# Patient Record
Sex: FEMALE | Race: OTHER | HISPANIC OR LATINO
[De-identification: names, ages, dates, MRNs, and addresses within clinical notes are randomized per-mention and may not be internally consistent; named-entity substitution may affect disease eponyms.]

---

## 2017-02-22 ENCOUNTER — APPOINTMENT (OUTPATIENT)
Dept: UROLOGY | Facility: CLINIC | Age: 52
End: 2017-02-22

## 2017-02-22 VITALS
HEIGHT: 61 IN | WEIGHT: 181 LBS | DIASTOLIC BLOOD PRESSURE: 72 MMHG | HEART RATE: 87 BPM | BODY MASS INDEX: 34.17 KG/M2 | OXYGEN SATURATION: 98 % | SYSTOLIC BLOOD PRESSURE: 110 MMHG | TEMPERATURE: 98.1 F

## 2017-02-22 RX ORDER — TAMSULOSIN HYDROCHLORIDE 0.4 MG/1
0.4 CAPSULE ORAL
Qty: 30 | Refills: 0 | Status: COMPLETED | COMMUNITY
Start: 2016-11-30

## 2017-02-22 RX ORDER — SULFAMETHOXAZOLE AND TRIMETHOPRIM 800; 160 MG/1; MG/1
800-160 TABLET ORAL
Qty: 10 | Refills: 0 | Status: COMPLETED | COMMUNITY
Start: 2016-12-13

## 2017-02-22 RX ORDER — ACETAMINOPHEN AND CODEINE 300; 30 MG/1; MG/1
300-30 TABLET ORAL
Qty: 60 | Refills: 0 | Status: COMPLETED | COMMUNITY
Start: 2016-10-20

## 2017-02-22 RX ORDER — AZITHROMYCIN 250 MG/1
250 TABLET, FILM COATED ORAL
Qty: 6 | Refills: 0 | Status: COMPLETED | COMMUNITY
Start: 2017-01-11

## 2017-02-22 RX ORDER — OXYCODONE AND ACETAMINOPHEN 5; 325 MG/1; MG/1
5-325 TABLET ORAL
Qty: 12 | Refills: 0 | Status: COMPLETED | COMMUNITY
Start: 2016-12-01

## 2017-02-24 LAB
BUN SERPL-MCNC: 13 MG/DL
CREAT SERPL-MCNC: 0.86 MG/DL

## 2017-03-02 ENCOUNTER — OUTPATIENT (OUTPATIENT)
Dept: OUTPATIENT SERVICES | Facility: HOSPITAL | Age: 52
LOS: 1 days | End: 2017-03-02
Payer: COMMERCIAL

## 2017-03-02 ENCOUNTER — APPOINTMENT (OUTPATIENT)
Dept: CT IMAGING | Facility: HOSPITAL | Age: 52
End: 2017-03-02

## 2017-03-02 DIAGNOSIS — Z98.890 OTHER SPECIFIED POSTPROCEDURAL STATES: Chronic | ICD-10-CM

## 2017-03-02 DIAGNOSIS — R10.9 UNSPECIFIED ABDOMINAL PAIN: ICD-10-CM

## 2017-03-02 PROCEDURE — 74176 CT ABD & PELVIS W/O CONTRAST: CPT

## 2017-03-16 LAB
STONE COMMENTS: NORMAL
STONE, AMMONIUM URINE: 32 MEQ/DY
STONE, BRUSHITE: 2.82
STONE, CALCIUM OXALATE: 4.6
STONE, CALCIUM URINE: 355 MG/DAY
STONE, CITRATE URINE: 775 MG/DAY
STONE, CREATININE URINE: 1877 MG/DAY
STONE, MAGNESIUM URINE: 66 MG/DAY
STONE, OXALATE URINE: 31 MG/DAY
STONE, PH URINE: 5.7
STONE, PHOSPHOROUS URINE: 874 MG/DAY
STONE, POTASSIUM URINE: 47 MEQ/DY
STONE, SODIUM URATE: 1.16
STONE, SODIUM URINE: 135 MEQ/DY
STONE, STRUVITE: 0.3
STONE, SULFATE URINE: 21 MMOL/DAY
STONE, URIC ACID URINE: 1.03
STONE, URIC ACID URINE: 148 MG/DAY
SUPER SATURATION INDEX WITH RESPECT TO: NORMAL
SUSPECTED PROBLEM IS: NORMAL
THE PATIENT HAS: NORMAL
TOTAL VOLUME URINE: 0.98 CD:134269781

## 2017-03-22 ENCOUNTER — APPOINTMENT (OUTPATIENT)
Dept: UROLOGY | Facility: CLINIC | Age: 52
End: 2017-03-22

## 2017-03-22 VITALS
WEIGHT: 184 LBS | HEART RATE: 88 BPM | SYSTOLIC BLOOD PRESSURE: 120 MMHG | HEIGHT: 61 IN | DIASTOLIC BLOOD PRESSURE: 70 MMHG | BODY MASS INDEX: 34.74 KG/M2 | RESPIRATION RATE: 16 BRPM | TEMPERATURE: 98.4 F

## 2017-04-05 ENCOUNTER — APPOINTMENT (OUTPATIENT)
Dept: INTERNAL MEDICINE | Facility: CLINIC | Age: 52
End: 2017-04-05

## 2017-04-05 VITALS
HEIGHT: 61 IN | BODY MASS INDEX: 34.55 KG/M2 | SYSTOLIC BLOOD PRESSURE: 119 MMHG | DIASTOLIC BLOOD PRESSURE: 80 MMHG | HEART RATE: 87 BPM | TEMPERATURE: 98.1 F | RESPIRATION RATE: 17 BRPM | WEIGHT: 183 LBS

## 2017-04-05 RX ORDER — NAPROXEN 500 MG/1
500 TABLET ORAL
Qty: 60 | Refills: 0 | Status: DISCONTINUED | COMMUNITY
Start: 2016-10-20 | End: 2017-04-05

## 2017-04-06 LAB
25(OH)D3 SERPL-MCNC: 18.1 NG/ML
ALBUMIN SERPL ELPH-MCNC: 3.9 G/DL
ALP BLD-CCNC: 86 U/L
ALT SERPL-CCNC: 64 U/L
ANION GAP SERPL CALC-SCNC: 15 MMOL/L
APPEARANCE: CLEAR
AST SERPL-CCNC: 60 U/L
BASOPHILS # BLD AUTO: 0.02 K/UL
BASOPHILS NFR BLD AUTO: 0.3 %
BILIRUB SERPL-MCNC: 0.2 MG/DL
BILIRUBIN URINE: NEGATIVE
BLOOD URINE: NEGATIVE
BUN SERPL-MCNC: 13 MG/DL
CALCIUM SERPL-MCNC: 9.6 MG/DL
CHLORIDE SERPL-SCNC: 102 MMOL/L
CHOLEST SERPL-MCNC: 176 MG/DL
CHOLEST/HDLC SERPL: 4 RATIO
CO2 SERPL-SCNC: 23 MMOL/L
COLOR: YELLOW
CREAT SERPL-MCNC: 0.79 MG/DL
EOSINOPHIL # BLD AUTO: 0.08 K/UL
EOSINOPHIL NFR BLD AUTO: 1 %
GGT SERPL-CCNC: 40 U/L
GLUCOSE QUALITATIVE U: NORMAL MG/DL
GLUCOSE SERPL-MCNC: 105 MG/DL
HBA1C MFR BLD HPLC: 6.3 %
HCT VFR BLD CALC: 38 %
HDLC SERPL-MCNC: 44 MG/DL
HGB BLD-MCNC: 12.6 G/DL
IMM GRANULOCYTES NFR BLD AUTO: 0.1 %
KETONES URINE: ABNORMAL
LDLC SERPL CALC-MCNC: 72 MG/DL
LEUKOCYTE ESTERASE URINE: NEGATIVE
LYMPHOCYTES # BLD AUTO: 2.15 K/UL
LYMPHOCYTES NFR BLD AUTO: 28.1 %
MAN DIFF?: NORMAL
MCHC RBC-ENTMCNC: 28.1 PG
MCHC RBC-ENTMCNC: 33.2 GM/DL
MCV RBC AUTO: 84.8 FL
MONOCYTES # BLD AUTO: 0.62 K/UL
MONOCYTES NFR BLD AUTO: 8.1 %
NEUTROPHILS # BLD AUTO: 4.77 K/UL
NEUTROPHILS NFR BLD AUTO: 62.4 %
NITRITE URINE: NEGATIVE
PH URINE: 5.5
PLATELET # BLD AUTO: 308 K/UL
POTASSIUM SERPL-SCNC: 3.9 MMOL/L
PROT SERPL-MCNC: 7.1 G/DL
PROTEIN URINE: NEGATIVE MG/DL
RBC # BLD: 4.48 M/UL
RBC # FLD: 13.2 %
SODIUM SERPL-SCNC: 140 MMOL/L
SPECIFIC GRAVITY URINE: 1.03
TRIGL SERPL-MCNC: 299 MG/DL
TSH SERPL-ACNC: 1.27 UIU/ML
UROBILINOGEN URINE: NORMAL MG/DL
WBC # FLD AUTO: 7.65 K/UL

## 2017-04-21 ENCOUNTER — APPOINTMENT (OUTPATIENT)
Dept: CARDIOLOGY | Facility: CLINIC | Age: 52
End: 2017-04-21

## 2017-04-21 ENCOUNTER — NON-APPOINTMENT (OUTPATIENT)
Age: 52
End: 2017-04-21

## 2017-04-21 VITALS
DIASTOLIC BLOOD PRESSURE: 72 MMHG | WEIGHT: 185 LBS | OXYGEN SATURATION: 98 % | HEART RATE: 90 BPM | RESPIRATION RATE: 17 BRPM | TEMPERATURE: 99 F | SYSTOLIC BLOOD PRESSURE: 107 MMHG | BODY MASS INDEX: 34.96 KG/M2

## 2017-05-11 ENCOUNTER — APPOINTMENT (OUTPATIENT)
Dept: INTERNAL MEDICINE | Facility: CLINIC | Age: 52
End: 2017-05-11

## 2017-05-11 VITALS
WEIGHT: 182 LBS | TEMPERATURE: 98.9 F | DIASTOLIC BLOOD PRESSURE: 78 MMHG | HEIGHT: 61 IN | SYSTOLIC BLOOD PRESSURE: 112 MMHG | BODY MASS INDEX: 34.36 KG/M2 | HEART RATE: 78 BPM

## 2017-05-11 RX ORDER — OMEPRAZOLE 20 MG/1
20 CAPSULE, DELAYED RELEASE ORAL DAILY
Qty: 90 | Refills: 3 | Status: DISCONTINUED | COMMUNITY
Start: 2017-04-05 | End: 2017-05-11

## 2017-05-11 RX ORDER — CLOTRIMAZOLE AND BETAMETHASONE DIPROPIONATE 10; .5 MG/G; MG/G
1-0.05 CREAM TOPICAL
Qty: 45 | Refills: 0 | Status: COMPLETED | COMMUNITY
Start: 2017-03-03

## 2017-05-11 RX ORDER — RANITIDINE 300 MG/1
300 TABLET ORAL
Qty: 90 | Refills: 0 | Status: DISCONTINUED | COMMUNITY
Start: 2016-10-12 | End: 2017-05-11

## 2017-05-11 RX ORDER — CIPROFLOXACIN 3 MG/ML
0.3 SOLUTION OPHTHALMIC
Qty: 5 | Refills: 0 | Status: COMPLETED | COMMUNITY
Start: 2017-04-08

## 2017-05-29 LAB
HAV IGG+IGM SER QL: REACTIVE
HBV SURFACE AB SER QL: NONREACTIVE
HBV SURFACE AG SER QL: NONREACTIVE
HCV AB SER QL: NONREACTIVE
HCV S/CO RATIO: 0.35 S/CO
UREA BREATH TEST QL: POSITIVE

## 2017-05-31 ENCOUNTER — LABORATORY RESULT (OUTPATIENT)
Age: 52
End: 2017-05-31

## 2017-05-31 ENCOUNTER — APPOINTMENT (OUTPATIENT)
Dept: INTERNAL MEDICINE | Facility: CLINIC | Age: 52
End: 2017-05-31

## 2017-05-31 VITALS
TEMPERATURE: 99.1 F | WEIGHT: 181 LBS | HEART RATE: 86 BPM | BODY MASS INDEX: 34.17 KG/M2 | SYSTOLIC BLOOD PRESSURE: 100 MMHG | HEIGHT: 61 IN | DIASTOLIC BLOOD PRESSURE: 60 MMHG

## 2017-06-01 LAB
A1AT SERPL-MCNC: 186 MG/DL
AFP-TM SERPL-MCNC: 3.8 NG/ML
ALBUMIN SERPL ELPH-MCNC: 4 G/DL
ALP BLD-CCNC: 89 U/L
ALT SERPL-CCNC: 47 U/L
AST SERPL-CCNC: 34 U/L
BILIRUB DIRECT SERPL-MCNC: 0.1 MG/DL
BILIRUB INDIRECT SERPL-MCNC: 0.1 MG/DL
BILIRUB SERPL-MCNC: 0.2 MG/DL
CARDIOLIPIN AB SER IA-ACNC: POSITIVE
PROT SERPL-MCNC: 7.6 G/DL

## 2017-06-05 LAB
DSDNA AB SER-ACNC: <12 IU/ML
ENDOMYSIUM IGA SER QL: NORMAL
ENDOMYSIUM IGA TITR SER: NORMAL
MITOCHONDRIA AB SER IF-ACNC: NORMAL
PS IGA SER QL: <20 U/ML
PS IGG SER QL: 10 U/ML
PS IGM SER QL: <25 U/ML
SMOOTH MUSCLE AB SER QL IF: NORMAL

## 2017-06-06 ENCOUNTER — RX RENEWAL (OUTPATIENT)
Age: 52
End: 2017-06-06

## 2017-06-21 ENCOUNTER — APPOINTMENT (OUTPATIENT)
Dept: CARDIOLOGY | Facility: CLINIC | Age: 52
End: 2017-06-21

## 2017-06-21 VITALS
TEMPERATURE: 99.6 F | DIASTOLIC BLOOD PRESSURE: 78 MMHG | RESPIRATION RATE: 17 BRPM | WEIGHT: 182 LBS | OXYGEN SATURATION: 98 % | SYSTOLIC BLOOD PRESSURE: 114 MMHG | HEART RATE: 92 BPM | BODY MASS INDEX: 34.39 KG/M2

## 2017-07-05 ENCOUNTER — TRANSCRIPTION ENCOUNTER (OUTPATIENT)
Age: 52
End: 2017-07-05

## 2017-07-05 ENCOUNTER — APPOINTMENT (OUTPATIENT)
Dept: INTERNAL MEDICINE | Facility: CLINIC | Age: 52
End: 2017-07-05

## 2017-07-17 ENCOUNTER — APPOINTMENT (OUTPATIENT)
Dept: UROLOGY | Facility: CLINIC | Age: 52
End: 2017-07-17

## 2017-07-17 VITALS
RESPIRATION RATE: 17 BRPM | WEIGHT: 181 LBS | TEMPERATURE: 99.5 F | SYSTOLIC BLOOD PRESSURE: 102 MMHG | DIASTOLIC BLOOD PRESSURE: 82 MMHG | BODY MASS INDEX: 34.17 KG/M2 | HEIGHT: 61 IN | HEART RATE: 87 BPM

## 2017-07-18 ENCOUNTER — APPOINTMENT (OUTPATIENT)
Dept: INTERNAL MEDICINE | Facility: CLINIC | Age: 52
End: 2017-07-18

## 2017-07-18 VITALS
BODY MASS INDEX: 34.17 KG/M2 | OXYGEN SATURATION: 99 % | TEMPERATURE: 98.4 F | HEART RATE: 91 BPM | SYSTOLIC BLOOD PRESSURE: 100 MMHG | WEIGHT: 181 LBS | HEIGHT: 61 IN | DIASTOLIC BLOOD PRESSURE: 60 MMHG

## 2017-07-20 LAB
APPEARANCE: CLEAR
BACTERIA UR CULT: NORMAL
BACTERIA: NEGATIVE
BILIRUBIN URINE: NEGATIVE
BLOOD URINE: NEGATIVE
COLOR: YELLOW
GLUCOSE QUALITATIVE U: NORMAL MG/DL
HYALINE CASTS: 6 /LPF
KETONES URINE: NEGATIVE
LEUKOCYTE ESTERASE URINE: NEGATIVE
MICROSCOPIC-UA: NORMAL
NITRITE URINE: NEGATIVE
PH URINE: 5.5
PROTEIN URINE: NEGATIVE MG/DL
RED BLOOD CELLS URINE: 3 /HPF
SPECIFIC GRAVITY URINE: 1.02
SQUAMOUS EPITHELIAL CELLS: 4 /HPF
UROBILINOGEN URINE: NORMAL MG/DL
WHITE BLOOD CELLS URINE: 3 /HPF

## 2017-08-07 ENCOUNTER — RX RENEWAL (OUTPATIENT)
Age: 52
End: 2017-08-07

## 2017-08-07 LAB
STONE COMMENTS: NORMAL
STONE, AMMONIUM URINE: 18 MEQ/DY
STONE, BRUSHITE: 0.75
STONE, CALCIUM OXALATE: 1.29
STONE, CALCIUM URINE: 191 MG/DAY
STONE, CITRATE URINE: 610 MG/DAY
STONE, CREATININE URINE: 1480 MG/DAY
STONE, MAGNESIUM URINE: 25 MG/DAY
STONE, OXALATE URINE: 24 MG/DAY
STONE, PH URINE: 6.2
STONE, PHOSPHOROUS URINE: 471 MG/DAY
STONE, POTASSIUM URINE: 23 MEQ/DY
STONE, SODIUM URATE: 1.41
STONE, SODIUM URINE: 140 MEQ/DY
STONE, STRUVITE: 0.05
STONE, SULFATE URINE: 10 MMOL/DAY
STONE, URIC ACID URINE: 0.73
STONE, URIC ACID URINE: 514 MG/DAY
SUPER SATURATION INDEX WITH RESPECT TO: NORMAL
SUSPECTED PROBLEM IS: NORMAL
THE PATIENT HAS: NORMAL
TOTAL VOLUME URINE: 2.03 CD:134269781

## 2017-08-16 ENCOUNTER — MEDICATION RENEWAL (OUTPATIENT)
Age: 52
End: 2017-08-16

## 2017-08-21 ENCOUNTER — APPOINTMENT (OUTPATIENT)
Dept: RHEUMATOLOGY | Facility: CLINIC | Age: 52
End: 2017-08-21
Payer: COMMERCIAL

## 2017-08-21 VITALS
DIASTOLIC BLOOD PRESSURE: 66 MMHG | TEMPERATURE: 98.8 F | HEART RATE: 73 BPM | SYSTOLIC BLOOD PRESSURE: 98 MMHG | RESPIRATION RATE: 18 BRPM | BODY MASS INDEX: 34.58 KG/M2 | OXYGEN SATURATION: 98 % | WEIGHT: 183 LBS

## 2017-08-21 PROCEDURE — 99244 OFF/OP CNSLTJ NEW/EST MOD 40: CPT

## 2017-08-23 LAB
25(OH)D3 SERPL-MCNC: 22.8 NG/ML
ALBUMIN SERPL ELPH-MCNC: 4.1 G/DL
ALP BLD-CCNC: 80 U/L
ALT SERPL-CCNC: 51 U/L
ANA PAT FLD IF-IMP: ABNORMAL
ANA SER IF-ACNC: ABNORMAL
ANION GAP SERPL CALC-SCNC: 16 MMOL/L
APPEARANCE: ABNORMAL
APTT BLD: 35.3 SEC
AST SERPL-CCNC: 43 U/L
B2 GLYCOPROT1 AB SER QL: NEGATIVE
BACTERIA: ABNORMAL
BASOPHILS # BLD AUTO: 0.02 K/UL
BASOPHILS NFR BLD AUTO: 0.2 %
BILIRUB SERPL-MCNC: 0.3 MG/DL
BILIRUBIN URINE: NEGATIVE
BLOOD URINE: NEGATIVE
BUN SERPL-MCNC: 11 MG/DL
CALCIUM SERPL-MCNC: 9.5 MG/DL
CARDIOLIPIN AB SER IA-ACNC: NEGATIVE
CCP AB SER IA-ACNC: <8 UNITS
CENTROMERE IGG SER-ACNC: <0.2 AL
CHLORIDE SERPL-SCNC: 103 MMOL/L
CO2 SERPL-SCNC: 24 MMOL/L
COLOR: YELLOW
CREAT SERPL-MCNC: 0.81 MG/DL
CREAT SPEC-SCNC: 136 MG/DL
CREAT/PROT UR: 0.1 RATIO
CRP SERPL-MCNC: <0.2 MG/DL
DSDNA AB SER-ACNC: <12 IU/ML
ENA RNP AB SER IA-ACNC: <0.2 AL
ENA SCL70 IGG SER IA-ACNC: <0.2 AL
ENA SM AB SER IA-ACNC: <0.2 AL
ENA SS-A AB SER IA-ACNC: <0.2 AL
ENA SS-B AB SER IA-ACNC: <0.2 AL
EOSINOPHIL # BLD AUTO: 0.06 K/UL
EOSINOPHIL NFR BLD AUTO: 0.6 %
ERYTHROCYTE [SEDIMENTATION RATE] IN BLOOD BY WESTERGREN METHOD: 11 MM/HR
G6PD SER-CCNC: 11.8 U/G HB
GLUCOSE QUALITATIVE U: NORMAL MG/DL
GLUCOSE SERPL-MCNC: 86 MG/DL
HCT VFR BLD CALC: 40.6 %
HGB BLD-MCNC: 13.2 G/DL
HYALINE CASTS: 0 /LPF
IMM GRANULOCYTES NFR BLD AUTO: 0.2 %
KETONES URINE: NEGATIVE
LEUKOCYTE ESTERASE URINE: NEGATIVE
LYMPHOCYTES # BLD AUTO: 2.55 K/UL
LYMPHOCYTES NFR BLD AUTO: 26 %
MAN DIFF?: NORMAL
MCHC RBC-ENTMCNC: 28.2 PG
MCHC RBC-ENTMCNC: 32.5 GM/DL
MCV RBC AUTO: 86.8 FL
MICROSCOPIC-UA: NORMAL
MONOCYTES # BLD AUTO: 0.66 K/UL
MONOCYTES NFR BLD AUTO: 6.7 %
NEUTROPHILS # BLD AUTO: 6.51 K/UL
NEUTROPHILS NFR BLD AUTO: 66.3 %
NITRITE URINE: NEGATIVE
PH URINE: 5.5
PLATELET # BLD AUTO: 348 K/UL
POTASSIUM SERPL-SCNC: 4.2 MMOL/L
PROT SERPL-MCNC: 7.4 G/DL
PROT UR-MCNC: 7 MG/DL
PROTEIN URINE: NEGATIVE MG/DL
RBC # BLD: 4.68 M/UL
RBC # FLD: 13.5 %
RED BLOOD CELLS URINE: 2 /HPF
RF+CCP IGG SER-IMP: NEGATIVE
RHEUMATOID FACT SER QL: 13 IU/ML
SODIUM SERPL-SCNC: 143 MMOL/L
SPECIFIC GRAVITY URINE: 1.02
SQUAMOUS EPITHELIAL CELLS: 5 /HPF
THYROGLOB AB SERPL-ACNC: <20 IU/ML
THYROPEROXIDASE AB SERPL IA-ACNC: <10 IU/ML
TSH SERPL-ACNC: 1.45 UIU/ML
URATE SERPL-MCNC: 6.1 MG/DL
URINE COMMENTS: NORMAL
UROBILINOGEN URINE: NORMAL MG/DL
WBC # FLD AUTO: 9.82 K/UL
WHITE BLOOD CELLS URINE: 3 /HPF

## 2017-08-24 LAB — RNA POLYMERASE III IGG: <10 U

## 2017-09-19 ENCOUNTER — APPOINTMENT (OUTPATIENT)
Age: 52
End: 2017-09-19
Payer: COMMERCIAL

## 2017-09-19 VITALS
SYSTOLIC BLOOD PRESSURE: 100 MMHG | TEMPERATURE: 99 F | WEIGHT: 183 LBS | RESPIRATION RATE: 18 BRPM | DIASTOLIC BLOOD PRESSURE: 78 MMHG | BODY MASS INDEX: 34.55 KG/M2 | OXYGEN SATURATION: 99 % | HEART RATE: 90 BPM | HEIGHT: 61 IN

## 2017-09-19 PROCEDURE — 99243 OFF/OP CNSLTJ NEW/EST LOW 30: CPT

## 2017-09-21 ENCOUNTER — APPOINTMENT (OUTPATIENT)
Dept: RADIOLOGY | Facility: CLINIC | Age: 52
End: 2017-09-21

## 2017-09-21 ENCOUNTER — OUTPATIENT (OUTPATIENT)
Dept: OUTPATIENT SERVICES | Facility: HOSPITAL | Age: 52
LOS: 1 days | End: 2017-09-21
Payer: COMMERCIAL

## 2017-09-21 DIAGNOSIS — Z98.890 OTHER SPECIFIED POSTPROCEDURAL STATES: Chronic | ICD-10-CM

## 2017-09-22 PROCEDURE — 77080 DXA BONE DENSITY AXIAL: CPT | Mod: 26

## 2017-09-25 LAB
ALBUMIN SERPL ELPH-MCNC: 3.9 G/DL
ALP BLD-CCNC: 93 U/L
ALT SERPL-CCNC: 53 U/L
AST SERPL-CCNC: 38 U/L
BILIRUB DIRECT SERPL-MCNC: 0.1 MG/DL
BILIRUB INDIRECT SERPL-MCNC: 0.3 MG/DL
BILIRUB SERPL-MCNC: 0.4 MG/DL
CERULOPLASMIN SERPL-MCNC: 31 MG/DL
DEPRECATED KAPPA LC FREE/LAMBDA SER: 1.4 RATIO
HBV CORE IGG+IGM SER QL: NONREACTIVE
IGA SER QL IEP: 190 MG/DL
IGG SER QL IEP: 1360 MG/DL
IGM SER QL IEP: 171 MG/DL
KAPPA LC CSF-MCNC: 1.62 MG/DL
KAPPA LC SERPL-MCNC: 2.26 MG/DL
PROT SERPL-MCNC: 7.4 G/DL
TTG IGA SER IA-ACNC: 6 UNITS
TTG IGA SER-ACNC: NEGATIVE

## 2017-10-02 ENCOUNTER — APPOINTMENT (OUTPATIENT)
Dept: RHEUMATOLOGY | Facility: CLINIC | Age: 52
End: 2017-10-02
Payer: COMMERCIAL

## 2017-10-02 VITALS
DIASTOLIC BLOOD PRESSURE: 68 MMHG | BODY MASS INDEX: 34.17 KG/M2 | OXYGEN SATURATION: 98 % | TEMPERATURE: 99 F | SYSTOLIC BLOOD PRESSURE: 106 MMHG | HEART RATE: 101 BPM | HEIGHT: 61 IN | RESPIRATION RATE: 18 BRPM | WEIGHT: 181 LBS

## 2017-10-02 DIAGNOSIS — Z85.3 PERSONAL HISTORY OF MALIGNANT NEOPLASM OF BREAST: ICD-10-CM

## 2017-10-02 DIAGNOSIS — Z87.19 PERSONAL HISTORY OF OTHER DISEASES OF THE DIGESTIVE SYSTEM: ICD-10-CM

## 2017-10-02 DIAGNOSIS — Z87.39 PERSONAL HISTORY OF OTHER DISEASES OF THE MUSCULOSKELETAL SYSTEM AND CONNECTIVE TISSUE: ICD-10-CM

## 2017-10-02 PROCEDURE — 99213 OFFICE O/P EST LOW 20 MIN: CPT

## 2017-10-10 ENCOUNTER — APPOINTMENT (OUTPATIENT)
Dept: INTERNAL MEDICINE | Facility: CLINIC | Age: 52
End: 2017-10-10
Payer: COMMERCIAL

## 2017-10-10 VITALS
SYSTOLIC BLOOD PRESSURE: 100 MMHG | WEIGHT: 180 LBS | HEIGHT: 61 IN | RESPIRATION RATE: 16 BRPM | HEART RATE: 73 BPM | BODY MASS INDEX: 33.99 KG/M2 | TEMPERATURE: 98.4 F | OXYGEN SATURATION: 99 % | DIASTOLIC BLOOD PRESSURE: 79 MMHG

## 2017-10-10 PROCEDURE — 90686 IIV4 VACC NO PRSV 0.5 ML IM: CPT

## 2017-10-10 PROCEDURE — 99213 OFFICE O/P EST LOW 20 MIN: CPT | Mod: 25

## 2017-10-10 PROCEDURE — G0008: CPT

## 2017-10-24 ENCOUNTER — OUTPATIENT (OUTPATIENT)
Dept: OUTPATIENT SERVICES | Facility: HOSPITAL | Age: 52
LOS: 1 days | Discharge: HOME | End: 2017-10-24
Payer: COMMERCIAL

## 2017-10-24 ENCOUNTER — APPOINTMENT (OUTPATIENT)
Dept: INTERNAL MEDICINE | Facility: CLINIC | Age: 52
End: 2017-10-24

## 2017-10-24 DIAGNOSIS — C50.919 MALIGNANT NEOPLASM OF UNSPECIFIED SITE OF UNSPECIFIED FEMALE BREAST: ICD-10-CM

## 2017-10-24 DIAGNOSIS — Z98.890 OTHER SPECIFIED POSTPROCEDURAL STATES: Chronic | ICD-10-CM

## 2017-10-26 ENCOUNTER — MEDICATION RENEWAL (OUTPATIENT)
Age: 52
End: 2017-10-26

## 2017-10-26 ENCOUNTER — APPOINTMENT (OUTPATIENT)
Dept: CARDIOLOGY | Facility: CLINIC | Age: 52
End: 2017-10-26

## 2017-10-27 ENCOUNTER — NON-APPOINTMENT (OUTPATIENT)
Age: 52
End: 2017-10-27

## 2017-10-27 ENCOUNTER — APPOINTMENT (OUTPATIENT)
Dept: CARDIOLOGY | Facility: CLINIC | Age: 52
End: 2017-10-27
Payer: COMMERCIAL

## 2017-10-27 ENCOUNTER — APPOINTMENT (OUTPATIENT)
Dept: CARDIOLOGY | Facility: CLINIC | Age: 52
End: 2017-10-27

## 2017-10-27 VITALS
DIASTOLIC BLOOD PRESSURE: 74 MMHG | TEMPERATURE: 98.4 F | HEART RATE: 76 BPM | RESPIRATION RATE: 12 BRPM | OXYGEN SATURATION: 97 % | WEIGHT: 180 LBS | BODY MASS INDEX: 34.01 KG/M2 | SYSTOLIC BLOOD PRESSURE: 114 MMHG

## 2017-10-27 PROCEDURE — 99214 OFFICE O/P EST MOD 30 MIN: CPT

## 2017-10-27 PROCEDURE — 93000 ELECTROCARDIOGRAM COMPLETE: CPT | Mod: 59

## 2017-10-30 ENCOUNTER — RESULT REVIEW (OUTPATIENT)
Age: 52
End: 2017-10-30

## 2017-10-30 ENCOUNTER — LABORATORY RESULT (OUTPATIENT)
Age: 52
End: 2017-10-30

## 2017-10-30 ENCOUNTER — OUTPATIENT (OUTPATIENT)
Dept: OUTPATIENT SERVICES | Facility: HOSPITAL | Age: 52
LOS: 1 days | End: 2017-10-30
Payer: COMMERCIAL

## 2017-10-30 ENCOUNTER — APPOINTMENT (OUTPATIENT)
Dept: HEMATOLOGY ONCOLOGY | Facility: CLINIC | Age: 52
End: 2017-10-30
Payer: COMMERCIAL

## 2017-10-30 VITALS
HEIGHT: 61.46 IN | HEART RATE: 82 BPM | SYSTOLIC BLOOD PRESSURE: 122 MMHG | BODY MASS INDEX: 33.41 KG/M2 | OXYGEN SATURATION: 97 % | RESPIRATION RATE: 16 BRPM | DIASTOLIC BLOOD PRESSURE: 77 MMHG | WEIGHT: 179.24 LBS | TEMPERATURE: 99.3 F

## 2017-10-30 DIAGNOSIS — C50.919 MALIGNANT NEOPLASM OF UNSPECIFIED SITE OF UNSPECIFIED FEMALE BREAST: ICD-10-CM

## 2017-10-30 DIAGNOSIS — Z98.890 OTHER SPECIFIED POSTPROCEDURAL STATES: Chronic | ICD-10-CM

## 2017-10-30 LAB
HCT VFR BLD CALC: 38.7 % — SIGNIFICANT CHANGE UP (ref 34.5–45)
HGB BLD-MCNC: 13.4 G/DL — SIGNIFICANT CHANGE UP (ref 11.5–15.5)
MCHC RBC-ENTMCNC: 28.9 PG — SIGNIFICANT CHANGE UP (ref 27–34)
MCHC RBC-ENTMCNC: 34.5 G/DL — SIGNIFICANT CHANGE UP (ref 32–36)
MCV RBC AUTO: 83.6 FL — SIGNIFICANT CHANGE UP (ref 80–100)
PLATELET # BLD AUTO: 312 K/UL — SIGNIFICANT CHANGE UP (ref 150–400)
RBC # BLD: 4.63 M/UL — SIGNIFICANT CHANGE UP (ref 3.8–5.2)
RBC # FLD: 11.4 % — SIGNIFICANT CHANGE UP (ref 10.3–14.5)
WBC # BLD: 8.4 K/UL — SIGNIFICANT CHANGE UP (ref 3.8–10.5)
WBC # FLD AUTO: 8.4 K/UL — SIGNIFICANT CHANGE UP (ref 3.8–10.5)

## 2017-10-30 PROCEDURE — 81240 F2 GENE: CPT

## 2017-10-30 PROCEDURE — 81241 F5 GENE: CPT

## 2017-10-30 PROCEDURE — G0452: CPT | Mod: 26

## 2017-10-30 PROCEDURE — 99245 OFF/OP CONSLTJ NEW/EST HI 55: CPT

## 2017-11-01 ENCOUNTER — RESULT REVIEW (OUTPATIENT)
Age: 52
End: 2017-11-01

## 2017-11-01 LAB
DNA PLOIDY SPEC FC-IMP: SIGNIFICANT CHANGE UP
PTR INTERPRETATION: SIGNIFICANT CHANGE UP

## 2017-11-01 PROCEDURE — 88321 CONSLTJ&REPRT SLD PREP ELSWR: CPT

## 2017-11-02 LAB — SURGICAL PATHOLOGY STUDY: SIGNIFICANT CHANGE UP

## 2017-11-03 LAB
25(OH)D3 SERPL-MCNC: 28.4 NG/ML
ALBUMIN SERPL ELPH-MCNC: 4.2 G/DL
ALP BLD-CCNC: 77 U/L
ALT SERPL-CCNC: 59 U/L
ANION GAP SERPL CALC-SCNC: 17 MMOL/L
APCR PPP: 3.03 RATIO
AST SERPL-CCNC: 50 U/L
AT III PPP CHRO-ACNC: 67 %
BILIRUB SERPL-MCNC: 0.2 MG/DL
BUN SERPL-MCNC: 13 MG/DL
CALCIUM SERPL-MCNC: 9.6 MG/DL
CARDIOLIPIN AB SER IA-ACNC: POSITIVE
CHLORIDE SERPL-SCNC: 104 MMOL/L
CO2 SERPL-SCNC: 23 MMOL/L
CONFIRM: 29.4 SEC
CREAT SERPL-MCNC: 0.73 MG/DL
DRVVT IMM 1:2 NP PPP: NORMAL
DRVVT SCREEN TO CONFIRM RATIO: 0.88 RATIO
ESTRADIOL SERPL-MCNC: 20 PG/ML
FSH SERPL-MCNC: 24.7 IU/L
FVL BLANK: 37.9
FVL TEST: 114.8
GLUCOSE SERPL-MCNC: 114 MG/DL
LH SERPL-ACNC: 12.7 IU/L
POTASSIUM SERPL-SCNC: 3.7 MMOL/L
PROT S AG ACT/NOR PPP IA: 148 %
PROT SERPL-MCNC: 7.6 G/DL
SCREEN DRVVT: 29.1 SEC
SILICA CLOTTING TIME INTERPRETATION: NORMAL
SILICA CLOTTING TIME S/C: 0.97 RATIO
SODIUM SERPL-SCNC: 144 MMOL/L

## 2017-11-03 RX ORDER — OMEPRAZOLE 40 MG/1
40 CAPSULE, DELAYED RELEASE ORAL TWICE DAILY
Qty: 28 | Refills: 0 | Status: DISCONTINUED | COMMUNITY
Start: 2017-07-18 | End: 2017-11-03

## 2017-11-03 RX ORDER — PROMETHAZINE AND PHENYLEPHRINE HYDROCHLORIDE AND CODEINE PHOSPHATE 10; 6.25; 5 MG/5ML; MG/5ML; MG/5ML
6.25-5-1 SOLUTION ORAL
Qty: 240 | Refills: 1 | Status: DISCONTINUED | COMMUNITY
Start: 2017-10-27 | End: 2017-11-03

## 2017-11-07 PROBLEM — Z87.39 HISTORY OF ARTHRITIS: Status: RESOLVED | Noted: 2017-08-21 | Resolved: 2017-11-07

## 2017-11-07 PROBLEM — Z87.19 HISTORY OF GASTROESOPHAGEAL REFLUX (GERD): Status: RESOLVED | Noted: 2017-08-21 | Resolved: 2017-11-07

## 2017-11-07 PROBLEM — Z85.3 HISTORY OF MALIGNANT NEOPLASM OF BREAST: Status: RESOLVED | Noted: 2017-08-21 | Resolved: 2017-11-07

## 2017-11-08 DIAGNOSIS — D68.9 COAGULATION DEFECT, UNSPECIFIED: ICD-10-CM

## 2017-11-13 ENCOUNTER — MEDICATION RENEWAL (OUTPATIENT)
Age: 52
End: 2017-11-13

## 2017-12-05 ENCOUNTER — OTHER (OUTPATIENT)
Age: 52
End: 2017-12-05

## 2017-12-13 ENCOUNTER — APPOINTMENT (OUTPATIENT)
Age: 52
End: 2017-12-13
Payer: COMMERCIAL

## 2017-12-13 VITALS
OXYGEN SATURATION: 99 % | HEIGHT: 61.46 IN | WEIGHT: 175 LBS | SYSTOLIC BLOOD PRESSURE: 110 MMHG | RESPIRATION RATE: 16 BRPM | HEART RATE: 90 BPM | BODY MASS INDEX: 32.62 KG/M2 | TEMPERATURE: 98.5 F | DIASTOLIC BLOOD PRESSURE: 80 MMHG

## 2017-12-13 PROCEDURE — 99213 OFFICE O/P EST LOW 20 MIN: CPT

## 2017-12-13 RX ORDER — TAMOXIFEN CITRATE 20 MG/1
20 TABLET, FILM COATED ORAL
Qty: 30 | Refills: 0 | Status: DISCONTINUED | COMMUNITY
Start: 2016-06-21 | End: 2017-12-13

## 2017-12-13 RX ORDER — DEXLANSOPRAZOLE 60 MG/1
60 CAPSULE, DELAYED RELEASE ORAL
Qty: 30 | Refills: 1 | Status: DISCONTINUED | COMMUNITY
Start: 2017-05-31 | End: 2017-12-13

## 2018-01-05 ENCOUNTER — MEDICATION RENEWAL (OUTPATIENT)
Age: 53
End: 2018-01-05

## 2018-01-18 ENCOUNTER — APPOINTMENT (OUTPATIENT)
Dept: UROLOGY | Facility: CLINIC | Age: 53
End: 2018-01-18

## 2018-01-31 ENCOUNTER — APPOINTMENT (OUTPATIENT)
Dept: INTERNAL MEDICINE | Facility: CLINIC | Age: 53
End: 2018-01-31
Payer: COMMERCIAL

## 2018-01-31 VITALS
WEIGHT: 175 LBS | OXYGEN SATURATION: 99 % | RESPIRATION RATE: 16 BRPM | SYSTOLIC BLOOD PRESSURE: 120 MMHG | HEIGHT: 61 IN | TEMPERATURE: 98.5 F | HEART RATE: 84 BPM | BODY MASS INDEX: 33.04 KG/M2 | DIASTOLIC BLOOD PRESSURE: 80 MMHG

## 2018-01-31 PROCEDURE — 99214 OFFICE O/P EST MOD 30 MIN: CPT

## 2018-02-05 LAB
AFP-TM SERPL-MCNC: 3.5 NG/ML
ALBUMIN SERPL ELPH-MCNC: 4.5 G/DL
ALP BLD-CCNC: 91 U/L
ALT SERPL-CCNC: 74 U/L
AST SERPL-CCNC: 57 U/L
BILIRUB DIRECT SERPL-MCNC: 0.1 MG/DL
BILIRUB INDIRECT SERPL-MCNC: 0.3 MG/DL
BILIRUB SERPL-MCNC: 0.4 MG/DL
DEPRECATED KAPPA LC FREE/LAMBDA SER: 1.28 RATIO
FERRITIN SERPL-MCNC: 97 NG/ML
IGA SER QL IEP: 200 MG/DL
IGG SER QL IEP: 1380 MG/DL
IGM SER QL IEP: 185 MG/DL
IRON SATN MFR SERPL: 29 %
IRON SERPL-MCNC: 101 UG/DL
KAPPA LC CSF-MCNC: 1.4 MG/DL
KAPPA LC SERPL-MCNC: 1.79 MG/DL
PROT SERPL-MCNC: 7.6 G/DL
SMOOTH MUSCLE AB SER QL IF: NORMAL
TIBC SERPL-MCNC: 354 UG/DL
TTG IGA SER IA-ACNC: <5 UNITS
TTG IGA SER-ACNC: NEGATIVE
UIBC SERPL-MCNC: 253 UG/DL

## 2018-02-06 ENCOUNTER — OTHER (OUTPATIENT)
Age: 53
End: 2018-02-06

## 2018-02-06 LAB
ANA PAT FLD IF-IMP: ABNORMAL
ANA SER IF-ACNC: ABNORMAL
ANION GAP SERPL CALC-SCNC: 14 MMOL/L
BASOPHILS # BLD AUTO: 0.02 K/UL
BASOPHILS NFR BLD AUTO: 0.3 %
BUN SERPL-MCNC: 13 MG/DL
CALCIUM SERPL-MCNC: 10.3 MG/DL
CHLORIDE SERPL-SCNC: 102 MMOL/L
CO2 SERPL-SCNC: 26 MMOL/L
CREAT SERPL-MCNC: 0.87 MG/DL
EOSINOPHIL # BLD AUTO: 0.05 K/UL
EOSINOPHIL NFR BLD AUTO: 0.7 %
ESTIMATED AVERAGE GLUCOSE: 114 MG/DL
GLUCOSE SERPL-MCNC: 90 MG/DL
HBA1C MFR BLD HPLC: 5.6 %
HCT VFR BLD CALC: 41 %
HGB BLD-MCNC: 13.8 G/DL
IMM GRANULOCYTES NFR BLD AUTO: 0.1 %
LYMPHOCYTES # BLD AUTO: 2.24 K/UL
LYMPHOCYTES NFR BLD AUTO: 29.5 %
MAN DIFF?: NORMAL
MCHC RBC-ENTMCNC: 28.5 PG
MCHC RBC-ENTMCNC: 33.7 GM/DL
MCV RBC AUTO: 84.5 FL
MONOCYTES # BLD AUTO: 0.53 K/UL
MONOCYTES NFR BLD AUTO: 7 %
NEUTROPHILS # BLD AUTO: 4.75 K/UL
NEUTROPHILS NFR BLD AUTO: 62.4 %
PLATELET # BLD AUTO: 322 K/UL
POTASSIUM SERPL-SCNC: 4.2 MMOL/L
RBC # BLD: 4.85 M/UL
RBC # FLD: 12.9 %
SODIUM SERPL-SCNC: 142 MMOL/L
WBC # FLD AUTO: 7.6 K/UL

## 2018-02-13 ENCOUNTER — APPOINTMENT (OUTPATIENT)
Age: 53
End: 2018-02-13
Payer: COMMERCIAL

## 2018-02-13 PROCEDURE — 91200 LIVER ELASTOGRAPHY: CPT

## 2018-02-16 ENCOUNTER — APPOINTMENT (OUTPATIENT)
Dept: ULTRASOUND IMAGING | Facility: CLINIC | Age: 53
End: 2018-02-16
Payer: COMMERCIAL

## 2018-02-16 ENCOUNTER — OUTPATIENT (OUTPATIENT)
Dept: OUTPATIENT SERVICES | Facility: HOSPITAL | Age: 53
LOS: 1 days | End: 2018-02-16

## 2018-02-16 DIAGNOSIS — Z98.890 OTHER SPECIFIED POSTPROCEDURAL STATES: Chronic | ICD-10-CM

## 2018-02-16 PROCEDURE — 76700 US EXAM ABDOM COMPLETE: CPT | Mod: 26

## 2018-03-01 ENCOUNTER — APPOINTMENT (OUTPATIENT)
Dept: UROLOGY | Facility: CLINIC | Age: 53
End: 2018-03-01
Payer: COMMERCIAL

## 2018-03-01 PROCEDURE — 99214 OFFICE O/P EST MOD 30 MIN: CPT

## 2018-03-14 ENCOUNTER — APPOINTMENT (OUTPATIENT)
Age: 53
End: 2018-03-14
Payer: COMMERCIAL

## 2018-03-14 VITALS
RESPIRATION RATE: 16 BRPM | HEART RATE: 84 BPM | OXYGEN SATURATION: 98 % | SYSTOLIC BLOOD PRESSURE: 118 MMHG | TEMPERATURE: 98.5 F | WEIGHT: 180 LBS | HEIGHT: 61 IN | DIASTOLIC BLOOD PRESSURE: 64 MMHG | BODY MASS INDEX: 33.99 KG/M2

## 2018-03-14 PROCEDURE — 99213 OFFICE O/P EST LOW 20 MIN: CPT

## 2018-03-28 ENCOUNTER — EMERGENCY (EMERGENCY)
Facility: HOSPITAL | Age: 53
LOS: 1 days | Discharge: HOME | End: 2018-03-28
Attending: EMERGENCY MEDICINE
Payer: COMMERCIAL

## 2018-03-28 VITALS
HEART RATE: 79 BPM | RESPIRATION RATE: 16 BRPM | SYSTOLIC BLOOD PRESSURE: 109 MMHG | TEMPERATURE: 99 F | OXYGEN SATURATION: 96 % | DIASTOLIC BLOOD PRESSURE: 71 MMHG

## 2018-03-28 DIAGNOSIS — Z98.890 OTHER SPECIFIED POSTPROCEDURAL STATES: Chronic | ICD-10-CM

## 2018-03-28 PROCEDURE — 70450 CT HEAD/BRAIN W/O DYE: CPT | Mod: 26

## 2018-03-28 PROCEDURE — 99284 EMERGENCY DEPT VISIT MOD MDM: CPT

## 2018-03-28 PROCEDURE — 99284 EMERGENCY DEPT VISIT MOD MDM: CPT | Mod: 25

## 2018-03-28 PROCEDURE — 70450 CT HEAD/BRAIN W/O DYE: CPT

## 2018-03-28 RX ORDER — ACETAMINOPHEN 500 MG
975 TABLET ORAL ONCE
Qty: 0 | Refills: 0 | Status: DISCONTINUED | OUTPATIENT
Start: 2018-03-28 | End: 2018-03-28

## 2018-03-28 RX ORDER — METOCLOPRAMIDE HCL 10 MG
10 TABLET ORAL ONCE
Qty: 0 | Refills: 0 | Status: DISCONTINUED | OUTPATIENT
Start: 2018-03-28 | End: 2018-03-28

## 2018-03-28 RX ORDER — DIPHENHYDRAMINE HCL 50 MG
25 CAPSULE ORAL ONCE
Qty: 0 | Refills: 0 | Status: DISCONTINUED | OUTPATIENT
Start: 2018-03-28 | End: 2018-03-28

## 2018-03-28 NOTE — ED PROVIDER NOTE - PMH
Breast cancer    Chronic Peptic Ulcer    CN (Constipation)    DM (diabetes mellitus)    GERD (Gastroesophageal Reflux Disease)    Herniated Cervical Disc  uses occasional tylenol  History of Tubal Ligation  2003  HTN (Hypertension)    Kidney stones    Migraines    RAD (Reactive Airway Disease)  with cold or allergies  Seasonal allergies

## 2018-03-28 NOTE — ED PROVIDER NOTE - OBJECTIVE STATEMENT
51 y/o F pt w/ PMHx of DM, Kidney stones, Breast CA, RAD, Herniated cervical disc, GERD, HTN and Migraines presents to ED c/o worsening headache x3 days. Pt reports that she went to Urgent Care today and was told to present to the ED. Pt states that she used to get Migraines in the past and was on Fioricet which she took for her current headache to no relief. Pt states that she was newly put on Ranitidine 1 week ago. NKDA.

## 2018-03-28 NOTE — ED PROVIDER NOTE - PSH
Delivery  2001  History of lumpectomy    S/P Dilation and Curettage    S/P Endometrial Ablation    S/P Laparoscopic Cholecystectomy

## 2018-04-30 ENCOUNTER — APPOINTMENT (OUTPATIENT)
Dept: RHEUMATOLOGY | Facility: CLINIC | Age: 53
End: 2018-04-30
Payer: COMMERCIAL

## 2018-04-30 VITALS
DIASTOLIC BLOOD PRESSURE: 68 MMHG | HEART RATE: 82 BPM | RESPIRATION RATE: 16 BRPM | TEMPERATURE: 98.4 F | HEIGHT: 61 IN | WEIGHT: 180 LBS | BODY MASS INDEX: 33.99 KG/M2 | OXYGEN SATURATION: 98 % | SYSTOLIC BLOOD PRESSURE: 100 MMHG

## 2018-04-30 DIAGNOSIS — M25.50 PAIN IN UNSPECIFIED JOINT: ICD-10-CM

## 2018-04-30 PROCEDURE — 99214 OFFICE O/P EST MOD 30 MIN: CPT

## 2018-05-10 ENCOUNTER — OUTPATIENT (OUTPATIENT)
Dept: OUTPATIENT SERVICES | Facility: HOSPITAL | Age: 53
LOS: 1 days | Discharge: HOME | End: 2018-05-10

## 2018-05-10 DIAGNOSIS — Z98.890 OTHER SPECIFIED POSTPROCEDURAL STATES: Chronic | ICD-10-CM

## 2018-05-10 DIAGNOSIS — C50.919 MALIGNANT NEOPLASM OF UNSPECIFIED SITE OF UNSPECIFIED FEMALE BREAST: ICD-10-CM

## 2018-05-11 ENCOUNTER — RESULT REVIEW (OUTPATIENT)
Age: 53
End: 2018-05-11

## 2018-05-11 ENCOUNTER — APPOINTMENT (OUTPATIENT)
Dept: HEMATOLOGY ONCOLOGY | Facility: CLINIC | Age: 53
End: 2018-05-11
Payer: COMMERCIAL

## 2018-05-11 VITALS
OXYGEN SATURATION: 98 % | BODY MASS INDEX: 33.82 KG/M2 | SYSTOLIC BLOOD PRESSURE: 129 MMHG | DIASTOLIC BLOOD PRESSURE: 79 MMHG | WEIGHT: 178.99 LBS | TEMPERATURE: 98.9 F | RESPIRATION RATE: 16 BRPM | HEART RATE: 71 BPM

## 2018-05-11 LAB
HCT VFR BLD CALC: 39.3 % — SIGNIFICANT CHANGE UP (ref 34.5–45)
HGB BLD-MCNC: 13.7 G/DL — SIGNIFICANT CHANGE UP (ref 11.5–15.5)
MCHC RBC-ENTMCNC: 28.6 PG — SIGNIFICANT CHANGE UP (ref 27–34)
MCHC RBC-ENTMCNC: 34.9 G/DL — SIGNIFICANT CHANGE UP (ref 32–36)
MCV RBC AUTO: 82.1 FL — SIGNIFICANT CHANGE UP (ref 80–100)
PLATELET # BLD AUTO: 306 K/UL — SIGNIFICANT CHANGE UP (ref 150–400)
RBC # BLD: 4.79 M/UL — SIGNIFICANT CHANGE UP (ref 3.8–5.2)
RBC # FLD: 11.7 % — SIGNIFICANT CHANGE UP (ref 10.3–14.5)
WBC # BLD: 7.6 K/UL — SIGNIFICANT CHANGE UP (ref 3.8–10.5)
WBC # FLD AUTO: 7.6 K/UL — SIGNIFICANT CHANGE UP (ref 3.8–10.5)

## 2018-05-11 PROCEDURE — 99214 OFFICE O/P EST MOD 30 MIN: CPT

## 2018-05-12 LAB
25(OH)D3 SERPL-MCNC: 20.4 NG/ML
ALBUMIN SERPL ELPH-MCNC: 4.3 G/DL
ALP BLD-CCNC: 100 U/L
ALT SERPL-CCNC: 53 U/L
ANION GAP SERPL CALC-SCNC: 15 MMOL/L
AST SERPL-CCNC: 40 U/L
BILIRUB SERPL-MCNC: 0.3 MG/DL
BUN SERPL-MCNC: 14 MG/DL
CALCIUM SERPL-MCNC: 9.7 MG/DL
CHLORIDE SERPL-SCNC: 102 MMOL/L
CO2 SERPL-SCNC: 23 MMOL/L
CREAT SERPL-MCNC: 0.85 MG/DL
ESTRADIOL SERPL-MCNC: 52 PG/ML
FSH SERPL-MCNC: 29.7 IU/L
GLUCOSE SERPL-MCNC: 133 MG/DL
LH SERPL-ACNC: 25.9 IU/L
POTASSIUM SERPL-SCNC: 3.9 MMOL/L
PROT SERPL-MCNC: 7.4 G/DL
SODIUM SERPL-SCNC: 140 MMOL/L

## 2018-05-17 ENCOUNTER — APPOINTMENT (OUTPATIENT)
Dept: INTERNAL MEDICINE | Facility: CLINIC | Age: 53
End: 2018-05-17
Payer: COMMERCIAL

## 2018-05-17 VITALS
WEIGHT: 179 LBS | TEMPERATURE: 98.6 F | HEIGHT: 61 IN | OXYGEN SATURATION: 98 % | HEART RATE: 80 BPM | SYSTOLIC BLOOD PRESSURE: 112 MMHG | DIASTOLIC BLOOD PRESSURE: 78 MMHG | RESPIRATION RATE: 16 BRPM | BODY MASS INDEX: 33.79 KG/M2

## 2018-05-17 PROCEDURE — 82270 OCCULT BLOOD FECES: CPT

## 2018-05-17 PROCEDURE — 99215 OFFICE O/P EST HI 40 MIN: CPT | Mod: 25

## 2018-06-04 ENCOUNTER — APPOINTMENT (OUTPATIENT)
Dept: UROLOGY | Facility: CLINIC | Age: 53
End: 2018-06-04
Payer: COMMERCIAL

## 2018-06-04 VITALS
TEMPERATURE: 98.4 F | DIASTOLIC BLOOD PRESSURE: 86 MMHG | OXYGEN SATURATION: 98 % | BODY MASS INDEX: 33.79 KG/M2 | WEIGHT: 179 LBS | HEIGHT: 61 IN | SYSTOLIC BLOOD PRESSURE: 114 MMHG | HEART RATE: 85 BPM

## 2018-06-04 PROCEDURE — 99214 OFFICE O/P EST MOD 30 MIN: CPT

## 2018-06-07 LAB
APPEARANCE: CLEAR
BACTERIA UR CULT: NORMAL
BACTERIA: NEGATIVE
BILIRUBIN URINE: NEGATIVE
BLOOD URINE: ABNORMAL
COLOR: YELLOW
GLUCOSE QUALITATIVE U: NEGATIVE MG/DL
HYALINE CASTS: 0 /LPF
KETONES URINE: NEGATIVE
LEUKOCYTE ESTERASE URINE: ABNORMAL
MICROSCOPIC-UA: NORMAL
NITRITE URINE: NEGATIVE
PH URINE: 5.5
PROTEIN URINE: NEGATIVE MG/DL
RED BLOOD CELLS URINE: 5 /HPF
SPECIFIC GRAVITY URINE: 1.01
SQUAMOUS EPITHELIAL CELLS: 1 /HPF
UROBILINOGEN URINE: NEGATIVE MG/DL
WHITE BLOOD CELLS URINE: 11 /HPF

## 2018-06-10 RX ORDER — AMOXICILLIN 500 MG/1
500 CAPSULE ORAL TWICE DAILY
Qty: 40 | Refills: 0 | Status: COMPLETED | COMMUNITY
Start: 2018-05-17 | End: 2018-06-10

## 2018-06-13 ENCOUNTER — APPOINTMENT (OUTPATIENT)
Dept: CT IMAGING | Facility: HOSPITAL | Age: 53
End: 2018-06-13
Payer: COMMERCIAL

## 2018-06-13 ENCOUNTER — APPOINTMENT (OUTPATIENT)
Dept: HEPATOLOGY | Facility: CLINIC | Age: 53
End: 2018-06-13
Payer: COMMERCIAL

## 2018-06-13 ENCOUNTER — OUTPATIENT (OUTPATIENT)
Dept: OUTPATIENT SERVICES | Facility: HOSPITAL | Age: 53
LOS: 1 days | End: 2018-06-13
Payer: COMMERCIAL

## 2018-06-13 VITALS
TEMPERATURE: 98.9 F | HEIGHT: 61 IN | HEART RATE: 72 BPM | DIASTOLIC BLOOD PRESSURE: 80 MMHG | BODY MASS INDEX: 33.61 KG/M2 | OXYGEN SATURATION: 99 % | SYSTOLIC BLOOD PRESSURE: 120 MMHG | RESPIRATION RATE: 16 BRPM | WEIGHT: 178 LBS

## 2018-06-13 DIAGNOSIS — Z98.890 OTHER SPECIFIED POSTPROCEDURAL STATES: Chronic | ICD-10-CM

## 2018-06-13 DIAGNOSIS — N20.0 CALCULUS OF KIDNEY: ICD-10-CM

## 2018-06-13 PROCEDURE — 74176 CT ABD & PELVIS W/O CONTRAST: CPT | Mod: 26

## 2018-06-13 PROCEDURE — 99213 OFFICE O/P EST LOW 20 MIN: CPT

## 2018-06-13 PROCEDURE — 74176 CT ABD & PELVIS W/O CONTRAST: CPT

## 2018-06-18 ENCOUNTER — APPOINTMENT (OUTPATIENT)
Dept: RHEUMATOLOGY | Facility: CLINIC | Age: 53
End: 2018-06-18

## 2018-06-21 ENCOUNTER — APPOINTMENT (OUTPATIENT)
Dept: INTERNAL MEDICINE | Facility: CLINIC | Age: 53
End: 2018-06-21
Payer: COMMERCIAL

## 2018-06-21 VITALS
WEIGHT: 180 LBS | OXYGEN SATURATION: 98 % | DIASTOLIC BLOOD PRESSURE: 80 MMHG | HEIGHT: 61 IN | HEART RATE: 88 BPM | RESPIRATION RATE: 16 BRPM | SYSTOLIC BLOOD PRESSURE: 120 MMHG | TEMPERATURE: 97.7 F | BODY MASS INDEX: 33.99 KG/M2

## 2018-06-21 VITALS
OXYGEN SATURATION: 97 % | SYSTOLIC BLOOD PRESSURE: 120 MMHG | TEMPERATURE: 97.5 F | HEIGHT: 61 IN | WEIGHT: 180 LBS | BODY MASS INDEX: 33.99 KG/M2 | DIASTOLIC BLOOD PRESSURE: 74 MMHG | HEART RATE: 79 BPM

## 2018-06-21 PROCEDURE — 99213 OFFICE O/P EST LOW 20 MIN: CPT | Mod: 25

## 2018-06-21 PROCEDURE — 99203 OFFICE O/P NEW LOW 30 MIN: CPT | Mod: 25

## 2018-06-21 PROCEDURE — 93000 ELECTROCARDIOGRAM COMPLETE: CPT

## 2018-06-21 PROCEDURE — 99214 OFFICE O/P EST MOD 30 MIN: CPT

## 2018-06-21 RX ORDER — RIFABUTIN 150 MG/1
150 CAPSULE ORAL DAILY
Qty: 20 | Refills: 0 | Status: COMPLETED | COMMUNITY
Start: 2017-07-18 | End: 2018-06-21

## 2018-06-21 NOTE — PHYSICAL EXAM
[No Acute Distress] : no acute distress [Well Developed] : well developed [Well-Appearing] : well-appearing [Normal Sclera/Conjunctiva] : normal sclera/conjunctiva [PERRL] : pupils equal round and reactive to light [EOMI] : extraocular movements intact [Normal Oropharynx] : the oropharynx was normal [No JVD] : no jugular venous distention [Supple] : supple [No Lymphadenopathy] : no lymphadenopathy [Thyroid Normal, No Nodules] : the thyroid was normal and there were no nodules present [No Respiratory Distress] : no respiratory distress  [Clear to Auscultation] : lungs were clear to auscultation bilaterally [Normal Rate] : normal rate  [Regular Rhythm] : with a regular rhythm [Normal S1, S2] : normal S1 and S2 [No Murmur] : no murmur heard [No Carotid Bruits] : no carotid bruits [Pedal Pulses Present] : the pedal pulses are present [No Edema] : there was no peripheral edema [No Axillary Lymphadenopathy] : no axillary lymphadenopathy [Soft] : abdomen soft [No Masses] : no abdominal mass palpated [No HSM] : no HSM [Declined Rectal Exam] : declined rectal exam [Normal Axillary Nodes] : no axillary lymphadenopathy [Normal Posterior Cervical Nodes] : no posterior cervical lymphadenopathy [Normal Anterior Cervical Nodes] : no anterior cervical lymphadenopathy [Normal Inguinal Nodes] : no inguinal lymphadenopathy [No Joint Swelling] : no joint swelling [No Rash] : no rash [Normal Gait] : normal gait [No Focal Deficits] : no focal deficits [Normal Affect] : the affect was normal [Normal Insight/Judgement] : insight and judgment were intact [Comprehensive Foot Exam Normal] : Right and left foot were examined and both feet are normal. No ulcers in either foot. Toes are normal and with full ROM.  Normal tactile sensation with monofilament testing throughout both feet [de-identified] : obese [de-identified] : JONATHANlumpectomy,RT,L.wnl

## 2018-06-21 NOTE — ASSESSMENT
[FreeTextEntry1] : 51 yo with well controlled T2D,high TG,well controlled HTN,obesity,fatty liver.labs taken.will continue the same management.will f/u.\par -pt will f/u with oncology.\par -f/u  for renal calculi,no recent renal colic noted.\par -Reflux regimen is well known to pt\par -asthma is quiescent for many months.

## 2018-06-21 NOTE — COUNSELING
[Weight management counseling provided] : Weight management [Healthy eating counseling provided] : healthy eating [Activity counseling provided] : activity [Low Fat Diet] : Low fat diet [Low Salt Diet] : Low salt diet [Walking] : Walking [de-identified] : ADA,Mediterranean diet

## 2018-06-21 NOTE — HISTORY OF PRESENT ILLNESS
[FreeTextEntry1] : 51 yo asymptomatic for f/u. [de-identified] : 53 yo asymptomatic pt for f/u.\par h/o T2D,last ZcC9s=8,6%,on Metformin 1 g qd.h/o high TG,on diet.HDL=44,LDL=71\par h/o HTN,on CCB,ARB,HCT,well controlled.\par h/o  mild elevation of LFT's 2/2 fatty liver.\par h/o R.BC/DCIS,st.p.lumpectomy,RT,2015,on aromatase inhibitor Anastrozole,oncol.f/u,no recurrence,no spread..\par h/o mild seasonal asthma,on,Albuterol prn.\par h/o renal calculi.\par h/o GERD.

## 2018-06-29 ENCOUNTER — APPOINTMENT (OUTPATIENT)
Dept: INTERNAL MEDICINE | Facility: HOSPITAL | Age: 53
End: 2018-06-29

## 2018-07-19 ENCOUNTER — LABORATORY RESULT (OUTPATIENT)
Age: 53
End: 2018-07-19

## 2018-07-19 LAB
ALBUMIN SERPL ELPH-MCNC: 4.3 G/DL
ALP BLD-CCNC: 82 U/L
ALT SERPL-CCNC: 50 U/L
AST SERPL-CCNC: 38 U/L
BILIRUB DIRECT SERPL-MCNC: 0.1 MG/DL
BILIRUB INDIRECT SERPL-MCNC: 0.3 MG/DL
BILIRUB SERPL-MCNC: 0.5 MG/DL
PROT SERPL-MCNC: 7.3 G/DL
UREA BREATH TEST QL: NEGATIVE

## 2018-07-23 ENCOUNTER — OTHER (OUTPATIENT)
Age: 53
End: 2018-07-23

## 2018-07-23 LAB
25(OH)D3 SERPL-MCNC: 21.8 NG/ML
ALBUMIN SERPL ELPH-MCNC: 4.3 G/DL
ALP BLD-CCNC: 85 U/L
ALT SERPL-CCNC: 47 U/L
ANION GAP SERPL CALC-SCNC: 15 MMOL/L
APPEARANCE: ABNORMAL
AST SERPL-CCNC: 37 U/L
BASOPHILS # BLD AUTO: 0.02 K/UL
BASOPHILS NFR BLD AUTO: 0.3 %
BILIRUB SERPL-MCNC: 0.4 MG/DL
BILIRUBIN URINE: NEGATIVE
BLOOD URINE: ABNORMAL
BUN SERPL-MCNC: 12 MG/DL
CALCIUM SERPL-MCNC: 9.3 MG/DL
CHLORIDE SERPL-SCNC: 100 MMOL/L
CHOLEST SERPL-MCNC: 190 MG/DL
CHOLEST/HDLC SERPL: 4 RATIO
CO2 SERPL-SCNC: 23 MMOL/L
COLOR: YELLOW
CREAT SERPL-MCNC: 0.87 MG/DL
CREAT SPEC-SCNC: 302 MG/DL
EOSINOPHIL # BLD AUTO: 0.05 K/UL
EOSINOPHIL NFR BLD AUTO: 0.7 %
GGT SERPL-CCNC: 33 U/L
GLUCOSE QUALITATIVE U: NEGATIVE MG/DL
GLUCOSE SERPL-MCNC: 91 MG/DL
HBA1C MFR BLD HPLC: 5.7 %
HCT VFR BLD CALC: 41.6 %
HDLC SERPL-MCNC: 47 MG/DL
HGB BLD-MCNC: 13.7 G/DL
IMM GRANULOCYTES NFR BLD AUTO: 0.1 %
KETONES URINE: NEGATIVE
LDLC SERPL CALC-MCNC: 116 MG/DL
LEUKOCYTE ESTERASE URINE: ABNORMAL
LYMPHOCYTES # BLD AUTO: 2.17 K/UL
LYMPHOCYTES NFR BLD AUTO: 29.6 %
MAN DIFF?: NORMAL
MCHC RBC-ENTMCNC: 28.1 PG
MCHC RBC-ENTMCNC: 32.9 GM/DL
MCV RBC AUTO: 85.2 FL
MICROALBUMIN 24H UR DL<=1MG/L-MCNC: 5.1 MG/DL
MICROALBUMIN/CREAT 24H UR-RTO: 17 MG/G
MONOCYTES # BLD AUTO: 0.61 K/UL
MONOCYTES NFR BLD AUTO: 8.3 %
NEUTROPHILS # BLD AUTO: 4.47 K/UL
NEUTROPHILS NFR BLD AUTO: 61 %
NITRITE URINE: NEGATIVE
PH URINE: 5
PLATELET # BLD AUTO: 328 K/UL
POTASSIUM SERPL-SCNC: 3.6 MMOL/L
PROT SERPL-MCNC: 7.6 G/DL
PROTEIN URINE: ABNORMAL MG/DL
RBC # BLD: 4.88 M/UL
RBC # FLD: 13.8 %
SODIUM SERPL-SCNC: 138 MMOL/L
SPECIFIC GRAVITY URINE: 1.03
TRIGL SERPL-MCNC: 136 MG/DL
TSH SERPL-ACNC: 1.91 UIU/ML
UROBILINOGEN URINE: NEGATIVE MG/DL
WBC # FLD AUTO: 7.33 K/UL

## 2018-07-30 ENCOUNTER — APPOINTMENT (OUTPATIENT)
Dept: INTERNAL MEDICINE | Facility: CLINIC | Age: 53
End: 2018-07-30
Payer: COMMERCIAL

## 2018-07-30 VITALS
TEMPERATURE: 98.5 F | DIASTOLIC BLOOD PRESSURE: 74 MMHG | HEART RATE: 81 BPM | SYSTOLIC BLOOD PRESSURE: 112 MMHG | OXYGEN SATURATION: 98 % | HEIGHT: 61 IN | WEIGHT: 178 LBS | BODY MASS INDEX: 33.61 KG/M2

## 2018-07-30 PROCEDURE — 99215 OFFICE O/P EST HI 40 MIN: CPT

## 2018-07-30 RX ORDER — ALUMINUM HYDROXIDE AND MAGNESIUM CARBONATE 160; 105 MG/1; MG/1
160-105 TABLET, CHEWABLE ORAL
Qty: 1 | Refills: 3 | Status: COMPLETED | COMMUNITY
Start: 2017-05-11 | End: 2018-07-30

## 2018-07-30 RX ORDER — GABAPENTIN 100 MG/1
100 CAPSULE ORAL
Qty: 60 | Refills: 2 | Status: COMPLETED | COMMUNITY
Start: 2017-08-21 | End: 2018-07-30

## 2018-07-30 RX ORDER — MAGNESIUM OXIDE 241.3 MG/1000MG
400 TABLET ORAL DAILY
Qty: 90 | Refills: 3 | Status: COMPLETED | COMMUNITY
Start: 2017-08-07 | End: 2018-07-30

## 2018-07-30 NOTE — PHYSICAL EXAM
[Normal] : normal gait, coordination grossly intact, no focal deficits [Comprehensive Foot Exam Normal] : Right and left foot were examined and both feet are normal. No ulcers in either foot. Toes are normal and with full ROM.  Normal tactile sensation with monofilament testing throughout both feet [de-identified] : no lymphadenopathy bilateral  [de-identified] : good skin turgor no eruptions  [de-identified] : alert and oriented  affect normal

## 2018-07-30 NOTE — HISTORY OF PRESENT ILLNESS
[Coronary Artery Disease] : no coronary artery disease [Diabetes] : diabetes  [Sleep Apnea] : no sleep apnea [COPD] : COPD [Previous Adverse Anesthesia Reaction] : no previous adverse anesthesia reaction [( Patient denies any chest pain, claudication, dyspnea on exertion, orthopnea, palpitations or syncope )] : Patient denies any chest pain, claudication, dyspnea on exertion, orthopnea, palpitations or syncope. 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 She continues to have abdominal discomfort and reflux symptoms. 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  It is sometimes seen in Barrets esophagus.   She has a sour taste in her mouth, and sore throat and is woken up during the night.   [FreeTextEntry7] : stress test in 2012  9 mets, echo normal lv systolic function ef 65 5 trace mr and tr

## 2018-07-30 NOTE — RESULTS/DATA
[ECG Reviewed] : reviewed [Normal] : The 12 - lead ECG is normal [NSR] : normal sinus rhythm [Ventricular Rate___] : ventricular rate is [unfilled] beats per minute [P Waves Normal] : the P wave is normal [ECG Intervals MT.] : MT interval is normal [KS Interval___] : [unfilled] seconds [Normal QRS] : the QRS is normal [QRS Interval___] : QRS interval: [unfilled] seconds [ECG Axis] : the QRS axis is normal [QTc Interval___] : QTc interval: [unfilled] [Normal ST Segments] : the ST segments are normal [ECG T. Waves] : normal

## 2018-07-30 NOTE — ASSESSMENT
[High Risk Surgery - Intraperitoneal, Intrathoracic or Supringuinal Vascular Procedures] : High Risk Surgery - Intraperitoneal, Intrathoracic or Supringuinal Vascular Procedures - No (0) [Ischemic Heart Disease] : Ischemic Heart Disease - No (0) [Congestive Heart Failure] : Congestive Heart Failure - No (0) [Prior Cerebrovascular Accident or TIA] : Prior Cerebrovascular Accident or TIA - No (0) [Creatinine >= 2mg/dL (1 Point)] : Creatinine >= 2mg/dL - No (0) [Insulin-dependent Diabetic (1 Point)] : Insulin-dependent Diabetic - No (0) [0] : 0 , RCRI Class: I, Risk of Post-Op Cardiac Complications: 0.4%, Procedure Risk: Low-Risk [Patient Optimized for Surgery] : Patient optimized for surgery [Modify medications prior to procedure] : Modify medications prior to procedure [As per surgery] : as per surgery [FreeTextEntry4] : Pt is having a low risk procedure and is low risks for cardiac adverse outcome and cri is 0.4% .  she was explained the procedures egd and anesthesia   risks and complications alternatives , prep and post procedure care.  I have answered all his questions.  ASA 2 .  She will bring with her Ventolin and take 2 puffs 30mins prior to procedure. pt is medically cleared for procedure.   [FreeTextEntry7] : she will take Bystolic, amlodipine - valsartan Hctz  with sip of water 3 hrs prior to procedure or earlier.  hold metformin till after she eats

## 2018-08-03 ENCOUNTER — RX RENEWAL (OUTPATIENT)
Age: 53
End: 2018-08-03

## 2018-08-03 ENCOUNTER — RESULT REVIEW (OUTPATIENT)
Age: 53
End: 2018-08-03

## 2018-08-03 ENCOUNTER — APPOINTMENT (OUTPATIENT)
Dept: INTERNAL MEDICINE | Facility: HOSPITAL | Age: 53
End: 2018-08-03

## 2018-08-03 ENCOUNTER — OUTPATIENT (OUTPATIENT)
Dept: OUTPATIENT SERVICES | Facility: HOSPITAL | Age: 53
LOS: 1 days | End: 2018-08-03
Payer: COMMERCIAL

## 2018-08-03 DIAGNOSIS — A04.8 OTHER SPECIFIED BACTERIAL INTESTINAL INFECTIONS: ICD-10-CM

## 2018-08-03 DIAGNOSIS — Z98.890 OTHER SPECIFIED POSTPROCEDURAL STATES: Chronic | ICD-10-CM

## 2018-08-03 DIAGNOSIS — K21.9 GASTRO-ESOPHAGEAL REFLUX DISEASE WITHOUT ESOPHAGITIS: ICD-10-CM

## 2018-08-03 PROCEDURE — 82962 GLUCOSE BLOOD TEST: CPT

## 2018-08-03 PROCEDURE — 88312 SPECIAL STAINS GROUP 1: CPT | Mod: 26

## 2018-08-03 PROCEDURE — 88305 TISSUE EXAM BY PATHOLOGIST: CPT | Mod: 26

## 2018-08-03 PROCEDURE — 88305 TISSUE EXAM BY PATHOLOGIST: CPT

## 2018-08-03 PROCEDURE — 88312 SPECIAL STAINS GROUP 1: CPT

## 2018-08-03 PROCEDURE — 43239 EGD BIOPSY SINGLE/MULTIPLE: CPT

## 2018-08-24 ENCOUNTER — EMERGENCY (EMERGENCY)
Facility: HOSPITAL | Age: 53
LOS: 1 days | Discharge: HOME | End: 2018-08-24
Attending: EMERGENCY MEDICINE
Payer: COMMERCIAL

## 2018-08-24 VITALS
SYSTOLIC BLOOD PRESSURE: 128 MMHG | TEMPERATURE: 98 F | RESPIRATION RATE: 18 BRPM | DIASTOLIC BLOOD PRESSURE: 80 MMHG | HEART RATE: 88 BPM | OXYGEN SATURATION: 98 %

## 2018-08-24 VITALS
HEIGHT: 62 IN | SYSTOLIC BLOOD PRESSURE: 130 MMHG | HEART RATE: 84 BPM | WEIGHT: 179.02 LBS | DIASTOLIC BLOOD PRESSURE: 82 MMHG | OXYGEN SATURATION: 97 % | RESPIRATION RATE: 16 BRPM | TEMPERATURE: 98 F

## 2018-08-24 DIAGNOSIS — Z98.890 OTHER SPECIFIED POSTPROCEDURAL STATES: Chronic | ICD-10-CM

## 2018-08-24 LAB
ALBUMIN SERPL ELPH-MCNC: 3.8 G/DL — SIGNIFICANT CHANGE UP (ref 3.5–5)
ALP SERPL-CCNC: 91 U/L — SIGNIFICANT CHANGE UP (ref 40–120)
ALT FLD-CCNC: 39 U/L DA — SIGNIFICANT CHANGE UP (ref 10–60)
ANION GAP SERPL CALC-SCNC: 10 MMOL/L — SIGNIFICANT CHANGE UP (ref 5–17)
APPEARANCE UR: CLEAR — SIGNIFICANT CHANGE UP
AST SERPL-CCNC: 25 U/L — SIGNIFICANT CHANGE UP (ref 10–40)
BASOPHILS # BLD AUTO: 0.1 K/UL — SIGNIFICANT CHANGE UP (ref 0–0.2)
BASOPHILS NFR BLD AUTO: 0.7 % — SIGNIFICANT CHANGE UP (ref 0–2)
BILIRUB SERPL-MCNC: 0.5 MG/DL — SIGNIFICANT CHANGE UP (ref 0.2–1.2)
BILIRUB UR-MCNC: NEGATIVE — SIGNIFICANT CHANGE UP
BUN SERPL-MCNC: 11 MG/DL — SIGNIFICANT CHANGE UP (ref 7–18)
CALCIUM SERPL-MCNC: 8.9 MG/DL — SIGNIFICANT CHANGE UP (ref 8.4–10.5)
CHLORIDE SERPL-SCNC: 108 MMOL/L — SIGNIFICANT CHANGE UP (ref 96–108)
CO2 SERPL-SCNC: 23 MMOL/L — SIGNIFICANT CHANGE UP (ref 22–31)
COLOR SPEC: YELLOW — SIGNIFICANT CHANGE UP
CREAT SERPL-MCNC: 0.87 MG/DL — SIGNIFICANT CHANGE UP (ref 0.5–1.3)
DIFF PNL FLD: ABNORMAL
EOSINOPHIL # BLD AUTO: 0.1 K/UL — SIGNIFICANT CHANGE UP (ref 0–0.5)
EOSINOPHIL NFR BLD AUTO: 0.5 % — SIGNIFICANT CHANGE UP (ref 0–6)
GLUCOSE SERPL-MCNC: 100 MG/DL — HIGH (ref 70–99)
GLUCOSE UR QL: NEGATIVE — SIGNIFICANT CHANGE UP
HCG UR QL: NEGATIVE — SIGNIFICANT CHANGE UP
HCT VFR BLD CALC: 42.7 % — SIGNIFICANT CHANGE UP (ref 34.5–45)
HGB BLD-MCNC: 14.2 G/DL — SIGNIFICANT CHANGE UP (ref 11.5–15.5)
KETONES UR-MCNC: NEGATIVE — SIGNIFICANT CHANGE UP
LEUKOCYTE ESTERASE UR-ACNC: ABNORMAL
LYMPHOCYTES # BLD AUTO: 2.5 K/UL — SIGNIFICANT CHANGE UP (ref 1–3.3)
LYMPHOCYTES # BLD AUTO: 20.2 % — SIGNIFICANT CHANGE UP (ref 13–44)
MCHC RBC-ENTMCNC: 27.5 PG — SIGNIFICANT CHANGE UP (ref 27–34)
MCHC RBC-ENTMCNC: 33.1 GM/DL — SIGNIFICANT CHANGE UP (ref 32–36)
MCV RBC AUTO: 83.2 FL — SIGNIFICANT CHANGE UP (ref 80–100)
MONOCYTES # BLD AUTO: 0.7 K/UL — SIGNIFICANT CHANGE UP (ref 0–0.9)
MONOCYTES NFR BLD AUTO: 5.6 % — SIGNIFICANT CHANGE UP (ref 2–14)
NEUTROPHILS # BLD AUTO: 9 K/UL — HIGH (ref 1.8–7.4)
NEUTROPHILS NFR BLD AUTO: 73 % — SIGNIFICANT CHANGE UP (ref 43–77)
NITRITE UR-MCNC: NEGATIVE — SIGNIFICANT CHANGE UP
PH UR: 6 — SIGNIFICANT CHANGE UP (ref 5–8)
PLATELET # BLD AUTO: 342 K/UL — SIGNIFICANT CHANGE UP (ref 150–400)
POTASSIUM SERPL-MCNC: 3.6 MMOL/L — SIGNIFICANT CHANGE UP (ref 3.5–5.3)
POTASSIUM SERPL-SCNC: 3.6 MMOL/L — SIGNIFICANT CHANGE UP (ref 3.5–5.3)
PROT SERPL-MCNC: 8.1 G/DL — SIGNIFICANT CHANGE UP (ref 6–8.3)
PROT UR-MCNC: NEGATIVE — SIGNIFICANT CHANGE UP
RBC # BLD: 5.14 M/UL — SIGNIFICANT CHANGE UP (ref 3.8–5.2)
RBC # FLD: 11.9 % — SIGNIFICANT CHANGE UP (ref 10.3–14.5)
SODIUM SERPL-SCNC: 141 MMOL/L — SIGNIFICANT CHANGE UP (ref 135–145)
SP GR SPEC: 1 — LOW (ref 1.01–1.02)
UROBILINOGEN FLD QL: NEGATIVE — SIGNIFICANT CHANGE UP
WBC # BLD: 12.3 K/UL — HIGH (ref 3.8–10.5)
WBC # FLD AUTO: 12.3 K/UL — HIGH (ref 3.8–10.5)

## 2018-08-24 PROCEDURE — 80053 COMPREHEN METABOLIC PANEL: CPT

## 2018-08-24 PROCEDURE — 99284 EMERGENCY DEPT VISIT MOD MDM: CPT

## 2018-08-24 PROCEDURE — 74176 CT ABD & PELVIS W/O CONTRAST: CPT

## 2018-08-24 PROCEDURE — 74176 CT ABD & PELVIS W/O CONTRAST: CPT | Mod: 26

## 2018-08-24 PROCEDURE — 99284 EMERGENCY DEPT VISIT MOD MDM: CPT | Mod: 25

## 2018-08-24 PROCEDURE — 81025 URINE PREGNANCY TEST: CPT

## 2018-08-24 PROCEDURE — 87086 URINE CULTURE/COLONY COUNT: CPT

## 2018-08-24 PROCEDURE — 76830 TRANSVAGINAL US NON-OB: CPT

## 2018-08-24 PROCEDURE — 85027 COMPLETE CBC AUTOMATED: CPT

## 2018-08-24 PROCEDURE — 96374 THER/PROPH/DIAG INJ IV PUSH: CPT

## 2018-08-24 PROCEDURE — 76830 TRANSVAGINAL US NON-OB: CPT | Mod: 26

## 2018-08-24 PROCEDURE — 76856 US EXAM PELVIC COMPLETE: CPT

## 2018-08-24 PROCEDURE — 76856 US EXAM PELVIC COMPLETE: CPT | Mod: 26

## 2018-08-24 PROCEDURE — 81001 URINALYSIS AUTO W/SCOPE: CPT

## 2018-08-24 RX ORDER — KETOROLAC TROMETHAMINE 30 MG/ML
15 SYRINGE (ML) INJECTION ONCE
Qty: 0 | Refills: 0 | Status: DISCONTINUED | OUTPATIENT
Start: 2018-08-24 | End: 2018-08-24

## 2018-08-24 RX ORDER — SODIUM CHLORIDE 9 MG/ML
3 INJECTION INTRAMUSCULAR; INTRAVENOUS; SUBCUTANEOUS ONCE
Qty: 0 | Refills: 0 | Status: COMPLETED | OUTPATIENT
Start: 2018-08-24 | End: 2018-08-24

## 2018-08-24 RX ORDER — ONDANSETRON 8 MG/1
4 TABLET, FILM COATED ORAL ONCE
Qty: 0 | Refills: 0 | Status: COMPLETED | OUTPATIENT
Start: 2018-08-24 | End: 2018-08-24

## 2018-08-24 RX ORDER — SODIUM CHLORIDE 9 MG/ML
1000 INJECTION INTRAMUSCULAR; INTRAVENOUS; SUBCUTANEOUS ONCE
Qty: 0 | Refills: 0 | Status: COMPLETED | OUTPATIENT
Start: 2018-08-24 | End: 2018-08-24

## 2018-08-24 RX ADMIN — Medication 15 MILLIGRAM(S): at 16:05

## 2018-08-24 RX ADMIN — Medication 15 MILLIGRAM(S): at 18:31

## 2018-08-24 RX ADMIN — SODIUM CHLORIDE 1000 MILLILITER(S): 9 INJECTION INTRAMUSCULAR; INTRAVENOUS; SUBCUTANEOUS at 18:00

## 2018-08-24 RX ADMIN — SODIUM CHLORIDE 3 MILLILITER(S): 9 INJECTION INTRAMUSCULAR; INTRAVENOUS; SUBCUTANEOUS at 16:05

## 2018-08-24 RX ADMIN — SODIUM CHLORIDE 1000 MILLILITER(S): 9 INJECTION INTRAMUSCULAR; INTRAVENOUS; SUBCUTANEOUS at 16:05

## 2018-08-24 NOTE — ED ADULT NURSE NOTE - NSIMPLEMENTINTERV_GEN_ALL_ED
Implemented All Universal Safety Interventions:  [place] to call system. Call bell, personal items and telephone within reach. Instruct patient to call for assistance. Room bathroom lighting operational. Non-slip footwear when patient is off stretcher. Physically safe environment: no spills, clutter or unnecessary equipment. Stretcher in lowest position, wheels locked, appropriate side rails in place.

## 2018-08-24 NOTE — ED ADULT NURSE NOTE - NSFALLRSKOUTCOME_ED_ALL_ED
From: Yudelka Nguyen  To: Parvez Thurman NP  Sent: 5/29/2017 12:50 AM EDT  Subject: Medication Renewal Request    Original authorizing provider: SALLY Jacobs would like a refill of the following medications:  pitavastatin (LIVALO) 2 mg tablet Parvez Thurman NP]    [facility] [address] [place] [address]    Comment:   need refill for Livalo I was told by Alvaro Fajardo to take twice a day but it was not sent to the pharmacy that way. How do you want me to take this medicine. Universal Safety Interventions

## 2018-08-24 NOTE — ED PROVIDER NOTE - OBJECTIVE STATEMENT
52 F with hx of renal stones c/o L flank/back pain consistent with prior renal stone symptoms.  +mild nausea but no vomiting.  No fever/chills, no other complaints.

## 2018-08-24 NOTE — ED PROVIDER NOTE - PROGRESS NOTE DETAILS
feeling better, given CT result with check sonogram to assess cyst further, Pt pain free, educated on results, urology f/u, pain control.

## 2018-08-25 LAB
CULTURE RESULTS: SIGNIFICANT CHANGE UP
SPECIMEN SOURCE: SIGNIFICANT CHANGE UP

## 2018-08-28 ENCOUNTER — APPOINTMENT (OUTPATIENT)
Dept: INTERNAL MEDICINE | Facility: CLINIC | Age: 53
End: 2018-08-28
Payer: COMMERCIAL

## 2018-08-28 ENCOUNTER — FORM ENCOUNTER (OUTPATIENT)
Age: 53
End: 2018-08-28

## 2018-08-28 VITALS
SYSTOLIC BLOOD PRESSURE: 120 MMHG | HEIGHT: 61 IN | RESPIRATION RATE: 18 BRPM | WEIGHT: 180 LBS | TEMPERATURE: 98.7 F | DIASTOLIC BLOOD PRESSURE: 80 MMHG | BODY MASS INDEX: 33.99 KG/M2 | OXYGEN SATURATION: 98 % | HEART RATE: 93 BPM

## 2018-08-28 PROCEDURE — 99214 OFFICE O/P EST MOD 30 MIN: CPT

## 2018-08-28 NOTE — HEALTH RISK ASSESSMENT
[Intercurrent ED visits] : went to ED [No falls in past year] : Patient reported no falls in the past year [0] : 2) Feeling down, depressed, or hopeless: Not at all (0) [] : No [de-identified] : 08/18 [de-identified] : GI [[id number]] : 0

## 2018-08-29 ENCOUNTER — APPOINTMENT (OUTPATIENT)
Dept: MAMMOGRAPHY | Facility: IMAGING CENTER | Age: 53
End: 2018-08-29
Payer: COMMERCIAL

## 2018-08-29 ENCOUNTER — APPOINTMENT (OUTPATIENT)
Dept: ULTRASOUND IMAGING | Facility: IMAGING CENTER | Age: 53
End: 2018-08-29
Payer: COMMERCIAL

## 2018-08-29 ENCOUNTER — OUTPATIENT (OUTPATIENT)
Dept: OUTPATIENT SERVICES | Facility: HOSPITAL | Age: 53
LOS: 1 days | End: 2018-08-29
Payer: COMMERCIAL

## 2018-08-29 DIAGNOSIS — D05.11 INTRADUCTAL CARCINOMA IN SITU OF RIGHT BREAST: ICD-10-CM

## 2018-08-29 DIAGNOSIS — Z98.890 OTHER SPECIFIED POSTPROCEDURAL STATES: Chronic | ICD-10-CM

## 2018-08-29 DIAGNOSIS — C50.919 MALIGNANT NEOPLASM OF UNSPECIFIED SITE OF UNSPECIFIED FEMALE BREAST: ICD-10-CM

## 2018-08-29 PROCEDURE — G0279: CPT | Mod: 26

## 2018-08-29 PROCEDURE — 76641 ULTRASOUND BREAST COMPLETE: CPT | Mod: 26,50

## 2018-08-29 PROCEDURE — 77066 DX MAMMO INCL CAD BI: CPT | Mod: 26

## 2018-08-29 PROCEDURE — 77066 DX MAMMO INCL CAD BI: CPT

## 2018-08-29 PROCEDURE — G0279: CPT

## 2018-08-29 PROCEDURE — 76641 ULTRASOUND BREAST COMPLETE: CPT

## 2018-09-10 ENCOUNTER — APPOINTMENT (OUTPATIENT)
Dept: UROLOGY | Facility: CLINIC | Age: 53
End: 2018-09-10
Payer: COMMERCIAL

## 2018-09-10 PROCEDURE — 99214 OFFICE O/P EST MOD 30 MIN: CPT

## 2018-09-11 ENCOUNTER — APPOINTMENT (OUTPATIENT)
Dept: INTERNAL MEDICINE | Facility: CLINIC | Age: 53
End: 2018-09-11

## 2018-09-12 LAB
APPEARANCE: ABNORMAL
BACTERIA UR CULT: NORMAL
BACTERIA: ABNORMAL
BILIRUBIN URINE: NEGATIVE
BLOOD URINE: ABNORMAL
COLOR: YELLOW
GLUCOSE QUALITATIVE U: NEGATIVE MG/DL
HYALINE CASTS: 2 /LPF
KETONES URINE: NEGATIVE
LEUKOCYTE ESTERASE URINE: ABNORMAL
MICROSCOPIC-UA: NORMAL
NITRITE URINE: NEGATIVE
PH URINE: 5.5
PROTEIN URINE: 30 MG/DL
RED BLOOD CELLS URINE: 6 /HPF
SPECIFIC GRAVITY URINE: 1.02
SQUAMOUS EPITHELIAL CELLS: 10 /HPF
URINE COMMENTS: NORMAL
UROBILINOGEN URINE: NEGATIVE MG/DL
WHITE BLOOD CELLS URINE: 573 /HPF

## 2018-09-24 ENCOUNTER — APPOINTMENT (OUTPATIENT)
Age: 53
End: 2018-09-24
Payer: COMMERCIAL

## 2018-09-24 ENCOUNTER — APPOINTMENT (OUTPATIENT)
Dept: INTERNAL MEDICINE | Facility: CLINIC | Age: 53
End: 2018-09-24
Payer: COMMERCIAL

## 2018-09-24 VITALS
OXYGEN SATURATION: 97 % | HEIGHT: 61 IN | TEMPERATURE: 98.3 F | HEART RATE: 86 BPM | BODY MASS INDEX: 33.99 KG/M2 | SYSTOLIC BLOOD PRESSURE: 116 MMHG | DIASTOLIC BLOOD PRESSURE: 82 MMHG | WEIGHT: 180 LBS

## 2018-09-24 VITALS
TEMPERATURE: 98.3 F | OXYGEN SATURATION: 97 % | SYSTOLIC BLOOD PRESSURE: 116 MMHG | DIASTOLIC BLOOD PRESSURE: 82 MMHG | HEART RATE: 86 BPM

## 2018-09-24 PROCEDURE — 99213 OFFICE O/P EST LOW 20 MIN: CPT

## 2018-09-24 PROCEDURE — 99214 OFFICE O/P EST MOD 30 MIN: CPT

## 2018-09-24 RX ORDER — PREDNISONE 10 MG/1
10 TABLET ORAL
Qty: 21 | Refills: 0 | Status: COMPLETED | COMMUNITY
Start: 2018-08-28 | End: 2018-09-24

## 2018-09-26 LAB
ANION GAP SERPL CALC-SCNC: 15 MMOL/L
BUN SERPL-MCNC: 14 MG/DL
CALCIUM SERPL-MCNC: 9.8 MG/DL
CHLORIDE SERPL-SCNC: 101 MMOL/L
CO2 SERPL-SCNC: 25 MMOL/L
CREAT SERPL-MCNC: 0.88 MG/DL
GLUCOSE SERPL-MCNC: 90 MG/DL
POTASSIUM SERPL-SCNC: 3.6 MMOL/L
SODIUM SERPL-SCNC: 141 MMOL/L

## 2018-10-08 ENCOUNTER — APPOINTMENT (OUTPATIENT)
Dept: ULTRASOUND IMAGING | Facility: CLINIC | Age: 53
End: 2018-10-08
Payer: COMMERCIAL

## 2018-10-08 ENCOUNTER — OUTPATIENT (OUTPATIENT)
Dept: OUTPATIENT SERVICES | Facility: HOSPITAL | Age: 53
LOS: 1 days | End: 2018-10-08
Payer: COMMERCIAL

## 2018-10-08 DIAGNOSIS — Z98.890 OTHER SPECIFIED POSTPROCEDURAL STATES: Chronic | ICD-10-CM

## 2018-10-08 PROCEDURE — 76770 US EXAM ABDO BACK WALL COMP: CPT

## 2018-10-08 PROCEDURE — 76770 US EXAM ABDO BACK WALL COMP: CPT | Mod: 26

## 2018-10-11 ENCOUNTER — MEDICATION RENEWAL (OUTPATIENT)
Age: 53
End: 2018-10-11

## 2018-10-11 ENCOUNTER — RESULT REVIEW (OUTPATIENT)
Age: 53
End: 2018-10-11

## 2018-10-22 ENCOUNTER — CLINICAL ADVICE (OUTPATIENT)
Age: 53
End: 2018-10-22

## 2018-10-23 ENCOUNTER — TRANSCRIPTION ENCOUNTER (OUTPATIENT)
Age: 53
End: 2018-10-23

## 2018-10-23 ENCOUNTER — INPATIENT (INPATIENT)
Facility: HOSPITAL | Age: 53
LOS: 0 days | Discharge: HOME | DRG: 669 | End: 2018-10-24
Attending: UROLOGY | Admitting: UROLOGY
Payer: COMMERCIAL

## 2018-10-23 VITALS — WEIGHT: 179.02 LBS | HEIGHT: 62 IN

## 2018-10-23 DIAGNOSIS — N20.0 CALCULUS OF KIDNEY: ICD-10-CM

## 2018-10-23 DIAGNOSIS — Z98.890 OTHER SPECIFIED POSTPROCEDURAL STATES: Chronic | ICD-10-CM

## 2018-10-23 DIAGNOSIS — C50.919 MALIGNANT NEOPLASM OF UNSPECIFIED SITE OF UNSPECIFIED FEMALE BREAST: ICD-10-CM

## 2018-10-23 DIAGNOSIS — I10 ESSENTIAL (PRIMARY) HYPERTENSION: ICD-10-CM

## 2018-10-23 DIAGNOSIS — E11.9 TYPE 2 DIABETES MELLITUS WITHOUT COMPLICATIONS: ICD-10-CM

## 2018-10-23 LAB
ALBUMIN SERPL ELPH-MCNC: 3.9 G/DL — SIGNIFICANT CHANGE UP (ref 3.5–5)
ALP SERPL-CCNC: 80 U/L — SIGNIFICANT CHANGE UP (ref 40–120)
ALT FLD-CCNC: 33 U/L DA — SIGNIFICANT CHANGE UP (ref 10–60)
ANION GAP SERPL CALC-SCNC: 9 MMOL/L — SIGNIFICANT CHANGE UP (ref 5–17)
APPEARANCE UR: CLEAR — SIGNIFICANT CHANGE UP
AST SERPL-CCNC: 18 U/L — SIGNIFICANT CHANGE UP (ref 10–40)
BASOPHILS # BLD AUTO: 0.1 K/UL — SIGNIFICANT CHANGE UP (ref 0–0.2)
BASOPHILS NFR BLD AUTO: 0.8 % — SIGNIFICANT CHANGE UP (ref 0–2)
BILIRUB SERPL-MCNC: 0.3 MG/DL — SIGNIFICANT CHANGE UP (ref 0.2–1.2)
BILIRUB UR-MCNC: NEGATIVE — SIGNIFICANT CHANGE UP
BUN SERPL-MCNC: 14 MG/DL — SIGNIFICANT CHANGE UP (ref 7–18)
CALCIUM SERPL-MCNC: 9.7 MG/DL — SIGNIFICANT CHANGE UP (ref 8.4–10.5)
CHLORIDE SERPL-SCNC: 105 MMOL/L — SIGNIFICANT CHANGE UP (ref 96–108)
CO2 SERPL-SCNC: 25 MMOL/L — SIGNIFICANT CHANGE UP (ref 22–31)
COLOR SPEC: YELLOW — SIGNIFICANT CHANGE UP
CREAT SERPL-MCNC: 0.89 MG/DL — SIGNIFICANT CHANGE UP (ref 0.5–1.3)
DIFF PNL FLD: ABNORMAL
EOSINOPHIL # BLD AUTO: 0.1 K/UL — SIGNIFICANT CHANGE UP (ref 0–0.5)
EOSINOPHIL NFR BLD AUTO: 0.7 % — SIGNIFICANT CHANGE UP (ref 0–6)
GLUCOSE SERPL-MCNC: 95 MG/DL — SIGNIFICANT CHANGE UP (ref 70–99)
GLUCOSE UR QL: NEGATIVE — SIGNIFICANT CHANGE UP
HCT VFR BLD CALC: 38.6 % — SIGNIFICANT CHANGE UP (ref 34.5–45)
HGB BLD-MCNC: 13.2 G/DL — SIGNIFICANT CHANGE UP (ref 11.5–15.5)
KETONES UR-MCNC: NEGATIVE — SIGNIFICANT CHANGE UP
LEUKOCYTE ESTERASE UR-ACNC: ABNORMAL
LYMPHOCYTES # BLD AUTO: 2.6 K/UL — SIGNIFICANT CHANGE UP (ref 1–3.3)
LYMPHOCYTES # BLD AUTO: 28.4 % — SIGNIFICANT CHANGE UP (ref 13–44)
MCHC RBC-ENTMCNC: 28 PG — SIGNIFICANT CHANGE UP (ref 27–34)
MCHC RBC-ENTMCNC: 34.2 GM/DL — SIGNIFICANT CHANGE UP (ref 32–36)
MCV RBC AUTO: 81.9 FL — SIGNIFICANT CHANGE UP (ref 80–100)
MONOCYTES # BLD AUTO: 0.7 K/UL — SIGNIFICANT CHANGE UP (ref 0–0.9)
MONOCYTES NFR BLD AUTO: 7.2 % — SIGNIFICANT CHANGE UP (ref 2–14)
NEUTROPHILS # BLD AUTO: 5.7 K/UL — SIGNIFICANT CHANGE UP (ref 1.8–7.4)
NEUTROPHILS NFR BLD AUTO: 62.8 % — SIGNIFICANT CHANGE UP (ref 43–77)
NITRITE UR-MCNC: NEGATIVE — SIGNIFICANT CHANGE UP
PH UR: 5 — SIGNIFICANT CHANGE UP (ref 5–8)
PLATELET # BLD AUTO: 352 K/UL — SIGNIFICANT CHANGE UP (ref 150–400)
POTASSIUM SERPL-MCNC: 3.6 MMOL/L — SIGNIFICANT CHANGE UP (ref 3.5–5.3)
POTASSIUM SERPL-SCNC: 3.6 MMOL/L — SIGNIFICANT CHANGE UP (ref 3.5–5.3)
PROT SERPL-MCNC: 8 G/DL — SIGNIFICANT CHANGE UP (ref 6–8.3)
PROT UR-MCNC: 30 MG/DL
RBC # BLD: 4.71 M/UL — SIGNIFICANT CHANGE UP (ref 3.8–5.2)
RBC # FLD: 11.5 % — SIGNIFICANT CHANGE UP (ref 10.3–14.5)
SODIUM SERPL-SCNC: 139 MMOL/L — SIGNIFICANT CHANGE UP (ref 135–145)
SP GR SPEC: 1.02 — SIGNIFICANT CHANGE UP (ref 1.01–1.02)
UROBILINOGEN FLD QL: NEGATIVE — SIGNIFICANT CHANGE UP
WBC # BLD: 9.1 K/UL — SIGNIFICANT CHANGE UP (ref 3.8–10.5)
WBC # FLD AUTO: 9.1 K/UL — SIGNIFICANT CHANGE UP (ref 3.8–10.5)

## 2018-10-23 PROCEDURE — 74176 CT ABD & PELVIS W/O CONTRAST: CPT | Mod: 26

## 2018-10-23 PROCEDURE — 99285 EMERGENCY DEPT VISIT HI MDM: CPT

## 2018-10-23 RX ORDER — INSULIN LISPRO 100/ML
VIAL (ML) SUBCUTANEOUS AT BEDTIME
Qty: 0 | Refills: 0 | Status: DISCONTINUED | OUTPATIENT
Start: 2018-10-23 | End: 2018-10-24

## 2018-10-23 RX ORDER — CEFTRIAXONE 500 MG/1
2 INJECTION, POWDER, FOR SOLUTION INTRAMUSCULAR; INTRAVENOUS ONCE
Qty: 0 | Refills: 0 | Status: COMPLETED | OUTPATIENT
Start: 2018-10-23 | End: 2018-10-23

## 2018-10-23 RX ORDER — CEFTRIAXONE 500 MG/1
INJECTION, POWDER, FOR SOLUTION INTRAMUSCULAR; INTRAVENOUS
Qty: 0 | Refills: 0 | Status: DISCONTINUED | OUTPATIENT
Start: 2018-10-23 | End: 2018-10-24

## 2018-10-23 RX ORDER — DEXTROSE 50 % IN WATER 50 %
15 SYRINGE (ML) INTRAVENOUS ONCE
Qty: 0 | Refills: 0 | Status: DISCONTINUED | OUTPATIENT
Start: 2018-10-23 | End: 2018-10-24

## 2018-10-23 RX ORDER — KETOROLAC TROMETHAMINE 30 MG/ML
30 SYRINGE (ML) INJECTION ONCE
Qty: 0 | Refills: 0 | Status: DISCONTINUED | OUTPATIENT
Start: 2018-10-23 | End: 2018-10-23

## 2018-10-23 RX ORDER — METOPROLOL TARTRATE 50 MG
25 TABLET ORAL
Qty: 0 | Refills: 0 | Status: DISCONTINUED | OUTPATIENT
Start: 2018-10-23 | End: 2018-10-24

## 2018-10-23 RX ORDER — TAMOXIFEN CITRATE 20 MG/1
20 TABLET, FILM COATED ORAL DAILY
Qty: 0 | Refills: 0 | Status: DISCONTINUED | OUTPATIENT
Start: 2018-10-23 | End: 2018-10-24

## 2018-10-23 RX ORDER — GLUCAGON INJECTION, SOLUTION 0.5 MG/.1ML
1 INJECTION, SOLUTION SUBCUTANEOUS ONCE
Qty: 0 | Refills: 0 | Status: DISCONTINUED | OUTPATIENT
Start: 2018-10-23 | End: 2018-10-24

## 2018-10-23 RX ORDER — DEXTROSE 50 % IN WATER 50 %
25 SYRINGE (ML) INTRAVENOUS ONCE
Qty: 0 | Refills: 0 | Status: DISCONTINUED | OUTPATIENT
Start: 2018-10-23 | End: 2018-10-24

## 2018-10-23 RX ORDER — CEFTRIAXONE 500 MG/1
1 INJECTION, POWDER, FOR SOLUTION INTRAMUSCULAR; INTRAVENOUS ONCE
Qty: 0 | Refills: 0 | Status: COMPLETED | OUTPATIENT
Start: 2018-10-23 | End: 2018-10-23

## 2018-10-23 RX ORDER — SODIUM CHLORIDE 9 MG/ML
1000 INJECTION, SOLUTION INTRAVENOUS
Qty: 0 | Refills: 0 | Status: DISCONTINUED | OUTPATIENT
Start: 2018-10-23 | End: 2018-10-24

## 2018-10-23 RX ORDER — SODIUM CHLORIDE 9 MG/ML
1000 INJECTION INTRAMUSCULAR; INTRAVENOUS; SUBCUTANEOUS
Qty: 0 | Refills: 0 | Status: DISCONTINUED | OUTPATIENT
Start: 2018-10-23 | End: 2018-10-24

## 2018-10-23 RX ORDER — HEPARIN SODIUM 5000 [USP'U]/ML
5000 INJECTION INTRAVENOUS; SUBCUTANEOUS EVERY 8 HOURS
Qty: 0 | Refills: 0 | Status: DISCONTINUED | OUTPATIENT
Start: 2018-10-23 | End: 2018-10-24

## 2018-10-23 RX ORDER — CEFTRIAXONE 500 MG/1
2 INJECTION, POWDER, FOR SOLUTION INTRAMUSCULAR; INTRAVENOUS EVERY 24 HOURS
Qty: 0 | Refills: 0 | Status: DISCONTINUED | OUTPATIENT
Start: 2018-10-24 | End: 2018-10-24

## 2018-10-23 RX ORDER — DEXTROSE 50 % IN WATER 50 %
12.5 SYRINGE (ML) INTRAVENOUS ONCE
Qty: 0 | Refills: 0 | Status: DISCONTINUED | OUTPATIENT
Start: 2018-10-23 | End: 2018-10-24

## 2018-10-23 RX ORDER — ONDANSETRON 8 MG/1
4 TABLET, FILM COATED ORAL ONCE
Qty: 0 | Refills: 0 | Status: COMPLETED | OUTPATIENT
Start: 2018-10-23 | End: 2018-10-23

## 2018-10-23 RX ORDER — PANTOPRAZOLE SODIUM 20 MG/1
40 TABLET, DELAYED RELEASE ORAL
Qty: 0 | Refills: 0 | Status: DISCONTINUED | OUTPATIENT
Start: 2018-10-23 | End: 2018-10-24

## 2018-10-23 RX ORDER — TAMSULOSIN HYDROCHLORIDE 0.4 MG/1
0.4 CAPSULE ORAL AT BEDTIME
Qty: 0 | Refills: 0 | Status: DISCONTINUED | OUTPATIENT
Start: 2018-10-23 | End: 2018-10-24

## 2018-10-23 RX ORDER — AMLODIPINE BESYLATE 2.5 MG/1
5 TABLET ORAL DAILY
Qty: 0 | Refills: 0 | Status: DISCONTINUED | OUTPATIENT
Start: 2018-10-23 | End: 2018-10-24

## 2018-10-23 RX ORDER — INSULIN LISPRO 100/ML
VIAL (ML) SUBCUTANEOUS
Qty: 0 | Refills: 0 | Status: DISCONTINUED | OUTPATIENT
Start: 2018-10-23 | End: 2018-10-24

## 2018-10-23 RX ORDER — LOSARTAN POTASSIUM 100 MG/1
50 TABLET, FILM COATED ORAL DAILY
Qty: 0 | Refills: 0 | Status: DISCONTINUED | OUTPATIENT
Start: 2018-10-23 | End: 2018-10-24

## 2018-10-23 RX ORDER — MORPHINE SULFATE 50 MG/1
4 CAPSULE, EXTENDED RELEASE ORAL ONCE
Qty: 0 | Refills: 0 | Status: DISCONTINUED | OUTPATIENT
Start: 2018-10-23 | End: 2018-10-23

## 2018-10-23 RX ORDER — SODIUM CHLORIDE 9 MG/ML
1000 INJECTION INTRAMUSCULAR; INTRAVENOUS; SUBCUTANEOUS ONCE
Qty: 0 | Refills: 0 | Status: COMPLETED | OUTPATIENT
Start: 2018-10-23 | End: 2018-10-23

## 2018-10-23 RX ADMIN — Medication 30 MILLIGRAM(S): at 15:00

## 2018-10-23 RX ADMIN — SODIUM CHLORIDE 1000 MILLILITER(S): 9 INJECTION INTRAMUSCULAR; INTRAVENOUS; SUBCUTANEOUS at 15:01

## 2018-10-23 RX ADMIN — Medication 30 MILLIGRAM(S): at 15:40

## 2018-10-23 RX ADMIN — TAMOXIFEN CITRATE 20 MILLIGRAM(S): 20 TABLET, FILM COATED ORAL at 22:05

## 2018-10-23 RX ADMIN — SODIUM CHLORIDE 125 MILLILITER(S): 9 INJECTION INTRAMUSCULAR; INTRAVENOUS; SUBCUTANEOUS at 18:09

## 2018-10-23 RX ADMIN — SODIUM CHLORIDE 1000 MILLILITER(S): 9 INJECTION INTRAMUSCULAR; INTRAVENOUS; SUBCUTANEOUS at 16:10

## 2018-10-23 RX ADMIN — CEFTRIAXONE 100 GRAM(S): 500 INJECTION, POWDER, FOR SOLUTION INTRAMUSCULAR; INTRAVENOUS at 17:25

## 2018-10-23 RX ADMIN — TAMSULOSIN HYDROCHLORIDE 0.4 MILLIGRAM(S): 0.4 CAPSULE ORAL at 22:04

## 2018-10-23 NOTE — H&P ADULT - ASSESSMENT
53 year old female with 4mm left UVJ stone with associated mild left hydronephrosis. PMH HTN, DM, right breast CA

## 2018-10-23 NOTE — ED PROVIDER NOTE - ATTENDING CONTRIBUTION TO CARE
I was physically present for the E/M service provided. I agree with above history, physical, and plan which I have reviewed and edited where appropriate. I was physically present for the key portions of the service provided.    woods:pt with bilateral kidney stone x 2 wks not improving here with persistent pain. + dysuria and urgency    bilateral flank pain.      a/p: obstructed kidney stone r/o infected stone vs pyelo.  labs, ct, fluids, uro consult

## 2018-10-23 NOTE — ED PROVIDER NOTE - CARE PLAN
Principal Discharge DX:	Kidney stone on left side  Secondary Diagnosis:	UTI (urinary tract infection)

## 2018-10-23 NOTE — H&P ADULT - HISTORY OF PRESENT ILLNESS
53 year old female with PMH right breast CA on tamoxifen, HTN, DM on Metformin, and known history of renal calculi presented as complaining of bilateral flank pain which radiated to the groin and is associated with dysuria and urinary frequency and hesitancy. States pain is worse on the left then the right and is usually 9/10. Admits to nausea and occasional shortness of breath secondary to asthma. Denies fever, chills, chest pain, hematuria, and vomiting.

## 2018-10-23 NOTE — ED PROVIDER NOTE - PROGRESS NOTE DETAILS
woods: ct shows left sided stone with + uti.  uro called and will admit  admit to uro for uvj stone of left with uti

## 2018-10-23 NOTE — ED ADULT NURSE REASSESSMENT NOTE - NS ED NURSE REASSESS COMMENT FT1
20:00pm. reports given to Keyana OSEI , Patient declined in ED repeat VS and fingerstick blood sugar, states she would rathwr have it done in her room.

## 2018-10-23 NOTE — ED PROVIDER NOTE - OBJECTIVE STATEMENT
52 y/o F pt with a PMHx of kidney stones, DM, HTN and a significant PSHx of lumpectomy presents to the ED with complaints of bilateral flank pain. Patient states burning urination, urinary urgency, urinary retention and urinary frequency. Patient notes 2 kidney stones after having a sonogram and took Advil at 10am and ibuprofen 200mg for the pain. Patient denies nausea, vomiting, fever or any other complaints. NKDA. 52 y/o F pt with a PMHx of kidney stones, DM, HTN, R sided breast ca (on tamoxifen) and a significant PSHx of R lumpectomy presents to the ED with complaints of bilateral flank pain x 2 weeks now getting worse. Patient also states burning urination, urinary urgency, urinary retention and urinary frequency. Patient notes 2 kidney stones after having a sonogram and took Advil at 10am and ibuprofen 200mg for the pain. Patient denies nausea, vomiting, fever or any other complaints. NKDA.

## 2018-10-23 NOTE — H&P ADULT - FAMILY HISTORY
Father  Still living? Unknown  Family history of hypertension, Age at diagnosis: Age Unknown  Family history of diabetes mellitus, Age at diagnosis: Age Unknown     Mother  Still living? Unknown  Family history of hypertension, Age at diagnosis: Age Unknown  Family history of diabetes mellitus, Age at diagnosis: Age Unknown  Family history of hyperlipidemia, Age at diagnosis: Age Unknown     Sibling  Still living? Unknown  Family history of hypertension, Age at diagnosis: Age Unknown  Family history of diabetes mellitus, Age at diagnosis: Age Unknown  Family history of cancer, Age at diagnosis: Age Unknown     Grandparent  Still living? Unknown  Family history of cancer, Age at diagnosis: Age Unknown

## 2018-10-23 NOTE — H&P ADULT - NSHPLABSRESULTS_GEN_ALL_CORE
LABS:                        13.2   9.1   )-----------( 352      ( 23 Oct 2018 14:55 )             38.6     10-23    139  |  105  |  14  ----------------------------<  95  3.6   |  25  |  0.89    Ca    9.7      23 Oct 2018 14:55    TPro  8.0  /  Alb  3.9  /  TBili  0.3  /  DBili  x   /  AST  18  /  ALT  33  /  AlkPhos  80  10    Urinalysis Basic - ( 23 Oct 2018 14:55 )    Color: Yellow / Appearance: Clear / S.020 / pH: x  Gluc: x / Ketone: Negative  / Bili: Negative / Urobili: Negative   Blood: x / Protein: 30 mg/dL / Nitrite: Negative   Leuk Esterase: Small / RBC: 5-10 /HPF / WBC 11-25 /HPF   Sq Epi: x / Non Sq Epi: Few /HPF / Bacteria: Moderate /HPF    RADIOLOGY & ADDITIONAL STUDIES:  < from: CT Renal Stone Hunt (10.23.18 @ 14:36) >    EXAM:  CT RENAL STONE HUNT                          PROCEDURE DATE:  10/23/2018      INTERPRETATION:  CT ABDOMEN AND PELVIS WITHOUT IV CONTRAST    CLINICAL STATEMENT: 53 years old. Female. b/l flank pain.    COMPARISON: CT abdomen pelvis 2018.    TECHNIQUE: CT scan of the abdomen and pelvis was performed without IV   contrast. Lack of IV contrast limits evaluation. Oral contrast was not   administered. Multiplanar reformations were made.    FINDINGS:  Mild bibasilar atelectasis. No pleural effusion.    Mild diffuse hepatic steatosis. Status post cholecystectomy with surgical   clips in the gallbladder fossa.     The unenhanced pancreas, spleen and adrenal glands are grossly   unremarkable.    There is a 4 mm radiodense calculus at the left UVJ causing mild left   hydroureteronephrosis. Additional nonobstructive 3 mm left upper pole   calculus. No change in a 1.2 cm left renal cyst. Mild left perinephric   and left periureteral fat stranding. There is an additional tiny   nonobstructive radiodense renal calculus in the right kidney. No   right-sided hydroureteronephrosis.    Evaluation of the GI tract is limited without oral contrast. GI tract is   unobstructed. Appendix is unremarkable. No free air or ascites.    Urinary bladder is mostly collapsed, limiting evaluation. Uterus is not   visualized, probably surgically absent. Previously noted 3.9 cm left   adnexal cyst has decreased significantly, now almost resolved at 3 mm. No   free pelvic fluid.    Degenerative changes in the spine.    IMPRESSION:  4 mm radiodense calculus at the left UVJ causing mild left   hydroureteronephrosis. Additional nonobstructive 3 mm left upper pole   calculus.    Tiny nonobstructive radiodense renal calculus in the right kidney.     Previously noted 3.9 cm left adnexal cyst has decreased significantly,   now almost resolved at 3 mm.    RICO RIVERA M.D., ATTENDING RADIOLOGIST  This document has been electronically signed. Oct 23 2018  2:54PM    < end of copied text >

## 2018-10-23 NOTE — H&P ADULT - NSHPPHYSICALEXAM_GEN_ALL_CORE
Vital Signs Last 24 Hrs  T(C): 36.2 (23 Oct 2018 13:27), Max: 36.2 (23 Oct 2018 13:27)  T(F): 97.1 (23 Oct 2018 13:27), Max: 97.1 (23 Oct 2018 13:27)  HR: 84 (23 Oct 2018 13:27) (84 - 84)  BP: 119/80 (23 Oct 2018 13:27) (119/80 - 119/80)  RR: 16 (23 Oct 2018 13:27) (16 - 16)  SpO2: 97% (23 Oct 2018 13:27) (97% - 97%)    Physical Exam:    General:  Appears stated age, well-groomed, well-nourished, no distress  Eyes: EOMI  HENT:  WNL, no JVD  Chest: respirations nonlabored bilaterally  Abdomen:  soft, tender to palpation in LLQ and RLQ  : Bilateral CVA tenderness, + suprapubic tenderness  Skin: Warm and dry   Musculoskeletal:  no calf tenderness  Neuro:  Alert, oriented to time, place and person   Psych: normal affect

## 2018-10-23 NOTE — H&P ADULT - PROBLEM SELECTOR PLAN 1
OR planning for cysto and left ureteral stent placement  MET with IVF and Flomax. Strain urine  Ceftriaxone, urine culture pending

## 2018-10-23 NOTE — ED STATDOCS - OBJECTIVE STATEMENT
Telemedicine assessment was conducted (using real time 2 way audio-video technology) by Dr. Clif Crouch located at [address] [place] [address]  +++++++++++++++++++++++++++++++++++++++++++++++++++  History and Plan: Patient states she has a history of kidney stones. Patient states she underwent a sonogram which showed b/l kidney stones and a kidney stone in the urethra. Patient states she has an appointment fro kidney stone removal on the 6th of November but was advised to come to the ED if her pain worsens. Patient states she is experiencing serve pain along with urinary retention and dysuria. Telemedicine assessment was conducted (using real time 2 way audio-video technology) by Dr. Clif Crouch located at [address] [place] [address]  +++++++++++++++++++++++++++++++++++++++++++++++++++  History and Plan: Patient states she has a history of kidney stones. Patient states she underwent a sonogram which showed b/l kidney stones and a kidney stone in the urethra. Patient states she has an appointment fro kidney stone removal on the 6th of November but was advised to come to the ED if her pain worsens. Patient states she is experiencing serve pain along with urinary retention and dysuria.    will check labs, urine, ct renal and medicate for pain    Patient seen by me in intake for initial assessment and ordering. Physician on site to follow results and further evaluate and treat patient.

## 2018-10-24 ENCOUNTER — TRANSCRIPTION ENCOUNTER (OUTPATIENT)
Age: 53
End: 2018-10-24

## 2018-10-24 ENCOUNTER — RESULT REVIEW (OUTPATIENT)
Age: 53
End: 2018-10-24

## 2018-10-24 VITALS
HEART RATE: 70 BPM | DIASTOLIC BLOOD PRESSURE: 67 MMHG | SYSTOLIC BLOOD PRESSURE: 115 MMHG | TEMPERATURE: 98 F | RESPIRATION RATE: 16 BRPM | OXYGEN SATURATION: 98 %

## 2018-10-24 LAB
ANION GAP SERPL CALC-SCNC: 8 MMOL/L — SIGNIFICANT CHANGE UP (ref 5–17)
APTT BLD: 34.7 SEC — SIGNIFICANT CHANGE UP (ref 27.5–37.4)
BUN SERPL-MCNC: 14 MG/DL — SIGNIFICANT CHANGE UP (ref 7–18)
CALCIUM SERPL-MCNC: 8.4 MG/DL — SIGNIFICANT CHANGE UP (ref 8.4–10.5)
CHLORIDE SERPL-SCNC: 110 MMOL/L — HIGH (ref 96–108)
CO2 SERPL-SCNC: 23 MMOL/L — SIGNIFICANT CHANGE UP (ref 22–31)
CREAT SERPL-MCNC: 0.79 MG/DL — SIGNIFICANT CHANGE UP (ref 0.5–1.3)
CULTURE RESULTS: SIGNIFICANT CHANGE UP
GLUCOSE SERPL-MCNC: 86 MG/DL — SIGNIFICANT CHANGE UP (ref 70–99)
HBA1C BLD-MCNC: 5.6 % — SIGNIFICANT CHANGE UP (ref 4–5.6)
HCT VFR BLD CALC: 33.7 % — LOW (ref 34.5–45)
HGB BLD-MCNC: 11.6 G/DL — SIGNIFICANT CHANGE UP (ref 11.5–15.5)
INR BLD: 1.08 RATIO — SIGNIFICANT CHANGE UP (ref 0.88–1.16)
MCHC RBC-ENTMCNC: 28.5 PG — SIGNIFICANT CHANGE UP (ref 27–34)
MCHC RBC-ENTMCNC: 34.5 GM/DL — SIGNIFICANT CHANGE UP (ref 32–36)
MCV RBC AUTO: 82.5 FL — SIGNIFICANT CHANGE UP (ref 80–100)
PLATELET # BLD AUTO: 284 K/UL — SIGNIFICANT CHANGE UP (ref 150–400)
POTASSIUM SERPL-MCNC: 3.5 MMOL/L — SIGNIFICANT CHANGE UP (ref 3.5–5.3)
POTASSIUM SERPL-SCNC: 3.5 MMOL/L — SIGNIFICANT CHANGE UP (ref 3.5–5.3)
PROTHROM AB SERPL-ACNC: 11.8 SEC — SIGNIFICANT CHANGE UP (ref 9.8–12.7)
RBC # BLD: 4.09 M/UL — SIGNIFICANT CHANGE UP (ref 3.8–5.2)
RBC # FLD: 11.2 % — SIGNIFICANT CHANGE UP (ref 10.3–14.5)
SODIUM SERPL-SCNC: 141 MMOL/L — SIGNIFICANT CHANGE UP (ref 135–145)
SPECIMEN SOURCE: SIGNIFICANT CHANGE UP
WBC # BLD: 7 K/UL — SIGNIFICANT CHANGE UP (ref 3.8–10.5)
WBC # FLD AUTO: 7 K/UL — SIGNIFICANT CHANGE UP (ref 3.8–10.5)

## 2018-10-24 PROCEDURE — 74176 CT ABD & PELVIS W/O CONTRAST: CPT

## 2018-10-24 PROCEDURE — 82962 GLUCOSE BLOOD TEST: CPT

## 2018-10-24 PROCEDURE — 86901 BLOOD TYPING SEROLOGIC RH(D): CPT

## 2018-10-24 PROCEDURE — 85610 PROTHROMBIN TIME: CPT

## 2018-10-24 PROCEDURE — 86850 RBC ANTIBODY SCREEN: CPT

## 2018-10-24 PROCEDURE — 52352 CYSTOURETERO W/STONE REMOVE: CPT | Mod: LT

## 2018-10-24 PROCEDURE — 85730 THROMBOPLASTIN TIME PARTIAL: CPT

## 2018-10-24 PROCEDURE — C1758: CPT

## 2018-10-24 PROCEDURE — 85027 COMPLETE CBC AUTOMATED: CPT

## 2018-10-24 PROCEDURE — C1769: CPT

## 2018-10-24 PROCEDURE — 88300 SURGICAL PATH GROSS: CPT | Mod: 26

## 2018-10-24 PROCEDURE — 80053 COMPREHEN METABOLIC PANEL: CPT

## 2018-10-24 PROCEDURE — 80048 BASIC METABOLIC PNL TOTAL CA: CPT

## 2018-10-24 PROCEDURE — C1889: CPT

## 2018-10-24 PROCEDURE — 83036 HEMOGLOBIN GLYCOSYLATED A1C: CPT

## 2018-10-24 PROCEDURE — 88300 SURGICAL PATH GROSS: CPT

## 2018-10-24 PROCEDURE — 99285 EMERGENCY DEPT VISIT HI MDM: CPT | Mod: 25

## 2018-10-24 PROCEDURE — 87086 URINE CULTURE/COLONY COUNT: CPT

## 2018-10-24 PROCEDURE — 81001 URINALYSIS AUTO W/SCOPE: CPT

## 2018-10-24 PROCEDURE — 86900 BLOOD TYPING SEROLOGIC ABO: CPT

## 2018-10-24 PROCEDURE — 82365 CALCULUS SPECTROSCOPY: CPT

## 2018-10-24 PROCEDURE — 76000 FLUOROSCOPY <1 HR PHYS/QHP: CPT

## 2018-10-24 PROCEDURE — 74420 UROGRAPHY RTRGR +-KUB: CPT | Mod: 26

## 2018-10-24 RX ORDER — AZTREONAM 2 G
1 VIAL (EA) INJECTION
Qty: 6 | Refills: 0
Start: 2018-10-24 | End: 2018-10-26

## 2018-10-24 RX ORDER — HYDROMORPHONE HYDROCHLORIDE 2 MG/ML
0.5 INJECTION INTRAMUSCULAR; INTRAVENOUS; SUBCUTANEOUS
Qty: 0 | Refills: 0 | Status: DISCONTINUED | OUTPATIENT
Start: 2018-10-24 | End: 2018-10-24

## 2018-10-24 RX ADMIN — LOSARTAN POTASSIUM 50 MILLIGRAM(S): 100 TABLET, FILM COATED ORAL at 06:34

## 2018-10-24 RX ADMIN — HEPARIN SODIUM 5000 UNIT(S): 5000 INJECTION INTRAVENOUS; SUBCUTANEOUS at 13:49

## 2018-10-24 RX ADMIN — AMLODIPINE BESYLATE 5 MILLIGRAM(S): 2.5 TABLET ORAL at 06:34

## 2018-10-24 RX ADMIN — Medication 25 MILLIGRAM(S): at 06:34

## 2018-10-24 RX ADMIN — PANTOPRAZOLE SODIUM 40 MILLIGRAM(S): 20 TABLET, DELAYED RELEASE ORAL at 06:34

## 2018-10-24 NOTE — BRIEF OPERATIVE NOTE - PROCEDURE
<<-----Click on this checkbox to enter Procedure Cystoscopy with left ureteroscopy with extraction of calculus using stone retrieval basket  10/25/2018    Active  [id number]

## 2018-10-24 NOTE — DISCHARGE NOTE ADULT - PATIENT PORTAL LINK FT
You can access the [employer] Patient Portal, offered by [facility], by registering with the following website: [email or web address]

## 2018-10-24 NOTE — DISCHARGE NOTE ADULT - PLAN OF CARE
Resolve pain with removal of kidney stones F/u with Dr. Barnes in 1 week. Continue flomax (tamsulosin). Take antibiotics as prescribed F/u with Dr. Barnes in 1 month. Continue flomax (tamsulosin). Take antibiotics as prescribed

## 2018-10-24 NOTE — DISCHARGE NOTE ADULT - OTHER SIGNIFICANT FINDINGS
< from: CT Renal Stone Hunt (10.23.18 @ 14:36) >  IMPRESSION:  4 mm radiodense calculus at the left UVJ causing mild left   hydroureteronephrosis. Additional nonobstructive 3 mm left upper pole   calculus.    Tiny nonobstructive radiodense renal calculus in the right kidney.       < end of copied text >

## 2018-10-24 NOTE — PROGRESS NOTE ADULT - SUBJECTIVE AND OBJECTIVE BOX
52 y/o female admitted with L UPJ calculi. Patient examined at bedside, no complaints, pain improved, no hematuria   No nausea, no vomiting    T(F): 98.1 (10-24-18 @ 05:58), Max: 98.8 (10-23-18 @ 19:02)  HR: 80 (10-24-18 @ 05:58) (74 - 84)  BP: 126/70 (10-24-18 @ 05:58) (109/74 - 130/76)  RR: 18 (10-24-18 @ 05:58) (16 - 18)  SpO2: 97% (10-24-18 @ 05:58) (97% - 100%)  Wt(kg): --                          11.6   7.0   )-----------( 284      ( 24 Oct 2018 07:17 )             33.7       10-24    141  |  110<H>  |  14  ----------------------------<  86  3.5   |  23  |  0.79    Ca    8.4      24 Oct 2018 07:17    TPro  8.0  /  Alb  3.9  /  TBili  0.3  /  DBili  x   /  AST  18  /  ALT  33  /  AlkPhos  80  10-23      Physical Exam  General: AAOx3, No acute distress  Skin: No jaundice, no icterus  Abdomen: soft, nontender, nondistended, no rebound tenderness, no guarding, no palpable masses  : Normal external genitalia  Extremities: non edematous, no calf pain bilaterally

## 2018-10-24 NOTE — DISCHARGE NOTE ADULT - CARE PLAN
Principal Discharge DX:	Kidney stone on left side  Goal:	Resolve pain with removal of kidney stones  Assessment and plan of treatment:	F/u with Dr. Barnes in 1 week. Continue flomax (tamsulosin). Take antibiotics as prescribed  Secondary Diagnosis:	HTN (hypertension), benign Principal Discharge DX:	Kidney stone on left side  Goal:	Resolve pain with removal of kidney stones  Assessment and plan of treatment:	F/u with Dr. Barnes in 1 month. Continue flomax (tamsulosin). Take antibiotics as prescribed  Secondary Diagnosis:	HTN (hypertension), benign

## 2018-10-24 NOTE — PROGRESS NOTE ADULT - ASSESSMENT
52 y/o female with L UPJ stone, pain improved, wbc and creatinine normal   IVF hydration   continue antibiotics   possible OR today

## 2018-10-24 NOTE — DISCHARGE NOTE ADULT - MEDICATION SUMMARY - MEDICATIONS TO TAKE
I will START or STAY ON the medications listed below when I get home from the hospital:    Percocet 5/325 oral tablet  -- 1 tab(s) by mouth every 6 hours, As Needed -for Moderate pain MDD:4 tablets  -- Caution federal law prohibits the transfer of this drug to any person other  than the person for whom it was prescribed.  May cause drowsiness.  Alcohol may intensify this effect.  Use care when operating dangerous machinery.  This prescription cannot be refilled.  This product contains acetaminophen.  Do not use  with any other product containing acetaminophen to prevent possible liver damage.  Using more of this medication than prescribed may cause serious breathing problems.    -- Indication: For Calculus of kidney    Flomax 0.4 mg oral capsule  -- 1 cap(s) by mouth once a day  -- Indication: For Calculus of kidney    Exforge 5 mg-160 mg oral tablet  -- 1 tab(s) by mouth once a day  -- Indication: For HTN (Hypertension)    tamoxifen 20 mg oral tablet  -- 1 tab(s) by mouth once a day  -- Indication: For Breast cancer    Bystolic 5 mg oral tablet  -- 1 tab(s) by mouth once a day  -- Indication: For HTN (Hypertension)    omeprazole 40 mg oral delayed release capsule  -- 1 cap(s) by mouth once a day  -- Indication: For acid reflux    Bactrim  mg-160 mg oral tablet  -- 1 tab(s) by mouth 2 times a day  -- Avoid prolonged or excessive exposure to direct and/or artificial sunlight while taking this medication.  Finish all this medication unless otherwise directed by prescriber.  Medication should be taken with plenty of water.    -- Indication: For Kidney stone on left side

## 2018-10-24 NOTE — DISCHARGE NOTE ADULT - MEDICATION SUMMARY - MEDICATIONS TO STOP TAKING
I will STOP taking the medications listed below when I get home from the hospital:    Bactrim  mg-160 mg oral tablet  -- 1 tab(s) by mouth 2 times a day  -- Avoid prolonged or excessive exposure to direct and/or artificial sunlight while taking this medication.  Finish all this medication unless otherwise directed by prescriber.  Medication should be taken with plenty of water.    Percocet 5/325 oral tablet  -- 1 tab(s) by mouth every 6 hours, As Needed -for severe pain MDD:4  -- Caution federal law prohibits the transfer of this drug to any person other  than the person for whom it was prescribed.  May cause drowsiness.  Alcohol may intensify this effect.  Use care when operating dangerous machinery.  This prescription cannot be refilled.  This product contains acetaminophen.  Do not use  with any other product containing acetaminophen to prevent possible liver damage.  Using more of this medication than prescribed may cause serious breathing problems.

## 2018-10-24 NOTE — DISCHARGE NOTE ADULT - HOSPITAL COURSE
53 year old female with PMH right breast CA on tamoxifen, HTN, DM on Metformin, and known history of renal calculi presented as complaining of bilateral flank pain which radiated to the groin and is associated with dysuria and urinary frequency and hesitancy. States pain is worse on the left then the right and is usually 9/10. Admits to nausea and occasional shortness of breath secondary to asthma. Denies fever, chills, chest pain, hematuria, and vomiting. (23 Oct 2018 16:46)    Pt's pain improved slightly with pain medication. Pt underwent cystoscopy and removal of left ureteral stone. Postoperative course was uncomplicated.  Pt is stable for discharge home with pain medication and oral antibiotics.

## 2018-10-24 NOTE — DISCHARGE NOTE ADULT - CARE PROVIDER_API CALL
Kaylan Barnes (MD; MPH), Urology  [address] [facility]  [address] [place] [address]  [place] [address]  Phone: [phone]  Fax: [phone]

## 2018-10-26 ENCOUNTER — OTHER (OUTPATIENT)
Age: 53
End: 2018-10-26

## 2018-11-06 ENCOUNTER — APPOINTMENT (OUTPATIENT)
Dept: UROLOGY | Facility: HOSPITAL | Age: 53
End: 2018-11-06

## 2018-11-13 ENCOUNTER — APPOINTMENT (OUTPATIENT)
Dept: UROLOGY | Facility: HOSPITAL | Age: 53
End: 2018-11-13

## 2018-11-18 NOTE — HEALTH RISK ASSESSMENT
[] : No [No falls in past year] : Patient reported no falls in the past year [0] : 2) Feeling down, depressed, or hopeless: Not at all (0) [de-identified] : GI/URO [[id number]] : 0

## 2018-11-18 NOTE — HISTORY OF PRESENT ILLNESS
[de-identified] : 53 year old  female patient with history of stable Hypertension, Asthma, Type 2 Diabetes Mellitus, Hypertriglyceridemia history as stated, presented for follow up examination of Elevated BP checked. Patient is compliant with all medications. Denies shortness of breath, chest pain or abdominal pains at this time. ROS as stated.\par

## 2018-11-18 NOTE — ASSESSMENT
[FreeTextEntry1] : 53 year old female found to have stable ,with the current regimen, diet and life style modifications, as counseled. Prior results reviewed and discussed with the patient during today's examination. Plan as ordered.\par

## 2018-11-19 ENCOUNTER — APPOINTMENT (OUTPATIENT)
Dept: INTERNAL MEDICINE | Facility: CLINIC | Age: 53
End: 2018-11-19

## 2018-11-22 NOTE — ED PROVIDER NOTE - PSH
22-Nov-2018 20:22  Delivery  2001  History of lumpectomy    S/P Dilation and Curettage    S/P Endometrial Ablation    S/P Laparoscopic Cholecystectomy

## 2018-11-26 ENCOUNTER — APPOINTMENT (OUTPATIENT)
Dept: UROLOGY | Facility: CLINIC | Age: 53
End: 2018-11-26
Payer: COMMERCIAL

## 2018-11-26 VITALS
HEIGHT: 61 IN | SYSTOLIC BLOOD PRESSURE: 108 MMHG | WEIGHT: 180 LBS | BODY MASS INDEX: 33.99 KG/M2 | DIASTOLIC BLOOD PRESSURE: 72 MMHG | TEMPERATURE: 98 F

## 2018-11-26 PROCEDURE — 99213 OFFICE O/P EST LOW 20 MIN: CPT

## 2018-12-13 ENCOUNTER — APPOINTMENT (OUTPATIENT)
Dept: CARDIOLOGY | Facility: CLINIC | Age: 53
End: 2018-12-13
Payer: COMMERCIAL

## 2018-12-13 ENCOUNTER — NON-APPOINTMENT (OUTPATIENT)
Age: 53
End: 2018-12-13

## 2018-12-13 VITALS
SYSTOLIC BLOOD PRESSURE: 112 MMHG | WEIGHT: 179 LBS | BODY MASS INDEX: 33.82 KG/M2 | RESPIRATION RATE: 17 BRPM | DIASTOLIC BLOOD PRESSURE: 79 MMHG | TEMPERATURE: 98.5 F | HEART RATE: 87 BPM | OXYGEN SATURATION: 99 %

## 2018-12-13 PROCEDURE — 93325 DOPPLER ECHO COLOR FLOW MAPG: CPT

## 2018-12-13 PROCEDURE — 93000 ELECTROCARDIOGRAM COMPLETE: CPT | Mod: 59

## 2018-12-13 PROCEDURE — 99215 OFFICE O/P EST HI 40 MIN: CPT

## 2018-12-13 PROCEDURE — 93351 STRESS TTE COMPLETE: CPT

## 2018-12-13 PROCEDURE — 93320 DOPPLER ECHO COMPLETE: CPT

## 2018-12-13 NOTE — REVIEW OF SYSTEMS
[Dyspnea on exertion] : dyspnea during exertion [Chest Pain] : chest pain [Palpitations] : palpitations [Negative] : ENT [Fever] : no fever [Headache] : no headache [Recent Weight Gain (___ Lbs)] : no recent weight gain [Chills] : no chills [Feeling Fatigued] : not feeling fatigued [Recent Weight Loss (___ Lbs)] : no recent weight loss [Shortness Of Breath] : no shortness of breath [Chest  Pressure] : no chest pressure [Lower Ext Edema] : no extremity edema [Leg Claudication] : no intermittent leg claudication [Cough] : no cough [Wheezing] : no wheezing [Coughing Up Blood] : no hemoptysis [Abdominal Pain] : no abdominal pain [Nausea] : no nausea [Vomiting] : no vomiting [Heartburn] : no heartburn [Change in Appetite] : no change in appetite [Dysphagia] : no dysphagia [Joint Pain] : no joint pain [Joint Swelling] : no joint swelling [Joint Stiffness] : no joint stiffness [Muscle Cramps] : no muscle cramps [Limb Weakness (Paresis)] : no limb weakness [Skin: A Rash] : no rash: [Itching] : no itching [Change In Color Of Skin] : change in skin color [Skin Lesions] : no skin lesions [Dizziness] : no dizziness [Numbness (Hypesthesia)] : no numbness [Convulsions] : no convulsions [Tingling (Paresthesia)] : no tingling [Confusion] : no confusion was observed [Memory Lapses Or Loss] : no memory lapses or loss [Depression] : no depression [Anxiety] : no anxiety [Under Stress] : not under stress [Suicidal] : not suicidal [Excessive Thirst] : no polydipsia [Easy Bleeding] : no tendency for easy bleeding [Swollen Glands] : no swollen glands [Easy Bruising] : no tendency for easy bruising [Swollen Glands In The Neck] : no swollen glands in the neck

## 2018-12-13 NOTE — DISCUSSION/SUMMARY
[Hypertension] : hypertension [Improving] : improving [Responding to Treatment] : responding to treatment [Weight Loss] : weight loss [Sodium Restriction] : sodium restriction [Anxiety] : anxiety disorder NOS [Stable] : stable [None] : none [Low Sodium Diet] : low sodium diet [Patient] : the patient [___ Month(s)] : [unfilled] month(s) [With Me] : with me [Likely Cardiac Ischemia (Hi Probability)] : likely cardiac ischemia (high probability) [Stress Echocardiogram] : stress echocardiogram [Sinus Tachycardia] : sinus tachycardia [Minutes spent___] : for [unfilled] ~Uminutes [de-identified] : pending the stress echocardiogram [de-identified] : tolerable [de-identified] : continue bystolic. [de-identified] : resolved, no recurrence [de-identified] : reduce weight [FreeTextEntry1] : Leg edema is not obvious, resolved.\par The  stress echocardiogram will be the references for the  further treatment. Discussed. If negative, patient will have to  consult  rheumatologist for  further treatment. If the test is positive, it is likely related to CAD due to various risk including radiation effect, T2 DM,  or rheumatological problem. Further treatment depends upon the basis.

## 2018-12-13 NOTE — DISCUSSION/SUMMARY
[Hypertension] : hypertension [Improving] : improving [Responding to Treatment] : responding to treatment [Weight Loss] : weight loss [Sodium Restriction] : sodium restriction [Anxiety] : anxiety disorder NOS [Stable] : stable [None] : none [Low Sodium Diet] : low sodium diet [Patient] : the patient [___ Month(s)] : [unfilled] month(s) [With Me] : with me [Likely Cardiac Ischemia (Hi Probability)] : likely cardiac ischemia (high probability) [Stress Echocardiogram] : stress echocardiogram [Sinus Tachycardia] : sinus tachycardia [Minutes spent___] : for [unfilled] ~Uminutes [de-identified] : pending the stress echocardiogram [de-identified] : tolerable [de-identified] : continue bystolic. [de-identified] : resolved, no recurrence [de-identified] : reduce weight [FreeTextEntry1] : Leg edema is not obvious, resolved.\par The  stress echocardiogram will be the references for the  further treatment. Discussed. If negative, patient will have to  consult  rheumatologist for  further treatment. If the test is positive, it is likely related to CAD due to various risk including radiation effect, T2 DM,  or rheumatological problem. Further treatment depends upon the basis.

## 2018-12-13 NOTE — REASON FOR VISIT
[Follow-Up - Clinic] : a clinic follow-up of [Hypertension] : hypertension [Medication Management] : Medication management [Palpitations] : palpitations [Chest Pain] : chest pain

## 2018-12-13 NOTE — PHYSICAL EXAM
[General Appearance - Well Developed] : well developed [Normal Appearance] : normal appearance [Well Groomed] : well groomed [General Appearance - Well Nourished] : well nourished [No Deformities] : no deformities [General Appearance - In No Acute Distress] : no acute distress [Normal Conjunctiva] : the conjunctiva exhibited no abnormalities [Eyelids - No Xanthelasma] : the eyelids demonstrated no xanthelasmas [Normal Oral Mucosa] : normal oral mucosa [No Oral Pallor] : no oral pallor [No Oral Cyanosis] : no oral cyanosis [Normal Jugular Venous A Waves Present] : normal jugular venous A waves present [Normal Jugular Venous V Waves Present] : normal jugular venous V waves present [No Jugular Venous [place] A Waves] : no jugular venous [place] A waves [Respiration, Rhythm And Depth] : normal respiratory rhythm and effort [Exaggerated Use Of Accessory Muscles For Inspiration] : no accessory muscle use [Auscultation Breath Sounds / Voice Sounds] : lungs were clear to auscultation bilaterally [Chest Palpation] : palpation of the chest revealed no abnormalities [Lungs Percussion] : the lungs were normal to percussion [Heart Rate And Rhythm] : heart rate and rhythm were normal [Heart Sounds] : normal S1 and S2 [Murmurs] : no murmurs present [Arterial Pulses Normal] : the arterial pulses were normal [Edema] : no peripheral edema present [Veins - Varicosity Changes] : no varicosital changes were noted in the lower extremities [Bowel Sounds] : normal bowel sounds [Abdomen Soft] : soft [Abdomen Tenderness] : non-tender [Abdomen Mass (___ Cm)] : no abdominal mass palpated [Abdomen Hernia] : no hernia was discovered [Abnormal Walk] : normal gait [Gait - Sufficient For Exercise Testing] : the gait was sufficient for exercise testing [Nail Clubbing] : no clubbing of the fingernails [Cyanosis, Localized] : no localized cyanosis [Petechial Hemorrhages (___cm)] : no petechial hemorrhages [Skin Color & Pigmentation] : normal skin color and pigmentation [Skin Turgor] : normal skin turgor [] : no rash [No Venous Stasis] : no venous stasis [Skin Lesions] : no skin lesions [No Skin Ulcers] : no skin ulcer [No Xanthoma] : no  xanthoma was observed [Oriented To Time, Place, And Person] : oriented to person, place, and time [Impaired Insight] : insight and judgment were intact [Affect] : the affect was normal [Mood] : the mood was normal [Memory Recent] : recent memory was not impaired [Memory Remote] : remote memory was not impaired [No Anxiety] : not feeling anxious [FreeTextEntry1] : no calf tenderness, no edema in my examination, no  asymmetry of calf. " right shoulder  with Rheumatoid arthritis.

## 2018-12-13 NOTE — DISCUSSION/SUMMARY
[Hypertension] : hypertension [Improving] : improving [Responding to Treatment] : responding to treatment [Weight Loss] : weight loss [Sodium Restriction] : sodium restriction [Anxiety] : anxiety disorder NOS [Stable] : stable [None] : none [Low Sodium Diet] : low sodium diet [Patient] : the patient [___ Month(s)] : [unfilled] month(s) [With Me] : with me [Likely Cardiac Ischemia (Hi Probability)] : likely cardiac ischemia (high probability) [Stress Echocardiogram] : stress echocardiogram [Sinus Tachycardia] : sinus tachycardia [Minutes spent___] : for [unfilled] ~Uminutes [de-identified] : pending the stress echocardiogram [de-identified] : tolerable [de-identified] : continue bystolic. [de-identified] : resolved, no recurrence [de-identified] : reduce weight [FreeTextEntry1] : Leg edema is not obvious, resolved.\par The  stress echocardiogram will be the references for the  further treatment. Discussed. If negative, patient will have to  consult  rheumatologist for  further treatment. If the test is positive, it is likely related to CAD due to various risk including radiation effect, T2 DM,  or rheumatological problem. Further treatment depends upon the basis.

## 2018-12-13 NOTE — HISTORY OF PRESENT ILLNESS
[FreeTextEntry1] : With current regimen, patient is doing well except for a month with exertional chest pain radiating to the left arm associated with shortness of breath and palpitations. She has no dizziness\par  She had lumpectomy, right, s/p RT and on tamoxifen .\par Recent blood test said " she has  FARSHAD+" she was referred to the  Rheumatologist. She has abnormal SGOT/GPT , which  was evaluated by the GI and PMD. She has  abnormal cardiolipin IGA, IGM positive,  She had diagnosis of  rheumatoid arthritis on treatment  only with  symptomatic relief.\par She is changing the oncologist.\par

## 2018-12-17 ENCOUNTER — APPOINTMENT (OUTPATIENT)
Dept: INTERNAL MEDICINE | Facility: CLINIC | Age: 53
End: 2018-12-17
Payer: COMMERCIAL

## 2018-12-17 VITALS
RESPIRATION RATE: 16 BRPM | HEART RATE: 77 BPM | DIASTOLIC BLOOD PRESSURE: 80 MMHG | OXYGEN SATURATION: 98 % | WEIGHT: 178 LBS | BODY MASS INDEX: 32.76 KG/M2 | HEIGHT: 62 IN | SYSTOLIC BLOOD PRESSURE: 120 MMHG | TEMPERATURE: 98.2 F

## 2018-12-17 PROCEDURE — 99214 OFFICE O/P EST MOD 30 MIN: CPT

## 2018-12-17 NOTE — ASSESSMENT
[FreeTextEntry1] : 53 year old female found to have stable Hypertension, Asthma, Type 2 Diabetes Mellitus, Hypertriglyceridemia,with the current regimen, diet and life style modifications, as counseled. Prior results reviewed and discussed with the patient during today's examination. Plan as ordered.\par Patient was recently evaluated by CARD/[facility]O, findings and recommendations reviewed with the patient during today's examination.\par Patient is refusing Flu Vaccination, in spite of counseling risks and benefits.\par

## 2018-12-17 NOTE — HISTORY OF PRESENT ILLNESS
[Weight Loss ___ Pounds] : Weight loss of [unfilled] pounds [None] : No weight lost attempts [Following Diet Successfully] : Following the diet successfully [Sedentary] : Patient is sedentary [Following  treatment as advised] : Patient is following  treatment as advised [Contemplation] : Contemplation: patient is considering to lose weight within the next 6 months, patient did not attempt to lose weight in the last year. [Good understanding] : Patient has a good understanding of disease, goals and obesity follow-up plan [de-identified] : 53 year old  female patient with history of stable Hypertension, Asthma, Type 2 Diabetes Mellitus, Hypertriglyceridemia history as stated, presented for follow up examination of Elevated BP checked. Patient is compliant with all medications. Denies shortness of breath, chest pain or abdominal pains at this time. ROS as stated.\par

## 2018-12-17 NOTE — HEALTH RISK ASSESSMENT
[No falls in past year] : Patient reported no falls in the past year [0] : 2) Feeling down, depressed, or hopeless: Not at all (0) [] : No [de-identified] : URO/CARD [[id number]] : 0

## 2018-12-19 ENCOUNTER — APPOINTMENT (OUTPATIENT)
Dept: INTERNAL MEDICINE | Facility: CLINIC | Age: 53
End: 2018-12-19

## 2019-01-24 ENCOUNTER — MEDICATION RENEWAL (OUTPATIENT)
Age: 54
End: 2019-01-24

## 2019-01-28 ENCOUNTER — APPOINTMENT (OUTPATIENT)
Dept: INTERNAL MEDICINE | Facility: CLINIC | Age: 54
End: 2019-01-28
Payer: COMMERCIAL

## 2019-01-28 VITALS
HEART RATE: 77 BPM | TEMPERATURE: 98.5 F | RESPIRATION RATE: 16 BRPM | BODY MASS INDEX: 33.13 KG/M2 | DIASTOLIC BLOOD PRESSURE: 80 MMHG | OXYGEN SATURATION: 97 % | SYSTOLIC BLOOD PRESSURE: 130 MMHG | WEIGHT: 180 LBS | HEIGHT: 62 IN

## 2019-01-28 PROCEDURE — 99214 OFFICE O/P EST MOD 30 MIN: CPT

## 2019-01-28 NOTE — HISTORY OF PRESENT ILLNESS
[de-identified] : 53 year old  female patient with history of stable Hypertension, Asthma, Type 2 Diabetes Mellitus, Hypertriglyceridemia, history as stated, presented for follow up examination with c/o cough, for few weeks, associated with wheezing, cough increases upon talking. Patient is compliant with all medications. Denies shortness of breath, chest pain or abdominal pains at this time. ROS as stated.\par

## 2019-01-28 NOTE — ASSESSMENT
[FreeTextEntry1] : 53 year old female found to have stable Hypertension, Asthma, Type 2 Diabetes Mellitus, Hypertriglyceridemia,with the current regimen, diet and life style modifications, as counseled. Prior results reviewed and discussed with the patient during today's examination. Plan as ordered.\par Current symptoms are consistent with symptomatic Reactive Airway Disease , prescription management and further followup as ordered.\par [facility], findings and recommendations reviewed with the patient during today's examination.\par

## 2019-01-28 NOTE — HEALTH RISK ASSESSMENT
[No falls in past year] : Patient reported no falls in the past year [0] : 2) Feeling down, depressed, or hopeless: Not at all (0) [Intercurrent Urgi Care visits] : went to urgent care [] : No [de-identified] : 1/6/19 [de-identified] : None [[id number]] : 0

## 2019-02-21 ENCOUNTER — LABORATORY RESULT (OUTPATIENT)
Age: 54
End: 2019-02-21

## 2019-02-21 ENCOUNTER — APPOINTMENT (OUTPATIENT)
Dept: INTERNAL MEDICINE | Facility: CLINIC | Age: 54
End: 2019-02-21
Payer: COMMERCIAL

## 2019-02-21 VITALS
WEIGHT: 179 LBS | BODY MASS INDEX: 32.94 KG/M2 | HEART RATE: 85 BPM | SYSTOLIC BLOOD PRESSURE: 113 MMHG | HEIGHT: 62 IN | TEMPERATURE: 98 F | DIASTOLIC BLOOD PRESSURE: 69 MMHG | OXYGEN SATURATION: 96 %

## 2019-02-21 PROCEDURE — 99214 OFFICE O/P EST MOD 30 MIN: CPT

## 2019-02-21 PROCEDURE — 99204 OFFICE O/P NEW MOD 45 MIN: CPT

## 2019-02-21 RX ORDER — PANTOPRAZOLE 40 MG/1
40 TABLET, DELAYED RELEASE ORAL
Qty: 1 | Refills: 2 | Status: COMPLETED | COMMUNITY
Start: 2018-08-03 | End: 2019-02-21

## 2019-02-21 RX ORDER — SUCRALFATE 1 G/10ML
1 SUSPENSION ORAL 4 TIMES DAILY
Qty: 1 | Refills: 1 | Status: COMPLETED | COMMUNITY
Start: 2018-08-03 | End: 2019-02-21

## 2019-02-21 RX ORDER — AMLODIPINE BESYLATE VALSARTAN HYDROCHLOROTHIAZIDE 5; 12.5; 16 MG/1; MG/1; MG/1
5-160-12.5 TABLET, FILM COATED ORAL
Qty: 90 | Refills: 1 | Status: DISCONTINUED | COMMUNITY
Start: 2017-12-05 | End: 2019-02-21

## 2019-02-21 NOTE — END OF VISIT
[] : Resident [FreeTextEntry3] : discussed with resident  [>50% of Time Spent on Counseling and Coordination of Care for  ___] : Greater than 50% of the encounter time was spent on counseling and coordination of care for [unfilled]

## 2019-02-21 NOTE — ASSESSMENT
[FreeTextEntry1] : nodule on skin right Pt likely has ganglionic cyst due to transillumination and could be given anti inflammatory meds but I don’t want to give her with hx of renal stones and hydronephrosis and recent pain .  she can put capsicin  on area .  us sonogram of area first .   gerd has improved and will stop omeprazole and ppi .  hpn she is on hctz which can increase her risks of stones and she had a calcium stone.  abn liver enzymes fu and repeat.  Dm will check her levels today. and urine for protein fatty liver she has appt with Dr guajardo next month .I asked her to also make appt for Dr Barnes

## 2019-02-21 NOTE — PHYSICAL EXAM
[General Appearance - Alert] : alert [General Appearance - In No Acute Distress] : in no acute distress [PERRL With Normal Accommodation] : pupils were equal in size, round, and reactive to light [Oropharynx] : the oropharynx was normal [Auscultation Breath Sounds / Voice Sounds] : lungs were clear to auscultation bilaterally [Apical Impulse] : the apical impulse was normal [Heart Rate And Rhythm] : heart rate was normal and rhythm regular [Heart Sounds] : normal S1 and S2 [Edema] : there was no peripheral edema [Bowel Sounds] : normal bowel sounds [Abdomen Soft] : soft [Abdomen Tenderness] : non-tender [Abdomen Mass (___ Cm)] : no abdominal mass palpated [Cervical Lymph Nodes Enlarged Posterior Bilaterally] : posterior cervical [Cervical Lymph Nodes Enlarged Anterior Bilaterally] : anterior cervical [Supraclavicular Lymph Nodes Enlarged Bilaterally] : supraclavicular [Axillary Lymph Nodes Enlarged Bilaterally] : axillary [Femoral Lymph Nodes Enlarged Bilaterally] : femoral [Inguinal Lymph Nodes Enlarged Bilaterally] : inguinal [FreeTextEntry1] : pt has right cva tenderness  [Normal Station and Gait] : the gait and station were normal [Normal] : motor strength was normal in all muscle groups [Normal Motor Tone] : the muscle tone was normal [Involuntary Movements] : no involuntary movements were seen [Skin Color & Pigmentation] : normal skin color and pigmentation [Skin Turgor] : normal skin turgor [] : no rash [No Focal Deficits] : no focal deficits [Oriented To Time, Place, And Person] : oriented to person, place, and time [Impaired Insight] : insight and judgment were intact [Affect] : the affect was normal

## 2019-02-22 ENCOUNTER — LABORATORY RESULT (OUTPATIENT)
Age: 54
End: 2019-02-22

## 2019-02-23 LAB
ALBUMIN SERPL ELPH-MCNC: 4.8 G/DL
ALP BLD-CCNC: 87 U/L
ALT SERPL-CCNC: 49 U/L
ANION GAP SERPL CALC-SCNC: 16 MMOL/L
APPEARANCE: ABNORMAL
AST SERPL-CCNC: 35 U/L
BASOPHILS # BLD AUTO: 0.04 K/UL
BASOPHILS NFR BLD AUTO: 0.5 %
BILIRUB SERPL-MCNC: 0.4 MG/DL
BILIRUBIN URINE: NEGATIVE
BLOOD URINE: NORMAL
BUN SERPL-MCNC: 13 MG/DL
CALCIUM SERPL-MCNC: 9.8 MG/DL
CHLORIDE SERPL-SCNC: 104 MMOL/L
CHOLEST SERPL-MCNC: 237 MG/DL
CHOLEST/HDLC SERPL: 4.7 RATIO
CO2 SERPL-SCNC: 24 MMOL/L
COLOR: YELLOW
CREAT SERPL-MCNC: 0.85 MG/DL
CREAT SPEC-SCNC: 278 MG/DL
EOSINOPHIL # BLD AUTO: 0.11 K/UL
EOSINOPHIL NFR BLD AUTO: 1.5 %
FOLATE SERPL-MCNC: >20 NG/ML
GLUCOSE QUALITATIVE U: NEGATIVE
GLUCOSE SERPL-MCNC: 102 MG/DL
HCT VFR BLD CALC: 42.8 %
HCV AB SER QL: NONREACTIVE
HCV S/CO RATIO: 0.32 S/CO
HDLC SERPL-MCNC: 50 MG/DL
HGB BLD-MCNC: 13.7 G/DL
IMM GRANULOCYTES NFR BLD AUTO: 0.3 %
KETONES URINE: NEGATIVE
LDLC SERPL CALC-MCNC: 150 MG/DL
LEUKOCYTE ESTERASE URINE: ABNORMAL
LYMPHOCYTES # BLD AUTO: 2.18 K/UL
LYMPHOCYTES NFR BLD AUTO: 29.8 %
MAGNESIUM SERPL-MCNC: 1.7 MG/DL
MAN DIFF?: NORMAL
MCHC RBC-ENTMCNC: 27.7 PG
MCHC RBC-ENTMCNC: 32 GM/DL
MCV RBC AUTO: 86.5 FL
MICROALBUMIN 24H UR DL<=1MG/L-MCNC: 5 MG/DL
MICROALBUMIN/CREAT 24H UR-RTO: 18 MG/G
MONOCYTES # BLD AUTO: 0.58 K/UL
MONOCYTES NFR BLD AUTO: 7.9 %
NEUTROPHILS # BLD AUTO: 4.38 K/UL
NEUTROPHILS NFR BLD AUTO: 60 %
NITRITE URINE: NEGATIVE
PH URINE: 5.5
PLATELET # BLD AUTO: 339 K/UL
POTASSIUM SERPL-SCNC: 4.2 MMOL/L
PROT SERPL-MCNC: 7.8 G/DL
PROTEIN URINE: NORMAL
RBC # BLD: 4.95 M/UL
RBC # FLD: 12.8 %
SODIUM SERPL-SCNC: 144 MMOL/L
SPECIFIC GRAVITY URINE: 1.03
TRIGL SERPL-MCNC: 187 MG/DL
UROBILINOGEN URINE: NORMAL
VIT B12 SERPL-MCNC: 496 PG/ML
WBC # FLD AUTO: 7.31 K/UL

## 2019-02-24 LAB
25(OH)D3 SERPL-MCNC: 19.5 NG/ML
ALBUMIN SERPL ELPH-MCNC: 4.9 G/DL
ALP BLD-CCNC: 86 U/L
ALT SERPL-CCNC: 45 U/L
ANION GAP SERPL CALC-SCNC: 13 MMOL/L
AST SERPL-CCNC: 36 U/L
BASOPHILS # BLD AUTO: 0.05 K/UL
BASOPHILS NFR BLD AUTO: 0.7 %
BILIRUB SERPL-MCNC: 0.2 MG/DL
BUN SERPL-MCNC: 13 MG/DL
CALCIUM SERPL-MCNC: 10.1 MG/DL
CHLORIDE SERPL-SCNC: 102 MMOL/L
CHOLEST SERPL-MCNC: 242 MG/DL
CHOLEST/HDLC SERPL: 4.8 RATIO
CO2 SERPL-SCNC: 26 MMOL/L
CREAT SERPL-MCNC: 0.83 MG/DL
EOSINOPHIL # BLD AUTO: 0.07 K/UL
EOSINOPHIL NFR BLD AUTO: 0.9 %
GGT SERPL-CCNC: 23 U/L
GLUCOSE SERPL-MCNC: 98 MG/DL
HBA1C MFR BLD HPLC: 5.9 %
HCT VFR BLD CALC: 47 %
HDLC SERPL-MCNC: 51 MG/DL
HGB BLD-MCNC: 13.8 G/DL
IMM GRANULOCYTES NFR BLD AUTO: 0.1 %
LDLC SERPL CALC-MCNC: 150 MG/DL
LYMPHOCYTES # BLD AUTO: 2.29 K/UL
LYMPHOCYTES NFR BLD AUTO: 30.6 %
MAN DIFF?: NORMAL
MCHC RBC-ENTMCNC: 27.5 PG
MCHC RBC-ENTMCNC: 29.4 GM/DL
MCV RBC AUTO: 93.6 FL
MONOCYTES # BLD AUTO: 0.54 K/UL
MONOCYTES NFR BLD AUTO: 7.2 %
NEUTROPHILS # BLD AUTO: 4.53 K/UL
NEUTROPHILS NFR BLD AUTO: 60.5 %
PLATELET # BLD AUTO: 336 K/UL
POTASSIUM SERPL-SCNC: 4.1 MMOL/L
PROT SERPL-MCNC: 7.7 G/DL
RBC # BLD: 5.02 M/UL
RBC # FLD: 13.4 %
SODIUM SERPL-SCNC: 141 MMOL/L
TRIGL SERPL-MCNC: 204 MG/DL
WBC # FLD AUTO: 7.49 K/UL

## 2019-02-27 ENCOUNTER — FORM ENCOUNTER (OUTPATIENT)
Age: 54
End: 2019-02-27

## 2019-02-28 ENCOUNTER — OUTPATIENT (OUTPATIENT)
Dept: OUTPATIENT SERVICES | Facility: HOSPITAL | Age: 54
LOS: 1 days | End: 2019-02-28

## 2019-02-28 ENCOUNTER — APPOINTMENT (OUTPATIENT)
Dept: ULTRASOUND IMAGING | Facility: CLINIC | Age: 54
End: 2019-02-28
Payer: COMMERCIAL

## 2019-02-28 ENCOUNTER — APPOINTMENT (OUTPATIENT)
Dept: RHEUMATOLOGY | Facility: CLINIC | Age: 54
End: 2019-02-28
Payer: COMMERCIAL

## 2019-02-28 VITALS
OXYGEN SATURATION: 97 % | SYSTOLIC BLOOD PRESSURE: 126 MMHG | BODY MASS INDEX: 32.2 KG/M2 | WEIGHT: 175 LBS | HEIGHT: 62 IN | TEMPERATURE: 98.4 F | HEART RATE: 82 BPM | DIASTOLIC BLOOD PRESSURE: 83 MMHG | RESPIRATION RATE: 16 BRPM

## 2019-02-28 DIAGNOSIS — Z98.890 OTHER SPECIFIED POSTPROCEDURAL STATES: Chronic | ICD-10-CM

## 2019-02-28 PROCEDURE — 99214 OFFICE O/P EST MOD 30 MIN: CPT

## 2019-02-28 PROCEDURE — 76770 US EXAM ABDO BACK WALL COMP: CPT | Mod: 26

## 2019-02-28 PROCEDURE — 76882 US LMTD JT/FCL EVL NVASC XTR: CPT | Mod: 26,RT

## 2019-02-28 RX ORDER — PREDNISONE 10 MG/1
10 TABLET ORAL
Qty: 21 | Refills: 0 | Status: DISCONTINUED | COMMUNITY
Start: 2019-01-28 | End: 2019-02-28

## 2019-03-03 NOTE — HISTORY OF PRESENT ILLNESS
[___ Month(s) Ago] : [unfilled] month(s) ago [FreeTextEntry1] : Continues to experience overall joint pain affecting her hands, knees and hips\par wakes up with pain \par morning stiffness < 30 minutes\par Pain is worse at night time\par patient with a history of FINCH - pending further f/u with hepatology\par Reports swelling of hands at times\par Positive FARSHAD in the past 1:640 recently - all other serologies are negative\par pain is not responsive to prednisone - had it 2 weeks ago\par hx of right leg DVT

## 2019-03-03 NOTE — ASSESSMENT
[FreeTextEntry1] : 53 year-old female with positive FARSHAD and hx of FINCH with polyarthralgia - not responsive to prednisone\par \par repeat serologies given persistent FARSHAD\par \par Fibromyalgia X UCTD\par add x-rays of more tender joints\par US diagnostic of bilateral writs - r/o synovitis X carpal tunnel\par \par f/u 2 months\par \par start tizanidine at bed time

## 2019-03-03 NOTE — PHYSICAL EXAM
[General Appearance - Alert] : alert [General Appearance - In No Acute Distress] : in no acute distress [Auscultation Breath Sounds / Voice Sounds] : lungs were clear to auscultation bilaterally [Heart Rate And Rhythm] : heart rate was normal and rhythm regular [Heart Sounds] : normal S1 and S2 [Heart Sounds Gallop] : no gallops [Murmurs] : no murmurs [Heart Sounds Pericardial Friction Rub] : no pericardial rub [Full Pulse] : the pedal pulses are present [Edema] : there was no peripheral edema [Bowel Sounds] : normal bowel sounds [Abdomen Soft] : soft [Abdomen Tenderness] : non-tender [] : no hepato-splenomegaly [Abdomen Mass (___ Cm)] : no abdominal mass palpated [Cervical Lymph Nodes Enlarged Posterior Bilaterally] : posterior cervical [Cervical Lymph Nodes Enlarged Anterior Bilaterally] : anterior cervical [Supraclavicular Lymph Nodes Enlarged Bilaterally] : supraclavicular [No CVA Tenderness] : no ~M costovertebral angle tenderness [No Spinal Tenderness] : no spinal tenderness [Abnormal Walk] : normal gait [Nail Clubbing] : no clubbing  or cyanosis of the fingernails [Musculoskeletal - Swelling] : no joint swelling seen [Motor Tone] : muscle strength and tone were normal [FreeTextEntry1] : overall tenderness [Oriented To Time, Place, And Person] : oriented to person, place, and time [Impaired Insight] : insight and judgment were intact [Affect] : the affect was normal

## 2019-03-05 ENCOUNTER — APPOINTMENT (OUTPATIENT)
Dept: CARDIOLOGY | Facility: CLINIC | Age: 54
End: 2019-03-05
Payer: COMMERCIAL

## 2019-03-05 ENCOUNTER — NON-APPOINTMENT (OUTPATIENT)
Age: 54
End: 2019-03-05

## 2019-03-05 VITALS
WEIGHT: 176 LBS | DIASTOLIC BLOOD PRESSURE: 77 MMHG | BODY MASS INDEX: 32.19 KG/M2 | RESPIRATION RATE: 18 BRPM | HEART RATE: 83 BPM | TEMPERATURE: 98 F | OXYGEN SATURATION: 97 % | SYSTOLIC BLOOD PRESSURE: 119 MMHG

## 2019-03-05 PROCEDURE — 93000 ELECTROCARDIOGRAM COMPLETE: CPT | Mod: 59

## 2019-03-05 PROCEDURE — 99214 OFFICE O/P EST MOD 30 MIN: CPT

## 2019-03-05 RX ORDER — AMLODIPINE AND VALSARTAN 5; 160 MG/1; MG/1
5-160 TABLET, FILM COATED ORAL DAILY
Qty: 90 | Refills: 0 | Status: DISCONTINUED | COMMUNITY
Start: 2019-02-21 | End: 2019-03-05

## 2019-03-05 NOTE — DISCUSSION/SUMMARY
[Likely Cardiac Ischemia (Hi Probability)] : likely cardiac ischemia (high probability) [Hypertension] : hypertension [Improving] : improving [Responding to Treatment] : responding to treatment [Weight Loss] : weight loss [Sodium Restriction] : sodium restriction [Anxiety] : anxiety disorder NOS [Sinus Tachycardia] : sinus tachycardia [Stable] : stable [Low Sodium Diet] : low sodium diet [Patient] : the patient [Minutes spent___] : for [unfilled] ~Uminutes [___ Month(s)] : [unfilled] month(s) [With Me] : with me [Unlikely Cardiac Ischemia (Low Prob.)] : chest pain unlikely to represent cardiac ischemia (low probability) [None] : none [de-identified] : resolved, no recurrence [de-identified] : tolerable [de-identified] : continue bystolic. [de-identified] : resolved, no recurrence [de-identified] : reduce weight [FreeTextEntry1] : Leg edema is not obvious, resolved.\par The  stress echocardiogram will be the references for the  further treatment. Discussed. If negative, patient will have to  consult  rheumatologist for  further treatment. \par The  doubt about the CCB causing the renal stone, but will do and  just inc. the valsartan dosage.

## 2019-03-05 NOTE — REASON FOR VISIT
[Follow-Up - Clinic] : a clinic follow-up of [Chest Pain] : chest pain [Hypertension] : hypertension [Medication Management] : Medication management [Palpitations] : palpitations

## 2019-03-05 NOTE — HISTORY OF PRESENT ILLNESS
[FreeTextEntry1] : With current regimen, patient is doing well except for a month with exertional chest pain radiating to the left arm associated with shortness of breath and palpitations. She has no dizziness\par  She had lumpectomy, right, s/p RT and on tamoxifen .\par Recent blood test said " she has  FARSHAD+" she was referred to the  Rheumatologist. She has abnormal SGOT/GPT , which  was evaluated by the GI and PMD. She has  abnormal cardiolipin IGA, IGM positive,  She had diagnosis of  rheumatoid arthritis on treatment  only with  symptomatic relief.\par She is changing the oncologist.\par She has  renal stone on both side and was advised not to be on CCB. Wish to discontinue the amlodipine.\par

## 2019-03-05 NOTE — REVIEW OF SYSTEMS
[Negative] : Genitourinary [Fever] : no fever [Headache] : no headache [Recent Weight Gain (___ Lbs)] : no recent weight gain [Chills] : no chills [Feeling Fatigued] : not feeling fatigued [Recent Weight Loss (___ Lbs)] : no recent weight loss [Shortness Of Breath] : no shortness of breath [Dyspnea on exertion] : not dyspnea during exertion [Chest  Pressure] : no chest pressure [Chest Pain] : no chest pain [Lower Ext Edema] : no extremity edema [Leg Claudication] : no intermittent leg claudication [Palpitations] : no palpitations [Cough] : no cough [Wheezing] : no wheezing [Coughing Up Blood] : no hemoptysis [Abdominal Pain] : no abdominal pain [Nausea] : no nausea [Vomiting] : no vomiting [Heartburn] : no heartburn [Change in Appetite] : no change in appetite [Dysphagia] : no dysphagia [Joint Pain] : no joint pain [Joint Swelling] : no joint swelling [Joint Stiffness] : no joint stiffness [Muscle Cramps] : no muscle cramps [Limb Weakness (Paresis)] : no limb weakness [Skin: A Rash] : no rash: [Itching] : no itching [Change In Color Of Skin] : change in skin color [Skin Lesions] : no skin lesions [Dizziness] : no dizziness [Numbness (Hypesthesia)] : no numbness [Convulsions] : no convulsions [Tingling (Paresthesia)] : no tingling [Confusion] : no confusion was observed [Memory Lapses Or Loss] : no memory lapses or loss [Depression] : no depression [Anxiety] : no anxiety [Under Stress] : not under stress [Suicidal] : not suicidal [Excessive Thirst] : no polydipsia [Easy Bleeding] : no tendency for easy bleeding [Swollen Glands] : no swollen glands [Easy Bruising] : no tendency for easy bruising [Swollen Glands In The Neck] : no swollen glands in the neck

## 2019-03-13 ENCOUNTER — OUTPATIENT (OUTPATIENT)
Dept: OUTPATIENT SERVICES | Facility: HOSPITAL | Age: 54
LOS: 1 days | Discharge: HOME | End: 2019-03-13

## 2019-03-13 DIAGNOSIS — Z98.890 OTHER SPECIFIED POSTPROCEDURAL STATES: Chronic | ICD-10-CM

## 2019-03-13 DIAGNOSIS — C50.919 MALIGNANT NEOPLASM OF UNSPECIFIED SITE OF UNSPECIFIED FEMALE BREAST: ICD-10-CM

## 2019-03-13 DIAGNOSIS — D68.9 COAGULATION DEFECT, UNSPECIFIED: ICD-10-CM

## 2019-03-18 ENCOUNTER — APPOINTMENT (OUTPATIENT)
Dept: HEPATOLOGY | Facility: CLINIC | Age: 54
End: 2019-03-18
Payer: COMMERCIAL

## 2019-03-18 VITALS
HEIGHT: 62 IN | HEART RATE: 71 BPM | DIASTOLIC BLOOD PRESSURE: 84 MMHG | WEIGHT: 175 LBS | TEMPERATURE: 98.5 F | OXYGEN SATURATION: 99 % | SYSTOLIC BLOOD PRESSURE: 124 MMHG | RESPIRATION RATE: 16 BRPM | BODY MASS INDEX: 32.2 KG/M2

## 2019-03-18 PROCEDURE — 99213 OFFICE O/P EST LOW 20 MIN: CPT

## 2019-03-20 ENCOUNTER — APPOINTMENT (OUTPATIENT)
Dept: HEMATOLOGY ONCOLOGY | Facility: CLINIC | Age: 54
End: 2019-03-20
Payer: COMMERCIAL

## 2019-03-20 VITALS
HEART RATE: 80 BPM | TEMPERATURE: 99.1 F | WEIGHT: 177.67 LBS | SYSTOLIC BLOOD PRESSURE: 135 MMHG | BODY MASS INDEX: 32.5 KG/M2 | RESPIRATION RATE: 16 BRPM | DIASTOLIC BLOOD PRESSURE: 88 MMHG | OXYGEN SATURATION: 99 %

## 2019-03-20 PROCEDURE — 99213 OFFICE O/P EST LOW 20 MIN: CPT

## 2019-03-22 RX ORDER — ANASTROZOLE TABLETS 1 MG/1
1 TABLET ORAL DAILY
Qty: 1 | Refills: 2 | Status: DISCONTINUED | COMMUNITY
Start: 2018-05-18 | End: 2019-03-22

## 2019-03-23 NOTE — REVIEW OF SYSTEMS
[Skin Rash] : no skin rash [Skin Wound] : no skin wound [Negative] : Allergic/Immunologic [de-identified] : right breast pain and R axillary skin nodule

## 2019-03-23 NOTE — PHYSICAL EXAM
[Fully active, able to carry on all pre-disease performance without restriction] : Status 0 - Fully active, able to carry on all pre-disease performance without restriction [Normal] : affect appropriate [de-identified] : no abnl masses palpable; tenderness on palpation over the R excision site; postsurgical scar [de-identified] : hemangioma over the R axilla 1cm

## 2019-03-23 NOTE — HISTORY OF PRESENT ILLNESS
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 She underwent right lumpectomy with SLNB  10/2/15. The pathology showed DCIS with close superior margin and returned for re-excision 11/4/15 with Dr Hawkins at [facility]. She then underwent RT to the lumpectomy site with Dr Mann: 4240 cGy with boost. She started on tamoxifen chemoprevention on 1/2016 and has been followed by Dr Gunderson. Since initiation of the tamoxifen, she has experienced worsening headaches, joint pain, fatigue, fatty liver, and concerned about blood clots. She had seen neurologists for atypical migraine and had MRI which was negative for suspicious lesions. She was offered different abortive agents: imitrex and gabapentin which she is not taking. She stopped the tamoxifen for 2 months in 8/2017 to 9/2017 and her headaches were much improved along with her liver enzymes.  [de-identified] : DCIS: intermediate to high grade ER 95% [de-identified] : Tamoxifen 1/2016 to 9/2017 [de-identified] : She did not start anastrozole for chemoprevention and has continued on surveillance. She had seen Dr Ball and had LFTs which are stable. She denies any headaches or joint pain off endocrine therapy. She is worried since her right breast has been more tender feels axillary node that seems to come and go. She also has small skin nodule over the left breast axilla that is more tender and giving her pain.

## 2019-03-23 NOTE — ASSESSMENT
[FreeTextEntry1] : 52 y/o postmenopausal F with right breast DCIS off tamoxifen who has resolved side effects from tamoxifen. She remains on breast surveillance: currently with R breast pain most likely postsurgical but will repeat R breast imaging due to increased symptoms. We reviewed dermatology evaluation to remove R axillary hemangioma which is causing her symptoms. Her bilateral mammogram will be due in August. She will continue with low fat diet and exercise and will consider anastrozole if there are any new breast findings on mammogram. Next follow up in 6 months.

## 2019-04-01 ENCOUNTER — OUTPATIENT (OUTPATIENT)
Dept: OUTPATIENT SERVICES | Facility: HOSPITAL | Age: 54
LOS: 1 days | End: 2019-04-01

## 2019-04-01 ENCOUNTER — APPOINTMENT (OUTPATIENT)
Dept: UROLOGY | Facility: CLINIC | Age: 54
End: 2019-04-01
Payer: COMMERCIAL

## 2019-04-01 ENCOUNTER — APPOINTMENT (OUTPATIENT)
Dept: RADIOLOGY | Facility: CLINIC | Age: 54
End: 2019-04-01
Payer: COMMERCIAL

## 2019-04-01 ENCOUNTER — APPOINTMENT (OUTPATIENT)
Dept: ULTRASOUND IMAGING | Facility: CLINIC | Age: 54
End: 2019-04-01
Payer: COMMERCIAL

## 2019-04-01 ENCOUNTER — APPOINTMENT (OUTPATIENT)
Dept: INTERNAL MEDICINE | Facility: CLINIC | Age: 54
End: 2019-04-01
Payer: COMMERCIAL

## 2019-04-01 VITALS — HEIGHT: 62 IN | SYSTOLIC BLOOD PRESSURE: 119 MMHG | HEART RATE: 77 BPM | DIASTOLIC BLOOD PRESSURE: 81 MMHG

## 2019-04-01 VITALS — BODY MASS INDEX: 33.11 KG/M2 | WEIGHT: 181 LBS

## 2019-04-01 DIAGNOSIS — Z98.890 OTHER SPECIFIED POSTPROCEDURAL STATES: Chronic | ICD-10-CM

## 2019-04-01 PROCEDURE — 76881 US COMPL JOINT R-T W/IMG: CPT | Mod: 26,LT

## 2019-04-01 PROCEDURE — 73562 X-RAY EXAM OF KNEE 3: CPT | Mod: 26,50

## 2019-04-01 PROCEDURE — 99214 OFFICE O/P EST MOD 30 MIN: CPT

## 2019-04-01 PROCEDURE — 73130 X-RAY EXAM OF HAND: CPT | Mod: 26,50

## 2019-04-01 PROCEDURE — 76881 US COMPL JOINT R-T W/IMG: CPT | Mod: 26,RT

## 2019-04-01 PROCEDURE — 73030 X-RAY EXAM OF SHOULDER: CPT | Mod: 26,50

## 2019-04-01 RX ORDER — FAMOTIDINE 40 MG/1
40 TABLET, FILM COATED ORAL
Qty: 90 | Refills: 1 | Status: DISCONTINUED | COMMUNITY
Start: 2019-02-21 | End: 2019-04-01

## 2019-04-01 RX ORDER — PROMETHAZINE HYDROCHLORIDE AND DEXTROMETHORPHAN HYDROBROMIDE ORAL SOLUTION 15; 6.25 MG/5ML; MG/5ML
6.25-15 SOLUTION ORAL
Qty: 210 | Refills: 2 | Status: COMPLETED | COMMUNITY
Start: 2019-01-28 | End: 2019-04-01

## 2019-04-01 NOTE — HISTORY OF PRESENT ILLNESS
[Heartburn] : heartburn worsened [Vomiting] : denies vomiting [Diarrhea] : denies diarrhea [Constipation] : denies constipation [Yellow Skin Or Eyes (Jaundice)] : denies jaundice [Abdominal Swelling] : denies abdominal swelling [Rectal Pain] : denies rectal pain [Wt Gain ___ Lbs] : recent [unfilled] ~Upound(s) weight gain [Nausea] : nausea [Abdominal Pain] : abdominal pain [GERD] : gastroesophageal reflux disease [Hiatus Hernia] : hiatus hernia [Peptic Ulcer Disease] : no peptic ulcer disease [Pancreatitis] : no pancreatitis [Kidney Stone] : kidney stone [Malignancy] : malignancy [de-identified] : pt states the Pepcid is not helping her and feels nausea and has reflux coming into her mouth.  This occurs daily and after eating she feels abd discomfort.  No diarrhea, dysphagia, black stools .  The acid reflux can wake her up during the night when she drinks orange juice or eats strawberries.  No cough , bleeding , sore throat or hoarseness of her voice.

## 2019-04-01 NOTE — PHYSICAL EXAM
[General Appearance - Alert] : alert [General Appearance - In No Acute Distress] : in no acute distress [Sclera] : the sclera and conjunctiva were normal [PERRL With Normal Accommodation] : pupils were equal in size, round, and reactive to light [Oropharynx] : the oropharynx was normal [Neck Appearance] : the appearance of the neck was normal [Auscultation Breath Sounds / Voice Sounds] : lungs were clear to auscultation bilaterally [Heart Rate And Rhythm] : heart rate was normal and rhythm regular [Heart Sounds] : normal S1 and S2 [Heart Sounds Gallop] : no gallops [Murmurs] : no murmurs [Heart Sounds Pericardial Friction Rub] : no pericardial rub [Edema] : there was no peripheral edema [Bowel Sounds] : normal bowel sounds [Abdomen Soft] : soft [Abdomen Tenderness] : non-tender [Abdomen Mass (___ Cm)] : no abdominal mass palpated [Cervical Lymph Nodes Enlarged Posterior Bilaterally] : posterior cervical [Cervical Lymph Nodes Enlarged Anterior Bilaterally] : anterior cervical [Abnormal Walk] : normal gait [Nail Clubbing] : no clubbing  or cyanosis of the fingernails [Musculoskeletal - Swelling] : no joint swelling seen [Motor Tone] : muscle strength and tone were normal [Skin Color & Pigmentation] : normal skin color and pigmentation [Skin Turgor] : normal skin turgor [] : no rash [Deep Tendon Reflexes (DTR)] : deep tendon reflexes were 2+ and symmetric [Sensation] : the sensory exam was normal to light touch and pinprick [No Focal Deficits] : no focal deficits [Oriented To Time, Place, And Person] : oriented to person, place, and time [Impaired Insight] : insight and judgment were intact [Affect] : the affect was normal

## 2019-04-04 ENCOUNTER — APPOINTMENT (OUTPATIENT)
Dept: RHEUMATOLOGY | Facility: CLINIC | Age: 54
End: 2019-04-04
Payer: COMMERCIAL

## 2019-04-04 VITALS
TEMPERATURE: 98.7 F | SYSTOLIC BLOOD PRESSURE: 103 MMHG | HEIGHT: 62 IN | OXYGEN SATURATION: 98 % | RESPIRATION RATE: 16 BRPM | BODY MASS INDEX: 32.2 KG/M2 | WEIGHT: 175 LBS | DIASTOLIC BLOOD PRESSURE: 69 MMHG | HEART RATE: 73 BPM

## 2019-04-04 LAB
ALBUMIN SERPL ELPH-MCNC: 4.4 G/DL
ALP BLD-CCNC: 81 U/L
ALT SERPL-CCNC: 45 U/L
ANION GAP SERPL CALC-SCNC: 14 MMOL/L
AST SERPL-CCNC: 31 U/L
BASOPHILS # BLD AUTO: 0.04 K/UL
BASOPHILS NFR BLD AUTO: 0.6 %
BILIRUB SERPL-MCNC: 0.3 MG/DL
BUN SERPL-MCNC: 12 MG/DL
CALCIUM SERPL-MCNC: 9.6 MG/DL
CHLORIDE SERPL-SCNC: 107 MMOL/L
CO2 SERPL-SCNC: 22 MMOL/L
CREAT SERPL-MCNC: 0.83 MG/DL
EOSINOPHIL # BLD AUTO: 0.06 K/UL
EOSINOPHIL NFR BLD AUTO: 0.8 %
GLUCOSE SERPL-MCNC: 98 MG/DL
HCT VFR BLD CALC: 41.5 %
HGB BLD-MCNC: 13.1 G/DL
IMM GRANULOCYTES NFR BLD AUTO: 0.3 %
LYMPHOCYTES # BLD AUTO: 1.94 K/UL
LYMPHOCYTES NFR BLD AUTO: 27.3 %
MAN DIFF?: NORMAL
MCHC RBC-ENTMCNC: 27 PG
MCHC RBC-ENTMCNC: 31.6 GM/DL
MCV RBC AUTO: 85.6 FL
MONOCYTES # BLD AUTO: 0.53 K/UL
MONOCYTES NFR BLD AUTO: 7.5 %
NEUTROPHILS # BLD AUTO: 4.52 K/UL
NEUTROPHILS NFR BLD AUTO: 63.5 %
PLATELET # BLD AUTO: 321 K/UL
POTASSIUM SERPL-SCNC: 4.2 MMOL/L
PROT SERPL-MCNC: 7.3 G/DL
RBC # BLD: 4.85 M/UL
RBC # FLD: 12.8 %
SODIUM SERPL-SCNC: 143 MMOL/L
WBC # FLD AUTO: 7.11 K/UL

## 2019-04-04 PROCEDURE — 99213 OFFICE O/P EST LOW 20 MIN: CPT

## 2019-04-05 LAB
APPEARANCE: ABNORMAL
BACTERIA UR CULT: NORMAL
BACTERIA: NEGATIVE
BILIRUBIN URINE: NEGATIVE
BLOOD URINE: ABNORMAL
CALCIUM OXALATE CRYSTALS: ABNORMAL
COLOR: YELLOW
CRP SERPL-MCNC: 0.11 MG/DL
DSDNA AB SER-ACNC: 17 IU/ML
ENA RNP AB SER IA-ACNC: <0.2 AL
ENA SCL70 IGG SER IA-ACNC: <0.2 AL
ENA SM AB SER IA-ACNC: <0.2 AL
ENA SS-A AB SER IA-ACNC: <0.2 AL
ENA SS-B AB SER IA-ACNC: <0.2 AL
ERYTHROCYTE [SEDIMENTATION RATE] IN BLOOD BY WESTERGREN METHOD: 16 MM/HR
GLUCOSE QUALITATIVE U: NEGATIVE
HYALINE CASTS: 2 /LPF
KETONES URINE: NEGATIVE
LEUKOCYTE ESTERASE URINE: ABNORMAL
MICROSCOPIC-UA: NORMAL
NITRITE URINE: NEGATIVE
PH URINE: 5.5
PROTEIN URINE: NORMAL
RED BLOOD CELLS URINE: 3 /HPF
RHEUMATOID FACT SER QL: 17 IU/ML
SPECIFIC GRAVITY URINE: 1.02
SQUAMOUS EPITHELIAL CELLS: 3 /HPF
TSH SERPL-ACNC: 1.68 UIU/ML
UROBILINOGEN URINE: NORMAL
WHITE BLOOD CELLS URINE: 42 /HPF

## 2019-04-08 NOTE — ASSESSMENT
[FreeTextEntry1] : 53 year-old female with positive FARSHAD and hx of FINCH with polyarthralgia - not responsive to prednisone\par \par 1. Polyarthralgia  and positive FARSHAD - Labs not done - less stiffness after starting tizanidine\par 2. Breast CA - planning to re-start tamoxifen - as per oncology\par 3. Lipoma - referral to derm for excision\par \par \par I reviewed previous labs results with patients.\par Laboratory tests re-ordered today\par Diagnosis and Prognosis discussed\par Continue with current medications\par F/u 2 months\par

## 2019-04-08 NOTE — HISTORY OF PRESENT ILLNESS
[___ Month(s) Ago] : [unfilled] month(s) ago [FreeTextEntry1] : Planning to re-start tamoxifen due to changes in breast tissue -  maybe 3 times a week\par results of the x-rays with ganglion cyst  right wrist\par also has one over the right mid antecubital fossa - US done in 02/2019 - likely lipoma - this does cause patient pain when using her arm too much- this happens daily\par labs are not done yet\par

## 2019-04-08 NOTE — PHYSICAL EXAM
[General Appearance - Alert] : alert [General Appearance - In No Acute Distress] : in no acute distress [Auscultation Breath Sounds / Voice Sounds] : lungs were clear to auscultation bilaterally [Heart Rate And Rhythm] : heart rate was normal and rhythm regular [Heart Sounds] : normal S1 and S2 [Heart Sounds Gallop] : no gallops [Murmurs] : no murmurs [Heart Sounds Pericardial Friction Rub] : no pericardial rub [Full Pulse] : the pedal pulses are present [Edema] : there was no peripheral edema [Bowel Sounds] : normal bowel sounds [Abdomen Soft] : soft [Abdomen Tenderness] : non-tender [] : no hepato-splenomegaly [Abdomen Mass (___ Cm)] : no abdominal mass palpated [Cervical Lymph Nodes Enlarged Posterior Bilaterally] : posterior cervical [Cervical Lymph Nodes Enlarged Anterior Bilaterally] : anterior cervical [Supraclavicular Lymph Nodes Enlarged Bilaterally] : supraclavicular [No CVA Tenderness] : no ~M costovertebral angle tenderness [No Spinal Tenderness] : no spinal tenderness [Abnormal Walk] : normal gait [Nail Clubbing] : no clubbing  or cyanosis of the fingernails [Musculoskeletal - Swelling] : no joint swelling seen [Motor Tone] : muscle strength and tone were normal [Oriented To Time, Place, And Person] : oriented to person, place, and time [Impaired Insight] : insight and judgment were intact [Affect] : the affect was normal [FreeTextEntry1] : overall tenderness

## 2019-04-10 ENCOUNTER — EMERGENCY (EMERGENCY)
Facility: HOSPITAL | Age: 54
LOS: 1 days | Discharge: HOME | End: 2019-04-10
Attending: EMERGENCY MEDICINE | Admitting: EMERGENCY MEDICINE
Payer: COMMERCIAL

## 2019-04-10 VITALS
TEMPERATURE: 99 F | SYSTOLIC BLOOD PRESSURE: 139 MMHG | DIASTOLIC BLOOD PRESSURE: 83 MMHG | OXYGEN SATURATION: 96 % | WEIGHT: 179.02 LBS | HEART RATE: 81 BPM | RESPIRATION RATE: 18 BRPM | HEIGHT: 61 IN

## 2019-04-10 VITALS
HEART RATE: 72 BPM | OXYGEN SATURATION: 99 % | RESPIRATION RATE: 18 BRPM | DIASTOLIC BLOOD PRESSURE: 78 MMHG | TEMPERATURE: 99 F | SYSTOLIC BLOOD PRESSURE: 131 MMHG

## 2019-04-10 DIAGNOSIS — Z98.890 OTHER SPECIFIED POSTPROCEDURAL STATES: Chronic | ICD-10-CM

## 2019-04-10 LAB
ALBUMIN SERPL ELPH-MCNC: 4.4 G/DL — SIGNIFICANT CHANGE UP (ref 3.3–5)
ALP SERPL-CCNC: 71 U/L — SIGNIFICANT CHANGE UP (ref 40–120)
ALT FLD-CCNC: 46 U/L — HIGH (ref 10–45)
ANION GAP SERPL CALC-SCNC: 13 MMOL/L — SIGNIFICANT CHANGE UP (ref 5–17)
AST SERPL-CCNC: 38 U/L — SIGNIFICANT CHANGE UP (ref 10–40)
BASOPHILS # BLD AUTO: 0.03 K/UL — SIGNIFICANT CHANGE UP (ref 0–0.2)
BASOPHILS NFR BLD AUTO: 0.3 % — SIGNIFICANT CHANGE UP (ref 0–2)
BILIRUB SERPL-MCNC: 0.5 MG/DL — SIGNIFICANT CHANGE UP (ref 0.2–1.2)
BUN SERPL-MCNC: 12 MG/DL — SIGNIFICANT CHANGE UP (ref 7–23)
CALCIUM SERPL-MCNC: 10 MG/DL — SIGNIFICANT CHANGE UP (ref 8.4–10.5)
CHLORIDE SERPL-SCNC: 103 MMOL/L — SIGNIFICANT CHANGE UP (ref 96–108)
CO2 SERPL-SCNC: 23 MMOL/L — SIGNIFICANT CHANGE UP (ref 22–31)
CREAT SERPL-MCNC: 0.91 MG/DL — SIGNIFICANT CHANGE UP (ref 0.5–1.3)
EOSINOPHIL # BLD AUTO: 0.06 K/UL — SIGNIFICANT CHANGE UP (ref 0–0.5)
EOSINOPHIL NFR BLD AUTO: 0.7 % — SIGNIFICANT CHANGE UP (ref 0–6)
GLUCOSE SERPL-MCNC: 91 MG/DL — SIGNIFICANT CHANGE UP (ref 70–99)
HCT VFR BLD CALC: 41.1 % — SIGNIFICANT CHANGE UP (ref 34.5–45)
HGB BLD-MCNC: 13.6 G/DL — SIGNIFICANT CHANGE UP (ref 11.5–15.5)
IMM GRANULOCYTES NFR BLD AUTO: 0.3 % — SIGNIFICANT CHANGE UP (ref 0–1.5)
LYMPHOCYTES # BLD AUTO: 1.93 K/UL — SIGNIFICANT CHANGE UP (ref 1–3.3)
LYMPHOCYTES # BLD AUTO: 21.1 % — SIGNIFICANT CHANGE UP (ref 13–44)
MCHC RBC-ENTMCNC: 27.9 PG — SIGNIFICANT CHANGE UP (ref 27–34)
MCHC RBC-ENTMCNC: 33.1 GM/DL — SIGNIFICANT CHANGE UP (ref 32–36)
MCV RBC AUTO: 84.2 FL — SIGNIFICANT CHANGE UP (ref 80–100)
MONOCYTES # BLD AUTO: 0.67 K/UL — SIGNIFICANT CHANGE UP (ref 0–0.9)
MONOCYTES NFR BLD AUTO: 7.3 % — SIGNIFICANT CHANGE UP (ref 2–14)
NEUTROPHILS # BLD AUTO: 6.42 K/UL — SIGNIFICANT CHANGE UP (ref 1.8–7.4)
NEUTROPHILS NFR BLD AUTO: 70.3 % — SIGNIFICANT CHANGE UP (ref 43–77)
NRBC # BLD: 0 /100 WBCS — SIGNIFICANT CHANGE UP (ref 0–0)
PLATELET # BLD AUTO: 306 K/UL — SIGNIFICANT CHANGE UP (ref 150–400)
POTASSIUM SERPL-MCNC: 4.3 MMOL/L — SIGNIFICANT CHANGE UP (ref 3.5–5.3)
POTASSIUM SERPL-SCNC: 4.3 MMOL/L — SIGNIFICANT CHANGE UP (ref 3.5–5.3)
PROT SERPL-MCNC: 7.6 G/DL — SIGNIFICANT CHANGE UP (ref 6–8.3)
RBC # BLD: 4.88 M/UL — SIGNIFICANT CHANGE UP (ref 3.8–5.2)
RBC # FLD: 12.8 % — SIGNIFICANT CHANGE UP (ref 10.3–14.5)
SODIUM SERPL-SCNC: 139 MMOL/L — SIGNIFICANT CHANGE UP (ref 135–145)
WBC # BLD: 9.14 K/UL — SIGNIFICANT CHANGE UP (ref 3.8–10.5)
WBC # FLD AUTO: 9.14 K/UL — SIGNIFICANT CHANGE UP (ref 3.8–10.5)

## 2019-04-10 PROCEDURE — 74176 CT ABD & PELVIS W/O CONTRAST: CPT | Mod: 26

## 2019-04-10 PROCEDURE — 99284 EMERGENCY DEPT VISIT MOD MDM: CPT

## 2019-04-10 RX ORDER — SODIUM CHLORIDE 9 MG/ML
1000 INJECTION INTRAMUSCULAR; INTRAVENOUS; SUBCUTANEOUS ONCE
Refills: 0 | Status: COMPLETED | OUTPATIENT
Start: 2019-04-10 | End: 2019-04-10

## 2019-04-10 RX ORDER — CEFTRIAXONE 500 MG/1
1 INJECTION, POWDER, FOR SOLUTION INTRAMUSCULAR; INTRAVENOUS ONCE
Refills: 0 | Status: COMPLETED | OUTPATIENT
Start: 2019-04-10 | End: 2019-04-10

## 2019-04-10 RX ORDER — KETOROLAC TROMETHAMINE 30 MG/ML
30 SYRINGE (ML) INJECTION ONCE
Refills: 0 | Status: DISCONTINUED | OUTPATIENT
Start: 2019-04-10 | End: 2019-04-10

## 2019-04-10 RX ORDER — ACETAMINOPHEN 500 MG
975 TABLET ORAL ONCE
Refills: 0 | Status: DISCONTINUED | OUTPATIENT
Start: 2019-04-10 | End: 2019-04-10

## 2019-04-10 RX ORDER — FAMOTIDINE 10 MG/ML
20 INJECTION INTRAVENOUS ONCE
Refills: 0 | Status: COMPLETED | OUTPATIENT
Start: 2019-04-10 | End: 2019-04-10

## 2019-04-10 RX ORDER — ONDANSETRON 8 MG/1
4 TABLET, FILM COATED ORAL ONCE
Refills: 0 | Status: COMPLETED | OUTPATIENT
Start: 2019-04-10 | End: 2019-04-10

## 2019-04-10 RX ORDER — ACETAMINOPHEN 500 MG
650 TABLET ORAL ONCE
Refills: 0 | Status: COMPLETED | OUTPATIENT
Start: 2019-04-10 | End: 2019-04-10

## 2019-04-10 RX ADMIN — FAMOTIDINE 20 MILLIGRAM(S): 10 INJECTION INTRAVENOUS at 12:25

## 2019-04-10 RX ADMIN — SODIUM CHLORIDE 2000 MILLILITER(S): 9 INJECTION INTRAMUSCULAR; INTRAVENOUS; SUBCUTANEOUS at 14:03

## 2019-04-10 RX ADMIN — Medication 650 MILLIGRAM(S): at 14:31

## 2019-04-10 RX ADMIN — CEFTRIAXONE 100 GRAM(S): 500 INJECTION, POWDER, FOR SOLUTION INTRAMUSCULAR; INTRAVENOUS at 14:03

## 2019-04-10 RX ADMIN — SODIUM CHLORIDE 2000 MILLILITER(S): 9 INJECTION INTRAMUSCULAR; INTRAVENOUS; SUBCUTANEOUS at 12:26

## 2019-04-10 NOTE — ED ADULT TRIAGE NOTE - CHIEF COMPLAINT QUOTE
Patient c/o abdominal pain and nausea for 3 days , denies any vomiting , dysuria , fever nor chills . [facility] .

## 2019-04-10 NOTE — ED PROVIDER NOTE - CLINICAL SUMMARY MEDICAL DECISION MAKING FREE TEXT BOX
54 yo female with h/o kidney stones, breast CA, in the ER c/o diarrhea for the past 3 days with associated cramping abdominal pain, urinary frequency. Pt afebrile, appears non-toxic. abdomen soft, with mild tenderness, left CVAT+. plan : labs, IVF, pain and antiemetics, CT scan, re-evaluate.

## 2019-04-10 NOTE — ED PROVIDER NOTE - OBJECTIVE STATEMENT
54 yo female with h/o breast CA, GERD, s/p cholecystectomy, h/o kidney stones in the ER due to persistent cramping abdominal pain since Sunday with associated diarrhea, nausea, mid epigastric and b/l flank pain. Pt reports urinary frequency for the past few days, also mentioned that last week she had some UTI like symptoms. Pt denies hematuria, denies fever or chills.

## 2019-04-10 NOTE — ED ADULT NURSE NOTE - CHPI ED NUR SYMPTOMS NEG
no abdominal distension/no blood in stool/no burning urination/no chills/no dysuria/no fever/no hematuria/no vomiting

## 2019-04-10 NOTE — ED PROVIDER NOTE - NSFOLLOWUPINSTRUCTIONS_ED_ALL_ED_FT
I have discussed the discharge plan with the patient. The patient agrees with the plan, as discussed.  The patient understands Emergency Department diagnosis is a preliminary diagnosis often based on limited information and that the patient must adhere to the follow-up plan as discussed.  The patient understands that if the symptoms worsen or if prescribed medications do not have the desired/planned effect that the patient may return to the Emergency Department at any time for further evaluation and treatment.      Pyelonephritis, Adult  ImagePyelonephritis is a kidney infection. The kidneys are the organs that filter a person's blood and move waste out of the bloodstream and into the urine. Urine passes from the kidneys, through the ureters, and into the bladder. There are two main types of pyelonephritis:    Infections that come on quickly without any warning (acute pyelonephritis).  Infections that last for a long period of time (chronic pyelonephritis).    In most cases, the infection clears up with treatment and does not cause further problems. More severe infections or chronic infections can sometimes spread to the bloodstream or lead to other problems with the kidneys.    What are the causes?  This condition is usually caused by:    Bacteria traveling from the bladder to the kidney through infected urine. The urine in the bladder can become infected with bacteria from:    Bladder infection (cystitis).  Inflammation of the prostate gland (prostatitis).  Sexual intercourse, in females.    Bacteria traveling from the bloodstream to the kidney.    What increases the risk?  This condition is more likely to develop in:    Pregnant women.  Older people.  People who have diabetes.  People who have kidney stones or bladder stones.  People who have other abnormalities of the kidney or ureter.  People who have a catheter placed in the bladder.  People who have cancer.  People who are sexually active.  Women who use spermicides.  People who have had a prior urinary tract infection.    What are the signs or symptoms?  Symptoms of this condition include:    Frequent urination.  Strong or persistent urge to urinate.  Burning or stinging when urinating.  Abdominal pain.  Back pain.  Pain in the side or flank area.  Fever.  Chills.  Blood in the urine, or dark urine.  Nausea.  Vomiting.    How is this diagnosed?  This condition may be diagnosed based on:    Medical history and physical exam.  Urine tests.  Blood tests.    You may also have imaging tests of the kidneys, such as an ultrasound or CT scan.    How is this treated?  Treatment for this condition may depend on the severity of the infection.    If the infection is mild and is found early, you may be treated with antibiotic medicines taken by mouth. You will need to drink fluids to remain hydrated.  If the infection is more severe, you may need to stay in the hospital and receive antibiotics given directly into a vein through an IV tube. You may also need to receive fluids through an IV tube if you are not able to remain hydrated. After your hospital stay, you may need to take oral antibiotics for a period of time.    Other treatments may be required, depending on the cause of the infection.    Follow these instructions at home:  Medicines     Take over-the-counter and prescription medicines only as told by your health care provider.  If you were prescribed an antibiotic medicine, take it as told by your health care provider. Do not stop taking the antibiotic even if you start to feel better.  General instructions     Drink enough fluid to keep your urine clear or pale yellow.  Avoid caffeine, tea, and carbonated beverages. They tend to irritate the bladder.  Urinate often. Avoid holding in urine for long periods of time.  Urinate before and after sex.  After a bowel movement, women should cleanse from front to back. Use each tissue only once.  Keep all follow-up visits as told by your health care provider. This is important.  Contact a health care provider if:  Your symptoms do not get better after 2 days of treatment.  Your symptoms get worse.  You have a fever.  Get help right away if:  You are unable to take your antibiotics or fluids.  You have shaking chills.  You vomit.  You have severe flank or back pain.  You have extreme weakness or fainting.  This information is not intended to replace advice given to you by your health care provider. Make sure you discuss any questions you have with your health care provider.        URINARY TRACT INFECTION IN WOMEN - AfterCare(R) Instructions(ER/ED)     Urinary Tract Infection in Women    WHAT YOU NEED TO KNOW:    A urinary tract infection (UTI) is caused by bacteria that get inside your urinary tract. Most bacteria that enter your urinary tract come out when you urinate. If the bacteria stay in your urinary tract, you may get an infection. Your urinary tract includes your kidneys, ureters, bladder, and urethra. Urine is made in your kidneys, and it flows from the ureters to the bladder. Urine leaves the bladder through the urethra. A UTI is more common in your lower urinary tract, which includes your bladder and urethra. Female Urinary System         DISCHARGE INSTRUCTIONS:    Return to the emergency department if:     You are urinating very little or not at all.      You have a high fever with shaking chills.       You have side or back pain that gets worse.    Contact your healthcare provider if:     You have a fever.      You do not feel better after 2 days of taking antibiotics.      You are vomiting.       You have questions or concerns about your condition or care.    Medicines:     Antibiotics help fight a bacterial infection.       Medicines may be given to decrease pain and burning when you urinate. They will also help decrease the feeling that you need to urinate often. These medicines will make your urine orange or red.      Take your medicine as directed. Contact your healthcare provider if you think your medicine is not helping or if you have side effects. Tell him or her if you are allergic to any medicine. Keep a list of the medicines, vitamins, and herbs you take. Include the amounts, and when and why you take them. Bring the list or the pill bottles to follow-up visits. Carry your medicine list with you in case of an emergency.    Follow up with your healthcare provider as directed: Write down your questions so you remember to ask them during your visits.     Prevent another UTI:     Empty your bladder often. Urinate and empty your bladder as soon as you feel the need. Do not hold your urine for long periods of time.      Wipe from front to back after you urinate or have a bowel movement. This will help prevent germs from getting into your urinary tract through your urethra.      Drink liquids as directed. Ask how much liquid to drink each day and which liquids are best for you. You may need to drink more liquids than usual to help flush out the bacteria. Do not drink alcohol, caffeine, or citrus juices. These can irritate your bladder and increase your symptoms. Your healthcare provider may recommend cranberry juice to help prevent a UTI.      Urinate after you have sex. This can help flush out bacteria passed during sex.      Do not douche or use feminine deodorants. These can change the chemical balance in your vagina.      Change sanitary pads or tampons often. This will help prevent germs from getting into your urinary tract.       Do pelvic muscle exercises often. Pelvic muscle exercises may help you start and stop urinating. Strong pelvic muscles may help you empty your bladder easier. Squeeze these muscles tightly for 5 seconds like you are trying to hold back urine. Then relax for 5 seconds. Gradually work up to squeezing for 10 seconds. Do 3 sets of 15 repetitions a day, or as directed.      ArabicBosnianCanadian FrenchUCHealth Grandview HospitallishTexas Orthopedic HospitalTraditional ChineseVietnamese    Diarrhea, Adult  Diarrhea is frequent loose and watery bowel movements. Diarrhea can make you feel weak and cause you to become dehydrated. Dehydration can make you tired and thirsty, cause you to have a dry mouth, and decrease how often you urinate. Diarrhea typically lasts 2–3 days. However, it can last longer if it is a sign of something more serious. It is important to treat your diarrhea as told by your health care provider.    Follow these instructions at home:  Eating and drinking     Image Image ImageFollow these recommendations as told by your health care provider:    Take an oral rehydration solution (ORS). This is a drink that is sold at pharmacies and retail stores.  Drink clear fluids, such as water, ice chips, diluted fruit juice, and low-calorie sports drinks.  Eat bland, easy-to-digest foods in small amounts as you are able. These foods include bananas, applesauce, rice, lean meats, toast, and crackers.  Avoid drinking fluids that contain a lot of sugar or caffeine, such as energy drinks, sports drinks, and soda.  Avoid alcohol.  Avoid spicy or fatty foods.    General instructions     Drink enough fluid to keep your urine clear or pale yellow.  Wash your hands often. If soap and water are not available, use hand .  Make sure that all people in your household wash their hands well and often.  Take over-the-counter and prescription medicines only as told by your health care provider.  Rest at home while you recover.  Watch your condition for any changes.  Take a warm bath to relieve any burning or pain from frequent diarrhea episodes.  Keep all follow-up visits as told by your health care provider. This is important.  Contact a health care provider if:  You have a fever.  Your diarrhea gets worse.  You have new symptoms.  You cannot keep fluids down.  You feel light-headed or dizzy.  You have a headache  You have muscle cramps.  Get help right away if:  You have chest pain.  You feel extremely weak or you faint.  You have bloody or black stools or stools that look like tar.  You have severe pain, cramping, or bloating in your abdomen.  You have trouble breathing or you are breathing very quickly.  Your heart is beating very quickly.  Your skin feels cold and clammy.  You feel confused.  You have signs of dehydration, such as:    Dark urine, very little urine, or no urine.  Cracked lips.  Dry mouth.  Sunken eyes.  Sleepiness.  Weakness.    This information is not intended to replace advice given to you by your health care provider. Make sure you discuss any questions you have with your health care provider.

## 2019-04-10 NOTE — ED PROVIDER NOTE - PROGRESS NOTE DETAILS
pt improved , pain subsided. Labs and CT scan done. no acute pathology on CT, UA consistent with UTI. will start on Abs and d/c home to f/u with her Urologist. Pt agree with a treatment plan  and seeking discharge.

## 2019-04-10 NOTE — ED ADULT NURSE NOTE - NSIMPLEMENTINTERV_GEN_ALL_ED
Implemented All Universal Safety Interventions:  [place] to call system. Call bell, personal items and telephone within reach. Instruct patient to call for assistance. Room bathroom lighting operational. Non-slip footwear when patient is off stretcher. Physically safe environment: no spills, clutter or unnecessary equipment. Stretcher in lowest position, wheels locked, appropriate side rails in place.

## 2019-04-11 LAB
CULTURE RESULTS: NO GROWTH — SIGNIFICANT CHANGE UP
SPECIMEN SOURCE: SIGNIFICANT CHANGE UP

## 2019-04-12 LAB
CCP AB SER IA-ACNC: <8 UNITS
RF+CCP IGG SER-IMP: NEGATIVE

## 2019-04-14 DIAGNOSIS — N39.0 URINARY TRACT INFECTION, SITE NOT SPECIFIED: ICD-10-CM

## 2019-04-14 DIAGNOSIS — R35.0 FREQUENCY OF MICTURITION: ICD-10-CM

## 2019-04-14 DIAGNOSIS — R10.13 EPIGASTRIC PAIN: ICD-10-CM

## 2019-04-22 ENCOUNTER — APPOINTMENT (OUTPATIENT)
Dept: INTERNAL MEDICINE | Facility: CLINIC | Age: 54
End: 2019-04-22
Payer: COMMERCIAL

## 2019-04-22 VITALS
RESPIRATION RATE: 16 BRPM | OXYGEN SATURATION: 99 % | HEIGHT: 62 IN | BODY MASS INDEX: 32.76 KG/M2 | HEART RATE: 77 BPM | TEMPERATURE: 98.4 F | SYSTOLIC BLOOD PRESSURE: 120 MMHG | DIASTOLIC BLOOD PRESSURE: 80 MMHG | WEIGHT: 178 LBS

## 2019-04-22 PROCEDURE — 99214 OFFICE O/P EST MOD 30 MIN: CPT

## 2019-04-22 RX ORDER — GREEN TEA/HOODIA GORDONII 315-12.5MG
CAPSULE ORAL
Qty: 30 | Refills: 0 | Status: DISCONTINUED | COMMUNITY
Start: 2018-05-17 | End: 2019-04-22

## 2019-04-22 RX ORDER — SULFAMETHOXAZOLE AND TRIMETHOPRIM 800; 160 MG/1; MG/1
800-160 TABLET ORAL
Qty: 14 | Refills: 0 | Status: DISCONTINUED | COMMUNITY
Start: 2019-04-01 | End: 2019-04-22

## 2019-04-22 NOTE — HISTORY OF PRESENT ILLNESS
[Patient declined Retinal Exam] : Patient declined Retinal Exam. [de-identified] : 53 year old  female patient with history of stable Hypertension, Asthma, Type 2 Diabetes Mellitus, Hypertriglyceridemia, history as stated, presented for follow up examination. Patient is compliant with all medications. Denies shortness of breath, chest pain or abdominal pains at this time. ROS as stated.\par

## 2019-04-22 NOTE — HEALTH RISK ASSESSMENT
[Intercurrent Urgi Care visits] : went to urgent care [No falls in past year] : Patient reported no falls in the past year [0] : 1) Little interest or pleasure doing things: Not at all (0) [] : No [de-identified] : 1/6/19 [[id number]] : 0 [de-identified] : None

## 2019-04-22 NOTE — ASSESSMENT
[FreeTextEntry1] : 53 year old female found to have stable Hypertension, Asthma, Type 2 Diabetes Mellitus, Hypertriglyceridemia,with the current regimen, diet and life style modifications, as counseled. Prior results reviewed and discussed with the patient during today's examination. Plan as ordered.\par Patient was recently evaluated by [facility]/GI/URO/[facility]MEON[facility] , findings and recommendations reviewed with the patient during today's examination.\par

## 2019-04-25 ENCOUNTER — FORM ENCOUNTER (OUTPATIENT)
Age: 54
End: 2019-04-25

## 2019-04-26 ENCOUNTER — APPOINTMENT (OUTPATIENT)
Dept: ULTRASOUND IMAGING | Facility: IMAGING CENTER | Age: 54
End: 2019-04-26
Payer: COMMERCIAL

## 2019-04-26 ENCOUNTER — OUTPATIENT (OUTPATIENT)
Dept: OUTPATIENT SERVICES | Facility: HOSPITAL | Age: 54
LOS: 1 days | End: 2019-04-26
Payer: COMMERCIAL

## 2019-04-26 ENCOUNTER — APPOINTMENT (OUTPATIENT)
Dept: MAMMOGRAPHY | Facility: IMAGING CENTER | Age: 54
End: 2019-04-26
Payer: COMMERCIAL

## 2019-04-26 DIAGNOSIS — Z98.890 OTHER SPECIFIED POSTPROCEDURAL STATES: Chronic | ICD-10-CM

## 2019-04-26 DIAGNOSIS — D05.11 INTRADUCTAL CARCINOMA IN SITU OF RIGHT BREAST: ICD-10-CM

## 2019-04-26 PROCEDURE — 77065 DX MAMMO INCL CAD UNI: CPT

## 2019-04-26 PROCEDURE — G0279: CPT | Mod: 26

## 2019-04-26 PROCEDURE — 76642 ULTRASOUND BREAST LIMITED: CPT

## 2019-04-26 PROCEDURE — 77065 DX MAMMO INCL CAD UNI: CPT | Mod: 26,RT

## 2019-04-26 PROCEDURE — G0279: CPT

## 2019-04-26 PROCEDURE — 76642 ULTRASOUND BREAST LIMITED: CPT | Mod: 26,RT

## 2019-06-17 ENCOUNTER — APPOINTMENT (OUTPATIENT)
Dept: HEPATOLOGY | Facility: CLINIC | Age: 54
End: 2019-06-17
Payer: COMMERCIAL

## 2019-06-17 VITALS
HEIGHT: 62 IN | BODY MASS INDEX: 32.76 KG/M2 | DIASTOLIC BLOOD PRESSURE: 62 MMHG | TEMPERATURE: 98.6 F | OXYGEN SATURATION: 99 % | RESPIRATION RATE: 16 BRPM | HEART RATE: 71 BPM | SYSTOLIC BLOOD PRESSURE: 98 MMHG | WEIGHT: 178 LBS

## 2019-06-17 PROCEDURE — 99213 OFFICE O/P EST LOW 20 MIN: CPT

## 2019-07-01 ENCOUNTER — APPOINTMENT (OUTPATIENT)
Dept: INTERNAL MEDICINE | Facility: CLINIC | Age: 54
End: 2019-07-01
Payer: COMMERCIAL

## 2019-07-01 VITALS
BODY MASS INDEX: 33.49 KG/M2 | SYSTOLIC BLOOD PRESSURE: 130 MMHG | WEIGHT: 182 LBS | TEMPERATURE: 98 F | DIASTOLIC BLOOD PRESSURE: 86 MMHG | HEART RATE: 78 BPM | HEIGHT: 62 IN | OXYGEN SATURATION: 98 %

## 2019-07-01 PROCEDURE — 99214 OFFICE O/P EST MOD 30 MIN: CPT

## 2019-07-01 NOTE — PHYSICAL EXAM
[General Appearance - Alert] : alert [General Appearance - In No Acute Distress] : in no acute distress [PERRL With Normal Accommodation] : pupils were equal in size, round, and reactive to light [Oropharynx] : the oropharynx was normal [Auscultation Breath Sounds / Voice Sounds] : lungs were clear to auscultation bilaterally [Heart Rate And Rhythm] : heart rate was normal and rhythm regular [Heart Sounds] : normal S1 and S2 [Heart Sounds Gallop] : no gallops [Heart Sounds Pericardial Friction Rub] : no pericardial rub [Edema] : there was no peripheral edema [Bowel Sounds] : normal bowel sounds [Abdomen Soft] : soft [Abdomen Tenderness] : non-tender [Abdomen Mass (___ Cm)] : no abdominal mass palpated [Cervical Lymph Nodes Enlarged Posterior Bilaterally] : posterior cervical [Cervical Lymph Nodes Enlarged Anterior Bilaterally] : anterior cervical [Supraclavicular Lymph Nodes Enlarged Bilaterally] : supraclavicular [Axillary Lymph Nodes Enlarged Bilaterally] : axillary [Femoral Lymph Nodes Enlarged Bilaterally] : femoral [Inguinal Lymph Nodes Enlarged Bilaterally] : inguinal [Abnormal Walk] : normal gait [Nail Clubbing] : no clubbing  or cyanosis of the fingernails [Musculoskeletal - Swelling] : no joint swelling seen [Motor Tone] : muscle strength and tone were normal [Skin Color & Pigmentation] : normal skin color and pigmentation [Skin Turgor] : normal skin turgor [] : no rash [Oriented To Time, Place, And Person] : oriented to person, place, and time [Impaired Insight] : insight and judgment were intact [Affect] : the affect was normal

## 2019-07-01 NOTE — ASSESSMENT
[FreeTextEntry1] : fatty liver pt has seen Dr Ball and will have fibroscan and us of liver.  Dyspepsia  She will continue present management and will be slowly taken off pantoprazole and kept on zantac.  She understands it can affect her kidneys and needs to have bun creatinine checked.  Her recent bun creatinine was normal in 4/19  and will recheck her levels today.  she raised the head of the bed.

## 2019-07-03 LAB
ANION GAP SERPL CALC-SCNC: 14 MMOL/L
BUN SERPL-MCNC: 12 MG/DL
CALCIUM SERPL-MCNC: 10 MG/DL
CHLORIDE SERPL-SCNC: 102 MMOL/L
CO2 SERPL-SCNC: 26 MMOL/L
CREAT SERPL-MCNC: 0.89 MG/DL
GLUCOSE SERPL-MCNC: 71 MG/DL
POTASSIUM SERPL-SCNC: 4.3 MMOL/L
SODIUM SERPL-SCNC: 141 MMOL/L

## 2019-07-12 ENCOUNTER — APPOINTMENT (OUTPATIENT)
Dept: RHEUMATOLOGY | Facility: CLINIC | Age: 54
End: 2019-07-12
Payer: COMMERCIAL

## 2019-07-12 VITALS
BODY MASS INDEX: 32.94 KG/M2 | SYSTOLIC BLOOD PRESSURE: 119 MMHG | TEMPERATURE: 98.6 F | RESPIRATION RATE: 16 BRPM | OXYGEN SATURATION: 98 % | HEART RATE: 71 BPM | HEIGHT: 62 IN | WEIGHT: 179 LBS | DIASTOLIC BLOOD PRESSURE: 80 MMHG

## 2019-07-12 PROCEDURE — 99214 OFFICE O/P EST MOD 30 MIN: CPT

## 2019-07-12 NOTE — PHYSICAL EXAM
[General Appearance - Alert] : alert [General Appearance - In No Acute Distress] : in no acute distress [Heart Sounds] : normal S1 and S2 [Auscultation Breath Sounds / Voice Sounds] : lungs were clear to auscultation bilaterally [Heart Rate And Rhythm] : heart rate was normal and rhythm regular [Heart Sounds Gallop] : no gallops [Murmurs] : no murmurs [Heart Sounds Pericardial Friction Rub] : no pericardial rub [Full Pulse] : the pedal pulses are present [Edema] : there was no peripheral edema [Bowel Sounds] : normal bowel sounds [] : no hepato-splenomegaly [Abdomen Tenderness] : non-tender [Abdomen Soft] : soft [Abdomen Mass (___ Cm)] : no abdominal mass palpated [Cervical Lymph Nodes Enlarged Anterior Bilaterally] : anterior cervical [Cervical Lymph Nodes Enlarged Posterior Bilaterally] : posterior cervical [Supraclavicular Lymph Nodes Enlarged Bilaterally] : supraclavicular [No CVA Tenderness] : no ~M costovertebral angle tenderness [No Spinal Tenderness] : no spinal tenderness [Abnormal Walk] : normal gait [Nail Clubbing] : no clubbing  or cyanosis of the fingernails [Motor Tone] : muscle strength and tone were normal [Musculoskeletal - Swelling] : no joint swelling seen [Impaired Insight] : insight and judgment were intact [Oriented To Time, Place, And Person] : oriented to person, place, and time [Affect] : the affect was normal [FreeTextEntry1] : left hip with pain on internal rotation

## 2019-07-12 NOTE — ASSESSMENT
[FreeTextEntry1] : 53 year-old female with positive FARSHAD and hx of FINCH with polyarthralgia - not responsive to prednisone\par \par 1. Polyarthralgia  and positive FARSHAD and borderline RF at 17  - will start turmeric 500 mg bid and fish oil\par 2. Breast CA - on anastrazole\par 3. Lipoma - referral to derm for excision\par 4. Hip and knee pain - x-rays today\par \par \par I reviewed previous labs results with patients.\par Diagnosis and Prognosis discussed\par F/u 2 months\par

## 2019-07-12 NOTE — HISTORY OF PRESENT ILLNESS
[___ Month(s) Ago] : [unfilled] month(s) ago [FreeTextEntry1] : started anastrozole\par worsening left hip pain with pain when going up the stairs and walking. and trying to open the hip\par labs with borderline RF at 17 - all other serologies are negative\par

## 2019-07-29 ENCOUNTER — APPOINTMENT (OUTPATIENT)
Dept: INTERNAL MEDICINE | Facility: CLINIC | Age: 54
End: 2019-07-29
Payer: COMMERCIAL

## 2019-07-29 VITALS
SYSTOLIC BLOOD PRESSURE: 130 MMHG | DIASTOLIC BLOOD PRESSURE: 80 MMHG | OXYGEN SATURATION: 98 % | HEART RATE: 80 BPM | HEIGHT: 62 IN | BODY MASS INDEX: 32.57 KG/M2 | WEIGHT: 177 LBS | TEMPERATURE: 98.3 F | RESPIRATION RATE: 16 BRPM

## 2019-07-29 PROCEDURE — 99214 OFFICE O/P EST MOD 30 MIN: CPT

## 2019-07-29 NOTE — HISTORY OF PRESENT ILLNESS
[de-identified] : 53 year old  female patient with history of stable Hypertension, Asthma, Type 2 Diabetes Mellitus, Hypertriglyceridemia, history as stated, presented for follow up examination. Patient is compliant with all medications. Denies shortness of breath, chest pain or abdominal pains at this time. ROS as stated.\par

## 2019-07-29 NOTE — ASSESSMENT
[FreeTextEntry1] : 53 year old female found to have stable Hypertension, Asthma, Type 2 Diabetes Mellitus, Hypertriglyceridemia,with the current regimen, diet and life style modifications, as counseled. Prior results reviewed and discussed with the patient during today's examination. Plan as ordered.\par Patient was recently evaluated by GI/RHEUM/HEP, findings and recommendations reviewed with the patient during today's examination.\par

## 2019-07-29 NOTE — HEALTH RISK ASSESSMENT
[Intercurrent Urgi Care visits] : went to urgent care [No] : In the past 12 months have you used drugs other than those required for medical reasons? No [No falls in past year] : Patient reported no falls in the past year [0] : 2) Feeling down, depressed, or hopeless: Not at all (0) [] : No [de-identified] : 1/6/19 [[id number]] : 0 [de-identified] : GI/RHEUM/HEP

## 2019-09-09 ENCOUNTER — FORM ENCOUNTER (OUTPATIENT)
Age: 54
End: 2019-09-09

## 2019-09-10 ENCOUNTER — APPOINTMENT (OUTPATIENT)
Dept: HEPATOLOGY | Facility: CLINIC | Age: 54
End: 2019-09-10
Payer: COMMERCIAL

## 2019-09-10 ENCOUNTER — APPOINTMENT (OUTPATIENT)
Dept: ULTRASOUND IMAGING | Facility: IMAGING CENTER | Age: 54
End: 2019-09-10
Payer: COMMERCIAL

## 2019-09-10 ENCOUNTER — OUTPATIENT (OUTPATIENT)
Dept: OUTPATIENT SERVICES | Facility: HOSPITAL | Age: 54
LOS: 1 days | End: 2019-09-10
Payer: COMMERCIAL

## 2019-09-10 ENCOUNTER — APPOINTMENT (OUTPATIENT)
Dept: MAMMOGRAPHY | Facility: IMAGING CENTER | Age: 54
End: 2019-09-10
Payer: COMMERCIAL

## 2019-09-10 DIAGNOSIS — D05.11 INTRADUCTAL CARCINOMA IN SITU OF RIGHT BREAST: ICD-10-CM

## 2019-09-10 DIAGNOSIS — Z98.890 OTHER SPECIFIED POSTPROCEDURAL STATES: Chronic | ICD-10-CM

## 2019-09-10 PROCEDURE — 77067 SCR MAMMO BI INCL CAD: CPT

## 2019-09-10 PROCEDURE — 77066 DX MAMMO INCL CAD BI: CPT

## 2019-09-10 PROCEDURE — 77066 DX MAMMO INCL CAD BI: CPT | Mod: 26

## 2019-09-10 PROCEDURE — G0279: CPT

## 2019-09-10 PROCEDURE — 76641 ULTRASOUND BREAST COMPLETE: CPT

## 2019-09-10 PROCEDURE — 77063 BREAST TOMOSYNTHESIS BI: CPT

## 2019-09-10 PROCEDURE — 76641 ULTRASOUND BREAST COMPLETE: CPT | Mod: 26,50

## 2019-09-10 PROCEDURE — G0279: CPT | Mod: 26

## 2019-09-10 PROCEDURE — 91200 LIVER ELASTOGRAPHY: CPT

## 2019-09-18 ENCOUNTER — OUTPATIENT (OUTPATIENT)
Dept: OUTPATIENT SERVICES | Facility: HOSPITAL | Age: 54
LOS: 1 days | Discharge: HOME | End: 2019-09-18

## 2019-09-18 DIAGNOSIS — D68.9 COAGULATION DEFECT, UNSPECIFIED: ICD-10-CM

## 2019-09-18 DIAGNOSIS — Z98.890 OTHER SPECIFIED POSTPROCEDURAL STATES: Chronic | ICD-10-CM

## 2019-09-18 DIAGNOSIS — C50.919 MALIGNANT NEOPLASM OF UNSPECIFIED SITE OF UNSPECIFIED FEMALE BREAST: ICD-10-CM

## 2019-09-19 ENCOUNTER — NON-APPOINTMENT (OUTPATIENT)
Age: 54
End: 2019-09-19

## 2019-09-19 ENCOUNTER — APPOINTMENT (OUTPATIENT)
Dept: CARDIOLOGY | Facility: CLINIC | Age: 54
End: 2019-09-19
Payer: COMMERCIAL

## 2019-09-19 VITALS
HEART RATE: 79 BPM | RESPIRATION RATE: 17 BRPM | BODY MASS INDEX: 32.56 KG/M2 | SYSTOLIC BLOOD PRESSURE: 155 MMHG | WEIGHT: 178 LBS | DIASTOLIC BLOOD PRESSURE: 91 MMHG | OXYGEN SATURATION: 99 % | TEMPERATURE: 98.2 F

## 2019-09-19 VITALS — SYSTOLIC BLOOD PRESSURE: 144 MMHG | DIASTOLIC BLOOD PRESSURE: 90 MMHG

## 2019-09-19 PROCEDURE — 99214 OFFICE O/P EST MOD 30 MIN: CPT

## 2019-09-19 PROCEDURE — 93000 ELECTROCARDIOGRAM COMPLETE: CPT | Mod: 59

## 2019-09-19 NOTE — HISTORY OF PRESENT ILLNESS
[FreeTextEntry1] : With current regimen, patient is doing well except  at time with needle pricking for a second on and off over the left upper chest. She has no dizziness, no true chest pain, no shortness of breath, or  no palpitation.\par  She had lumpectomy, right, s/p RT and on tamoxifen .\par Recent blood test said " she has  FARSHAD+" she was referred to the  Rheumatologist. She has abnormal SGOT/GPT , which  was evaluated by the GI and PMD. She has  abnormal cardiolipin IGA, IGM positive,  She had diagnosis of  rheumatoid arthritis on treatment  only with  symptomatic relief.\par She is changing the oncologist.\par She has  renal stone on both side and was advised not to be on CCB. Wish to discontinue the amlodipine.\par

## 2019-09-19 NOTE — DISCUSSION/SUMMARY
[Unlikely Cardiac Ischemia (Low Prob.)] : chest pain unlikely to represent cardiac ischemia (low probability) [Hypertension] : hypertension [Improving] : improving [Weight Loss] : weight loss [Sodium Restriction] : sodium restriction [Anxiety] : anxiety disorder NOS [Sinus Tachycardia] : sinus tachycardia [Stable] : stable [None] : none [Low Sodium Diet] : low sodium diet [Patient] : the patient [Minutes spent___] : for [unfilled] ~Uminutes [___ Month(s)] : [unfilled] month(s) [With Me] : with me [Family] : the patient's family [de-identified] : resolved, no recurrence, not active. [de-identified] : need to have more  strict control [de-identified] : tolerable [de-identified] : continue bystolic. [de-identified] : resolved, no recurrence [de-identified] : reduce weight [FreeTextEntry1] : Leg edema is not obvious, resolved.\par The  stress echocardiogram will be the references for the  further treatment. Discussed. If negative, patient will have to  consult  rheumatologist for  further treatment. \par The  doubt about the CCB causing the renal stone, but will do and  just inc. the valsartan dosage.

## 2019-09-19 NOTE — REVIEW OF SYSTEMS
[Negative] : Heme/Lymph [Fever] : no fever [Headache] : no headache [Recent Weight Gain (___ Lbs)] : no recent weight gain [Chills] : no chills [Feeling Fatigued] : not feeling fatigued [Recent Weight Loss (___ Lbs)] : no recent weight loss [Shortness Of Breath] : no shortness of breath [Dyspnea on exertion] : not dyspnea during exertion [Chest  Pressure] : no chest pressure [Chest Pain] : no chest pain [Lower Ext Edema] : no extremity edema [Leg Claudication] : no intermittent leg claudication [Palpitations] : no palpitations [Cough] : no cough [Wheezing] : no wheezing [Coughing Up Blood] : no hemoptysis [Abdominal Pain] : no abdominal pain [Nausea] : no nausea [Vomiting] : no vomiting [Heartburn] : no heartburn [Change in Appetite] : no change in appetite [Dysphagia] : no dysphagia [Joint Pain] : no joint pain [Joint Swelling] : no joint swelling [Joint Stiffness] : no joint stiffness [Muscle Cramps] : no muscle cramps [Limb Weakness (Paresis)] : no limb weakness [Skin: A Rash] : no rash: [Itching] : no itching [Change In Color Of Skin] : change in skin color [Skin Lesions] : no skin lesions [Dizziness] : no dizziness [Tremor] : no tremor was seen [Numbness (Hypesthesia)] : no numbness [Convulsions] : no convulsions [Tingling (Paresthesia)] : no tingling [Confusion] : no confusion was observed [Memory Lapses Or Loss] : no memory lapses or loss [Depression] : no depression [Anxiety] : no anxiety [Under Stress] : not under stress [Suicidal] : not suicidal [Excessive Thirst] : no polydipsia [Easy Bleeding] : no tendency for easy bleeding [Swollen Glands] : no swollen glands [Easy Bruising] : no tendency for easy bruising [Swollen Glands In The Neck] : no swollen glands in the neck

## 2019-09-20 ENCOUNTER — APPOINTMENT (OUTPATIENT)
Dept: HEMATOLOGY ONCOLOGY | Facility: CLINIC | Age: 54
End: 2019-09-20

## 2019-09-20 ENCOUNTER — APPOINTMENT (OUTPATIENT)
Dept: RHEUMATOLOGY | Facility: CLINIC | Age: 54
End: 2019-09-20
Payer: COMMERCIAL

## 2019-09-20 VITALS
RESPIRATION RATE: 16 BRPM | HEART RATE: 75 BPM | SYSTOLIC BLOOD PRESSURE: 134 MMHG | OXYGEN SATURATION: 96 % | HEIGHT: 62 IN | BODY MASS INDEX: 32.39 KG/M2 | DIASTOLIC BLOOD PRESSURE: 86 MMHG | TEMPERATURE: 98.3 F | WEIGHT: 176 LBS

## 2019-09-20 PROCEDURE — 99213 OFFICE O/P EST LOW 20 MIN: CPT

## 2019-09-20 NOTE — HISTORY OF PRESENT ILLNESS
[___ Month(s) Ago] : [unfilled] month(s) ago [FreeTextEntry1] : no improvement on turmeric - still with ongoing pain - takes tylenol or advil as needed for pain. \par Having trouble with her right arm with weakness, feeling of heaviness and pain at the biceps levels\par left hip with pain when lifting and bending her hip\par

## 2019-09-20 NOTE — ASSESSMENT
[FreeTextEntry1] : 53 year-old female with positive FARSHAD and hx of FINCH with polyarthralgia - not responsive to prednisone\par \par 1. Polyarthralgia  and positive FARSHAD and borderline RF at 17  - no improvement - will continue with tylenol and advil as needed for pain \par 2. Breast CA - on anastrazole\par 3. Lipoma - referral to derm for excision - not seen yet\par 4. Hip and knee pain - not done - re-send today\par 5. Right shoulder RC tendinopathy X tear - send for US - will likely need PT\par \par \par I reviewed previous labs results with patients.\par Diagnosis and Prognosis discussed\par F/u 2 months\par

## 2019-09-20 NOTE — PHYSICAL EXAM
[General Appearance - Alert] : alert [General Appearance - In No Acute Distress] : in no acute distress [Heart Rate And Rhythm] : heart rate was normal and rhythm regular [Auscultation Breath Sounds / Voice Sounds] : lungs were clear to auscultation bilaterally [Heart Sounds Gallop] : no gallops [Heart Sounds] : normal S1 and S2 [Murmurs] : no murmurs [Heart Sounds Pericardial Friction Rub] : no pericardial rub [Full Pulse] : the pedal pulses are present [Bowel Sounds] : normal bowel sounds [Edema] : there was no peripheral edema [Abdomen Tenderness] : non-tender [Abdomen Soft] : soft [] : no hepato-splenomegaly [Abdomen Mass (___ Cm)] : no abdominal mass palpated [Cervical Lymph Nodes Enlarged Posterior Bilaterally] : posterior cervical [Cervical Lymph Nodes Enlarged Anterior Bilaterally] : anterior cervical [Supraclavicular Lymph Nodes Enlarged Bilaterally] : supraclavicular [No CVA Tenderness] : no ~M costovertebral angle tenderness [No Spinal Tenderness] : no spinal tenderness [Nail Clubbing] : no clubbing  or cyanosis of the fingernails [Abnormal Walk] : normal gait [Musculoskeletal - Swelling] : no joint swelling seen [Motor Tone] : muscle strength and tone were normal [Oriented To Time, Place, And Person] : oriented to person, place, and time [Impaired Insight] : insight and judgment were intact [Affect] : the affect was normal [FreeTextEntry1] : left hip with pain on internal rotation - right shoulder with positive empty can sign

## 2019-10-02 LAB
ALBUMIN SERPL ELPH-MCNC: 4.6 G/DL
ALP BLD-CCNC: 103 U/L
ALT SERPL-CCNC: 35 U/L
ANION GAP SERPL CALC-SCNC: 13 MMOL/L
AST SERPL-CCNC: 28 U/L
BASOPHILS # BLD AUTO: 0.05 K/UL
BASOPHILS NFR BLD AUTO: 0.7 %
BILIRUB SERPL-MCNC: 0.3 MG/DL
BUN SERPL-MCNC: 15 MG/DL
CALCIUM SERPL-MCNC: 9.9 MG/DL
CHLORIDE SERPL-SCNC: 107 MMOL/L
CO2 SERPL-SCNC: 21 MMOL/L
CREAT SERPL-MCNC: 0.94 MG/DL
EOSINOPHIL # BLD AUTO: 0.08 K/UL
EOSINOPHIL NFR BLD AUTO: 1.1 %
GLUCOSE SERPL-MCNC: 100 MG/DL
HCT VFR BLD CALC: 43.4 %
HGB BLD-MCNC: 13.8 G/DL
IMM GRANULOCYTES NFR BLD AUTO: 0.4 %
LYMPHOCYTES # BLD AUTO: 2.21 K/UL
LYMPHOCYTES NFR BLD AUTO: 29.5 %
MAN DIFF?: NORMAL
MCHC RBC-ENTMCNC: 27.5 PG
MCHC RBC-ENTMCNC: 31.8 GM/DL
MCV RBC AUTO: 86.5 FL
MONOCYTES # BLD AUTO: 0.73 K/UL
MONOCYTES NFR BLD AUTO: 9.7 %
NEUTROPHILS # BLD AUTO: 4.4 K/UL
NEUTROPHILS NFR BLD AUTO: 58.6 %
PLATELET # BLD AUTO: 321 K/UL
POTASSIUM SERPL-SCNC: 4.6 MMOL/L
PROT SERPL-MCNC: 7.4 G/DL
RBC # BLD: 5.02 M/UL
RBC # FLD: 13.1 %
SODIUM SERPL-SCNC: 141 MMOL/L
WBC # FLD AUTO: 7.5 K/UL

## 2019-10-03 ENCOUNTER — OUTPATIENT (OUTPATIENT)
Dept: OUTPATIENT SERVICES | Facility: HOSPITAL | Age: 54
LOS: 1 days | End: 2019-10-03

## 2019-10-03 ENCOUNTER — APPOINTMENT (OUTPATIENT)
Dept: RADIOLOGY | Facility: CLINIC | Age: 54
End: 2019-10-03
Payer: COMMERCIAL

## 2019-10-03 ENCOUNTER — APPOINTMENT (OUTPATIENT)
Dept: ULTRASOUND IMAGING | Facility: CLINIC | Age: 54
End: 2019-10-03
Payer: COMMERCIAL

## 2019-10-03 DIAGNOSIS — Z98.890 OTHER SPECIFIED POSTPROCEDURAL STATES: Chronic | ICD-10-CM

## 2019-10-03 PROCEDURE — 72190 X-RAY EXAM OF PELVIS: CPT | Mod: 26

## 2019-10-03 PROCEDURE — 73562 X-RAY EXAM OF KNEE 3: CPT | Mod: 26,50

## 2019-10-03 PROCEDURE — 76882 US LMTD JT/FCL EVL NVASC XTR: CPT | Mod: 26,RT

## 2019-10-04 ENCOUNTER — RX RENEWAL (OUTPATIENT)
Age: 54
End: 2019-10-04

## 2019-10-07 ENCOUNTER — APPOINTMENT (OUTPATIENT)
Age: 54
End: 2019-10-07
Payer: COMMERCIAL

## 2019-10-07 VITALS
SYSTOLIC BLOOD PRESSURE: 112 MMHG | DIASTOLIC BLOOD PRESSURE: 78 MMHG | HEART RATE: 76 BPM | BODY MASS INDEX: 32.39 KG/M2 | WEIGHT: 176 LBS | RESPIRATION RATE: 15 BRPM | HEIGHT: 62 IN

## 2019-10-07 PROCEDURE — 99214 OFFICE O/P EST MOD 30 MIN: CPT

## 2019-10-07 NOTE — PHYSICAL EXAM
[General Appearance - Well Developed] : well developed [Normal Appearance] : normal appearance [General Appearance - Well Nourished] : well nourished [Well Groomed] : well groomed [General Appearance - In No Acute Distress] : no acute distress [Abdomen Soft] : soft [Abdomen Tenderness] : non-tender [Abdomen Mass (___ Cm)] : no abdominal mass palpated [Abdomen Hernia] : no hernia was discovered [Costovertebral Angle Tenderness] : no ~M costovertebral angle tenderness [Urinary Bladder Findings] : the bladder was normal on palpation [Skin Color & Pigmentation] : normal skin color and pigmentation [Skin Turgor] : supple [Skin Lesions] : no skin lesions [Heart Rate And Rhythm] : Heart rate and rhythm were normal [Edema] : no peripheral edema [Respiration, Rhythm And Depth] : normal respiratory rhythm and effort [] : no respiratory distress [Exaggerated Use Of Accessory Muscles For Inspiration] : no accessory muscle use [Oriented To Time, Place, And Person] : oriented to person, place, and time [Auscultation Breath Sounds / Voice Sounds] : lungs were clear to auscultation bilaterally [Mood] : the mood was normal [Affect] : the affect was normal [Not Anxious] : not anxious [Normal Station and Gait] : the gait and station were normal for the patient's age [No Palpable Adenopathy] : no palpable adenopathy [No Focal Deficits] : no focal deficits [Motor Exam] : the motor exam was normal

## 2019-10-10 ENCOUNTER — RESULT REVIEW (OUTPATIENT)
Age: 54
End: 2019-10-10

## 2019-10-10 LAB
APPEARANCE: ABNORMAL
BACTERIA UR CULT: ABNORMAL
BACTERIA: NEGATIVE
BILIRUBIN URINE: NEGATIVE
BLOOD URINE: NORMAL
CALCIUM OXALATE CRYSTALS: ABNORMAL
COLOR: YELLOW
GLUCOSE QUALITATIVE U: NEGATIVE
HYALINE CASTS: 8 /LPF
KETONES URINE: NEGATIVE
LEUKOCYTE ESTERASE URINE: ABNORMAL
MICROSCOPIC-UA: NORMAL
NITRITE URINE: NEGATIVE
PH URINE: 5.5
PROTEIN URINE: ABNORMAL
RED BLOOD CELLS URINE: 5 /HPF
SPECIFIC GRAVITY URINE: 1.03
SQUAMOUS EPITHELIAL CELLS: 1 /HPF
URINE COMMENTS: NORMAL
UROBILINOGEN URINE: NORMAL
WHITE BLOOD CELLS URINE: 117 /HPF

## 2019-10-10 NOTE — ASSESSMENT
[FreeTextEntry1] : 52 yo F with history of nephrolithiasis, recent UTI (first symptomatic UTI in 6 months)\par \par - UA, culture\par - discussed possible etiologies for UTI. Spent extensive period of time discussed behavioral modification including adequate hydration, cutting back on caffeine intake, timed voiding during the day, importance of controlling any diabetes and chronic constipation and how all of these things could potentially increase risk for persistent or recurrent UTI.\par - Discussed options for her nonobstructing stones. For now, pt would like to avoid any surgical intervention. Will continue observation. \par - Once UTI is confirmed resolved, will plan for repeat 24 hr urine collection

## 2019-10-10 NOTE — HISTORY OF PRESENT ILLNESS
[FreeTextEntry1] : 52 yo F s/p left URS/HLL last year. Most recent CT in June showed two small stones in left kidney. Was doing well until 2 weeks ago, went to ER for severe flank pain. Stone from kidney had dropped into ureter (obstructing 3mm stone). Also went to urgent care last week for LUTS and dysuria. Told she had UTI and finished course of cipro. No fever, chills, nausea or vomiting\par \par 11/26/2018 Interval history: Pt found to have obstructing left ureteral stone and presented to the ER with intractable pain. Pt underwent left URS with stone basket extraction. NO issues since procedure.\par \par 4/1/19 Interval history: Has been having some left sided flank pain for a month. US done when symptoms initialyl started was neg for hydronephrosis. Last few days have been having dysuria, suprapubic pain. No gross hematuria, fever, or chills\par \par 10/7/19 Interval history: Was doing well until 9/26/19 - ER for left flank pain\par CT showed 5mm nonobstructing left sided stone, otherwise no hydronephrosis\par Was told she likely had UTI and was sent home with a course of levaquin\par Symptoms improved but still having some LUTS - frequency but only small amounts coming out\par Still with some mild left sided pain\par no gross hematuria, fever, or chills\par Of note, pt states she did repeat 24 hr urine collection in April after last visit but no record of it at this time. Also did 24 hr urine collection last year with nephrologist and was told everything was normal.

## 2019-10-14 ENCOUNTER — APPOINTMENT (OUTPATIENT)
Dept: HEPATOLOGY | Facility: CLINIC | Age: 54
End: 2019-10-14
Payer: COMMERCIAL

## 2019-10-14 VITALS
BODY MASS INDEX: 32.2 KG/M2 | WEIGHT: 175 LBS | DIASTOLIC BLOOD PRESSURE: 74 MMHG | HEART RATE: 71 BPM | TEMPERATURE: 98.1 F | OXYGEN SATURATION: 97 % | RESPIRATION RATE: 16 BRPM | HEIGHT: 62 IN | SYSTOLIC BLOOD PRESSURE: 114 MMHG

## 2019-10-14 PROCEDURE — 99213 OFFICE O/P EST LOW 20 MIN: CPT

## 2019-10-14 RX ORDER — TIZANIDINE 2 MG/1
2 TABLET ORAL
Qty: 40 | Refills: 0 | Status: DISCONTINUED | COMMUNITY
Start: 2019-02-28 | End: 2019-10-14

## 2019-10-14 NOTE — ASSESSMENT
[FreeTextEntry1] : A 54 year-old woman with history of asthma, Breast cancer s/p right lumpectomy in 2015, DM, hypertension, dyslipidemia, obesity returns for followup regarding abnormal liver tests, fatty liver and hepatic fibrosis. \par \par Patient was diagnosed with DM, and dyslipidemia and fatty liver  since 2016, abnormal liver tests since 2/2017. She is immune to hepatitis A but not  to B and never exposed to hepatitis C.\par ALT was elevated < x3 ULN in the past, workup for elevated liver enzymes was unrevealing.\par \par Tamoxifen was discontinued in 10/2017. Liver tests improved over past 1 year.  Fibroscan from 9/2019 reported S3 steatosis and F2 fibrosis. \par Most recent BW from 10/2019 sowed normal AST, ALT of 35, , TB 0.3, normal CBC,\par \par She has non-alcoholic fatty liver disease, likely from MetS of hypertension, dyslipidemia, DM and obesity and possible additional risk from long term use of Tamoxifen which was discontinued in 10/2017.\par \par Although she had positive FARSHAD but normal immunoglobulins not suggestive of active immune mediated liver disease, or autoimmune hepatitis. Elevated liver enzymes likely from  NAFLD with FINCH which downtrended. Hepatic fibrosis has been noted on Fibroscan for more than 2 years. \par \par I have explained to her the natural history of nonalcoholic fatty liver disease, disease progression to FINCH, hepatic fibrosis, and cirrhosis and risk of hepatocellular carcinoma.\par \par I have requested an abdominal US, and asked her to call for results. \par \par I have recommended lifestyle modification with a Keto-Mediterranean diet, regular exercise, gradual weight reduction, control of dyslipidemia and diabetes to reduce risk of non-alcoholic fatty liver disease, hepatitis B vaccination per PCP, avoidance of  hepatotoxic agents including alcohol, and a return visit in 6 months.

## 2019-10-14 NOTE — HISTORY OF PRESENT ILLNESS
[___ Month(s) Ago] : [unfilled] month(s) ago [None] : ~he/she~ had no significant interval events [de-identified] : A 54  year-old woman with history of asthma, breast cancer s/p right lumpectomy in 2015, DM, hypertension, dyslipidemia, obesity returns for followup regarding abnormal liver tests and fatty liver and hepatic fibrosis. .\par \par Patient was diagnosed with DM, and dyslipidemia and fatty liver  since 2016. \par Patient was seen by rheumatology for autoimmune disease.  Workup so far was notable for  positive FARSHAD 1:640. \par \par She has had abnormal liver tests since 2/2017. She is immune to hepatitis A but not  to B and never exposed to hepatitis C.\par \par 6/2017 abdominal US reported s/p cholecystectomy, moderate to severe fatty infiltration of the liver, liver elastography reported F2-F3 fibrosis. \par \par 8/23/2017 AST 43, ALT 51, normal alkaline phosphatase and total bilirubin, CBC was normal. \par 9/2017 tTGIgA was negative, ceruloplasmin was normal, AST 38, ALT 53, ALP 93, TB 0.4 , normal IgG, A and M.\par 10/2017 AST 50, ALT 59, ALP 77, TB 0.2   Tamoxifen was discontinued since then. \par \par 2/2018 AST 57, ALT 74, ALP 91, TB 0.4, FARSHAD 1:160, A1C 5.6%, iron panel normal, SMA, and tTG IgA were negative, immunoglobulins were normal. WBC 7.6, Hb 13.8, , 000, AFP normal. \par 2/2018 Fibroscan reported S0 steatosis and F2-F3 fibrosis. \par 10/2018 noncontrast CT reported left kidney stones , and  hepatic steatosis \par \par 2/2019 anti-HCV negative, , , HDL 50, , AST 35, ALT 49, ALP 87, TB 0.4, Cr 0.85, Na 144, CBC normal, A1C 5.9, \par Blood work from 4/2019 showed  AST 31, ALT 45, ALP 81, TB 0.3, CBC normal, \par \par Interval and Current History: \par Patient was seen 4 months ago.  \par \par Fibroscan from 9/2019 reported S3 steatosis and F2 fibrosis. \par BWM from 10/2019 sowed normal AST, ALT of 35, , TB 0.3, normal CBC,\par \par Patient has left kidney stone, on antibiotics for UTI. \par She has right lower chest pain, elicited on deep palpation of lower ribs. \par She has lost 3 pounds over past 6 months. \par ROS: as below

## 2019-10-14 NOTE — REVIEW OF SYSTEMS
[Eye Pain] : eye pain [SOB on Exertion] : shortness of breath during exertion [Constipation] : constipation [Heartburn] : heartburn [Joint Pain] : joint pain [Limb Swelling] : limb swelling [Negative] : Heme/Lymph [Fever] : no fever [Chills] : no chills [Feeling Tired] : not feeling tired [Dry Eyes] : no dryness of the eyes [Red Eyes] : eyes not red [Chest Pain] : no chest pain [Palpitations] : no palpitations [Lower Ext Edema] : no lower extremity edema [Wheezing] : no wheezing [Shortness Of Breath] : no shortness of breath [Cough] : no cough [Abdominal Pain] : no abdominal pain [Vomiting] : no vomiting [Diarrhea] : no diarrhea [Melena] : no melena [Limb Pain] : no limb pain [Itching] : no itching [FreeTextEntry6] : History of asthma [FreeTextEntry7] : history of peptic ulcer treated for H. pylori, denies rectal bleeding , or nausea or hematemesis [FreeTextEntry9] : osteoarthritis, knee and hip pain

## 2019-10-24 ENCOUNTER — OUTPATIENT (OUTPATIENT)
Dept: OUTPATIENT SERVICES | Facility: HOSPITAL | Age: 54
LOS: 1 days | Discharge: HOME | End: 2019-10-24

## 2019-10-24 DIAGNOSIS — D68.9 COAGULATION DEFECT, UNSPECIFIED: ICD-10-CM

## 2019-10-24 DIAGNOSIS — C50.919 MALIGNANT NEOPLASM OF UNSPECIFIED SITE OF UNSPECIFIED FEMALE BREAST: ICD-10-CM

## 2019-10-24 DIAGNOSIS — Z98.890 OTHER SPECIFIED POSTPROCEDURAL STATES: Chronic | ICD-10-CM

## 2019-10-29 ENCOUNTER — FORM ENCOUNTER (OUTPATIENT)
Age: 54
End: 2019-10-29

## 2019-10-29 ENCOUNTER — APPOINTMENT (OUTPATIENT)
Dept: INTERNAL MEDICINE | Facility: CLINIC | Age: 54
End: 2019-10-29
Payer: COMMERCIAL

## 2019-10-29 VITALS
DIASTOLIC BLOOD PRESSURE: 80 MMHG | HEART RATE: 81 BPM | OXYGEN SATURATION: 98 % | TEMPERATURE: 98.2 F | SYSTOLIC BLOOD PRESSURE: 120 MMHG | WEIGHT: 175 LBS | RESPIRATION RATE: 12 BRPM | BODY MASS INDEX: 32.2 KG/M2 | HEIGHT: 62 IN

## 2019-10-29 PROCEDURE — 99214 OFFICE O/P EST MOD 30 MIN: CPT

## 2019-10-29 RX ORDER — IPRATROPIUM BROMIDE AND ALBUTEROL SULFATE 2.5; .5 MG/3ML; MG/3ML
0.5-2.5 (3) SOLUTION RESPIRATORY (INHALATION)
Qty: 90 | Refills: 0 | Status: DISCONTINUED | COMMUNITY
Start: 2019-05-14 | End: 2019-10-29

## 2019-10-29 RX ORDER — RANITIDINE HYDROCHLORIDE 300 MG/1
300 CAPSULE ORAL
Qty: 1 | Refills: 3 | Status: DISCONTINUED | COMMUNITY
Start: 2019-04-01 | End: 2019-10-29

## 2019-10-29 RX ORDER — LEVOFLOXACIN 750 MG/1
750 TABLET, FILM COATED ORAL
Qty: 7 | Refills: 0 | Status: DISCONTINUED | COMMUNITY
Start: 2019-09-27 | End: 2019-10-29

## 2019-10-29 RX ORDER — NITROFURANTOIN (MONOHYDRATE/MACROCRYSTALS) 25; 75 MG/1; MG/1
100 CAPSULE ORAL
Qty: 14 | Refills: 0 | Status: DISCONTINUED | COMMUNITY
Start: 2019-10-10 | End: 2019-10-29

## 2019-10-29 RX ORDER — IBUPROFEN 800 MG/1
800 TABLET, FILM COATED ORAL
Qty: 60 | Refills: 0 | Status: DISCONTINUED | COMMUNITY
Start: 2019-09-27 | End: 2019-10-29

## 2019-10-29 NOTE — HEALTH RISK ASSESSMENT
[Intercurrent Urgi Care visits] : went to urgent care [No] : In the past 12 months have you used drugs other than those required for medical reasons? No [No falls in past year] : Patient reported no falls in the past year [0] : 2) Feeling down, depressed, or hopeless: Not at all (0) [] : No [de-identified] : 1/6/19 [de-identified] : GI/RHEUM/HEP [[id number]] : 0

## 2019-10-29 NOTE — HISTORY OF PRESENT ILLNESS
[de-identified] : 54 year old  female patient with history of stable Hypertension, Asthma, Type 2 Diabetes Mellitus, Hypertriglyceridemia, history as stated, presented for follow up examination with c/o Painful, swelling, of left leg, for few days. Patient is compliant with all medications. Denies shortness of breath, chest pain or abdominal pains at this time. ROS as stated.\par

## 2019-10-29 NOTE — ASSESSMENT
[FreeTextEntry1] : 54 year old female found to have stable Hypertension, Reactive Airway Disease, Type 2 Diabetes Mellitus, Hypertriglyceridemia,with the current regimen, diet and life style modifications, as counseled. Prior results reviewed and discussed with the patient during today's examination. Plan as ordered.\par Current symptoms are consistent with Left Leg Swelling, R/O DVT, Venous Doppler, as ordered, if Sx get worse, consider Vascular F/U, as counseled.

## 2019-10-30 ENCOUNTER — APPOINTMENT (OUTPATIENT)
Dept: ULTRASOUND IMAGING | Facility: HOSPITAL | Age: 54
End: 2019-10-30

## 2019-10-30 ENCOUNTER — OUTPATIENT (OUTPATIENT)
Dept: OUTPATIENT SERVICES | Facility: HOSPITAL | Age: 54
LOS: 1 days | End: 2019-10-30
Payer: COMMERCIAL

## 2019-10-30 DIAGNOSIS — Z98.890 OTHER SPECIFIED POSTPROCEDURAL STATES: Chronic | ICD-10-CM

## 2019-10-30 DIAGNOSIS — M79.89 OTHER SPECIFIED SOFT TISSUE DISORDERS: ICD-10-CM

## 2019-10-30 PROCEDURE — 93971 EXTREMITY STUDY: CPT | Mod: 26,LT

## 2019-10-30 PROCEDURE — 93971 EXTREMITY STUDY: CPT

## 2019-11-04 ENCOUNTER — MEDICATION RENEWAL (OUTPATIENT)
Age: 54
End: 2019-11-04

## 2019-11-08 ENCOUNTER — APPOINTMENT (OUTPATIENT)
Dept: HEMATOLOGY ONCOLOGY | Facility: CLINIC | Age: 54
End: 2019-11-08
Payer: COMMERCIAL

## 2019-11-08 VITALS
BODY MASS INDEX: 32.06 KG/M2 | DIASTOLIC BLOOD PRESSURE: 77 MMHG | SYSTOLIC BLOOD PRESSURE: 115 MMHG | WEIGHT: 175.25 LBS | RESPIRATION RATE: 16 BRPM | OXYGEN SATURATION: 99 % | HEART RATE: 89 BPM | TEMPERATURE: 98.8 F

## 2019-11-08 PROCEDURE — 99213 OFFICE O/P EST LOW 20 MIN: CPT

## 2019-11-08 NOTE — ASSESSMENT
[FreeTextEntry1] : 53 y/o postmenopausal F with right breast DCIS on lower dose tamoxifen as per [place] study SAB 2018. She is tolerating low dose tamoxifen without increasing headaches. LFTs remain WNL. She is up to date with breast imaging. She will continue with low fat diet and exercise. Next follow up in 1 year. \par \par Bruising over the knees: no new medications; platelets WNL on CBC. She also had past hypercoagulable work up in 2017 which was negative.

## 2019-11-08 NOTE — HISTORY OF PRESENT ILLNESS
[Disease: _____________________] : Disease: [unfilled] [de-identified] : Age 49: right upper quadrant DCIS which spans 1.2 cm in greatest dimension ER 95%; intermediate to high grade \par She had screen detected abnormality in mammogram: 1.7 cm asymmetry lateral right breast with core biopsy showing DCIS but could not rule out invasive component. She underwent right lumpectomy with SLNB  10/2/15. The pathology showed DCIS with close superior margin and returned for re-excision 11/4/15 with Dr Hawkins at [facility]. She then underwent RT to the lumpectomy site with Dr Mann: 4240 cGy with boost. She started on tamoxifen chemoprevention on 1/2016 and has been followed by Dr Gunderson. Since initiation of the tamoxifen, she has experienced worsening headaches, joint pain, fatigue, fatty liver, and concerned about blood clots. She had seen neurologists for atypical migraine and had MRI which was negative for suspicious lesions. She was offered different abortive agents: imitrex and gabapentin which she is not taking. She stopped the tamoxifen for 2 months in 8/2017 to 9/2017 and her headaches were much improved along with her liver enzymes.  [T: ___] : T[unfilled] [AJCC Stage: ____] : AJCC Stage: [unfilled] [de-identified] : DCIS: intermediate to high grade ER 95% [de-identified] : Tamoxifen 1/2016 to 9/2017\par tamoxifen 2018 to present: JONAH  [de-identified] : She is tolerating tamoxifen without headaches or joint issues. She saw Dr Ball and had Fibroscan and LFT testing. She also had breast imaging 9/2019. She has occasional R sided breast tenderness. She denies any new medications. Has noticed occasional bruising over the knees. She saw PCP who did BLE doppler which was negative. No prior bleeding from surgeries and no blood in stool or bruise over the abdomen/ arms.

## 2019-11-08 NOTE — REVIEW OF SYSTEMS
[Skin Rash] : no skin rash [Skin Wound] : no skin wound [de-identified] : occasional R breast tenderness  [Negative] : Heme/Lymph

## 2019-11-08 NOTE — PHYSICAL EXAM
[Fully active, able to carry on all pre-disease performance without restriction] : Status 0 - Fully active, able to carry on all pre-disease performance without restriction [de-identified] : no abnl masses palpable; tenderness on palpation over the R excision site; postsurgical scar [Normal] : affect appropriate [de-identified] : hemangioma over the R axilla 1cm

## 2019-12-02 ENCOUNTER — APPOINTMENT (OUTPATIENT)
Age: 54
End: 2019-12-02

## 2019-12-20 ENCOUNTER — APPOINTMENT (OUTPATIENT)
Dept: RHEUMATOLOGY | Facility: CLINIC | Age: 54
End: 2019-12-20
Payer: COMMERCIAL

## 2019-12-20 VITALS
SYSTOLIC BLOOD PRESSURE: 144 MMHG | DIASTOLIC BLOOD PRESSURE: 86 MMHG | WEIGHT: 175 LBS | BODY MASS INDEX: 32.2 KG/M2 | TEMPERATURE: 98.4 F | HEIGHT: 62 IN | OXYGEN SATURATION: 98 % | HEART RATE: 71 BPM | RESPIRATION RATE: 16 BRPM

## 2019-12-20 PROCEDURE — 99213 OFFICE O/P EST LOW 20 MIN: CPT

## 2019-12-20 NOTE — ASSESSMENT
[FreeTextEntry1] : 53 year-old female with positive FARSHAD and hx of FINCH with polyarthralgia - not responsive to prednisone\par \par 1. Polyarthralgia  and positive FARSHAD and borderline RF at 17  - no improvement - will continue with tylenol and advil as needed for pain \par 2. Breast CA - on anastrazole - stable\par 3. Lipoma - referral to derm for excision - not seen yet\par 4. Hip and knee pain -x-rays with mild arthritis - now with left knee effusion - send for diagnostic US with arthrocentesis \par 5. Right shoulder RC tendinopathy X tear - US with RC tear - now undergoing Pt with improvement on her loss of ROM\par \par \par I reviewed previous labs results with patients.\par Diagnosis and Prognosis discussed\par F/u 2 months\par

## 2019-12-20 NOTE — HISTORY OF PRESENT ILLNESS
[___ Month(s) Ago] : [unfilled] month(s) ago [FreeTextEntry1] : no improvement on turmeric - still with ongoing pain - takes tylenol or advil as needed for pain. \par Having trouble with her right arm with weakness, RC tear - doing Pt with improvement on her ROM\par left knee pain - worse cannot walk at times -  pending more Pt and CT scan - pending brace\par left hip with pain when lifting and bending her hip\par

## 2019-12-20 NOTE — PHYSICAL EXAM
[General Appearance - Alert] : alert [General Appearance - In No Acute Distress] : in no acute distress [Heart Rate And Rhythm] : heart rate was normal and rhythm regular [Heart Sounds Gallop] : no gallops [Auscultation Breath Sounds / Voice Sounds] : lungs were clear to auscultation bilaterally [Heart Sounds] : normal S1 and S2 [Full Pulse] : the pedal pulses are present [Murmurs] : no murmurs [Heart Sounds Pericardial Friction Rub] : no pericardial rub [Abdomen Soft] : soft [Edema] : there was no peripheral edema [Bowel Sounds] : normal bowel sounds [Abdomen Mass (___ Cm)] : no abdominal mass palpated [] : no hepato-splenomegaly [Abdomen Tenderness] : non-tender [Cervical Lymph Nodes Enlarged Anterior Bilaterally] : anterior cervical [Cervical Lymph Nodes Enlarged Posterior Bilaterally] : posterior cervical [Supraclavicular Lymph Nodes Enlarged Bilaterally] : supraclavicular [No CVA Tenderness] : no ~M costovertebral angle tenderness [No Spinal Tenderness] : no spinal tenderness [Nail Clubbing] : no clubbing  or cyanosis of the fingernails [Abnormal Walk] : normal gait [Musculoskeletal - Swelling] : no joint swelling seen [Motor Tone] : muscle strength and tone were normal [Impaired Insight] : insight and judgment were intact [Oriented To Time, Place, And Person] : oriented to person, place, and time [Affect] : the affect was normal [FreeTextEntry1] : left hip with pain on internal rotation - right shoulder with positive empty can sign, left knee with effusion

## 2019-12-27 ENCOUNTER — CLINICAL ADVICE (OUTPATIENT)
Age: 54
End: 2019-12-27

## 2019-12-27 RX ORDER — AMLODIPINE BESYLATE 2.5 MG/1
2.5 TABLET ORAL
Qty: 90 | Refills: 1 | Status: DISCONTINUED | COMMUNITY
Start: 2019-09-19 | End: 2019-12-27

## 2020-01-03 ENCOUNTER — NON-APPOINTMENT (OUTPATIENT)
Age: 55
End: 2020-01-03

## 2020-01-03 ENCOUNTER — APPOINTMENT (OUTPATIENT)
Dept: CARDIOLOGY | Facility: CLINIC | Age: 55
End: 2020-01-03
Payer: COMMERCIAL

## 2020-01-03 VITALS
HEART RATE: 88 BPM | DIASTOLIC BLOOD PRESSURE: 83 MMHG | BODY MASS INDEX: 31.28 KG/M2 | TEMPERATURE: 98.2 F | WEIGHT: 171 LBS | RESPIRATION RATE: 17 BRPM | OXYGEN SATURATION: 98 % | SYSTOLIC BLOOD PRESSURE: 120 MMHG

## 2020-01-03 VITALS — SYSTOLIC BLOOD PRESSURE: 127 MMHG | DIASTOLIC BLOOD PRESSURE: 84 MMHG

## 2020-01-03 PROCEDURE — 99214 OFFICE O/P EST MOD 30 MIN: CPT

## 2020-01-03 PROCEDURE — 93000 ELECTROCARDIOGRAM COMPLETE: CPT | Mod: 59

## 2020-01-03 NOTE — HISTORY OF PRESENT ILLNESS
[FreeTextEntry1] : She had hypertension sometimes back. It was controlled by nifedipine along with Bystolic. However, she  has on and off palpitations which bothers her.\par  She had lumpectomy, right, s/p RT and on tamoxifen .\par Recent blood test said " she has  FARSHAD+" she was referred to the  Rheumatologist. She has abnormal SGOT/GPT , which  was evaluated by the GI and PMD. She has  abnormal cardiolipin IGA, IGM positive,  She had diagnosis of  rheumatoid arthritis on treatment  only with  symptomatic relief.\par She is changing the oncologist.\par She has  renal stone on both side and was advised not to be on CCB. Wish to discontinue the amlodipine.\par

## 2020-01-03 NOTE — REVIEW OF SYSTEMS
[Negative] : Endocrine [Fever] : no fever [Headache] : no headache [Recent Weight Gain (___ Lbs)] : no recent weight gain [Chills] : no chills [Feeling Fatigued] : not feeling fatigued [Dyspnea on exertion] : not dyspnea during exertion [Shortness Of Breath] : no shortness of breath [Recent Weight Loss (___ Lbs)] : no recent weight loss [Chest  Pressure] : no chest pressure [Lower Ext Edema] : no extremity edema [Chest Pain] : no chest pain [Leg Claudication] : no intermittent leg claudication [Palpitations] : palpitations [Cough] : no cough [Wheezing] : no wheezing [Abdominal Pain] : no abdominal pain [Coughing Up Blood] : no hemoptysis [Vomiting] : no vomiting [Nausea] : no nausea [Heartburn] : no heartburn [Change in Appetite] : no change in appetite [Dysphagia] : no dysphagia [Joint Pain] : no joint pain [Joint Stiffness] : no joint stiffness [Joint Swelling] : no joint swelling [Muscle Cramps] : no muscle cramps [Limb Weakness (Paresis)] : no limb weakness [Skin: A Rash] : no rash: [Change In Color Of Skin] : change in skin color [Skin Lesions] : no skin lesions [Itching] : no itching [Numbness (Hypesthesia)] : no numbness [Tremor] : no tremor was seen [Dizziness] : no dizziness [Tingling (Paresthesia)] : no tingling [Confusion] : no confusion was observed [Convulsions] : no convulsions [Depression] : no depression [Memory Lapses Or Loss] : no memory lapses or loss [Anxiety] : no anxiety [Suicidal] : not suicidal [Under Stress] : not under stress [Excessive Thirst] : no polydipsia [Easy Bruising] : no tendency for easy bruising [Swollen Glands] : no swollen glands [Easy Bleeding] : no tendency for easy bleeding [Swollen Glands In The Neck] : no swollen glands in the neck

## 2020-01-03 NOTE — DISCUSSION/SUMMARY
[Unlikely Cardiac Ischemia (Low Prob.)] : chest pain unlikely to represent cardiac ischemia (low probability) [Hypertension] : hypertension [Improving] : improving [Weight Loss] : weight loss [Sodium Restriction] : sodium restriction [Anxiety] : anxiety disorder NOS [Sinus Tachycardia] : sinus tachycardia [Stable] : stable [Patient] : the patient [Low Sodium Diet] : low sodium diet [None] : none [Minutes spent___] : for [unfilled] ~Uminutes [___ Month(s)] : [unfilled] month(s) [With Me] : with me [Responding to Treatment] : responding to treatment [de-identified] : resolved, no recurrence, not active. [Holter Monitor] : a Holter monitor [Rhythm Disorder] : rhythm disorder [de-identified] : tolerable [de-identified] : resolved, no recurrence [de-identified] : continue bystolic. [FreeTextEntry1] : Leg edema is not obvious, resolved.\par The  stress echocardiogram will be the references for the  further treatment. Discussed. If negative, patient will have to  consult  rheumatologist for  further treatment. \par The  doubt about the CCB causing the renal stone, but will do and  just inc. the valsartan dosage. [de-identified] : reduce weight

## 2020-01-03 NOTE — REASON FOR VISIT
[Follow-Up - Clinic] : a clinic follow-up of [Chest Pain] : chest pain [Hyperlipidemia] : hyperlipidemia [Hypertension] : hypertension [Palpitations] : palpitations

## 2020-01-03 NOTE — PHYSICAL EXAM
[Normal Appearance] : normal appearance [Well Groomed] : well groomed [General Appearance - Well Developed] : well developed [No Deformities] : no deformities [General Appearance - Well Nourished] : well nourished [General Appearance - In No Acute Distress] : no acute distress [Normal Conjunctiva] : the conjunctiva exhibited no abnormalities [Eyelids - No Xanthelasma] : the eyelids demonstrated no xanthelasmas [Normal Oral Mucosa] : normal oral mucosa [No Oral Pallor] : no oral pallor [No Oral Cyanosis] : no oral cyanosis [Normal Jugular Venous A Waves Present] : normal jugular venous A waves present [Normal Jugular Venous V Waves Present] : normal jugular venous V waves present [No Jugular Venous [place] A Waves] : no jugular venous [place] A waves [Respiration, Rhythm And Depth] : normal respiratory rhythm and effort [Exaggerated Use Of Accessory Muscles For Inspiration] : no accessory muscle use [Auscultation Breath Sounds / Voice Sounds] : lungs were clear to auscultation bilaterally [Chest Palpation] : palpation of the chest revealed no abnormalities [Lungs Percussion] : the lungs were normal to percussion [Heart Rate And Rhythm] : heart rate and rhythm were normal [Heart Sounds] : normal S1 and S2 [Murmurs] : no murmurs present [Arterial Pulses Normal] : the arterial pulses were normal [Veins - Varicosity Changes] : no varicosital changes were noted in the lower extremities [Edema] : no peripheral edema present [Bowel Sounds] : normal bowel sounds [Abdomen Soft] : soft [Abdomen Tenderness] : non-tender [Abdomen Hernia] : no hernia was discovered [Abnormal Walk] : normal gait [Abdomen Mass (___ Cm)] : no abdominal mass palpated [Gait - Sufficient For Exercise Testing] : the gait was sufficient for exercise testing [Nail Clubbing] : no clubbing of the fingernails [Cyanosis, Localized] : no localized cyanosis [Petechial Hemorrhages (___cm)] : no petechial hemorrhages [] : no rash [No Venous Stasis] : no venous stasis [Skin Turgor] : normal skin turgor [No Skin Ulcers] : no skin ulcer [No Xanthoma] : no  xanthoma was observed [Impaired Insight] : insight and judgment were intact [Oriented To Time, Place, And Person] : oriented to person, place, and time [Affect] : the affect was normal [Memory Recent] : recent memory was not impaired [Mood] : the mood was normal [Memory Remote] : remote memory was not impaired [No Anxiety] : not feeling anxious [FreeTextEntry1] : no calf tenderness, no edema in my examination, no  asymmetry of calf. " right shoulder  with Rheumatoid arthritis.

## 2020-01-10 ENCOUNTER — NON-APPOINTMENT (OUTPATIENT)
Age: 55
End: 2020-01-10

## 2020-01-12 ENCOUNTER — RX RENEWAL (OUTPATIENT)
Age: 55
End: 2020-01-12

## 2020-02-02 NOTE — HISTORY OF PRESENT ILLNESS
[Heartburn] : improved heartburn 21yMale with history of multiple left patella dislocations and reductions presents with a L patella dislocation that occurred after he was walking around the house quickly. Patient denies numbness or tingling in the LLE. Patient denies any other injuries.    ROS: 10 point review of systems otherwise negative unless noted in HPI    PMH:    PSH:    AH:    Meds: See med rec    T(C): 36.7 (02-02-20 @ 02:54)  HR: 111 (02-02-20 @ 03:17)  BP: 167/84 (02-02-20 @ 03:17)  RR: 18 (02-02-20 @ 03:17)  SpO2: 100% (02-02-20 @ 03:17)  Wt(kg): --                PE  Gen: NAD, alert and oriented  Resp: Unlabored breathing  LLE: Skin intact, no ecchymosis,        SILT DP/SP/ [place]/Saph/Post Tib       +EHL/FHL/TA/Gastroc,        Knee/ankle painless ROM,        hip ROM limited 2/2 pain,       DP+,        soft compartments, no calf ttp,          Secondary:  No TTP over bony landmarks, SILT BL, ROM intact BL, distal pulses palpable.    Imaging:  XR demonstrating L patella subluxation    Procedure;  Patient was given medication, and the patella was relocated. The patient was placed in a BJKI and was NVI following the procedure. XRs confirmed reduction.     21yMale with L patella dislocation    - Pain control  -WBAT in BJKI  -Follow up with Dr. Pablo or Dr. Vidal in 1-2 weeks  - No acute ortho intervention  Please page back as needed if you have questions    Ortho [id number] [Nausea] : denies nausea [Vomiting] : denies vomiting [Diarrhea] : denies diarrhea [Constipation] : denies constipation [Yellow Skin Or Eyes (Jaundice)] : denies jaundice [Abdominal Pain] : denies abdominal pain [Abdominal Swelling] : denies abdominal swelling [Rectal Pain] : denies rectal pain [Wt Gain ___ Lbs] : recent [unfilled] ~Upound(s) weight gain [GERD] : gastroesophageal reflux disease [Good Compliance] : good compliance with treatment [de-identified] : Pt is feeling much better and has slight reflux and avoiding certain foods and is eating healthier and taking ppi and ranitidine.  She doesn’t have nausea or vomiting or abd discomfort .  No black stools

## 2020-02-21 ENCOUNTER — APPOINTMENT (OUTPATIENT)
Dept: RHEUMATOLOGY | Facility: CLINIC | Age: 55
End: 2020-02-21

## 2020-03-13 ENCOUNTER — APPOINTMENT (OUTPATIENT)
Dept: RHEUMATOLOGY | Facility: CLINIC | Age: 55
End: 2020-03-13

## 2020-03-28 ENCOUNTER — APPOINTMENT (OUTPATIENT)
Dept: DISASTER EMERGENCY | Facility: CLINIC | Age: 55
End: 2020-03-28
Payer: COMMERCIAL

## 2020-03-28 VITALS
HEART RATE: 89 BPM | RESPIRATION RATE: 20 BRPM | DIASTOLIC BLOOD PRESSURE: 88 MMHG | OXYGEN SATURATION: 98 % | TEMPERATURE: 100.2 F | SYSTOLIC BLOOD PRESSURE: 126 MMHG

## 2020-03-28 PROCEDURE — 99213 OFFICE O/P EST LOW 20 MIN: CPT

## 2020-03-30 RX ORDER — NEBULIZER ACCESSORIES
KIT MISCELLANEOUS
Qty: 1 | Refills: 0 | Status: ACTIVE | COMMUNITY
Start: 2020-03-30 | End: 1900-01-01

## 2020-04-07 LAB — SARS-COV-2 N GENE NPH QL NAA+PROBE: ABNORMAL

## 2020-05-02 ENCOUNTER — APPOINTMENT (OUTPATIENT)
Dept: INTERNAL MEDICINE | Facility: CLINIC | Age: 55
End: 2020-05-02
Payer: COMMERCIAL

## 2020-05-02 PROCEDURE — 99441: CPT

## 2020-05-02 RX ORDER — CROMOLYN SODIUM 40 MG/ML
4 SOLUTION/ DROPS OPHTHALMIC
Qty: 10 | Refills: 0 | Status: DISCONTINUED | COMMUNITY
Start: 2019-11-23

## 2020-05-02 RX ORDER — PREDNISONE 20 MG/1
20 TABLET ORAL
Qty: 8 | Refills: 0 | Status: DISCONTINUED | COMMUNITY
Start: 2020-04-04

## 2020-05-02 RX ORDER — CIPROFLOXACIN HYDROCHLORIDE 500 MG/1
500 TABLET, FILM COATED ORAL
Qty: 14 | Refills: 0 | Status: DISCONTINUED | COMMUNITY
Start: 2019-12-27

## 2020-05-02 RX ORDER — TAMSULOSIN HYDROCHLORIDE 0.4 MG/1
0.4 CAPSULE ORAL
Qty: 30 | Refills: 3 | Status: DISCONTINUED | COMMUNITY
Start: 2018-09-10 | End: 2020-05-02

## 2020-05-02 RX ORDER — MAGNESIUM OXIDE 241.3 MG/1000MG
400 TABLET ORAL DAILY
Qty: 90 | Refills: 3 | Status: DISCONTINUED | COMMUNITY
Start: 2018-09-10 | End: 2020-05-02

## 2020-05-02 RX ORDER — VALSARTAN 320 MG/1
320 TABLET, COATED ORAL
Qty: 90 | Refills: 3 | Status: DISCONTINUED | COMMUNITY
Start: 2019-03-05 | End: 2020-05-02

## 2020-05-02 RX ORDER — CLINDAMYCIN PHOSPHATE 10 MG/ML
1 SOLUTION TOPICAL
Qty: 60 | Refills: 0 | Status: DISCONTINUED | COMMUNITY
Start: 2020-04-22

## 2020-05-02 RX ORDER — DOXYCYCLINE HYCLATE 100 MG/1
100 TABLET ORAL
Qty: 20 | Refills: 0 | Status: DISCONTINUED | COMMUNITY
Start: 2020-04-04

## 2020-05-02 RX ORDER — FLUTICASONE FUROATE AND VILANTEROL TRIFENATATE 100; 25 UG/1; UG/1
100-25 POWDER RESPIRATORY (INHALATION)
Qty: 60 | Refills: 0 | Status: DISCONTINUED | COMMUNITY
Start: 2020-02-10

## 2020-05-02 NOTE — HISTORY OF PRESENT ILLNESS
[Verbal consent obtained from patient] : the patient, [unfilled] [de-identified] :  54 year F  patient with history of stable Hypertension, Type 2 Diabetes Mellitus, Reactive Airway Disease, history as stated, initiated Telephonic visit for a Medical Clearance to return to work after being confirmed positive for COVID - 19, asymptomatic at this time.\par

## 2020-05-02 NOTE — ASSESSMENT
[FreeTextEntry1] : 54 year F patient found to have stable Hypertension, Type 2 Diabetes Mellitus, Reactive Airway Disease,with the current regimen, diet and life style modifications, as counseled. Prior results reviewed and discussed with the patient during today's encounter. Plan as ordered.\par Current symptoms and Telephonic evaluation are consistent with resolved COVID-19 , prescription management and further followup as ordered.\par Patient was unable to perform at work from 4/14/2020 to 5/3/2020 , may resume full time work from 5/4/2020, as per [employer]/CDC guidelines.

## 2020-05-12 NOTE — PLAN
[FreeTextEntry1] : Ordered and performed nasal swab. Procedure explained to patient. She is made aware that the test is uncomfortable. \par Advised to self quarantine starting from today, to remain self quarantined until she has n o fever for 3 days without anti-fever medications, then continue to practice social distancing\par Litterature given to patient\par

## 2020-05-12 NOTE — ASSESSMENT
[FreeTextEntry1] : Patient with many comorbidities, still coughing, low grade fever of 100.2F, lungs are clear. Will do the test

## 2020-05-12 NOTE — HISTORY OF PRESENT ILLNESS
[FreeTextEntry8] : Patient reports exposure x 2 weeks at the [employer]. She started with a dry cough x 4 days ago, with chest discomfort when coughing, low grade fever of 99.8F, SOB,  has fevers of 101.2F and is self quarantine \par H/O Asthma, DM, Breast cancer, HTN, palpitation\par

## 2020-05-26 ENCOUNTER — RX RENEWAL (OUTPATIENT)
Age: 55
End: 2020-05-26

## 2020-06-12 ENCOUNTER — APPOINTMENT (OUTPATIENT)
Dept: RHEUMATOLOGY | Facility: CLINIC | Age: 55
End: 2020-06-12

## 2020-07-07 ENCOUNTER — APPOINTMENT (OUTPATIENT)
Dept: INTERNAL MEDICINE | Facility: CLINIC | Age: 55
End: 2020-07-07
Payer: COMMERCIAL

## 2020-07-07 VITALS
HEIGHT: 62 IN | OXYGEN SATURATION: 97 % | RESPIRATION RATE: 18 BRPM | DIASTOLIC BLOOD PRESSURE: 78 MMHG | BODY MASS INDEX: 31.47 KG/M2 | SYSTOLIC BLOOD PRESSURE: 123 MMHG | HEART RATE: 77 BPM | TEMPERATURE: 97.9 F | WEIGHT: 171 LBS

## 2020-07-07 PROCEDURE — 99214 OFFICE O/P EST MOD 30 MIN: CPT

## 2020-07-07 NOTE — HEALTH RISK ASSESSMENT
[Intercurrent Urgi Care visits] : went to urgent care [No] : In the past 12 months have you used drugs other than those required for medical reasons? No [No falls in past year] : Patient reported no falls in the past year [0] : 2) Feeling down, depressed, or hopeless: Not at all (0) [] : No [de-identified] : 1/6/19 [de-identified] : None [SEL1Xbkal] : 0

## 2020-07-07 NOTE — HISTORY OF PRESENT ILLNESS
[de-identified] : 54 year old  female patient with history of stable Hypertension, Asthma, Type 2 Diabetes Mellitus, Hypertriglyceridemia, history as stated, presented for follow up examination. Patient is compliant with all medications. Denies shortness of breath, chest pain or abdominal pains at this time. ROS as stated.\par  [Patient declined Retinal Exam] : Patient declined Retinal Exam.

## 2020-07-07 NOTE — ASSESSMENT
[FreeTextEntry1] : 54 year old female found to have stable Hypertension, Reactive Airway Disease, Type 2 Diabetes Mellitus, Hypertriglyceridemia,with the current regimen, diet and life style modifications, as counseled. Prior results reviewed and discussed with the patient during today's examination. Plan as ordered.\par

## 2020-07-14 ENCOUNTER — APPOINTMENT (OUTPATIENT)
Dept: PULMONOLOGY | Facility: CLINIC | Age: 55
End: 2020-07-14
Payer: COMMERCIAL

## 2020-07-14 VITALS
WEIGHT: 170 LBS | DIASTOLIC BLOOD PRESSURE: 80 MMHG | OXYGEN SATURATION: 98 % | RESPIRATION RATE: 16 BRPM | HEART RATE: 84 BPM | SYSTOLIC BLOOD PRESSURE: 110 MMHG | TEMPERATURE: 98.6 F | BODY MASS INDEX: 31.09 KG/M2

## 2020-07-14 PROCEDURE — 99203 OFFICE O/P NEW LOW 30 MIN: CPT

## 2020-07-14 NOTE — PHYSICAL EXAM
[No Abnormalities] : no abnormalities [Lesions: ___] : lesions: [unfilled] [No Edema] : no edema [No Clubbing] : no clubbing [No Focal Deficits] : no focal deficits [Oriented x3] : oriented x3 [No Acute Distress] : no acute distress [No Deformities] : no deformities [Normal Appearance] : normal appearance [No Neck Mass] : no neck mass [Normal Rate/Rhythm] : normal rate/rhythm [No Resp Distress] : no resp distress [Benign] : benign [No HSM] : no hsm [TextBox_68] : mild wheeze and cough

## 2020-07-14 NOTE — DISCUSSION/SUMMARY
[FreeTextEntry1] : Asthma, with persistent, wheeze, cough and exertional dyspnea\par \par history of COVID infection\par \par \par PlAN\par prednisone 20 mg pe day for 5 days\par \par switch to Symbicort 160/4.5 2 puffs twice per day  with spacer\par \par continue montelukast\par \par may use albuterol via PPI or nebulizer\par \par obtain CXR to evaluate for sequelae of COVID\par \par [place] [facility]

## 2020-07-14 NOTE — REVIEW OF SYSTEMS
[Cough] : cough [Dry Mouth] : dry mouth [Wheezing] : wheezing [Dyspnea] : dyspnea [Palpitations] : palpitations [GERD] : gerd [Hay Fever] : hay fever [Fever] : no fever [Chills] : no chills [Chest Discomfort] : no chest discomfort [TextBox_69] : on pantoprazole

## 2020-07-14 NOTE — HISTORY OF PRESENT ILLNESS
[TextBox_4] : 54 year old woman with asthma for 5 years.  she ha had symptoms prior that was seasonal\par \par She is currently on Breo and montelukast..  She does not use her rescue inhaler.  She states that she sometimes has dyspnea and wheeze with exertion.  She is most improved using nebulized albuterol.\par \par She developed COVID infection in March.  She did not require hospitalization. She was treated with azithromycin. She has some dyspnea that improved.\par \par \par \par PMH\par \par DM\par \par \par \par PSH\par breast cancer, on anastrazole\par HPV\par hysterectomy\par \par \par SH\par \par never smoked\par \par no ETOH\par \par \par ALLERGY\par \par NKDA\par believes she has seasonal allergy\par

## 2020-07-20 ENCOUNTER — OUTPATIENT (OUTPATIENT)
Dept: OUTPATIENT SERVICES | Facility: HOSPITAL | Age: 55
LOS: 1 days | End: 2020-07-20
Payer: COMMERCIAL

## 2020-07-20 ENCOUNTER — LABORATORY RESULT (OUTPATIENT)
Age: 55
End: 2020-07-20

## 2020-07-20 DIAGNOSIS — J45.909 UNSPECIFIED ASTHMA, UNCOMPLICATED: ICD-10-CM

## 2020-07-20 DIAGNOSIS — Z98.890 OTHER SPECIFIED POSTPROCEDURAL STATES: Chronic | ICD-10-CM

## 2020-07-20 DIAGNOSIS — U07.1 COVID-19: ICD-10-CM

## 2020-07-20 PROCEDURE — 71046 X-RAY EXAM CHEST 2 VIEWS: CPT

## 2020-07-20 PROCEDURE — 71046 X-RAY EXAM CHEST 2 VIEWS: CPT | Mod: 26

## 2020-07-21 LAB
25(OH)D3 SERPL-MCNC: 27.2 NG/ML
ALBUMIN SERPL ELPH-MCNC: 4.4 G/DL
ALP BLD-CCNC: 69 U/L
ALT SERPL-CCNC: 26 U/L
ANION GAP SERPL CALC-SCNC: 15 MMOL/L
APPEARANCE: ABNORMAL
AST SERPL-CCNC: 14 U/L
BASOPHILS # BLD AUTO: 0.04 K/UL
BASOPHILS NFR BLD AUTO: 0.3 %
BILIRUB SERPL-MCNC: 0.4 MG/DL
BILIRUBIN URINE: NEGATIVE
BLOOD URINE: ABNORMAL
BUN SERPL-MCNC: 18 MG/DL
CALCIUM SERPL-MCNC: 10 MG/DL
CHLORIDE SERPL-SCNC: 103 MMOL/L
CHOLEST SERPL-MCNC: 157 MG/DL
CHOLEST/HDLC SERPL: 3 RATIO
CO2 SERPL-SCNC: 23 MMOL/L
COLOR: YELLOW
CREAT SERPL-MCNC: 0.86 MG/DL
CREAT SPEC-SCNC: 352 MG/DL
EOSINOPHIL # BLD AUTO: 0.03 K/UL
EOSINOPHIL NFR BLD AUTO: 0.2 %
ERYTHROCYTE [SEDIMENTATION RATE] IN BLOOD BY WESTERGREN METHOD: 24 MM/HR
ESTIMATED AVERAGE GLUCOSE: 111 MG/DL
FOLATE SERPL-MCNC: 16.3 NG/ML
GGT SERPL-CCNC: 17 U/L
GLUCOSE QUALITATIVE U: NEGATIVE
GLUCOSE SERPL-MCNC: 83 MG/DL
HBA1C MFR BLD HPLC: 5.5 %
HCT VFR BLD CALC: 41.1 %
HDLC SERPL-MCNC: 52 MG/DL
HGB BLD-MCNC: 13 G/DL
IMM GRANULOCYTES NFR BLD AUTO: 0.4 %
IRON SATN MFR SERPL: 25 %
IRON SERPL-MCNC: 88 UG/DL
KETONES URINE: NEGATIVE
LDLC SERPL CALC-MCNC: 85 MG/DL
LEUKOCYTE ESTERASE URINE: ABNORMAL
LYMPHOCYTES # BLD AUTO: 3.46 K/UL
LYMPHOCYTES NFR BLD AUTO: 24.5 %
MAN DIFF?: NORMAL
MCHC RBC-ENTMCNC: 27 PG
MCHC RBC-ENTMCNC: 31.6 GM/DL
MCV RBC AUTO: 85.4 FL
MICROALBUMIN 24H UR DL<=1MG/L-MCNC: 36.9 MG/DL
MICROALBUMIN/CREAT 24H UR-RTO: 105 MG/G
MONOCYTES # BLD AUTO: 1.13 K/UL
MONOCYTES NFR BLD AUTO: 8 %
NEUTROPHILS # BLD AUTO: 9.38 K/UL
NEUTROPHILS NFR BLD AUTO: 66.6 %
NITRITE URINE: NEGATIVE
PH URINE: 6
PLATELET # BLD AUTO: 340 K/UL
POTASSIUM SERPL-SCNC: 4 MMOL/L
PROT SERPL-MCNC: 7.2 G/DL
PROTEIN URINE: ABNORMAL
RBC # BLD: 4.81 M/UL
RBC # FLD: 13.1 %
SODIUM SERPL-SCNC: 142 MMOL/L
SPECIFIC GRAVITY URINE: >=1.03
T3 SERPL-MCNC: 80 NG/DL
T4 FREE SERPL-MCNC: 1.1 NG/DL
TIBC SERPL-MCNC: 350 UG/DL
TRIGL SERPL-MCNC: 99 MG/DL
TSH SERPL-ACNC: 1.64 UIU/ML
UIBC SERPL-MCNC: 263 UG/DL
UROBILINOGEN URINE: NORMAL
VIT B12 SERPL-MCNC: 486 PG/ML
WBC # FLD AUTO: 14.1 K/UL

## 2020-07-31 LAB — HEMOCCULT STL QL IA: NEGATIVE

## 2020-09-09 ENCOUNTER — APPOINTMENT (OUTPATIENT)
Dept: INTERNAL MEDICINE | Facility: CLINIC | Age: 55
End: 2020-09-09
Payer: COMMERCIAL

## 2020-09-09 VITALS
HEART RATE: 84 BPM | TEMPERATURE: 98.9 F | OXYGEN SATURATION: 98 % | BODY MASS INDEX: 31.28 KG/M2 | SYSTOLIC BLOOD PRESSURE: 108 MMHG | HEIGHT: 62 IN | WEIGHT: 170 LBS | DIASTOLIC BLOOD PRESSURE: 75 MMHG

## 2020-09-09 PROCEDURE — 99205 OFFICE O/P NEW HI 60 MIN: CPT

## 2020-09-09 RX ORDER — PREDNISONE 20 MG/1
20 TABLET ORAL DAILY
Qty: 10 | Refills: 0 | Status: COMPLETED | COMMUNITY
Start: 2020-07-14 | End: 2020-09-09

## 2020-09-09 NOTE — HISTORY OF PRESENT ILLNESS
[Heartburn] : denies heartburn [Nausea] : denies nausea [Vomiting] : denies vomiting [Diarrhea] : denies diarrhea [Yellow Skin Or Eyes (Jaundice)] : denies jaundice [Wt Loss ___ Lbs] : recent [unfilled] ~Upound(s) weight loss [Abdominal Swelling] : abdominal swelling [Constipation] : constipation [Abdominal Pain] : abdominal pain [GERD] : gastroesophageal reflux disease [Good Compliance] : good compliance with treatment [de-identified] :  Pt is a 54 yr old woman with fatty liver who had a fibroscan revealing -Fibroscan test was performed per standard protocol and was well tolerated. Patient scored a median liver stiffness of 7.9 kPa which is consistent with F2 disease,  dB/m (S 3).\par She has lost 8 lbs and is trying to exercise and decrease her fat intake.  Pt had covid in  march and lost 20 lbs due to diarrhea and nausea and sob and heart arrhythmia . she is recovered and has antibodies.  She states she has reflux and has been taking pantoprazole 20 mg and is helping her reflux  but not her abd cramps she gets.  she has been Pepto-Bismol for pain.   She then becomes constipated but it helps with her  bloating.  She had hpylori and was treated several times and finally was eradicated .

## 2020-09-09 NOTE — ASSESSMENT
[FreeTextEntry1] : 1 Fatty Liver: The patient denies any jaundice or pruritus. The patient denies any alcohol use. The patient denies taking large doses of nonsteroidal anti-inflammatory drugs or acetaminophen. The findings are suggestive of fatty liver. The patient and I had a long discussion regarding the risks of fatty liver progressing to cirrhosis. The patient was told of the possible increased risk of developing liver failure, cirrhosis, ascites, GI bleeding secondary to varices, hepatic encephalopathy, bleeding tendencies and liver cancer. The patient was told of the importance of follow-up. The patient was advised to follow up every 6 months for blood work and imaging studies. The patient agreed and will follow up. The patient was advised to lose weight. I recommend a trial of vitamin E supplementation for the fatty liver. If the liver enzymes remain elevated, the patient may require a trial of Pioglitazone for the fatty liver. I recommend avoid alcohol and hepato-toxic agents. The patient was also advised to avoid NSAIDs, Acetaminophen and any other hepatotoxic drugs. The patient was also advised not to share needles, razors, scissors, nail clippers, etc.. The patient is to continue close follow-up in our office for blood work and exams. If the liver enzymes remain elevated, the patient may require a CT guided liver biopsy to assess the liver parenchyma and for possible treatment. We had a long discussion regarding the risks and benefits of the procedure. The patient was told of the risks of bleeding, perforation, infections, emergency surgery and missing lesions. The patient agreed and will follow-up to reassess the symptoms.\par \par She has lost weight and will need fu for her us of liver \par 2.  colon cancer screening  will be done in May 2021 with her hx of breast cancer.  \par 3.  hematuria she is to fu with pcp and Dr jay and also is fu with her gyn to make sure it is not vaginal  She should had us of renal and pelvis \par 4 gerd discussed Rule of 2's; pt should avoid eating too much; too fast; too spicy; too lousy; less than two hours before bed \par -Things to avoid including overeating, spicy foods, tight clothing, eating within three hours of bed, this list is not all inclusive. \par -For treatment of reflux, possible options discussed including diet control, H2 blockers, PPIs, as well as coating motility agents discussed as treatment options. Timing of meals and proximity of last meal to sleep were discussed. If symptoms persist, a formal gastrointestinal evaluation is needed. \par \par 5  Most foods that contain carbohydrates can cause gas. By contrast, fats and proteins cause little gas (although certain proteins may intensify the odor of gas).\par \par Sugars\par \par The sugars that cause gas are raffinose, lactose, fructose, and sorbitol.\par •Raffinose — Beans contain large amounts of this complex sugar. Smaller amounts are found in cabbage, [place] sprouts, broccoli, asparagus, other vegetables, and whole grains.\par •Lactose — Lactose is the natural sugar in milk. It is also found in milk products, such as cheese and ice cream, and processed foods, such as bread, cereal, and salad dressing. Many people, particularly those of , , or  background, have low levels of the enzyme lactase needed to digest lactose. Also, as people age, their enzyme levels decrease. As a result, over time people may experience increasing amounts of gas after eating food containing lactose.\par •Fructose — Fructose is naturally present in onions, artichokes, pears, and wheat. It is also used as a sweetener in some soft drinks and fruit drinks.\par •Sorbitol — Sorbitol is a sugar found naturally in fruits, including apples, pears, peaches, and prunes. It is also used as an artificial sweetener in many dietetic foods and sugarfree candies and gums.\par \par Starches\par \par Most starches, including potatoes, corn, noodles, and wheat, produce gas as they are broken down in the large intestine. Rice is the only starch that does not cause gas.\par \par Fiber\par \par Dietary fiber is carbohydrate that is indigestible in the small intestine and reaches the colon relatively intact. In the colon, certain bacteria digest fiber (fermentation), which produces gas. Dietary fiber can be classified as either soluble or insoluble.\par \par Soluble fiber dissolves in water and becomes a soft gel. It is found in oat bran, beans, barley, nuts, seeds, lentils, peas, and most fruits. Insoluble fiber does not dissolve or gel in water. It absorbs liquid and adds bulk to stool. Cellulose (found in legumes, seeds, root vegetables, and vegetables in the cabbage family), wheat bran, and corn bran are examples of insoluble fiber.\par \par High fiber substances containing both soluble and insoluble fibers have the properties of both. They include oat bran, psyllium, and soy fiber. Methylcellulose is a semi-synthetic fiber. It is soluble and gel forming, but not fermentable.\par \par Types of fiber differ in the speed and extent to which they are digested in the GI tract, and in the process of fermentation. The solubility and fermentation of a particular fiber affects how it is handled in the GI tract. However, the effect of identical fibers varies from person to person.\par \par A gradual increase in dietary fiber can modify and improve symptoms. But individual responses vary and too much of a type of fiber can worsen symptoms. It may be necessary to try different types of fiber. With any dietary fiber it is best to start low and go slow.\par

## 2020-09-09 NOTE — PHYSICAL EXAM
[General Appearance - Alert] : alert [General Appearance - In No Acute Distress] : in no acute distress [Oropharynx] : the oropharynx was normal [PERRL With Normal Accommodation] : pupils were equal in size, round, and reactive to light [Heart Rate And Rhythm] : heart rate was normal and rhythm regular [Heart Sounds] : normal S1 and S2 [Auscultation Breath Sounds / Voice Sounds] : lungs were clear to auscultation bilaterally [Heart Sounds Pericardial Friction Rub] : no pericardial rub [Heart Sounds Gallop] : no gallops [Murmurs] : no murmurs [Normal] : normal [Edema] : there was no peripheral edema [No HSM] : no hepatosplenomegaly noted [Soft, Nontender] : the abdomen was soft and nontender [No Mass] : no masses were palpated [Cervical Lymph Nodes Enlarged Anterior Bilaterally] : anterior cervical [Cervical Lymph Nodes Enlarged Posterior Bilaterally] : posterior cervical [Supraclavicular Lymph Nodes Enlarged Bilaterally] : supraclavicular [Femoral Lymph Nodes Enlarged Bilaterally] : femoral [Axillary Lymph Nodes Enlarged Bilaterally] : axillary [Inguinal Lymph Nodes Enlarged Bilaterally] : inguinal [No CVA Tenderness] : no ~M costovertebral angle tenderness [No Spinal Tenderness] : no spinal tenderness [Abnormal Walk] : normal gait [Nail Clubbing] : no clubbing  or cyanosis of the fingernails [Musculoskeletal - Swelling] : no joint swelling seen [Motor Tone] : muscle strength and tone were normal [Skin Color & Pigmentation] : normal skin color and pigmentation [Skin Turgor] : normal skin turgor [] : no rash [Deep Tendon Reflexes (DTR)] : deep tendon reflexes were 2+ and symmetric [Sensation] : the sensory exam was normal to light touch and pinprick [No Focal Deficits] : no focal deficits [Oriented To Time, Place, And Person] : oriented to person, place, and time [Impaired Insight] : insight and judgment were intact [Affect] : the affect was normal

## 2020-09-10 ENCOUNTER — LABORATORY RESULT (OUTPATIENT)
Age: 55
End: 2020-09-10

## 2020-09-10 ENCOUNTER — APPOINTMENT (OUTPATIENT)
Age: 55
End: 2020-09-10
Payer: COMMERCIAL

## 2020-09-10 VITALS — DIASTOLIC BLOOD PRESSURE: 77 MMHG | HEART RATE: 84 BPM | TEMPERATURE: 97.4 F | SYSTOLIC BLOOD PRESSURE: 121 MMHG

## 2020-09-10 PROCEDURE — 99213 OFFICE O/P EST LOW 20 MIN: CPT

## 2020-09-11 ENCOUNTER — RESULT REVIEW (OUTPATIENT)
Age: 55
End: 2020-09-11

## 2020-09-11 ENCOUNTER — OUTPATIENT (OUTPATIENT)
Dept: OUTPATIENT SERVICES | Facility: HOSPITAL | Age: 55
LOS: 1 days | End: 2020-09-11
Payer: COMMERCIAL

## 2020-09-11 ENCOUNTER — APPOINTMENT (OUTPATIENT)
Dept: MAMMOGRAPHY | Facility: CLINIC | Age: 55
End: 2020-09-11
Payer: COMMERCIAL

## 2020-09-11 ENCOUNTER — APPOINTMENT (OUTPATIENT)
Dept: ULTRASOUND IMAGING | Facility: CLINIC | Age: 55
End: 2020-09-11
Payer: COMMERCIAL

## 2020-09-11 DIAGNOSIS — Z98.890 OTHER SPECIFIED POSTPROCEDURAL STATES: Chronic | ICD-10-CM

## 2020-09-11 DIAGNOSIS — D05.11 INTRADUCTAL CARCINOMA IN SITU OF RIGHT BREAST: ICD-10-CM

## 2020-09-11 LAB
25(OH)D3 SERPL-MCNC: 21.9 NG/ML
AFP-TM SERPL-MCNC: 2.4 NG/ML
APPEARANCE: ABNORMAL
BILIRUBIN URINE: NEGATIVE
BLOOD URINE: NEGATIVE
COLOR: YELLOW
FOLATE SERPL-MCNC: 18.1 NG/ML
GLUCOSE QUALITATIVE U: NEGATIVE
HBV CORE IGG+IGM SER QL: NONREACTIVE
HBV SURFACE AB SER QL: NONREACTIVE
HBV SURFACE AG SER QL: NONREACTIVE
HCV AB SER QL: NONREACTIVE
HCV S/CO RATIO: 0.22 S/CO
HEPATITIS A IGG ANTIBODY: REACTIVE
KETONES URINE: NORMAL
LEUKOCYTE ESTERASE URINE: ABNORMAL
MAGNESIUM SERPL-MCNC: 1.7 MG/DL
NITRITE URINE: NEGATIVE
PH URINE: 6
PROTEIN URINE: ABNORMAL
SPECIFIC GRAVITY URINE: 1.03
UROBILINOGEN URINE: NORMAL
VIT B12 SERPL-MCNC: 474 PG/ML

## 2020-09-11 PROCEDURE — 77066 DX MAMMO INCL CAD BI: CPT | Mod: 26

## 2020-09-11 PROCEDURE — G0279: CPT | Mod: 26

## 2020-09-11 PROCEDURE — 76641 ULTRASOUND BREAST COMPLETE: CPT | Mod: 26,50

## 2020-09-11 PROCEDURE — 76641 ULTRASOUND BREAST COMPLETE: CPT

## 2020-09-11 PROCEDURE — 77066 DX MAMMO INCL CAD BI: CPT

## 2020-09-11 PROCEDURE — G0279: CPT

## 2020-09-12 ENCOUNTER — APPOINTMENT (OUTPATIENT)
Dept: ULTRASOUND IMAGING | Facility: CLINIC | Age: 55
End: 2020-09-12

## 2020-09-12 ENCOUNTER — OUTPATIENT (OUTPATIENT)
Dept: OUTPATIENT SERVICES | Facility: HOSPITAL | Age: 55
LOS: 1 days | End: 2020-09-12
Payer: COMMERCIAL

## 2020-09-12 DIAGNOSIS — Z98.890 OTHER SPECIFIED POSTPROCEDURAL STATES: Chronic | ICD-10-CM

## 2020-09-12 PROCEDURE — 76700 US EXAM ABDOM COMPLETE: CPT | Mod: 26

## 2020-09-13 NOTE — HISTORY OF PRESENT ILLNESS
[FreeTextEntry1] : 52 yo F s/p left URS/HLL last year. Most recent CT in June showed two small stones in left kidney. Was doing well until 2 weeks ago, went to ER for severe flank pain. Stone from kidney had dropped into ureter (obstructing 3mm stone). Also went to urgent care last week for LUTS and dysuria. Told she had UTI and finished course of cipro. No fever, chills, nausea or vomiting\par \par 11/26/2018 Interval history: Pt found to have obstructing left ureteral stone and presented to the ER with intractable pain. Pt underwent left URS with stone basket extraction. NO issues since procedure.\par \par 4/1/19 Interval history: Has been having some left sided flank pain for a month. US done when symptoms initialyl started was neg for hydronephrosis. Last few days have been having dysuria, suprapubic pain. No gross hematuria, fever, or chills\par \par 10/7/19 Interval history: Was doing well until 9/26/19 - ER for left flank pain\par CT showed 5mm nonobstructing left sided stone, otherwise no hydronephrosis\par Was told she likely had UTI and was sent home with a course of levaquin\par Symptoms improved but still having some LUTS - frequency but only small amounts coming out\par Still with some mild left sided pain\par no gross hematuria, fever, or chills\par Of note, pt states she did repeat 24 hr urine collection in April after last visit but no record of it at this time. Also did 24 hr urine collection last year with nephrologist and was told everything was normal.\par \par 9/10/20 Interval history: Had episode of gross hematuria a month ago for two days = red urine with little clots\par Last symptomatic UTI was 2 months ago\par Haven't passed stones since last visit\par Left flank pain = intermittent, sometimes sharp, seems to be worse in the afternoon\par Drinks 1.5 gallon, chamomile tea sometimes\par Currently no urinary issues

## 2020-09-13 NOTE — ASSESSMENT
[FreeTextEntry1] : 53 yo F with history of nephrolithiasis, recent hematuria\par \par - UA, culture\par - Abdominal US pending already ordered by PCP\par - After US, may need CT stone hunt\par - Reaffirmed behavioral modifications

## 2020-09-13 NOTE — PHYSICAL EXAM
[General Appearance - Well Nourished] : well nourished [Normal Appearance] : normal appearance [General Appearance - Well Developed] : well developed [Well Groomed] : well groomed [General Appearance - In No Acute Distress] : no acute distress [Abdomen Soft] : soft [Abdomen Tenderness] : non-tender [Abdomen Hernia] : no hernia was discovered [Abdomen Mass (___ Cm)] : no abdominal mass palpated [Costovertebral Angle Tenderness] : no ~M costovertebral angle tenderness [Urinary Bladder Findings] : the bladder was normal on palpation [Skin Turgor] : supple [Skin Color & Pigmentation] : normal skin color and pigmentation [Edema] : no peripheral edema [Skin Lesions] : no skin lesions [Heart Rate And Rhythm] : Heart rate and rhythm were normal [Respiration, Rhythm And Depth] : normal respiratory rhythm and effort [Exaggerated Use Of Accessory Muscles For Inspiration] : no accessory muscle use [] : no respiratory distress [Auscultation Breath Sounds / Voice Sounds] : lungs were clear to auscultation bilaterally [Oriented To Time, Place, And Person] : oriented to person, place, and time [Mood] : the mood was normal [Affect] : the affect was normal [Not Anxious] : not anxious [No Focal Deficits] : no focal deficits [Normal Station and Gait] : the gait and station were normal for the patient's age [No Palpable Adenopathy] : no palpable adenopathy [Motor Exam] : the motor exam was normal

## 2020-09-15 ENCOUNTER — APPOINTMENT (OUTPATIENT)
Dept: PULMONOLOGY | Facility: CLINIC | Age: 55
End: 2020-09-15
Payer: COMMERCIAL

## 2020-09-15 VITALS
WEIGHT: 168 LBS | RESPIRATION RATE: 16 BRPM | DIASTOLIC BLOOD PRESSURE: 68 MMHG | SYSTOLIC BLOOD PRESSURE: 111 MMHG | BODY MASS INDEX: 30.73 KG/M2 | TEMPERATURE: 97.1 F | OXYGEN SATURATION: 99 % | HEART RATE: 67 BPM

## 2020-09-15 PROCEDURE — 99214 OFFICE O/P EST MOD 30 MIN: CPT | Mod: 25

## 2020-09-15 PROCEDURE — 94729 DIFFUSING CAPACITY: CPT

## 2020-09-15 PROCEDURE — 94060 EVALUATION OF WHEEZING: CPT

## 2020-09-15 PROCEDURE — 94727 GAS DIL/WSHOT DETER LNG VOL: CPT

## 2020-09-15 NOTE — HISTORY OF PRESENT ILLNESS
[Never] : never [TextBox_4] : 54 year old woman with asthma for 5 years. she ha had symptoms prior that was seasonal\par \par She is currently on Breo and montelukast.. She does not use her rescue inhaler. She states that she sometimes has dyspnea and wheeze with exertion. She is most improved using nebulized albuterol.\par \par She developed COVID infection in March. She did not require hospitalization. She was treated with azithromycin. She has some dyspnea that improved.\par \par CXR was performed that did not demonstrate any residual infiltrate. \par \par Last visit she was treated with prednisone short course and  switched to Symbicort and continued montelukast.\par \par She states that she improved but has noted some had increased dyspnea last week.  She has has nasal congestion and believes symptoms are due to this.\par \par \par \par PMH\par \par DM\par \par \par \par PSH\par breast cancer, on anastrazole\par HPV\par hysterectomy\par \par \par SH\par \par never smoked\par \par no ETOH\par \par \par ALLERGY\par \par NKDA\par

## 2020-09-15 NOTE — DISCUSSION/SUMMARY
[FreeTextEntry1] : Asthma improved on current regimen, wheeze has resolved.\par \par She has nasal congestion \par \par history of COVID infection, CXR was normal\par \par \par PlAN\par continue Symbicort 160/4.5 2 puffs twice per day  with spacer\par \par continue montelukast\par \par may use albuterol via PPI or nebulizer as needed\par \par She will use nasal wash for nasal congestion and inform me if symptoms persist or she has significant dyspnea.\par \par [place] Sheriff MD FCCP \par

## 2020-09-15 NOTE — REVIEW OF SYSTEMS
[Dry Mouth] : dry mouth [Cough] : cough [Dyspnea] : dyspnea [Wheezing] : wheezing [Palpitations] : palpitations [Hay Fever] : hay fever [GERD] : gerd [Fever] : no fever [Chills] : no chills [Chest Discomfort] : no chest discomfort [TextBox_69] : on pantoprazole

## 2020-09-15 NOTE — PHYSICAL EXAM
[No Acute Distress] : no acute distress [No Deformities] : no deformities [Normal Appearance] : normal appearance [No Neck Mass] : no neck mass [No Resp Distress] : no resp distress [Normal Rate/Rhythm] : normal rate/rhythm [Lesions: ___] : lesions: [unfilled] [No Abnormalities] : no abnormalities [Benign] : benign [No HSM] : no hsm [No Focal Deficits] : no focal deficits [No Clubbing] : no clubbing [No Edema] : no edema [Oriented x3] : oriented x3 [Clear to Auscultation Bilaterally] : clear to auscultation bilaterally [Turbinate hypertrophy] : turbinate hypertrophy [TextBox_68] : improved

## 2020-09-16 ENCOUNTER — APPOINTMENT (OUTPATIENT)
Dept: ULTRASOUND IMAGING | Facility: CLINIC | Age: 55
End: 2020-09-16

## 2020-09-16 ENCOUNTER — APPOINTMENT (OUTPATIENT)
Dept: MAMMOGRAPHY | Facility: CLINIC | Age: 55
End: 2020-09-16

## 2020-09-18 ENCOUNTER — OUTPATIENT (OUTPATIENT)
Dept: OUTPATIENT SERVICES | Facility: HOSPITAL | Age: 55
LOS: 1 days | Discharge: HOME | End: 2020-09-18

## 2020-09-18 DIAGNOSIS — Z98.890 OTHER SPECIFIED POSTPROCEDURAL STATES: Chronic | ICD-10-CM

## 2020-09-18 DIAGNOSIS — C50.919 MALIGNANT NEOPLASM OF UNSPECIFIED SITE OF UNSPECIFIED FEMALE BREAST: ICD-10-CM

## 2020-09-21 ENCOUNTER — APPOINTMENT (OUTPATIENT)
Dept: HEMATOLOGY ONCOLOGY | Facility: CLINIC | Age: 55
End: 2020-09-21
Payer: COMMERCIAL

## 2020-09-21 VITALS
DIASTOLIC BLOOD PRESSURE: 68 MMHG | RESPIRATION RATE: 16 BRPM | BODY MASS INDEX: 31.24 KG/M2 | TEMPERATURE: 98.7 F | SYSTOLIC BLOOD PRESSURE: 111 MMHG | HEIGHT: 61.65 IN | HEART RATE: 69 BPM | OXYGEN SATURATION: 98 % | WEIGHT: 169.76 LBS

## 2020-09-21 PROCEDURE — 99213 OFFICE O/P EST LOW 20 MIN: CPT

## 2020-09-21 NOTE — HISTORY OF PRESENT ILLNESS
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 She underwent right lumpectomy with SLNB  10/2/15. The pathology showed DCIS with close superior margin and [place] [facility] [place] then [place] RT to the lumpectomy site with Dr Mann: 4240 cGy with boost. She started on tamoxifen chemoprevention on 1/2016 and has been followed by Dr Gunderson. Since initiation of the tamoxifen, she has experienced worsening headaches, joint pain, fatigue, fatty liver, and concerned about blood clots. She had seen neurologists for atypical migraine and had MRI which was negative for suspicious lesions. She was offered different abortive agents: imitrex and gabapentin which she is not taking. She stopped the tamoxifen for 2 months in 8/2017 to 9/2017 and her headaches were much improved along with her liver enzymes.  [de-identified] : DCIS: intermediate to high grade ER 95% [de-identified] : Tamoxifen 1/2016 to 9/2017\par tamoxifen 2018 to present: JONAH  [de-identified] : She had breast imaging done last week which did not show any new findings. She denies any new breast changes: she has tenderness under the breasts occasionally. She has been having trouble with back pain due to kidney stones and had u/s done last week also. Had covid months ago and still with SOB with exertion. She denies any new medications. She is tolerating tamoxifen MWF without any new headaches or joint issues.

## 2020-09-21 NOTE — ASSESSMENT
[FreeTextEntry1] : 53 y/o postmenopausal F with right breast DCIS on lower dose tamoxifen as per [place] study SAB 2018. She is tolerating low dose tamoxifen without increasing headaches. We reviewed her mammogram/ sonogram 9/2020 and her blood work from 7/2020. We encouraged Vitamin D supplement. She continues to follow with hepatology although will need new hepatologist since Dr Ball has left. She last had LFTs done 7/2020 which were WNL. Next follow up in 6 months to 1 year.

## 2020-09-21 NOTE — PHYSICAL EXAM
[Fully active, able to carry on all pre-disease performance without restriction] : Status 0 - Fully active, able to carry on all pre-disease performance without restriction [Normal] : affect appropriate [de-identified] : no abnl masses palpable; tenderness on palpation over the R excision site; postsurgical scar at 10:00 R breast

## 2020-10-02 ENCOUNTER — APPOINTMENT (OUTPATIENT)
Dept: HEPATOLOGY | Facility: CLINIC | Age: 55
End: 2020-10-02

## 2020-10-02 ENCOUNTER — APPOINTMENT (OUTPATIENT)
Dept: HEPATOLOGY | Facility: CLINIC | Age: 55
End: 2020-10-02
Payer: COMMERCIAL

## 2020-10-02 VITALS
RESPIRATION RATE: 16 BRPM | HEART RATE: 77 BPM | DIASTOLIC BLOOD PRESSURE: 80 MMHG | HEIGHT: 62 IN | SYSTOLIC BLOOD PRESSURE: 124 MMHG | TEMPERATURE: 98.2 F | OXYGEN SATURATION: 96 % | BODY MASS INDEX: 31.47 KG/M2 | WEIGHT: 171 LBS

## 2020-10-02 PROCEDURE — 99215 OFFICE O/P EST HI 40 MIN: CPT | Mod: 25

## 2020-10-02 PROCEDURE — G0447 BEHAVIOR COUNSEL OBESITY 15M: CPT

## 2020-10-02 NOTE — REVIEW OF SYSTEMS
[Fever] : no fever [Chills] : no chills [Fatigue] : no fatigue [Hot Flashes] : no hot flashes [Night Sweats] : no night sweats [Abdominal Pain] : no abdominal pain [Nausea] : no nausea [Constipation] : no constipation [Diarrhea] : diarrhea [Vomiting] : no vomiting [Heartburn] : heartburn [Melena] : no melena [Dysuria] : dysuria [Incontinence] : no incontinence [Nocturia] : no nocturia [Hematuria] : no hematuria [Frequency] : no frequency [Vaginal Discharge] : no vaginal discharge [Negative] : Heme/Lymph [FreeTextEntry2] : Attempting to loose weight [FreeTextEntry8] : For > 1 year

## 2020-10-02 NOTE — COUNSELING
[Potential consequences of obesity discussed] : Potential consequences of obesity discussed [Benefits of weight loss discussed] : Benefits of weight loss discussed [Encouraged to maintain food diary] : Encouraged to maintain food diary [Encouraged to increase physical activity] : Encouraged to increase physical activity [Encouraged to use exercise tracking device] : Encouraged to use exercise tracking device [Good understanding] : Patient has a good understanding of disease, goals and obesity follow-up plan [FreeTextEntry2] : \par  [de-identified] : Patient has been counseled on life style interventions, including but not limited to: weight loss (3-5% loss of body weight might improve fat in the liver, while 7-10% needed for potential improvement of other components, including inflammation and scarring of the liver, called fibrosis); healthy diet, avoiding added sugars, sodas, avoiding saturated fats, limiting sodium, avoiding alcohol; and on the importance of regular exercise (> 150 min/week moderate intensity aerobic exercise with at least 2x/week muscle strengthening or exercise as tolerated). \par \par Discussed with patient anti-reflux measures as well.

## 2020-10-02 NOTE — HISTORY OF PRESENT ILLNESS
[FreeTextEntry1] : Follow up for NAFLD, also on Tamoxifen,  FARSHAD positivity [de-identified] : 55 yo female with extensive medical history including diabetes mellitus (diagnosed in 2016, HbA1c: 5.5 in 7/2020), dyslipidemia, hypertension, obesity (BMI 31), s/p cholecystectomy (6/2017), hx of breast cancer s/p R lumpectomy (2015), on Tamoxifen (1/2016-9-2017, then 2018 till present), FARSHAD positivity (1:640, 1:160) with normal Immunoglobulins, nephrolithiasis and asthma is being followed by hepatology for fatty liver disease (US abd 6/2017: moderate to severe fatty infiltration of the liver, CT abd 10/2018: hepatic steatosis) with hepatic fibrosis (transient elastography 9/2019: F2 fibrosis, S3 steatosis, prior in 2017: F2-F3 fibrosis). She was last seen by Dr. Ball on 10/14/2019.  She was noted to have abnormal liver tests in 2/2017 (mild elevation of AST/ALT with peak 57/74 in 2018, normal ALP and bilirubin). Tamoxifen was discontinued in 10/2017 (1/2016-9/2017) and resumed again in 2018; she is taking 20 mg/day MWF at present. Her prior liver workup showed normal ceruloplasmin, neg tTGIgA, normal iron panel, normal IgG, IgA and IgM, negative SMA ab, positive FARSHAD, positive RF, negative HCV ab, immune for Hep A, not immune or exposed to Hep B. Latest liver enzymes were WNL in 7/2020: AST 14, ALT 26, ALP 69, Se bi 0.4, GGT 17. Her lipid profile improved:  -99, Cholesterol 242-257, -85, HDL 52 and she lost weight, from 186 lb to 171lb approximately in 1 year. \par \par Notably, she had COVID in 03/2020, did not require hospitalization. She still c/o QIU, following with pulmonology, was started on prednisone 10 mg ~ 2 weeks ago. \par \par She is also being followed by GI for GERD, constipation, last seen by Dr. Campoverde on 9/9/2020 when US abdomen, AFP and hepatitis viral serology was ordered. AFP was normal (2.4). US abdomen (9/12/2020) showed hepatic steatosis and bilateral non-obstructing renal stones (patient is following with urology). Last GI visit she was started on cipro for UTI, completed. \par \par Today she is here for follow up. She states that has frequent heartburn if not taking PPI, denies dysphagia, odynophagia, vomiting, nausea. She has occasional epigastric pain. She denies diarrhea, has regular BM, denies melena or hematochezia. She is trying to loose weight, exercising (walking 2 hrs) regularly. She denies jaundice, itching. She denies CP, no SOB, wheezing at present, she is using her rescue inhaler ~ 3x a week. She has dysuria for >1 year, no macroscopic hematuria, no CVA tenderness or pain at present, no fever or chills. See detailed ROS below. \par

## 2020-10-02 NOTE — ASSESSMENT
[FreeTextEntry1] : 53 yo female with extensive medical history as above including multiple risk factors for NAFLD, such as obesity, well controlled DM, controlled HTN, improving dyslipidemia, Hx of breast cancer s/p R lumpectomy (2015), on Tamoxifen (1/2016-9/2017 and 2018 - present), is being followed for her NAFLD with F2-F3 fibrosis (based on elastography). Although she had positive FARSHAD, immunoglobulins were normal, thus not suggestive of active immune mediated liver disease or AIH. She is here for follow up visit (initial encounter as patient has been seeing Dr. Ball previously). \par

## 2020-10-02 NOTE — PLAN
[FreeTextEntry1] : #1. Non-alcoholic fatty liver disease with F2-F3 fibrosis with risk factors (obesity, DM, HLD and Tamoxifen)\par - I have explained to her the natural history of nonalcoholic fatty liver disease, disease progression to FINCH, hepatic fibrosis, and cirrhosis and risk of hepatocellular carcinoma. \par - I have counseled on life style modifications as above. \par - Avoid hepatotoxic agents, herbal supplements, alcohol \par - Advised to c/w statin \par - DM is controlled, currently on Metformin, mgmt. per PCP (if liraglutide)\par - Ordered CMP, lipid panel and HbA1c (patient will see her PCP in ~1.5 week, prefer to do blood test after in case additional blood test ordered)\par - Last Fibroscan in 2019, ordered repeat to assess for progression/improvement, patient is agreeable\par - Patient had Tamoxifen discontinued in past and had trial of anastrazole, but was switched back to Tamoxifen in 2018 (20 mg MWF) with stable liver enzymes, and stable FibroScan results. Will closely monitor. \par \par #Hx of FARSHAD positivity\par - Ordered repeat FARSHAD\par \par #S/p COVID \par - Will monitor from hepatology standpoint as long term consequences / sequel  of COVID not known yet (we do know that liver can be affected during infection, abnormal liver tests has been  found in > 50% of cases) \par - Otherwise following with pulmonology \par \par #HCM \par - Hep A immune \par - Hep C negative \par - Hep B not immune – need vaccination, referred to [address] [place] [address] office for the HBV vaccine series, patient agreeable \par - no HIV status on file, ordered \par - Vitamin D level low, 21.9 – was prescribed Vit D by Dr. Campoverde, c/w Vit D \par \par Following with PCP, , Pulmonology, GI, cardiology, hem/onc. \par \par RTC in 3 months

## 2020-10-02 NOTE — PHYSICAL EXAM
[No Respiratory Distress] : no respiratory distress  [No Accessory Muscle Use] : no accessory muscle use [Clear to Auscultation] : lungs were clear to auscultation bilaterally [Normal Rate] : normal rate  [Regular Rhythm] : with a regular rhythm [Normal S1, S2] : normal S1 and S2 [No Murmur] : no murmur heard [No Edema] : there was no peripheral edema [Soft] : abdomen soft [Non Tender] : non-tender [Non-distended] : non-distended [No Masses] : no abdominal mass palpated [No HSM] : no HSM [Normal Bowel Sounds] : normal bowel sounds [No Hernias] : no hernias [Normal] : normal gait, coordination grossly intact, no focal deficits and deep tendon reflexes were 2+ and symmetric [de-identified] : Obese [de-identified] : No CVA tenderness.

## 2020-10-08 ENCOUNTER — APPOINTMENT (OUTPATIENT)
Age: 55
End: 2020-10-08
Payer: COMMERCIAL

## 2020-10-08 VITALS — TEMPERATURE: 98.2 F

## 2020-10-08 PROCEDURE — 99214 OFFICE O/P EST MOD 30 MIN: CPT

## 2020-10-11 NOTE — HISTORY OF PRESENT ILLNESS
[FreeTextEntry1] : 52 yo F s/p left URS/HLL last year. Most recent CT in June showed two small stones in left kidney. Was doing well until 2 weeks ago, went to ER for severe flank pain. Stone from kidney had dropped into ureter (obstructing 3mm stone). Also went to urgent care last week for LUTS and dysuria. Told she had UTI and finished course of cipro. No fever, chills, nausea or vomiting\par \par 11/26/2018 Interval history: Pt found to have obstructing left ureteral stone and presented to the ER with intractable pain. Pt underwent left URS with stone basket extraction. NO issues since procedure.\par \par 4/1/19 Interval history: Has been having some left sided flank pain for a month. US done when symptoms initialyl started was neg for hydronephrosis. Last few days have been having dysuria, suprapubic pain. No gross hematuria, fever, or chills\par \par 10/7/19 Interval history: Was doing well until 9/26/19 - ER for left flank pain\par CT showed 5mm nonobstructing left sided stone, otherwise no hydronephrosis\par Was told she likely had UTI and was sent home with a course of levaquin\par Symptoms improved but still having some LUTS - frequency but only small amounts coming out\par Still with some mild left sided pain\par no gross hematuria, fever, or chills\par Of note, pt states she did repeat 24 hr urine collection in April after last visit but no record of it at this time. Also did 24 hr urine collection last year with nephrologist and was told everything was normal.\par \par 9/10/20 Interval history: Had episode of gross hematuria a month ago for two days = red urine with little clots\par Last symptomatic UTI was 2 months ago\par Haven't passed stones since last visit\par Left flank pain = intermittent, sometimes sharp, seems to be worse in the afternoon\par Drinks 1.5 gallon, chamomile tea sometimes\par Currently no urinary issues \par \par 10/8/20 Interval history: Had some flank pain about 2 weeks ago\par No fever, chills, nausea or vomiting\par Abdominal US since last visit showed 1.4cm nonobstructing left renal stone

## 2020-10-11 NOTE — ASSESSMENT
[FreeTextEntry1] : 54 yo F with nephrolithiasis, left flank pain\par \par - CT stone hunt\par - Discussed possible etiologies for nephrolithiasis. Reviewed behavioral modifications including adequate hydration, cutting back on coffee, dark sodas, dark teas, low sodium diet, increasing citrate levels with lemon juice. \par - Discussed importance of seeking medical attention should intractable flank pain with nausea, vomiting or fever occur\par

## 2020-10-11 NOTE — PHYSICAL EXAM
[General Appearance - Well Developed] : well developed [General Appearance - Well Nourished] : well nourished [Normal Appearance] : normal appearance [Well Groomed] : well groomed [General Appearance - In No Acute Distress] : no acute distress [Abdomen Soft] : soft [Abdomen Tenderness] : non-tender [Abdomen Mass (___ Cm)] : no abdominal mass palpated [Abdomen Hernia] : no hernia was discovered [Costovertebral Angle Tenderness] : no ~M costovertebral angle tenderness [Urinary Bladder Findings] : the bladder was normal on palpation [Skin Color & Pigmentation] : normal skin color and pigmentation [Skin Turgor] : supple [Skin Lesions] : no skin lesions [Heart Rate And Rhythm] : Heart rate and rhythm were normal [Edema] : no peripheral edema [] : no respiratory distress [Respiration, Rhythm And Depth] : normal respiratory rhythm and effort [Exaggerated Use Of Accessory Muscles For Inspiration] : no accessory muscle use [Auscultation Breath Sounds / Voice Sounds] : lungs were clear to auscultation bilaterally [Oriented To Time, Place, And Person] : oriented to person, place, and time [Affect] : the affect was normal [Mood] : the mood was normal [Not Anxious] : not anxious [Normal Station and Gait] : the gait and station were normal for the patient's age [No Focal Deficits] : no focal deficits [Motor Exam] : the motor exam was normal [No Palpable Adenopathy] : no palpable adenopathy

## 2020-10-12 ENCOUNTER — APPOINTMENT (OUTPATIENT)
Dept: HEPATOLOGY | Facility: CLINIC | Age: 55
End: 2020-10-12
Payer: COMMERCIAL

## 2020-10-12 PROCEDURE — 91200 LIVER ELASTOGRAPHY: CPT

## 2020-10-12 PROCEDURE — 90739 HEPB VACC 2/4 DOSE ADULT IM: CPT

## 2020-10-12 PROCEDURE — 90471 IMMUNIZATION ADMIN: CPT

## 2020-10-13 ENCOUNTER — APPOINTMENT (OUTPATIENT)
Dept: INTERNAL MEDICINE | Facility: CLINIC | Age: 55
End: 2020-10-13
Payer: COMMERCIAL

## 2020-10-13 ENCOUNTER — TRANSCRIPTION ENCOUNTER (OUTPATIENT)
Age: 55
End: 2020-10-13

## 2020-10-13 VITALS
HEIGHT: 62 IN | SYSTOLIC BLOOD PRESSURE: 111 MMHG | HEART RATE: 82 BPM | RESPIRATION RATE: 16 BRPM | TEMPERATURE: 98 F | OXYGEN SATURATION: 97 % | BODY MASS INDEX: 31.47 KG/M2 | DIASTOLIC BLOOD PRESSURE: 79 MMHG | WEIGHT: 171 LBS

## 2020-10-13 PROCEDURE — 90662 IIV NO PRSV INCREASED AG IM: CPT

## 2020-10-13 PROCEDURE — G0008: CPT

## 2020-10-13 PROCEDURE — 99214 OFFICE O/P EST MOD 30 MIN: CPT | Mod: 25

## 2020-10-13 NOTE — HISTORY OF PRESENT ILLNESS
[de-identified] : 54 year old  female patient with history of stable Hypertension, Asthma, Type 2 Diabetes Mellitus, Hypertriglyceridemia, history as stated, presented for follow up examination. Patient is compliant with all medications. Denies shortness of breath, chest pain or abdominal pains at this time. ROS as stated.\par

## 2020-10-13 NOTE — HEALTH RISK ASSESSMENT
[Intercurrent Urgi Care visits] : went to urgent care [No] : In the past 12 months have you used drugs other than those required for medical reasons? No [No falls in past year] : Patient reported no falls in the past year [0] : 2) Feeling down, depressed, or hopeless: Not at all (0) [] : No [de-identified] : 1/6/19 [de-identified] : HEP/URO/HEMEONC/PULM/GI [[id number]] : 0

## 2020-10-13 NOTE — ASSESSMENT
[FreeTextEntry1] : 54 year old female found to have stable Hypertension, Reactive Airway Disease, Type 2 Diabetes Mellitus, Hypertriglyceridemia,with the current regimen, diet and life style modifications, as counseled. Prior results reviewed and discussed with the patient during today's examination. Plan as ordered.\par Patient was recently evaluated by HEP/URO/HEMEONC/PULM/GI , findings and recommendations reviewed with the patient during today's examination.\par

## 2020-10-14 ENCOUNTER — RESULT REVIEW (OUTPATIENT)
Age: 55
End: 2020-10-14

## 2020-10-14 ENCOUNTER — OUTPATIENT (OUTPATIENT)
Dept: OUTPATIENT SERVICES | Facility: HOSPITAL | Age: 55
LOS: 1 days | End: 2020-10-14
Payer: COMMERCIAL

## 2020-10-14 ENCOUNTER — APPOINTMENT (OUTPATIENT)
Dept: CT IMAGING | Facility: CLINIC | Age: 55
End: 2020-10-14

## 2020-10-14 ENCOUNTER — APPOINTMENT (OUTPATIENT)
Dept: CT IMAGING | Facility: HOSPITAL | Age: 55
End: 2020-10-14
Payer: COMMERCIAL

## 2020-10-14 DIAGNOSIS — Z98.890 OTHER SPECIFIED POSTPROCEDURAL STATES: Chronic | ICD-10-CM

## 2020-10-14 DIAGNOSIS — N20.0 CALCULUS OF KIDNEY: ICD-10-CM

## 2020-10-14 PROCEDURE — 74176 CT ABD & PELVIS W/O CONTRAST: CPT

## 2020-10-14 PROCEDURE — 74176 CT ABD & PELVIS W/O CONTRAST: CPT | Mod: 26

## 2020-10-16 ENCOUNTER — APPOINTMENT (OUTPATIENT)
Dept: HEPATOLOGY | Facility: CLINIC | Age: 55
End: 2020-10-16

## 2020-10-30 ENCOUNTER — APPOINTMENT (OUTPATIENT)
Dept: CARDIOLOGY | Facility: CLINIC | Age: 55
End: 2020-10-30
Payer: COMMERCIAL

## 2020-10-30 ENCOUNTER — OUTPATIENT (OUTPATIENT)
Dept: OUTPATIENT SERVICES | Facility: HOSPITAL | Age: 55
LOS: 1 days | End: 2020-10-30
Payer: SELF-PAY

## 2020-10-30 ENCOUNTER — NON-APPOINTMENT (OUTPATIENT)
Age: 55
End: 2020-10-30

## 2020-10-30 VITALS
TEMPERATURE: 98 F | WEIGHT: 169.98 LBS | SYSTOLIC BLOOD PRESSURE: 117 MMHG | OXYGEN SATURATION: 98 % | RESPIRATION RATE: 18 BRPM | DIASTOLIC BLOOD PRESSURE: 67 MMHG | HEART RATE: 77 BPM | HEIGHT: 61 IN

## 2020-10-30 VITALS
DIASTOLIC BLOOD PRESSURE: 74 MMHG | RESPIRATION RATE: 16 BRPM | OXYGEN SATURATION: 96 % | SYSTOLIC BLOOD PRESSURE: 116 MMHG | TEMPERATURE: 97.3 F | HEART RATE: 80 BPM | BODY MASS INDEX: 31.46 KG/M2 | WEIGHT: 172 LBS

## 2020-10-30 DIAGNOSIS — E11.9 TYPE 2 DIABETES MELLITUS WITHOUT COMPLICATIONS: ICD-10-CM

## 2020-10-30 DIAGNOSIS — I10 ESSENTIAL (PRIMARY) HYPERTENSION: ICD-10-CM

## 2020-10-30 DIAGNOSIS — Z98.890 OTHER SPECIFIED POSTPROCEDURAL STATES: Chronic | ICD-10-CM

## 2020-10-30 DIAGNOSIS — N20.0 CALCULUS OF KIDNEY: ICD-10-CM

## 2020-10-30 DIAGNOSIS — Z01.818 ENCOUNTER FOR OTHER PREPROCEDURAL EXAMINATION: ICD-10-CM

## 2020-10-30 DIAGNOSIS — Z90.710 ACQUIRED ABSENCE OF BOTH CERVIX AND UTERUS: Chronic | ICD-10-CM

## 2020-10-30 LAB
APPEARANCE UR: ABNORMAL
BACTERIA # UR AUTO: ABNORMAL /HPF
BILIRUB UR-MCNC: NEGATIVE — SIGNIFICANT CHANGE UP
COLOR SPEC: YELLOW — SIGNIFICANT CHANGE UP
DIFF PNL FLD: ABNORMAL
EPI CELLS # UR: SIGNIFICANT CHANGE UP /HPF
GLUCOSE UR QL: NEGATIVE — SIGNIFICANT CHANGE UP
KETONES UR-MCNC: NEGATIVE — SIGNIFICANT CHANGE UP
LEUKOCYTE ESTERASE UR-ACNC: ABNORMAL
NITRITE UR-MCNC: NEGATIVE — SIGNIFICANT CHANGE UP
PH UR: 5 — SIGNIFICANT CHANGE UP (ref 5–8)
PROT UR-MCNC: 100
RBC CASTS # UR COMP ASSIST: >50 /HPF (ref 0–2)
SP GR SPEC: 1.02 — SIGNIFICANT CHANGE UP (ref 1.01–1.02)
UROBILINOGEN FLD QL: NEGATIVE — SIGNIFICANT CHANGE UP
WBC UR QL: >50 /HPF (ref 0–5)

## 2020-10-30 PROCEDURE — 93351 STRESS TTE COMPLETE: CPT

## 2020-10-30 PROCEDURE — 99215 OFFICE O/P EST HI 40 MIN: CPT

## 2020-10-30 PROCEDURE — G0463: CPT

## 2020-10-30 PROCEDURE — 93325 DOPPLER ECHO COLOR FLOW MAPG: CPT

## 2020-10-30 PROCEDURE — 93320 DOPPLER ECHO COMPLETE: CPT

## 2020-10-30 PROCEDURE — 93000 ELECTROCARDIOGRAM COMPLETE: CPT | Mod: 59

## 2020-10-30 RX ORDER — ATORVASTATIN CALCIUM 10 MG/1
10 TABLET, FILM COATED ORAL
Qty: 30 | Refills: 0 | Status: DISCONTINUED | COMMUNITY
Start: 2020-05-14 | End: 2020-10-30

## 2020-10-30 RX ORDER — NIFEDIPINE 30 MG/1
30 TABLET, FILM COATED, EXTENDED RELEASE ORAL
Qty: 90 | Refills: 0 | Status: DISCONTINUED | COMMUNITY
Start: 2019-12-27 | End: 2020-10-30

## 2020-10-30 NOTE — PHYSICAL EXAM
[General Appearance - Well Developed] : well developed [Normal Appearance] : normal appearance [Well Groomed] : well groomed [General Appearance - Well Nourished] : well nourished [No Deformities] : no deformities [General Appearance - In No Acute Distress] : no acute distress [Normal Conjunctiva] : the conjunctiva exhibited no abnormalities [Eyelids - No Xanthelasma] : the eyelids demonstrated no xanthelasmas [Normal Oral Mucosa] : normal oral mucosa [No Oral Pallor] : no oral pallor [No Oral Cyanosis] : no oral cyanosis [Normal Jugular Venous A Waves Present] : normal jugular venous A waves present [Normal Jugular Venous V Waves Present] : normal jugular venous V waves present [No Jugular Venous [place] A Waves] : no jugular venous [place] A waves [Respiration, Rhythm And Depth] : normal respiratory rhythm and effort [Exaggerated Use Of Accessory Muscles For Inspiration] : no accessory muscle use [Auscultation Breath Sounds / Voice Sounds] : lungs were clear to auscultation bilaterally [Chest Palpation] : palpation of the chest revealed no abnormalities [Lungs Percussion] : the lungs were normal to percussion [Heart Rate And Rhythm] : heart rate and rhythm were normal [Heart Sounds] : normal S1 and S2 [Murmurs] : no murmurs present [Arterial Pulses Normal] : the arterial pulses were normal [Edema] : no peripheral edema present [Veins - Varicosity Changes] : no varicosital changes were noted in the lower extremities [Bowel Sounds] : normal bowel sounds [Abdomen Soft] : soft [Abdomen Tenderness] : non-tender [Abdomen Mass (___ Cm)] : no abdominal mass palpated [Abdomen Hernia] : no hernia was discovered [Abnormal Walk] : normal gait [Gait - Sufficient For Exercise Testing] : the gait was sufficient for exercise testing [Nail Clubbing] : no clubbing of the fingernails [Cyanosis, Localized] : no localized cyanosis [Petechial Hemorrhages (___cm)] : no petechial hemorrhages [Skin Turgor] : normal skin turgor [] : no rash [No Venous Stasis] : no venous stasis [No Skin Ulcers] : no skin ulcer [No Xanthoma] : no  xanthoma was observed [Oriented To Time, Place, And Person] : oriented to person, place, and time [Impaired Insight] : insight and judgment were intact [Affect] : the affect was normal [Mood] : the mood was normal [Memory Recent] : recent memory was not impaired [Memory Remote] : remote memory was not impaired [No Anxiety] : not feeling anxious [FreeTextEntry1] : no calf tenderness, no edema in my examination, no  asymmetry of calf. " right shoulder  with Rheumatoid arthritis.

## 2020-10-30 NOTE — H&P PST ADULT - HISTORY OF PRESENT ILLNESS
55year old female with pmhx of hypertension, T2DM, breast cancer, uterine leiomyoma, peptic ulcer disease, GERD, herniated cervical disc, migraine, kidney stone, seasonal allergies and cholecystitis presents with c/o left flank pain area x 2months. Patient is here today for presurgical testing for scheduled cystoscopy, left ureteroscopy with laser lithotripsy and ureteral stent placement on 11/10/2020

## 2020-10-30 NOTE — H&P PST ADULT - NEGATIVE OPHTHALMOLOGIC SYMPTOMS
no photophobia/no diplopia/no loss of vision L/no blurred vision L/no blurred vision R/no loss of vision R

## 2020-10-30 NOTE — DISCUSSION/SUMMARY
[Unlikely Cardiac Ischemia (Low Prob.)] : chest pain unlikely to represent cardiac ischemia (low probability) [Hypertension] : hypertension [Improving] : improving [Responding to Treatment] : responding to treatment [Weight Loss] : weight loss [Sodium Restriction] : sodium restriction [Anxiety] : anxiety disorder NOS [Rhythm Disorder] : rhythm disorder [Sinus Tachycardia] : sinus tachycardia [Holter Monitor] : a Holter monitor [Stable] : stable [None] : none [Low Sodium Diet] : low sodium diet [Patient] : the patient [Minutes spent___] : for [unfilled] ~Uminutes [___ Month(s)] : [unfilled] month(s) [With Me] : with me [de-identified] : resolved, no recurrence, not active. [de-identified] : tolerable [de-identified] : continue bystolic. [de-identified] : resolved, no recurrence [de-identified] : reduce weight [FreeTextEntry1] : Post-covid infection manifestation with exertional shortness of breath, fast pulse beats and vague chest discomfort was discussed. Before the consideration of surgery, it is indicated to have repeatede stress echocardiogram for the clarification.The  stress echocardiogram will be the references for the  further treatment. Discussed. Leg edema is not obvious, resolved.\par \par For the pre-operative assessment for renal stone removal:\par 1. RCRI is  zero.\par 2. No active cardiac risk contradicting to the surgery.

## 2020-10-30 NOTE — REVIEW OF SYSTEMS
[Negative] : Heme/Lymph [Dyspnea on exertion] : dyspnea during exertion [Chest  Pressure] : chest pressure [Fever] : no fever [Headache] : no headache [Recent Weight Gain (___ Lbs)] : no recent weight gain [Chills] : no chills [Feeling Fatigued] : not feeling fatigued [Recent Weight Loss (___ Lbs)] : no recent weight loss [Shortness Of Breath] : no shortness of breath [Chest Pain] : no chest pain [Lower Ext Edema] : no extremity edema [Leg Claudication] : no intermittent leg claudication [Palpitations] : no palpitations [Cough] : no cough [Wheezing] : no wheezing [Coughing Up Blood] : no hemoptysis [Abdominal Pain] : no abdominal pain [Nausea] : no nausea [Vomiting] : no vomiting [Heartburn] : no heartburn [Change in Appetite] : no change in appetite [Dysphagia] : no dysphagia [Joint Pain] : no joint pain [Joint Swelling] : no joint swelling [Joint Stiffness] : no joint stiffness [Muscle Cramps] : no muscle cramps [Limb Weakness (Paresis)] : no limb weakness [Skin: A Rash] : no rash: [Itching] : no itching [Change In Color Of Skin] : change in skin color [Skin Lesions] : no skin lesions [Dizziness] : no dizziness [Tremor] : no tremor was seen [Numbness (Hypesthesia)] : no numbness [Convulsions] : no convulsions [Tingling (Paresthesia)] : no tingling [Confusion] : no confusion was observed [Memory Lapses Or Loss] : no memory lapses or loss [Depression] : no depression [Anxiety] : no anxiety [Under Stress] : not under stress [Suicidal] : not suicidal [Excessive Thirst] : no polydipsia [Easy Bleeding] : no tendency for easy bleeding [Swollen Glands] : no swollen glands [Easy Bruising] : no tendency for easy bruising [Swollen Glands In The Neck] : no swollen glands in the neck

## 2020-10-30 NOTE — REASON FOR VISIT
[Follow-Up - Clinic] : a clinic follow-up of [Chest Pain] : chest pain [Hyperlipidemia] : hyperlipidemia [Hypertension] : hypertension [Palpitations] : palpitations [FreeTextEntry2] : pre-operative assessment [FreeTextEntry1] :  for left renal stone operations.

## 2020-10-30 NOTE — H&P PST ADULT - NSICDXFAMILYHX_GEN_ALL_CORE_FT
FAMILY HISTORY:  Family history of hyperlipidemia    Sibling  Still living? Unknown  Family history of cancer, Age at diagnosis: Age Unknown  Family history of diabetes mellitus, Age at diagnosis: Age Unknown  Family history of hypertension, Age at diagnosis: Age Unknown    Grandparent  Still living? Unknown  Family history of cancer, Age at diagnosis: Age Unknown

## 2020-10-30 NOTE — H&P PST ADULT - NSICDXPROBLEM_GEN_ALL_CORE_FT
PROBLEM DIAGNOSES  Problem: Calculus of kidney  Assessment and Plan: Preoperative instructions given to patient with understanding verbalized for scheduled cystoscopy, left ureteroscopy with laser lithotripsy and ureteral stent placement on 11/10/2020    Problem: Type 2 diabetes mellitus  Assessment and Plan: Patient instructed to skip oral hypoglycemics the morning of surgery. Understanding verbalized by patient.     Problem: Hypertension  Assessment and Plan: Paitent instructed to take bystolic and exforge with a sip of water the morning of surgery. Understanding verbalized by patient

## 2020-10-30 NOTE — HISTORY OF PRESENT ILLNESS
[FreeTextEntry1] : She had hypertension sometimes back. It was controlled by nifedipine along with Bystolic. However, she  had on and off palpitations which bothers her. She had repeated Holter examination without findings of  dysrhythmia\par  She had lumpectomy, right, s/p RT and on tamoxifen .\par Recent blood test said " she has  FARSHAD+" she was referred to the  Rheumatologist. She has abnormal SGOT/GPT , which  was evaluated by the GI and PMD. She has  abnormal cardiolipin IGA, IGM positive,  She had diagnosis of  rheumatoid arthritis on treatment  only with  symptomatic relief.\par She is changing the oncologist.\par She has  renal stone on both side and was advised not to be on CCB.  She is going to have left renal stone removed.\par In March this year ( 2020) she was contracted with  COVID. Since then she experienced more shortness of breath and higher pulse rate with mild vague  chest symptoms.

## 2020-10-30 NOTE — H&P PST ADULT - NSICDXPASTSURGICALHX_GEN_ALL_CORE_FT
PAST SURGICAL HISTORY:   Delivery 2001    History of hysterectomy     History of lumpectomy     S/P Dilation and Curettage     S/P Endometrial Ablation     S/P Laparoscopic Cholecystectomy

## 2020-10-31 LAB
CULTURE RESULTS: SIGNIFICANT CHANGE UP
SPECIMEN SOURCE: SIGNIFICANT CHANGE UP

## 2020-11-02 ENCOUNTER — LABORATORY RESULT (OUTPATIENT)
Age: 55
End: 2020-11-02

## 2020-11-02 ENCOUNTER — APPOINTMENT (OUTPATIENT)
Dept: INTERNAL MEDICINE | Facility: CLINIC | Age: 55
End: 2020-11-02
Payer: COMMERCIAL

## 2020-11-02 VITALS
TEMPERATURE: 97.3 F | BODY MASS INDEX: 30.18 KG/M2 | SYSTOLIC BLOOD PRESSURE: 121 MMHG | RESPIRATION RATE: 16 BRPM | OXYGEN SATURATION: 98 % | HEART RATE: 82 BPM | WEIGHT: 164 LBS | HEIGHT: 62 IN | DIASTOLIC BLOOD PRESSURE: 78 MMHG

## 2020-11-02 PROCEDURE — 99072 ADDL SUPL MATRL&STAF TM PHE: CPT

## 2020-11-02 PROCEDURE — 99214 OFFICE O/P EST MOD 30 MIN: CPT | Mod: 25

## 2020-11-03 LAB
ABO + RH PNL BLD: NORMAL
ALBUMIN SERPL ELPH-MCNC: 4.9 G/DL
ALP BLD-CCNC: 86 U/L
ALT SERPL-CCNC: 30 U/L
ANION GAP SERPL CALC-SCNC: 13 MMOL/L
APPEARANCE: ABNORMAL
APTT BLD: 36.4 SEC
AST SERPL-CCNC: 24 U/L
BASOPHILS # BLD AUTO: 0.04 K/UL
BASOPHILS NFR BLD AUTO: 0.5 %
BILIRUB SERPL-MCNC: 0.4 MG/DL
BILIRUBIN URINE: NEGATIVE
BLOOD URINE: ABNORMAL
BUN SERPL-MCNC: 15 MG/DL
CALCIUM SERPL-MCNC: 10.1 MG/DL
CHLORIDE SERPL-SCNC: 100 MMOL/L
CO2 SERPL-SCNC: 26 MMOL/L
COLOR: YELLOW
CREAT SERPL-MCNC: 0.93 MG/DL
EOSINOPHIL # BLD AUTO: 0.06 K/UL
EOSINOPHIL NFR BLD AUTO: 0.7 %
ESTIMATED AVERAGE GLUCOSE: 114 MG/DL
GGT SERPL-CCNC: 21 U/L
GLUCOSE QUALITATIVE U: NEGATIVE
GLUCOSE SERPL-MCNC: 87 MG/DL
HBA1C MFR BLD HPLC: 5.6 %
HCT VFR BLD CALC: 41.5 %
HGB BLD-MCNC: 13.3 G/DL
IMM GRANULOCYTES NFR BLD AUTO: 0.2 %
INR PPP: 1.1 RATIO
KETONES URINE: NEGATIVE
LEUKOCYTE ESTERASE URINE: ABNORMAL
LYMPHOCYTES # BLD AUTO: 2.54 K/UL
LYMPHOCYTES NFR BLD AUTO: 30.2 %
MAN DIFF?: NORMAL
MCHC RBC-ENTMCNC: 27.1 PG
MCHC RBC-ENTMCNC: 32 GM/DL
MCV RBC AUTO: 84.7 FL
MONOCYTES # BLD AUTO: 0.61 K/UL
MONOCYTES NFR BLD AUTO: 7.2 %
NEUTROPHILS # BLD AUTO: 5.15 K/UL
NEUTROPHILS NFR BLD AUTO: 61.2 %
NITRITE URINE: NEGATIVE
PH URINE: 5.5
PLATELET # BLD AUTO: 360 K/UL
POTASSIUM SERPL-SCNC: 3.9 MMOL/L
PROT SERPL-MCNC: 7.7 G/DL
PROTEIN URINE: ABNORMAL
PT BLD: 13 SEC
RBC # BLD: 4.9 M/UL
RBC # FLD: 12.6 %
SODIUM SERPL-SCNC: 139 MMOL/L
SPECIFIC GRAVITY URINE: 1.02
UROBILINOGEN URINE: NORMAL
WBC # FLD AUTO: 8.42 K/UL

## 2020-11-03 NOTE — ASSESSMENT
[Procedure Intermediate Risk] : the procedure risk is intermediate [Patient Intermediate Risk] : the patient is an intermediate risk [As per surgery] : as per surgery [Continue] : Continue medications as currently directed [Optimized for Surgery Pending Laboratory Results] : the patient is optimized for surgery pending laboratory results [FreeTextEntry4] : 55 year old female found to have stable Hypertension, Type 2 Diabetes Mellitus, Asthma, Hypertriglyceridemia,with the current regimen, diet and life style modifications, as counseled. Prior results reviewed and discussed with the patient during today's examination. Plan as ordered.\par Possible URO SX for Left Ureteral Stone / Nephrolithiasis, as per URO.\par \par CARD F/U, EKG and Clearance noted from 10/30/2020.\par

## 2020-11-03 NOTE — HISTORY OF PRESENT ILLNESS
[No Pertinent Cardiac History] : no history of aortic stenosis, atrial fibrillation, coronary artery disease, recent myocardial infarction, or implantable device/pacemaker [Asthma] : asthma [No Adverse Anesthesia Reaction] : no adverse anesthesia reaction in self or family member [Diabetes] : diabetes [(Patient denies any chest pain, claudication, dyspnea on exertion, orthopnea, palpitations or syncope)] : Patient denies any chest pain, claudication, dyspnea on exertion, orthopnea, palpitations or syncope [COPD] : no COPD [Sleep Apnea] : no sleep apnea [Smoker] : not a smoker [Chronic Anticoagulation] : no chronic anticoagulation [Chronic Kidney Disease] : no chronic kidney disease [FreeTextEntry1] : As per URO [FreeTextEntry2] : TBD [FreeTextEntry3] : Dr. LEA [FreeTextEntry4] : 55 year old female with history of stable Hypertension, Type 2 Diabetes Mellitus, Asthma, Hypertriglyceridemia, history as stated, presented for clearance evaluation prior to possible URO SX for Left Ureteral Stone / Nephrolithiasis, as per URO.\par

## 2020-11-03 NOTE — ADDENDUM
[FreeTextEntry1] : PST results from 11/02/2020 noted, discussed with the patient.\par \par Patient is medically optimized to the best at this time for the stated intervention.\par Patient is medically cleared.\par \par

## 2020-11-04 LAB — BACTERIA UR CULT: NORMAL

## 2020-11-07 ENCOUNTER — APPOINTMENT (OUTPATIENT)
Dept: DISASTER EMERGENCY | Facility: CLINIC | Age: 55
End: 2020-11-07

## 2020-11-07 PROBLEM — Z00.00 ENCOUNTER FOR PREVENTIVE HEALTH EXAMINATION: Noted: 2020-11-07

## 2020-11-08 LAB — SARS-COV-2 N GENE NPH QL NAA+PROBE: NOT DETECTED

## 2020-11-09 ENCOUNTER — TRANSCRIPTION ENCOUNTER (OUTPATIENT)
Age: 55
End: 2020-11-09

## 2020-11-10 ENCOUNTER — RESULT REVIEW (OUTPATIENT)
Age: 55
End: 2020-11-10

## 2020-11-10 ENCOUNTER — OUTPATIENT (OUTPATIENT)
Dept: OUTPATIENT SERVICES | Facility: HOSPITAL | Age: 55
LOS: 1 days | End: 2020-11-10
Payer: COMMERCIAL

## 2020-11-10 ENCOUNTER — APPOINTMENT (OUTPATIENT)
Dept: UROLOGY | Facility: HOSPITAL | Age: 55
End: 2020-11-10

## 2020-11-10 VITALS
OXYGEN SATURATION: 99 % | RESPIRATION RATE: 16 BRPM | HEART RATE: 106 BPM | SYSTOLIC BLOOD PRESSURE: 113 MMHG | DIASTOLIC BLOOD PRESSURE: 62 MMHG | TEMPERATURE: 98 F

## 2020-11-10 VITALS
OXYGEN SATURATION: 100 % | RESPIRATION RATE: 16 BRPM | DIASTOLIC BLOOD PRESSURE: 77 MMHG | SYSTOLIC BLOOD PRESSURE: 131 MMHG | HEART RATE: 104 BPM | TEMPERATURE: 98 F | HEIGHT: 61 IN | WEIGHT: 169.98 LBS

## 2020-11-10 DIAGNOSIS — Z90.710 ACQUIRED ABSENCE OF BOTH CERVIX AND UTERUS: Chronic | ICD-10-CM

## 2020-11-10 DIAGNOSIS — N20.0 CALCULUS OF KIDNEY: ICD-10-CM

## 2020-11-10 DIAGNOSIS — Z98.890 OTHER SPECIFIED POSTPROCEDURAL STATES: Chronic | ICD-10-CM

## 2020-11-10 PROCEDURE — C1758: CPT

## 2020-11-10 PROCEDURE — C2617: CPT

## 2020-11-10 PROCEDURE — 82365 CALCULUS SPECTROSCOPY: CPT

## 2020-11-10 PROCEDURE — C1889: CPT

## 2020-11-10 PROCEDURE — 76000 FLUOROSCOPY <1 HR PHYS/QHP: CPT

## 2020-11-10 PROCEDURE — 88300 SURGICAL PATH GROSS: CPT | Mod: 26

## 2020-11-10 PROCEDURE — 88300 SURGICAL PATH GROSS: CPT

## 2020-11-10 PROCEDURE — C1769: CPT

## 2020-11-10 PROCEDURE — 52356 CYSTO/URETERO W/LITHOTRIPSY: CPT | Mod: LT

## 2020-11-10 PROCEDURE — 74420 UROGRAPHY RTRGR +-KUB: CPT | Mod: 26

## 2020-11-10 PROCEDURE — 82962 GLUCOSE BLOOD TEST: CPT

## 2020-11-10 RX ORDER — SODIUM CHLORIDE 9 MG/ML
1000 INJECTION, SOLUTION INTRAVENOUS
Refills: 0 | Status: DISCONTINUED | OUTPATIENT
Start: 2020-11-10 | End: 2020-11-17

## 2020-11-10 RX ORDER — ACETAMINOPHEN 500 MG
1000 TABLET ORAL ONCE
Refills: 0 | Status: DISCONTINUED | OUTPATIENT
Start: 2020-11-10 | End: 2020-11-10

## 2020-11-10 RX ORDER — SODIUM CHLORIDE 9 MG/ML
3 INJECTION INTRAMUSCULAR; INTRAVENOUS; SUBCUTANEOUS EVERY 8 HOURS
Refills: 0 | Status: DISCONTINUED | OUTPATIENT
Start: 2020-11-10 | End: 2020-11-10

## 2020-11-10 RX ORDER — FENTANYL CITRATE 50 UG/ML
50 INJECTION INTRAVENOUS
Refills: 0 | Status: DISCONTINUED | OUTPATIENT
Start: 2020-11-10 | End: 2020-11-10

## 2020-11-10 RX ORDER — FENTANYL CITRATE 50 UG/ML
25 INJECTION INTRAVENOUS
Refills: 0 | Status: DISCONTINUED | OUTPATIENT
Start: 2020-11-10 | End: 2020-11-10

## 2020-11-10 RX ORDER — NITROFURANTOIN MACROCRYSTAL 50 MG
1 CAPSULE ORAL
Qty: 14 | Refills: 0
Start: 2020-11-10 | End: 2020-11-16

## 2020-11-10 NOTE — ASU DISCHARGE PLAN (ADULT/PEDIATRIC) - CARE PROVIDER_API CALL
Kaylan Barnes  UROLOGY  [address], [address] [place] [address]  [place] [address]  Phone: [phone]  Fax: [phone]  Follow Up Time: 1 week

## 2020-11-10 NOTE — ASU PATIENT PROFILE, ADULT - PSH
Delivery  2001  History of hysterectomy    History of lumpectomy    S/P Dilation and Curettage    S/P Endometrial Ablation    S/P Laparoscopic Cholecystectomy  1992

## 2020-11-10 NOTE — BRIEF OPERATIVE NOTE - NSICDXBRIEFPROCEDURE_GEN_ALL_CORE_FT
PROCEDURES:  Cystoureteroscopy with lithotripsy using holmium laser and insertion of stent 10-Nov-2020 10:11:29  José Miguel Cifuentes

## 2020-11-10 NOTE — ASU PATIENT PROFILE, ADULT - PMH
Breast cancer    Chronic Peptic Ulcer    CN (Constipation)    DM (diabetes mellitus)    GERD (Gastroesophageal Reflux Disease)    Herniated Cervical Disc  uses occasional tylenol  History of Tubal Ligation  2003  HTN (Hypertension)    Kidney stones    Migraines    RAD (Reactive Airway Disease)  with cold or allergies  Seasonal allergies    Uterine leiomyoma

## 2020-11-10 NOTE — ASU DISCHARGE PLAN (ADULT/PEDIATRIC) - ASU DC SPECIAL INSTRUCTIONSFT
You may have intermittent blood tinged urine and slight flank pain when you urinate.  This is normal and due to the stent in your ureter.   If your urine becomes bright red or with clots, please call the office.    follow 1 week for stent removal

## 2020-11-11 PROBLEM — D25.9 LEIOMYOMA OF UTERUS, UNSPECIFIED: Chronic | Status: ACTIVE | Noted: 2020-10-30

## 2020-11-16 ENCOUNTER — APPOINTMENT (OUTPATIENT)
Dept: HEPATOLOGY | Facility: CLINIC | Age: 55
End: 2020-11-16

## 2020-11-16 ENCOUNTER — APPOINTMENT (OUTPATIENT)
Dept: UROLOGY | Facility: CLINIC | Age: 55
End: 2020-11-16
Payer: COMMERCIAL

## 2020-11-16 VITALS
TEMPERATURE: 97.3 F | SYSTOLIC BLOOD PRESSURE: 117 MMHG | HEART RATE: 86 BPM | RESPIRATION RATE: 16 BRPM | DIASTOLIC BLOOD PRESSURE: 78 MMHG

## 2020-11-16 PROCEDURE — 52310 CYSTOSCOPY AND TREATMENT: CPT

## 2020-12-21 ENCOUNTER — APPOINTMENT (OUTPATIENT)
Age: 55
End: 2020-12-21
Payer: COMMERCIAL

## 2020-12-21 PROCEDURE — 99214 OFFICE O/P EST MOD 30 MIN: CPT

## 2020-12-21 PROCEDURE — 99072 ADDL SUPL MATRL&STAF TM PHE: CPT

## 2020-12-27 NOTE — HISTORY OF PRESENT ILLNESS
[FreeTextEntry1] : 52 yo F s/p left URS/HLL last year. Most recent CT in June showed two small stones in left kidney. Was doing well until 2 weeks ago, went to ER for severe flank pain. Stone from kidney had dropped into ureter (obstructing 3mm stone). Also went to urgent care last week for LUTS and dysuria. Told she had UTI and finished course of cipro. No fever, chills, nausea or vomiting\par \par 11/26/2018 Interval history: Pt found to have obstructing left ureteral stone and presented to the ER with intractable pain. Pt underwent left URS with stone basket extraction. NO issues since procedure.\par \par 4/1/19 Interval history: Has been having some left sided flank pain for a month. US done when symptoms initialyl started was neg for hydronephrosis. Last few days have been having dysuria, suprapubic pain. No gross hematuria, fever, or chills\par \par 10/7/19 Interval history: Was doing well until 9/26/19 - ER for left flank pain\par CT showed 5mm nonobstructing left sided stone, otherwise no hydronephrosis\par Was told she likely had UTI and was sent home with a course of levaquin\par Symptoms improved but still having some LUTS - frequency but only small amounts coming out\par Still with some mild left sided pain\par no gross hematuria, fever, or chills\par Of note, pt states she did repeat 24 hr urine collection in April after last visit but no record of it at this time. Also did 24 hr urine collection last year with nephrologist and was told everything was normal.\par \par 9/10/20 Interval history: Had episode of gross hematuria a month ago for two days = red urine with little clots\par Last symptomatic UTI was 2 months ago\par Haven't passed stones since last visit\par Left flank pain = intermittent, sometimes sharp, seems to be worse in the afternoon\par Drinks 1.5 gallon, chamomile tea sometimes\par Currently no urinary issues \par \par 10/8/20 Interval history: Had some flank pain about 2 weeks ago\par No fever, chills, nausea or vomiting\par Abdominal US since last visit showed 1.4cm nonobstructing left renal stone\par \par 12/21/20 Interval history: Pt is s/p URS/HLL with subsequent stent removal\par Some hematuria afterward but now clear

## 2020-12-27 NOTE — ASSESSMENT
[FreeTextEntry1] : 56 yo F s/p URS/HLL\par \par - Reaffirmed behavioral modifications\par - 24 hr urine collection\par - Renal US in 6 months

## 2020-12-30 ENCOUNTER — APPOINTMENT (OUTPATIENT)
Dept: INTERNAL MEDICINE | Facility: CLINIC | Age: 55
End: 2020-12-30
Payer: COMMERCIAL

## 2020-12-30 VITALS
HEART RATE: 73 BPM | OXYGEN SATURATION: 98 % | HEIGHT: 62 IN | TEMPERATURE: 97.9 F | BODY MASS INDEX: 30.9 KG/M2 | SYSTOLIC BLOOD PRESSURE: 122 MMHG | WEIGHT: 167.9 LBS | DIASTOLIC BLOOD PRESSURE: 70 MMHG

## 2020-12-30 LAB
FOLATE SERPL-MCNC: 17 NG/ML
MAGNESIUM SERPL-MCNC: 2.1 MG/DL
VIT B12 SERPL-MCNC: 540 PG/ML

## 2020-12-30 PROCEDURE — 99203 OFFICE O/P NEW LOW 30 MIN: CPT | Mod: 25

## 2020-12-30 PROCEDURE — 99213 OFFICE O/P EST LOW 20 MIN: CPT | Mod: 25

## 2020-12-30 PROCEDURE — 99072 ADDL SUPL MATRL&STAF TM PHE: CPT

## 2020-12-30 RX ORDER — CIPROFLOXACIN HYDROCHLORIDE 250 MG/1
250 TABLET, FILM COATED ORAL
Qty: 14 | Refills: 0 | Status: COMPLETED | COMMUNITY
Start: 2020-09-11 | End: 2020-12-30

## 2020-12-30 RX ORDER — SIMETHICONE 125 MG/1
125 TABLET, CHEWABLE ORAL
Qty: 120 | Refills: 0 | Status: COMPLETED | COMMUNITY
Start: 2020-09-09 | End: 2020-12-30

## 2020-12-30 RX ORDER — BENZONATATE 100 MG/1
100 CAPSULE ORAL 3 TIMES DAILY
Qty: 30 | Refills: 5 | Status: COMPLETED | COMMUNITY
Start: 2020-03-26 | End: 2020-12-30

## 2020-12-30 NOTE — HISTORY OF PRESENT ILLNESS
[Heartburn] : denies heartburn [Nausea] : denies nausea [Vomiting] : denies vomiting [Diarrhea] : denies diarrhea [Yellow Skin Or Eyes (Jaundice)] : denies jaundice [Abdominal Pain] : denies abdominal pain [Rectal Pain] : denies rectal pain [Wt Gain ___ Lbs] : recent [unfilled] ~Upound(s) weight gain [Wt Loss ___ Lbs] : no recent weight loss [Abdominal Swelling] : abdominal swelling [GERD] : gastroesophageal reflux disease [de-identified] : Pt had repeated fibroscan and has now F0- Y9giozge and median liver stiffness of 7.9kpa.  She is feeling bloated  .she recent had surgery for large renal stones.  She is being followed by Dr Barnes . she still has another stone on her right.  She has started her hepatitis vaccination.  She has seen hepatologist  as well. She is not having as frequent reflux.  she states she has decreased appetite  due to bloating .  she doesn’t have diarrhea or constipation.  she eats fiber regularly.  She has gained 3lbs  and doesn’t have dysphagia.  She doesn’t have black stools or rectal bleeding.  she also states she had increased sob and took covid pcr which was negative and had positive ab still .she is not taking her Symbicort regularly  .  She doesn’t have wheezing , fever body aches . She had a sore throat .  She doesn t have chest pain.  She has palpitations.  she saw her cardiologist  Dr martinez.    She also has increased risk of colon cancer due to her  breast cancer and will have colonoscopy scheduled in 2012  .

## 2020-12-30 NOTE — PHYSICAL EXAM
[General Appearance - Alert] : alert [General Appearance - In No Acute Distress] : in no acute distress [PERRL With Normal Accommodation] : pupils were equal in size, round, and reactive to light [Oropharynx] : the oropharynx was normal [Auscultation Breath Sounds / Voice Sounds] : lungs were clear to auscultation bilaterally [Apical Impulse] : the apical impulse was normal [Heart Rate And Rhythm] : heart rate was normal and rhythm regular [Heart Sounds] : normal S1 and S2 [Heart Sounds Gallop] : no gallops [Full Pulse] : the pedal pulses are present [Edema] : there was no peripheral edema [Normal] : normal [Soft, Nontender] : the abdomen was soft and nontender [No Mass] : no masses were palpated [No HSM] : no hepatosplenomegaly noted [None] : no CVA tenderness [Cervical Lymph Nodes Enlarged Posterior Bilaterally] : posterior cervical [Cervical Lymph Nodes Enlarged Anterior Bilaterally] : anterior cervical [Supraclavicular Lymph Nodes Enlarged Bilaterally] : supraclavicular [Axillary Lymph Nodes Enlarged Bilaterally] : axillary [Femoral Lymph Nodes Enlarged Bilaterally] : femoral [Inguinal Lymph Nodes Enlarged Bilaterally] : inguinal [No CVA Tenderness] : no ~M costovertebral angle tenderness [No Spinal Tenderness] : no spinal tenderness [Abnormal Walk] : normal gait [Nail Clubbing] : no clubbing  or cyanosis of the fingernails [Musculoskeletal - Swelling] : no joint swelling seen [Motor Tone] : muscle strength and tone were normal [Skin Color & Pigmentation] : normal skin color and pigmentation [Skin Turgor] : normal skin turgor [] : no rash [Deep Tendon Reflexes (DTR)] : deep tendon reflexes were 2+ and symmetric [Sensation] : the sensory exam was normal to light touch and pinprick [No Focal Deficits] : no focal deficits [Oriented To Time, Place, And Person] : oriented to person, place, and time [Impaired Insight] : insight and judgment were intact [Affect] : the affect was normal

## 2020-12-30 NOTE — CONSULT LETTER
[Dear  ___] : Dear  [unfilled], [Courtesy Letter:] : I had the pleasure of seeing your patient, [unfilled], in my office today. [Please see my note below.] : Please see my note below. [Sincerely,] : Sincerely, [FreeTextEntry3] : Norman Solorio MDfacp

## 2021-01-08 ENCOUNTER — APPOINTMENT (OUTPATIENT)
Dept: HEPATOLOGY | Facility: CLINIC | Age: 56
End: 2021-01-08
Payer: COMMERCIAL

## 2021-01-08 VITALS
DIASTOLIC BLOOD PRESSURE: 86 MMHG | WEIGHT: 168 LBS | RESPIRATION RATE: 16 BRPM | BODY MASS INDEX: 30.91 KG/M2 | SYSTOLIC BLOOD PRESSURE: 126 MMHG | OXYGEN SATURATION: 96 % | HEIGHT: 62 IN | HEART RATE: 77 BPM | TEMPERATURE: 98 F

## 2021-01-08 PROCEDURE — 99214 OFFICE O/P EST MOD 30 MIN: CPT

## 2021-01-08 PROCEDURE — 99072 ADDL SUPL MATRL&STAF TM PHE: CPT

## 2021-01-08 RX ORDER — ATORVASTATIN CALCIUM 20 MG/1
20 TABLET, FILM COATED ORAL
Refills: 0 | Status: DISCONTINUED | COMMUNITY
End: 2021-01-08

## 2021-01-10 ENCOUNTER — RX RENEWAL (OUTPATIENT)
Age: 56
End: 2021-01-10

## 2021-01-11 NOTE — ASSESSMENT
[FreeTextEntry1] : 56 yo female with extensive medical history including diabetes mellitus (diagnosed in 2016, HbA1c: 5.5 in 7/2020), dyslipidemia, hypertension, obesity (BMI 31), s/p cholecystectomy (6/2017), hx of breast cancer s/p R lumpectomy (2015), on Tamoxifen (1/2016-9-2017, then 2018 till present), FARSHAD positivity (1:640, 1:160) with normal Immunoglobulins, nephrolithiasis and asthma is being followed by hepatology for fatty liver disease (US abd 6/2017: moderate to severe fatty infiltration of the liver, CT abd 10/2018: hepatic steatosis) with hepatic fibrosis (transient elastography 9/2019: F2 fibrosis, S3 steatosis, prior in 2017: F2-F3 fibrosis, now with significant improvement S1, F0-F1 in 10/2020). She was last seen by Dr. Ball on 10/14/2019  and transferred care on 10/2/2020. \par \par

## 2021-01-11 NOTE — REASON FOR VISIT
[Follow-Up: _____] : a [unfilled] follow-up visit [FreeTextEntry1] : Follow up for NAFLD, also on Tamoxifen,  FARSHAD positivity

## 2021-01-11 NOTE — PLAN
[FreeTextEntry1] : # Non-alcoholic fatty liver disease with F2-F3 fibrosis with risk factors (obesity, DM, HLD and Tamoxifen), now with improvement\par - I have explained to her the natural history of nonalcoholic fatty liver disease, disease progression to FINCH, hepatic fibrosis, and cirrhosis and risk of hepatocellular carcinoma. \par - I have counseled on life style modifications as above. \par - Avoid hepatotoxic agents, herbal supplements, alcohol \par - Advised to c/w statin \par - DM is controlled, currently on Metformin, mgmt. per PCP (if get uncontrolled and additional treatment needed, can consider liraglutide if no contraindications)\par - Ordered hepatic function panel (patient will see her PCP in ~1.5 week, prefer to do blood test after in case additional blood test ordered)\par - Last Fibroscan in 10/2020 Steatosis S1, Fibrosis F0-F1, significant improvement\par - Patient had Tamoxifen discontinued in past and had trial of anastrazole, but was switched back to Tamoxifen in 2018 (20 mg MWF) with stable liver enzymes, and stable FibroScan results. Will closely monitor. \par \par #Hx of FARSHAD positivity\par - Ordered repeat FARSHAD (last visit was ordered, but not done)\par \par #S/p COVID \par - Will monitor from hepatology standpoint as long term consequences / sequel  of COVID not known yet (we do know that liver can be affected during infection, abnormal liver tests has been  found in > 50% of cases) \par - Otherwise following with pulmonology \par \par Chronic, intermittent RUQ pain\par - being treated for GERD (being switched from PPI to famotidine by Dr. Campoverde due to long term PPI side effects, called her pharmacy because has not received famotidine, was informed that not covered by insurance, available OTC, patient agrees to buy)\par - states that somewhat different from her "kidney stone pain"\par - occasionally provoked by food\par - s/p cholecystectomy without biliary dilation on US abd from 9/2020 and on CT abd 10/2020\par - given persistent complaint, ordered MR MRCP - Patient states that while had clips placed during breast surgery, she did have MRI after it without complications (highlighted in referral that has possibly metallic clips, to be evaluated/discuss with radiology before)\par - no additional symptoms\par \par # HCM\par - Missed second hepatitis B vaccine as could not get a ride, talked to PCP office, patient will get during her PCP visit on 1/19/21 \par - Hep A immune \par - Hep C negative \par - no HIV status on file, ordered again\par - Vitamin D level low, 21.9 – was prescribed Vit D by Dr. Campoverde, c/w Vit D \par \par Following with PCP, , Pulmonology, GI, cardiology, hem/onc. \par \par RTC in 1 month to review labs, MRCP, or of those are wnl, than change to 3 and then 6 months

## 2021-01-11 NOTE — HISTORY OF PRESENT ILLNESS
[FreeTextEntry1] : Follow up for NAFLD, also on Tamoxifen,  FARSHAD positivity [de-identified] : 56 yo female with extensive medical history including diabetes mellitus (diagnosed in 2016, HbA1c: 5.5 in 7/2020), dyslipidemia, hypertension, obesity (BMI 31), s/p cholecystectomy (6/2017), hx of breast cancer s/p R lumpectomy (2015), on Tamoxifen (1/2016-9-2017, then 2018 till present), FARSHAD positivity (1:640, 1:160) with normal Immunoglobulins, nephrolithiasis and asthma is being followed by hepatology for fatty liver disease (US abd 6/2017: moderate to severe fatty infiltration of the liver, CT abd 10/2018: hepatic steatosis) with hepatic fibrosis (transient elastography 9/2019: F2 fibrosis, S3 steatosis, prior in 2017: F2-F3 fibrosis). She was last seen by Dr. Ball on 10/14/2019  and transferred care on 10/2/2020. \par \par She was noted to have abnormal liver tests in 2/2017 (mild elevation of AST/ALT with peak 57/74 in 2018, normal ALP and bilirubin). Tamoxifen was discontinued in 10/2017 (1/2016-9/2017) and resumed again in 2018; she is taking 20 mg/day MWF at present. Her prior liver workup showed normal ceruloplasmin, neg tTGIgA, normal iron panel, normal IgG, IgA and IgM, negative SMA ab, positive FARSHAD, positive RF, negative HCV ab, immune for Hep A, not immune or exposed to Hep B. Latest liver enzymes were WNL in 7/2020: AST 14, ALT 26, ALP 69, Se bi 0.4, GGT 17. Her lipid profile improved:  -99, Cholesterol 242-257, -85, HDL 52 and she lost weight, from 186 lb to 171lb approximately in 1 year. \par \par Notably, she had COVID in 03/2020, did not require hospitalization. She still c/o QIU, following with pulmonology.\par \par She is also being followed by GI for GERD, constipation, last seen by Dr. Campoverde on 12/30/2020.  US abdomen (9/12/2020) showed hepatic steatosis and bilateral non-obstructing renal stones (patient is following with urology). Last GI visit was referred for colonoscopy.\par \par Interim patient also had urological procedure for kidney stones, following with . \par \par Today she is here for follow up. She states that has frequent heartburn if not taking PPI, denies dysphagia, odynophagia, vomiting, nausea. She still has occasional epigastric, RUQ pain, different from her "kidney stone pain". She denies diarrhea, has regular BM, denies melena or hematochezia. She denies jaundice, itching. She denies CP, no SOB, wheezing at present, she is using her rescue inhaler ~ 3x a week. She has dysuria for >1 year, no macroscopic hematuria, no CVA tenderness or pain at present, no fever or chills. See detailed ROS below. \par \par She had FibroScan since last visit, showing significant improvement, with S1 steatosis and F0-F1 fibrosis (from F2-F3). Patient continued the healthy life style choices. She was also started on Hep B vaccine, but has not received the 2nd one due to lack of transportation. \par

## 2021-01-11 NOTE — REVIEW OF SYSTEMS
[Abdominal Pain] : abdominal pain [Heartburn] : heartburn [Negative] : Cardiovascular [Fever] : no fever [Chills] : no chills [Feeling Poorly] : not feeling poorly [Feeling Tired] : not feeling tired [Recent Weight Gain (___ Lbs)] : no recent weight gain [Recent Weight Loss (___ Lbs)] : no recent weight loss [Shortness Of Breath] : no shortness of breath [Cough] : no cough [Vomiting] : no vomiting [Diarrhea] : no diarrhea [Constipation] : no constipation [Melena] : no melena [Itching] : no itching [Depression] : no depression [Easy Bleeding] : no tendency for easy bleeding [Easy Bruising] : no tendency for easy bruising [FreeTextEntry7] : No pain at present, but still mentions intermittent RUQ, epigastric pain;  [FreeTextEntry8] : Following with Dr. Barnes for urinary frequency

## 2021-01-11 NOTE — PHYSICAL EXAM
[No Respiratory Distress] : no respiratory distress  [No Accessory Muscle Use] : no accessory muscle use [Clear to Auscultation] : lungs were clear to auscultation bilaterally [Normal Rate] : normal rate  [Regular Rhythm] : with a regular rhythm [Normal S1, S2] : normal S1 and S2 [No Murmur] : no murmur heard [No Edema] : there was no peripheral edema [Soft] : abdomen soft [Non Tender] : non-tender [Non-distended] : non-distended [No Masses] : no abdominal mass palpated [Normal Bowel Sounds] : normal bowel sounds [No Hernias] : no hernias [General Appearance - Alert] : alert [General Appearance - In No Acute Distress] : in no acute distress [General Appearance - Well Nourished] : well nourished [General Appearance - Well Developed] : well developed [General Appearance - Well-Appearing] : healthy appearing [Sclera] : the sclera and conjunctiva were normal [Oropharynx] : the oropharynx was normal [Neck Appearance] : the appearance of the neck was normal [] : no respiratory distress [Respiration, Rhythm And Depth] : normal respiratory rhythm and effort [Exaggerated Use Of Accessory Muscles For Inspiration] : no accessory muscle use [Auscultation Breath Sounds / Voice Sounds] : lungs were clear to auscultation bilaterally [Heart Rate And Rhythm] : heart rate was normal and rhythm regular [Heart Sounds] : normal S1 and S2 [Edema] : there was no peripheral edema [Rounded] : rounded [Normal] : normal to percussion [Epigastric] : in the epigastric area [RUQ] : in the right upper quadrant [No Mass] : no masses were palpated [No HSM] : no hepatosplenomegaly noted [None] : no hernias were palpable [No Spinal Tenderness] : no spinal tenderness [No CVA Tenderness] : no ~M costovertebral angle tenderness [Abnormal Walk] : normal gait [Skin Color & Pigmentation] : normal skin color and pigmentation [Skin Turgor] : normal skin turgor [Oriented To Time, Place, And Person] : oriented to person, place, and time [Impaired Insight] : insight and judgment were intact [Affect] : the affect was normal [Mood] : the mood was normal [Memory Recent] : recent memory was not impaired [Memory Remote] : remote memory was not impaired [de-identified] : No CVA tenderness.  [de-identified] : Obese [Scleral Icterus] : No Scleral Icterus [Spider Angioma] : No spider angioma(s) were observed [Abdominal Bruit] : no abdominal bruit [Abdominal  Ascites] : no ascites [Asterixis] : no asterixis observed [Jaundice] : No jaundice [Palmar Erythema] : no Palmar Erythema [Depression] : no depression [Dilated Collat. Veins] : no collateral vein dilation [Striae] : no striae [Firm] : not firm [Rigid] : not rigid [Rebound] : no rebound [Guarding] : no guarding [Denis's] : a negative Denis's sign [FreeTextEntry1] : Grossly intact

## 2021-01-11 NOTE — COUNSELING
[Potential consequences of obesity discussed] : Potential consequences of obesity discussed [Benefits of weight loss discussed] : Benefits of weight loss discussed [Encouraged to maintain food diary] : Encouraged to maintain food diary [Encouraged to increase physical activity] : Encouraged to increase physical activity [Encouraged to use exercise tracking device] : Encouraged to use exercise tracking device [Good understanding] : Patient has a good understanding of disease, goals and obesity follow-up plan [de-identified] : Patient has been counseled on life style interventions, including but not limited to: weight loss (3-5% loss of body weight might improve fat in the liver, while 7-10% needed for potential improvement of other components, including inflammation and scarring of the liver, called fibrosis); healthy diet, avoiding added sugars, sodas, avoiding saturated fats, limiting sodium, avoiding alcohol; and on the importance of regular exercise (> 150 min/week moderate intensity aerobic exercise with at least 2x/week muscle strengthening or exercise as tolerated). \par \par Discussed with patient anti-reflux measures as well.  [FreeTextEntry2] : \par

## 2021-01-15 ENCOUNTER — NON-APPOINTMENT (OUTPATIENT)
Age: 56
End: 2021-01-15

## 2021-01-15 ENCOUNTER — APPOINTMENT (OUTPATIENT)
Dept: CARDIOLOGY | Facility: CLINIC | Age: 56
End: 2021-01-15
Payer: COMMERCIAL

## 2021-01-15 VITALS
DIASTOLIC BLOOD PRESSURE: 79 MMHG | HEART RATE: 82 BPM | TEMPERATURE: 98 F | SYSTOLIC BLOOD PRESSURE: 130 MMHG | WEIGHT: 170 LBS | BODY MASS INDEX: 31.09 KG/M2 | OXYGEN SATURATION: 97 % | RESPIRATION RATE: 16 BRPM

## 2021-01-15 VITALS — DIASTOLIC BLOOD PRESSURE: 74 MMHG | SYSTOLIC BLOOD PRESSURE: 114 MMHG

## 2021-01-15 PROCEDURE — 99072 ADDL SUPL MATRL&STAF TM PHE: CPT

## 2021-01-15 PROCEDURE — 93000 ELECTROCARDIOGRAM COMPLETE: CPT | Mod: 59

## 2021-01-15 PROCEDURE — 99214 OFFICE O/P EST MOD 30 MIN: CPT

## 2021-01-15 NOTE — REVIEW OF SYSTEMS
[Negative] : Heme/Lymph [Fever] : no fever [Headache] : no headache [Recent Weight Gain (___ Lbs)] : no recent weight gain [Chills] : no chills [Feeling Fatigued] : not feeling fatigued [Recent Weight Loss (___ Lbs)] : no recent weight loss [Shortness Of Breath] : no shortness of breath [Dyspnea on exertion] : not dyspnea during exertion [Chest  Pressure] : no chest pressure [Chest Pain] : no chest pain [Lower Ext Edema] : no extremity edema [Leg Claudication] : no intermittent leg claudication [Palpitations] : no palpitations [Cough] : no cough [Wheezing] : no wheezing [Coughing Up Blood] : no hemoptysis [Abdominal Pain] : no abdominal pain [Nausea] : no nausea [Vomiting] : no vomiting [Heartburn] : no heartburn [Change in Appetite] : no change in appetite [Dysphagia] : no dysphagia [Joint Pain] : no joint pain [Joint Swelling] : no joint swelling [Muscle Cramps] : no muscle cramps [Joint Stiffness] : no joint stiffness [Limb Weakness (Paresis)] : no limb weakness [Skin: A Rash] : no rash: [Itching] : no itching [Change In Color Of Skin] : change in skin color [Skin Lesions] : no skin lesions [Dizziness] : no dizziness [Tremor] : no tremor was seen [Numbness (Hypesthesia)] : no numbness [Convulsions] : no convulsions [Tingling (Paresthesia)] : no tingling [Confusion] : no confusion was observed [Memory Lapses Or Loss] : no memory lapses or loss [Depression] : no depression [Anxiety] : no anxiety [Under Stress] : not under stress [Suicidal] : not suicidal [Excessive Thirst] : no polydipsia [Easy Bleeding] : no tendency for easy bleeding [Swollen Glands] : no swollen glands [Easy Bruising] : no tendency for easy bruising [Swollen Glands In The Neck] : no swollen glands in the neck

## 2021-01-15 NOTE — PHYSICAL EXAM
[General Appearance - Well Developed] : well developed [Normal Appearance] : normal appearance [Well Groomed] : well groomed [General Appearance - Well Nourished] : well nourished [No Deformities] : no deformities [General Appearance - In No Acute Distress] : no acute distress [Normal Conjunctiva] : the conjunctiva exhibited no abnormalities [Eyelids - No Xanthelasma] : the eyelids demonstrated no xanthelasmas [Normal Oral Mucosa] : normal oral mucosa [No Oral Cyanosis] : no oral cyanosis [No Oral Pallor] : no oral pallor [Normal Jugular Venous A Waves Present] : normal jugular venous A waves present [Normal Jugular Venous V Waves Present] : normal jugular venous V waves present [No Jugular Venous [place] A Waves] : no jugular venous [place] A waves [Respiration, Rhythm And Depth] : normal respiratory rhythm and effort [Exaggerated Use Of Accessory Muscles For Inspiration] : no accessory muscle use [Auscultation Breath Sounds / Voice Sounds] : lungs were clear to auscultation bilaterally [Chest Palpation] : palpation of the chest revealed no abnormalities [Lungs Percussion] : the lungs were normal to percussion [Heart Rate And Rhythm] : heart rate and rhythm were normal [Heart Sounds] : normal S1 and S2 [Murmurs] : no murmurs present [Arterial Pulses Normal] : the arterial pulses were normal [Edema] : no peripheral edema present [Veins - Varicosity Changes] : no varicosital changes were noted in the lower extremities [Bowel Sounds] : normal bowel sounds [Abdomen Soft] : soft [Abdomen Tenderness] : non-tender [Abdomen Mass (___ Cm)] : no abdominal mass palpated [Abdomen Hernia] : no hernia was discovered [Abnormal Walk] : normal gait [Gait - Sufficient For Exercise Testing] : the gait was sufficient for exercise testing [Nail Clubbing] : no clubbing of the fingernails [Cyanosis, Localized] : no localized cyanosis [Petechial Hemorrhages (___cm)] : no petechial hemorrhages [Skin Turgor] : normal skin turgor [] : no rash [No Venous Stasis] : no venous stasis [No Skin Ulcers] : no skin ulcer [No Xanthoma] : no  xanthoma was observed [Oriented To Time, Place, And Person] : oriented to person, place, and time [Impaired Insight] : insight and judgment were intact [Affect] : the affect was normal [Mood] : the mood was normal [Memory Recent] : recent memory was not impaired [Memory Remote] : remote memory was not impaired [No Anxiety] : not feeling anxious [FreeTextEntry1] : no calf tenderness, no edema in my examination, no  asymmetry of calf. " right shoulder  with Rheumatoid arthritis.

## 2021-01-15 NOTE — REASON FOR VISIT
[Follow-Up - Clinic] : a clinic follow-up of [Chest Pain] : chest pain [Hyperlipidemia] : hyperlipidemia [Hypertension] : hypertension [Palpitations] : palpitations [FreeTextEntry2] : pre-operative assessment [FreeTextEntry1] :  for colonoscopic examination

## 2021-01-19 ENCOUNTER — APPOINTMENT (OUTPATIENT)
Dept: INTERNAL MEDICINE | Facility: CLINIC | Age: 56
End: 2021-01-19
Payer: COMMERCIAL

## 2021-01-19 VITALS
HEIGHT: 62 IN | HEART RATE: 76 BPM | DIASTOLIC BLOOD PRESSURE: 83 MMHG | BODY MASS INDEX: 31.28 KG/M2 | SYSTOLIC BLOOD PRESSURE: 129 MMHG | OXYGEN SATURATION: 98 % | TEMPERATURE: 97.8 F | RESPIRATION RATE: 16 BRPM | WEIGHT: 170 LBS

## 2021-01-19 PROCEDURE — 90471 IMMUNIZATION ADMIN: CPT

## 2021-01-19 PROCEDURE — 90746 HEPB VACCINE 3 DOSE ADULT IM: CPT

## 2021-01-19 PROCEDURE — 99072 ADDL SUPL MATRL&STAF TM PHE: CPT

## 2021-01-19 PROCEDURE — 99214 OFFICE O/P EST MOD 30 MIN: CPT | Mod: 25

## 2021-01-19 NOTE — PHYSICAL EXAM
[de-identified] : Right thumb with diffuse swelling with tenderness and reduced ROM, MCP Joint with pain, no redness noted, skin is intact.

## 2021-01-19 NOTE — HEALTH RISK ASSESSMENT
[Intercurrent Urgi Care visits] : went to urgent care [No] : In the past 12 months have you used drugs other than those required for medical reasons? No [No falls in past year] : Patient reported no falls in the past year [0] : 2) Feeling down, depressed, or hopeless: Not at all (0) [] : No [de-identified] : 1/6/19 [de-identified] : URO/GI/CARD/HEP [[id number]] : 0

## 2021-01-19 NOTE — HISTORY OF PRESENT ILLNESS
[de-identified] : 55 year old  female patient with history of stable Hypertension, Asthma, Type 2 Diabetes Mellitus, Hypertriglyceridemia, history as stated, presented for follow up examination. Patient is compliant with all medications. Denies shortness of breath, chest pain or abdominal pains at this time. ROS as stated.\par

## 2021-01-19 NOTE — ASSESSMENT
[FreeTextEntry1] : 55 year old female found to have stable Hypertension, Asthma, Type 2 Diabetes Mellitus, Hypertriglyceridemia,with the current prescription regimen as recommended, diet and life style modifications, as counseled. Prior results reviewed, interpreted and discussed with the patient during today's examination, as appropriate. Follow up, treatment plan and tests, as ordered.\par \par Patient was recently evaluated by URO/GI/CARD/HEP , findings and recommendations reviewed with the patient during today's examination.\par

## 2021-02-05 ENCOUNTER — APPOINTMENT (OUTPATIENT)
Dept: RHEUMATOLOGY | Facility: CLINIC | Age: 56
End: 2021-02-05
Payer: COMMERCIAL

## 2021-02-05 VITALS
RESPIRATION RATE: 17 BRPM | TEMPERATURE: 97.2 F | HEART RATE: 85 BPM | HEIGHT: 62 IN | OXYGEN SATURATION: 96 % | DIASTOLIC BLOOD PRESSURE: 70 MMHG | WEIGHT: 170 LBS | SYSTOLIC BLOOD PRESSURE: 104 MMHG | BODY MASS INDEX: 31.28 KG/M2

## 2021-02-05 PROCEDURE — 99214 OFFICE O/P EST MOD 30 MIN: CPT

## 2021-02-05 PROCEDURE — 99072 ADDL SUPL MATRL&STAF TM PHE: CPT

## 2021-02-05 NOTE — HISTORY OF PRESENT ILLNESS
[___ Month(s) Ago] : [unfilled] month(s) ago [FreeTextEntry1] : has finished Pt completed 3 months of therapy - but still with ongoing pain over the right shoulder, left knee, now with left hip pain\par US of the knee was not done -\par taking tylenol or aleve as needed -  pain is daily - \par had covid in march/2019 -  ongoing SOB since the infection

## 2021-02-05 NOTE — ASSESSMENT
[FreeTextEntry1] : 53 year-old female with positive FARSHAD and hx of FINCH with polyarthralgia - not responsive to prednisone\par \par 1. Polyarthralgia  and positive FARSHAD and borderline RF at 16  - no improvement - start meloxicam =-repeat labs today\par 2. Breast CA - now on tamoxifen\par 3. Lipoma - referral to derm for excision - not seen yet\par 4. Hip and knee pain -x-rays with mild arthritis -  worsening left hip pain and loss of ROM - knee pain is ongoing - defer injection - start meloxicam daily 15 mg\par 5. Right shoulder RC tendinopathy X tear - completed 3 months of PT - taking Aleve daily with no improvement - minimal rom - concern for frozen shoulder - send for MRI\par \par trial of meloxicam - can still take tylenol as needed - avoid aleve completely\par \par I reviewed previous labs results with patients.\par repeat RA serologies\par Diagnosis and Prognosis discussed\par Continue with current medications\par medications refilled\par education provided on\par F/u 2 months\par

## 2021-02-05 NOTE — PHYSICAL EXAM
[General Appearance - Alert] : alert [General Appearance - In No Acute Distress] : in no acute distress [Auscultation Breath Sounds / Voice Sounds] : lungs were clear to auscultation bilaterally [Heart Rate And Rhythm] : heart rate was normal and rhythm regular [Heart Sounds] : normal S1 and S2 [Heart Sounds Gallop] : no gallops [Murmurs] : no murmurs [Heart Sounds Pericardial Friction Rub] : no pericardial rub [Full Pulse] : the pedal pulses are present [Bowel Sounds] : normal bowel sounds [Edema] : there was no peripheral edema [Abdomen Soft] : soft [Abdomen Tenderness] : non-tender [] : no hepato-splenomegaly [Abdomen Mass (___ Cm)] : no abdominal mass palpated [Cervical Lymph Nodes Enlarged Posterior Bilaterally] : posterior cervical [Cervical Lymph Nodes Enlarged Anterior Bilaterally] : anterior cervical [Supraclavicular Lymph Nodes Enlarged Bilaterally] : supraclavicular [No CVA Tenderness] : no ~M costovertebral angle tenderness [No Spinal Tenderness] : no spinal tenderness [Abnormal Walk] : normal gait [Nail Clubbing] : no clubbing  or cyanosis of the fingernails [Musculoskeletal - Swelling] : no joint swelling seen [Motor Tone] : muscle strength and tone were normal [Oriented To Time, Place, And Person] : oriented to person, place, and time [Impaired Insight] : insight and judgment were intact [Affect] : the affect was normal [FreeTextEntry1] : left hip with pain on internal rotation minimal rotation - frozen shoulder, left knee with effusion

## 2021-02-06 LAB
ALBUMIN SERPL ELPH-MCNC: 4.5 G/DL
ALP BLD-CCNC: 82 U/L
ALT SERPL-CCNC: 33 U/L
ANION GAP SERPL CALC-SCNC: 15 MMOL/L
AST SERPL-CCNC: 24 U/L
BILIRUB DIRECT SERPL-MCNC: 0.1 MG/DL
BILIRUB INDIRECT SERPL-MCNC: 0.4 MG/DL
BILIRUB SERPL-MCNC: 0.6 MG/DL
BUN SERPL-MCNC: 14 MG/DL
CALCIUM SERPL-MCNC: 9.6 MG/DL
CHLORIDE SERPL-SCNC: 102 MMOL/L
CO2 SERPL-SCNC: 24 MMOL/L
CREAT SERPL-MCNC: 0.95 MG/DL
GGT SERPL-CCNC: 17 U/L
GLUCOSE SERPL-MCNC: 90 MG/DL
HIV1+2 AB SPEC QL IA.RAPID: NONREACTIVE
POTASSIUM SERPL-SCNC: 3.8 MMOL/L
PROT SERPL-MCNC: 7.4 G/DL
SODIUM SERPL-SCNC: 141 MMOL/L

## 2021-02-07 LAB
DEPRECATED KAPPA LC FREE/LAMBDA SER: 1.4 RATIO
IGA SER QL IEP: 212 MG/DL
IGG SER QL IEP: 1280 MG/DL
IGM SER QL IEP: 158 MG/DL
KAPPA LC CSF-MCNC: 1.51 MG/DL
KAPPA LC SERPL-MCNC: 2.11 MG/DL

## 2021-02-08 LAB
LKM AB SER QL IF: <20.1 UNITS
SMOOTH MUSCLE AB SER QL IF: NORMAL

## 2021-02-09 LAB
ANA PAT FLD IF-IMP: ABNORMAL
ANA PATTERN: ABNORMAL
ANA SER IF-ACNC: ABNORMAL
ANA TITER: ABNORMAL
IGG4 SER-MCNC: 12 MG/DL
SOLUBLE LIVER IGG SER IA-ACNC: 2.4

## 2021-02-16 ENCOUNTER — TRANSCRIPTION ENCOUNTER (OUTPATIENT)
Age: 56
End: 2021-02-16

## 2021-02-16 LAB
CCP AB SER IA-ACNC: <8 UNITS
CRP SERPL-MCNC: <0.1 MG/DL
ERYTHROCYTE [SEDIMENTATION RATE] IN BLOOD BY WESTERGREN METHOD: 17 MM/HR
RF+CCP IGG SER-IMP: NEGATIVE
RHEUMATOID FACT SER QL: 18 IU/ML

## 2021-03-02 ENCOUNTER — RESULT REVIEW (OUTPATIENT)
Age: 56
End: 2021-03-02

## 2021-03-02 ENCOUNTER — APPOINTMENT (OUTPATIENT)
Dept: RADIOLOGY | Facility: CLINIC | Age: 56
End: 2021-03-02
Payer: COMMERCIAL

## 2021-03-02 ENCOUNTER — OUTPATIENT (OUTPATIENT)
Dept: OUTPATIENT SERVICES | Facility: HOSPITAL | Age: 56
LOS: 1 days | End: 2021-03-02

## 2021-03-02 ENCOUNTER — APPOINTMENT (OUTPATIENT)
Dept: MRI IMAGING | Facility: CLINIC | Age: 56
End: 2021-03-02
Payer: COMMERCIAL

## 2021-03-02 DIAGNOSIS — Z90.710 ACQUIRED ABSENCE OF BOTH CERVIX AND UTERUS: Chronic | ICD-10-CM

## 2021-03-02 DIAGNOSIS — Z98.890 OTHER SPECIFIED POSTPROCEDURAL STATES: Chronic | ICD-10-CM

## 2021-03-02 PROCEDURE — 74181 MRI ABDOMEN W/O CONTRAST: CPT | Mod: 26

## 2021-03-02 PROCEDURE — 72190 X-RAY EXAM OF PELVIS: CPT | Mod: 26

## 2021-03-02 PROCEDURE — 73221 MRI JOINT UPR EXTREM W/O DYE: CPT | Mod: 26,RT

## 2021-03-03 ENCOUNTER — APPOINTMENT (OUTPATIENT)
Dept: INTERNAL MEDICINE | Facility: CLINIC | Age: 56
End: 2021-03-03
Payer: COMMERCIAL

## 2021-03-03 VITALS
TEMPERATURE: 97.8 F | SYSTOLIC BLOOD PRESSURE: 117 MMHG | BODY MASS INDEX: 31.47 KG/M2 | WEIGHT: 171 LBS | DIASTOLIC BLOOD PRESSURE: 71 MMHG | HEIGHT: 62 IN | OXYGEN SATURATION: 97 % | HEART RATE: 81 BPM

## 2021-03-03 PROCEDURE — 99215 OFFICE O/P EST HI 40 MIN: CPT

## 2021-03-03 PROCEDURE — 99072 ADDL SUPL MATRL&STAF TM PHE: CPT

## 2021-03-03 NOTE — HISTORY OF PRESENT ILLNESS
[Heartburn] : improved heartburn [Nausea] : denies nausea [Vomiting] : denies vomiting [Diarrhea] : denies diarrhea [Constipation] : stable constipation [Yellow Skin Or Eyes (Jaundice)] : denies jaundice [Abdominal Pain] : stable abdominal pain [Abdominal Swelling] : abdominal swelling worsened [Rectal Pain] : denies rectal pain [Wt Gain ___ Lbs] : recent [unfilled] ~Upound(s) weight gain [GERD] : gastroesophageal reflux disease [Cholelithiasis] : cholelithiasis [Kidney Stone] : kidney stone [Malignancy] : malignancy [Abdominal Surgery] : abdominal surgery [Cholecystectomy] : cholecystectomy [_________] : Performed [unfilled] [Hiatus Hernia] : no hiatus hernia [Peptic Ulcer Disease] : no peptic ulcer disease [Pancreatitis] : no pancreatitis [Inflammatory Bowel Disease] : no inflammatory bowel disease [Irritable Bowel Syndrome] : no irritable bowel syndrome [Diverticulitis] : no diverticulitis [Alcohol Abuse] : no alcohol abuse [Appendectomy] : no appendectomy [Good Compliance] : good compliance with treatment [de-identified] : Patient is a 55yoF, w/ PMH of Breast cancer, NAFLD, HTN, DM, and GERD, presents to the office for f/u. She reports constant abdominal bloating for 4-6mo, worsens with tamoxifen, and nothing is alleviating the bloating. She also complains of intermittent epigastric pain that worsens with meals. She endorses constipation, but denies hematochezia, or melena.  she has hx of breast cancer and needs colon cancer screening and has hx of esophagitis and erosive and will have egd.   she has been cleared by her cardiologist for procedures.  Pt has hx of having palpitations during a procedure and ventricular tachycardia  when having surgery for her renal stones  and had to be treated 5 months ago.  She has had holter and has had evaluation by her cardiologist. She has hx of having  asthma , diabetes Type 2 and hypertension, and breast cancer  and is a higher risk for  complications for procedures done as an out pt  facility and should have a out pt hospital setting due to her recent problem. [de-identified] : hiatal hernia, pathology unremarkable [de-identified] : internal hemorrhoid; f/u q3yr [de-identified] : hepatic steatosis, b/l nonobstrucing renal stones [de-identified] : awaiting on result

## 2021-03-03 NOTE — ASSESSMENT
[FreeTextEntry1] : 1. abdominal bloating. h/o H pylori w/ symptom resolution and negative breath test on July2018. will test for H pylori again for possible re-infection and lactose intolerance.\par \par 2. hepatic fibrosis. Following hepatologist. Most recent fibroscan shows F0-F1 hepatic statosis. Rec'd weight loss and healthy diet.\par \par 3. GERD. improving epigastric pain with alternating pantoprazole and famotidine. Gastroesophageal reflux disease (GERD) occurs when stomach acid frequently flows back into the tube connecting your mouth and stomach (esophagus). This backwash (acid reflux) can irritate the lining of your esophagus.\par \par Many people experience acid reflux from time to time. GERD is mild acid reflux that occurs at least twice a week, or moderate to severe acid reflux that occurs at least once a week.\par \par Most people can manage the discomfort of GERD with lifestyle changes and over-the-counter medications. But some people with GERD may need stronger medications or surgery to ease symptoms.\par \par Symptoms\par \par Common signs and symptoms of GERD include:\par •A burning sensation in your chest (heartburn), usually after eating, which might be worse at night\par •Chest pain\par •Difficulty swallowing\par •Regurgitation of food or sour liquid\par •Sensation of a lump in your throat\par \par If you have nighttime acid reflux, you might also experience:\par •Chronic cough\par •Laryngitis\par •New or worsening asthma\par •Disrupted sleep\par \par When to see a doctor\par \par Seek immediate medical care if you have chest pain, especially if you also have shortness of breath, or jaw or arm pain. These may be signs and symptoms of a heart attack.\par \par Make an appointment with your doctor if you:\par •Experience severe or frequent GERD symptoms\par •Take over-the-counter medications for heartburn more than twice a week\par \par Causes\par \par GERD is caused by frequent acid reflux.\par \par When you swallow, a circular band of muscle around the bottom of your esophagus (lower esophageal sphincter) relaxes to allow food and liquid to flow into your stomach. Then the sphincter closes again.\par \par If the sphincter relaxes abnormally or weakens, stomach acid can flow back up into your esophagus. This constant backwash of acid irritates the lining of your esophagus, often causing it to become inflamed.\par \par Risk factors\par \par Conditions that can increase your risk of GERD include:\par •Obesity\par •Bulging of the top of the stomach up into the diaphragm (hiatal hernia)\par •Pregnancy\par •Connective tissue disorders, such as scleroderma\par •Delayed stomach emptying\par \par Factors that can aggravate acid reflux include:\par •Smoking\par •Eating large meals or eating late at night\par •Eating certain foods (triggers) such as fatty or fried foods\par •Drinking certain beverages, such as alcohol or coffee\par •Taking certain medications, such as aspirin\par \par Complications\par \par Over time, chronic inflammation in your esophagus can cause:\par •Narrowing of the esophagus (esophageal stricture). Damage to the lower esophagus from stomach acid causes scar tissue to form. The scar tissue narrows the food pathway, leading to problems with swallowing.\par •An open sore in the esophagus (esophageal ulcer). Stomach acid can wear away tissue in the esophagus, causing an open sore to form. An esophageal ulcer can bleed, cause pain and make swallowing difficult.\par •Precancerous changes to the esophagus (Breaux's esophagus). Damage from acid can cause changes in the tissue lining the lower esophagus. These changes are associated with an increased risk of esophageal cancer\par \par Pt is having a low risk procedure and is low risks for cardiac adverse outcome and cri is 0.4% .  she was explained the procedures egd and colonoscopy, anesthesia   risks and complications alternatives , prep and post procedure care.  I have answered all her questions.  she will have blood testing today  . The risks, benefits, and alternatives were explained in detail to the patient. Risks including but not limited to bleeding, perforation, adverse reaction to medications, cardiopulmonary compromise and their attendant sequalae explained. Sequelae including but not limited to need for surgery, colostomy, prolonged hospital stay, placement of drainage tubes, blood transfusions, disability, morbidity and mortality was explained. \par It has been made clear to the patient that although colonoscopy is still considered the best test to screen for colon cancer and polyps, no test is 100% accurate. He/she indicated understanding of the above and wishes to proceed. \par All questions and concerns have been addressed. \par \par \par Upper endoscopy is a procedure that lets a doctor look at the lining of the upper digestive tract. The upper digestive tract includes the esophagus (the tube than connects the mouth to the stomach), the stomach, and the duodenum (the first part of the small intestine).\par \par \par Why might my doctor do an upper endoscopy?\par You might have an upper endoscopy if you have:\par \par ?Pain in your upper belly that you cannot explain\par \par ?A condition called "acid reflux"\par \par ?Nausea and vomiting that has lasted a long time\par \par ?Diarrhea that has lasted a long time\par \par ?Black bowel movements or blood in your vomit\par \par ?Trouble swallowing or a feeling of food getting stuck in your throat\par \par ?Abnormal results from other tests of your digestive system\par \par ?Swallowed an object that you should not have swallowed\par \par ?Had growths or ulcers in your digestive tract, and your doctor wants to follow up\par \par \par \par What should I do before an upper endoscopy?\par Your doctor will give you instructions about what to do before an upper endoscopy. He or she will tell you if you need to stop eating or drinking, or stop taking any of your usual medicines beforehand. Make sure to read the instructions as soon as you get them. You might have to stop some medicines up to a week before the test. Let your doctor know if you have trouble getting ready for your upper endoscopy.\par \par \par What happens during an upper endoscopy?\par Your doctor will give you an IV, a thin tube that goes into a vein. You will get medicines through the IV to make you feel relaxed. He or she might give you a mouth spray or gargle to numb your mouth. You will also get a plastic mouth guard to protect your teeth.\par \par Then your doctor will put a thin tube with a camera and light on the end into your mouth and down into your esophagus, stomach, and duodenum. He or she will look for irritation, bleeding, ulcers, or growths.\par \par During an upper endoscopy, your doctor might also:\par \par ?Do a test called a biopsy – During a biopsy, a doctor takes a small piece of tissue from the lining of the digestive tract. (You will not feel this.) Then he or she looks at the tissue under a microscope.\par \par \par ?Treat problems that he or she sees – For example, a doctor can stop bleeding or sometimes remove a growth. He or she can also widen any narrow areas of the esophagus. Narrow areas of the esophagus can cause trouble swallowing.\par \par \par \par What happens after an upper endoscopy?\par After an upper endoscopy, you will be watched for 1 to 2 hours until the medicines wear off. Most doctors recommend that people not drive or go to work right after an upper endoscopy. Most can drive and go back to work the next day.\par \par \par What are the side effects of an upper endoscopy?\par The most common side effect is feeling bloated. Some people have nausea because of the medicines used before the procedure. If this happens to you, your doctor can give you medicine to make the nausea better. Most people can eat as usual after the procedure.\par \par Other side effects are not as common, but can occur. These can include:\par \par ?Food from the stomach getting into the lungs\par \par ?Bleeding, for example, after a growth is removed\par \par ?Getting a tear in the digestive tract lining\par \par ?Having redness or swelling of the skin around the IV\par \par \par \par Should I call my doctor or nurse?\par Call your doctor or nurse immediately if you have any of the following problems after your upper endoscopy:\par \par ?Belly pain that is much worse than gas pain or cramps\par \par ?A bloated and hard belly\par \par ?Vomiting\par \par ?Fever\par \par ?Trouble swallowing or severe throat pain\par \par ?Black bowel movements\par \par ?A "crunching" feeling under the skin in the neck\par  WE discussed procedure and will return prior for blood testing and instruction.  Colon and rectal cancer screening is a way in which doctors check the colon and rectum for signs of cancer or growths (called polyps) that might become cancer. It is done in people who have no symptoms and no reason to think they have cancer. The goal is to find and remove polyps before they become cancer, or to find cancer early, before it grows, spreads, or causes problems.\par \par The colon and rectum are the last part of the digestive tract (figure 1). When doctors talk about colon and rectal cancer screening, they use the term "colorectal." That is just a shorter way of saying "colon and rectal." It's also possible to say just colon cancer screening.\par \par Studies show that having colon cancer screening lowers the chance of dying from colon cancer. There are several different types of screening test that can do this.\par \par What are the different screening tests for colon cancer? — They include:\par \par ?Colonoscopy – Colonoscopy allows the doctor to see directly inside the entire colon. Before you can have a colonoscopy, you must clean out your colon. You do this at home by drinking a special liquid that causes watery diarrhea for several hours. On the day of the test, you get medicine to help you relax. Then a doctor puts a thin tube into your anus and advances it into your colon (figure 2). The tube has a tiny camera attached to it, so the doctor can see inside your colon. The tube also has tiny tools on the end, so the doctor can remove pieces of tissue or polyps if they are there. After polyps or pieces of tissue are removed, they are sent to a lab to be checked for cancer.\par \par \par •Advantages of this test – Colonoscopy finds most small polyps and almost all large polyps and cancers. If found, polyps can be removed right away. This test gives the most accurate results. If any other screening tests are done first and come back positive, a colonoscopy will need to be done for follow-up. If you have a colonoscopy as your first test, you will probably not need a second follow-up test soon after.\par \par \par •Drawbacks to this test – Colonoscopy has some small risks. It can cause bleeding or tear the inside of the colon, but this only happens in 1 out of 1,000 people. Also, cleaning out the bowel beforehand can be unpleasant. Plus, people usually cannot work or drive for the rest of the day after the test, because of the relaxation medicine they must take during the test.\par \par \par Sigmoidoscopy – A sigmoidoscopy is similar to a colonoscopy. The difference is that this test looks only at the last part of the colon, and a colonoscopy looks at the whole colon. Before you have a sigmoidoscopy, you must clean out the lower part of your colon using an enema. This bowel cleaning is not as thorough or unpleasant as the one for colonoscopy. For this test, you do not need to take medicines to help you relax, so you can drive and work afterward if you want.\par \par \par •Advantages of this test – Sigmoidoscopy can find polyps and cancers in the rectum and the last part of the colon. If polyps are found, they can be removed right away.\par \par \par •Drawbacks to this test – In about 2 out of 10,000 people, sigmoidoscopy tears the inside of the colon. The test also can't find polyps or cancers that are in the part of the colon the test does not view (figure 3). If doctors find polyps or cancer during a sigmoidoscopy, they usually follow up with a colonoscopy.\par \par \par CT colonography (also known as virtual colonoscopy or CTC) – CTC looks for cancer and polyps using a special X-ray called a "CT scan." For most CTC tests, the preparation is the same as it is for colonoscopy.\par \par \par •Advantages of this test – CTC can find polyps and cancers in the whole colon without the need for medicines to relax.\par \par \par •Drawbacks to this test – If doctors find polyps or cancer with CTC, they usually follow up with a colonoscopy. CTC sometimes finds areas that look abnormal but that turn out to be healthy. This means that CTC can lead to tests and procedures you did not need. Plus, CTC exposes you to radiation. In most cases, the preparation needed to clean the bowel is the same as the one needed for a colonoscopy. The test is expensive, and some insurance companies might not cover this test for screening.\par \par \par Stool test for blood – "Stool" is another word for bowel movements. Stool tests most commonly check for blood in samples of stool. Cancers and polyps can bleed, and if they bleed around the time you do the stool test, then blood will show up on the test. The test can find even small amounts of blood that you can't see in your stool. Other less serious conditions can also cause small amounts of blood in the stool, and that will show up in this test. You will have to collect small samples from your bowel movements, which you will put in a special container you get from your doctor or nurse. Then you follow the instructions to mail the container out for the testing.\par \par \par •Advantages of this test – This test does not involve cleaning out the colon or having any procedures.\par \par \par •Drawbacks to this test – Stool tests are less likely to find polyps than other screening tests. These tests also often come up abnormal even in people who do not have cancer. If a stool test shows something abnormal, doctors usually follow up with a colonoscopy.\par \par \par Stool DNA test – The stool DNA test checks for genetic markers of cancer, as well as for signs of blood. For this test, you get a special kit in order to collect a whole bowel movement. Then you follow the instructions about how and where to ship it.\par \par \par •Advantages of this test – This test does not involve cleaning out the colon or having any procedures. When cancer is not present, it is less likely to be falsely abnormal than a stool test for blood. That means it leads to fewer unnecessary colonoscopies.\par \par \par •Drawbacks to this test – It might be unpleasant to collect and ship a whole bowel movement. If a DNA test shows something abnormal, doctors usually follow up with a colonoscopy.\par \par \par There is no blood test that most experts think is accurate enough to use for screening.\par \par How do I choose which test to have? — Work with your doctor or nurse to decide which test is best for you. Some doctors might choose to combine screening tests, for example, sigmoidoscopy plus stool testing for blood. Being screened–no matter how–is more important than which test you choose.\par \par Who should be screened for colon cancer? — Doctors recommend that most people begin having colon cancer screening at age 50. People who have an increased risk of getting colon cancer sometimes begin screening at a younger age. That might include people with a strong family history of colon cancer, and people with diseases of the colon called "Crohn's disease" and "ulcerative colitis."\par \par Most people can stop being screened around the age of 75, or at the latest 85.\par \par How often should I be screened? — That depends on your risk of colon cancer and which test you have. People who have a high risk of colon cancer often need to be tested more often and should have a colonoscopy.\par \par Most people are not at high risk, so they can choose one of these schedules:\par \par Colonoscopy every 10 years\par \par CT colonography (CTC) every 5 years\par \par Sigmoidoscopy every 5 to 10 years\par \par Stool testing for blood once a year\par \par Stool DNA testing every 3 years (but doctors are not yet sure of the best time frame)\par   ekg- rate 77 pr 150 ms qt 372/401  qrs 85 preop meds -   On thursday  take all medications except take metformin once in am  on day of procedures take bystolic valsartan and symbicorty 3hrs prior to procedures  and bring ventolin with you to hospital and take 2 puffs 30 min prior to  procedures.  check fasting blood sugar  in am.  hold all other medications till after procedures.  stop meloxicam  -\par \par

## 2021-03-03 NOTE — REVIEW OF SYSTEMS
[Eye Pain] : eye pain [Constipation] : constipation [Heartburn] : heartburn [Negative] : Heme/Lymph [Abdominal Pain] : no abdominal pain [FreeTextEntry3] : sinus related eye pain

## 2021-03-03 NOTE — END OF VISIT
[FreeTextEntry3] : residnet present  [>50% of the face to face encounter time was spent on counseling and/or coordination of care for ___] : Greater than 50% of the face to face encounter time was spent on counseling and/or coordination of care for [unfilled]

## 2021-03-03 NOTE — PHYSICAL EXAM
[General Appearance - Alert] : alert [General Appearance - In No Acute Distress] : in no acute distress [Sclera] : the sclera and conjunctiva were normal [PERRL With Normal Accommodation] : pupils were equal in size, round, and reactive to light [Extraocular Movements] : extraocular movements were intact [Neck Appearance] : the appearance of the neck was normal [Neck Cervical Mass (___cm)] : no neck mass was observed [Jugular Venous Distention Increased] : there was no jugular-venous distention [Thyroid Diffuse Enlargement] : the thyroid was not enlarged [Thyroid Nodule] : there were no palpable thyroid nodules [Auscultation Breath Sounds / Voice Sounds] : lungs were clear to auscultation bilaterally [Heart Rate And Rhythm] : heart rate was normal and rhythm regular [Heart Sounds] : normal S1 and S2 [Heart Sounds Gallop] : no gallops [Murmurs] : no murmurs [Heart Sounds Pericardial Friction Rub] : no pericardial rub [Full Pulse] : the pedal pulses are present [Edema] : there was no peripheral edema [Bowel Sounds] : normal bowel sounds [Abdomen Soft] : soft [Abdomen Hernia] : no hernia was discovered [FreeTextEntry1] : RUQ and LUQ tender to palpation  [No CVA Tenderness] : no ~M costovertebral angle tenderness [No Spinal Tenderness] : no spinal tenderness [Abnormal Walk] : normal gait [Nail Clubbing] : no clubbing  or cyanosis of the fingernails [Musculoskeletal - Swelling] : no joint swelling seen [Motor Tone] : muscle strength and tone were normal [Well Developed] : well developed [Well Nourished] : well nourished [Examination Of The Oral Cavity] : the lips and gums were normal [Normal Rate] : the respiratory rate was normal [Rate ___] : at [unfilled] breaths per minute [Normal Rhythm/Effort] : normal respiratory rhythm and effort [Clear Bilaterally] : the lungs were clear to auscultation bilaterally [Normal to Percussion] : the lungs were normal to percussion [Soft, Nontender] : the abdomen was soft and nontender [No Mass] : no masses were palpated [No HSM] : no hepatosplenomegaly noted [Cervical Lymph Nodes Enlarged Posterior Bilaterally] : posterior cervical [Cervical Lymph Nodes Enlarged Anterior Bilaterally] : anterior cervical [Supraclavicular Lymph Nodes Enlarged Bilaterally] : supraclavicular [Axillary Lymph Nodes Enlarged Bilaterally] : axillary [Femoral Lymph Nodes Enlarged Bilaterally] : femoral [Inguinal Lymph Nodes Enlarged Bilaterally] : inguinal [Normal Station and Gait] : the gait and station were normal [Normal] : motor strength was normal in all muscle groups [Normal Motor Tone] : the muscle tone was normal [Involuntary Movements] : no involuntary movements were seen [Skin Color & Pigmentation] : normal skin color and pigmentation [Skin Turgor] : normal skin turgor [] : no rash [Normal Mental Status] : the patient's orientation, memory, attention, language and fund of knowledge were normal [Appropriate] : appropriate [Impaired judgment] : intact judgment [Impaired Insight] : intact insight

## 2021-03-04 LAB
ALBUMIN SERPL ELPH-MCNC: 4.4 G/DL
ALP BLD-CCNC: 83 U/L
ALT SERPL-CCNC: 25 U/L
ANION GAP SERPL CALC-SCNC: 13 MMOL/L
APPEARANCE: CLEAR
AST SERPL-CCNC: 23 U/L
BASOPHILS # BLD AUTO: 0.04 K/UL
BASOPHILS NFR BLD AUTO: 0.6 %
BILIRUB SERPL-MCNC: 0.3 MG/DL
BILIRUBIN URINE: NEGATIVE
BLOOD URINE: NEGATIVE
BUN SERPL-MCNC: 11 MG/DL
CALCIUM SERPL-MCNC: 9.6 MG/DL
CHLORIDE SERPL-SCNC: 104 MMOL/L
CO2 SERPL-SCNC: 25 MMOL/L
COLOR: YELLOW
CREAT SERPL-MCNC: 0.86 MG/DL
EOSINOPHIL # BLD AUTO: 0.06 K/UL
EOSINOPHIL NFR BLD AUTO: 0.8 %
GLUCOSE QUALITATIVE U: NEGATIVE
GLUCOSE SERPL-MCNC: 86 MG/DL
HCT VFR BLD CALC: 40.8 %
HGB BLD-MCNC: 13.1 G/DL
IMM GRANULOCYTES NFR BLD AUTO: 0.1 %
KETONES URINE: NEGATIVE
LEUKOCYTE ESTERASE URINE: NEGATIVE
LYMPHOCYTES # BLD AUTO: 2.32 K/UL
LYMPHOCYTES NFR BLD AUTO: 32.7 %
MAN DIFF?: NORMAL
MCHC RBC-ENTMCNC: 27.4 PG
MCHC RBC-ENTMCNC: 32.1 GM/DL
MCV RBC AUTO: 85.4 FL
MONOCYTES # BLD AUTO: 0.59 K/UL
MONOCYTES NFR BLD AUTO: 8.3 %
NEUTROPHILS # BLD AUTO: 4.07 K/UL
NEUTROPHILS NFR BLD AUTO: 57.5 %
NITRITE URINE: NEGATIVE
PH URINE: 5.5
PLATELET # BLD AUTO: 326 K/UL
POTASSIUM SERPL-SCNC: 3.8 MMOL/L
PROT SERPL-MCNC: 7.2 G/DL
PROTEIN URINE: NORMAL
RBC # BLD: 4.78 M/UL
RBC # FLD: 13.4 %
SODIUM SERPL-SCNC: 142 MMOL/L
SPECIFIC GRAVITY URINE: 1.03
UROBILINOGEN URINE: NORMAL
WBC # FLD AUTO: 7.09 K/UL

## 2021-03-09 ENCOUNTER — LABORATORY RESULT (OUTPATIENT)
Age: 56
End: 2021-03-09

## 2021-03-09 ENCOUNTER — APPOINTMENT (OUTPATIENT)
Dept: DISASTER EMERGENCY | Facility: CLINIC | Age: 56
End: 2021-03-09

## 2021-03-12 ENCOUNTER — RESULT REVIEW (OUTPATIENT)
Age: 56
End: 2021-03-12

## 2021-03-12 ENCOUNTER — APPOINTMENT (OUTPATIENT)
Dept: INTERNAL MEDICINE | Facility: HOSPITAL | Age: 56
End: 2021-03-12

## 2021-03-12 ENCOUNTER — OUTPATIENT (OUTPATIENT)
Dept: OUTPATIENT SERVICES | Facility: HOSPITAL | Age: 56
LOS: 1 days | End: 2021-03-12
Payer: COMMERCIAL

## 2021-03-12 DIAGNOSIS — R14.0 ABDOMINAL DISTENSION (GASEOUS): ICD-10-CM

## 2021-03-12 DIAGNOSIS — Z98.890 OTHER SPECIFIED POSTPROCEDURAL STATES: Chronic | ICD-10-CM

## 2021-03-12 DIAGNOSIS — Z90.710 ACQUIRED ABSENCE OF BOTH CERVIX AND UTERUS: Chronic | ICD-10-CM

## 2021-03-12 PROCEDURE — 88312 SPECIAL STAINS GROUP 1: CPT | Mod: 26

## 2021-03-12 PROCEDURE — 43239 EGD BIOPSY SINGLE/MULTIPLE: CPT

## 2021-03-12 PROCEDURE — 45380 COLONOSCOPY AND BIOPSY: CPT

## 2021-03-12 PROCEDURE — 45380 COLONOSCOPY AND BIOPSY: CPT | Mod: PT

## 2021-03-12 PROCEDURE — 88305 TISSUE EXAM BY PATHOLOGIST: CPT | Mod: 26

## 2021-03-12 PROCEDURE — 88305 TISSUE EXAM BY PATHOLOGIST: CPT

## 2021-03-12 PROCEDURE — 88312 SPECIAL STAINS GROUP 1: CPT

## 2021-03-12 NOTE — CHART NOTE - NSCHARTNOTEFT_GEN_A_CORE
Esophagogastroduodenoscopy Report      Indication: chronic Gerd abd pain, hx of erosive esophagitis   Referring MD: Dr. Campoverde   Anesthesia: MAC  Consent:  Informed consent was obtained from the patient after providing any opportunity for questions  Procedure: The gastroscope was gently passed through the incisoral orifice into the oral cavity and under direct visualization the esophagus was intubated. The endoscope was passed down the esophagus, through the stomach and into proximal jejunum. Color, texture, mucosa and anatomy of the esophagus, stomach, and duodenum were carefully examined with the scope. The patient tolerated the procedure well. After completion of the examination, the patient was transferred to the recovery room.   Preparation: NPO   Findings:     Oropharynx	Normal    Esophagus	Normal.     EG-junction	Normal Zline 35 cm form incisors     Cardia:	Normal. fundus normal     Body: greater curvature polyp removed with cold forceps. bx# 4,   bx of mucosa Bx # 3 lesser curvature normal , mild erythema of mucosa of body     Antrum mild erythema bx # 2 incisura normal     Pylorus:	Normal.    Duodenal Bulb:	Normal     2nd portion:	Normal bx # 1    3rd portion:	Normal.    EBL:0      Impression: gastric polyps, gastritis     Plan: 1- fu pathology for further recommendations       Time Started:  10:25 am                                  Time Ended: 10:31 am   scope # [id number]      Attending:     Norman Solorio M.D., F.A.C.P.
START TIME:   10:33 am                        CECUM REACH TIME:  10:45 am             END TIME:    10:57 am                       WITHDRAWAL TIME;  12 mins  PREOPERATIVE DIAGNOSIS: colon cancer screening , breast cancer   PROCEDURE PERFORMED:  Colonoscopy.    ENDOSCOPIST: Norman JOSEPH    INSTRUMENT USED:  # [id number]    ANESTHESIA:  MAC provided by ANESTHESIA    EXTENT OF EXAM:  To the terminal Ileum    PREPARATION: good   LIMITATIONS:  None.    INDICATIONS FOR EXAMINATION:      PROCEDURE IN DETAIL:  A physical examination was performed. Consent on chart.  The patient was connected to the appropriate monitoring devices and an IV was started. Continuous oxygen was provided via nasal cannula and intravenous sedation was administered in divided doses throughout the procedure.     After adequate sedation was achieved, the patient was placed in the left lateral decubitus position and a digital rectal exam was performed. A well-lubricated colonoscope was then inserted into the rectum and advanced under direct visualization to the level of the cecum. The cecum was identified by both visual and anatomic landmarks. A photograph was taken of the cecal cap, as well as the intussuscepting ileum. The scope was then fully withdrawn while examining the color, texture, anatomy and integrity of the mucosa from the cecum to the anal canal. The findings were consistent with normal colonic mucosa. The scope was completely retrieved upon exiting the anal canal and the procedure was terminated. The patient was then transferred to the recovery room in stable condition.  EBL: 0    PROCEDURE PERFORMED:  Total colonoscopy  cold biopsy polypectomy.    DETAILS OF PROCEDURE:      RECTUM: internal hemorrhoids grade 2  right anterior right posterior and left lateral positions   recto sigmoid  small hyperplastic like polyp seen and removed by cold forceps bx#6  SIGMOID: normal     DESCENDING COLON: small polyp seen and sessile  removed using cold forceps 1mm bx# 5  SPLENIC flexure:  TRANSVERSE COLON: normal   HEPATIC FLEXURE normal   ASCENDING COLON: normal     CECUM: normal   ILEOCECAL VALVE; normal   TERMINAL ILEUM: normal      IMPRESSION; colon polyps , Internal Hemorrhoids grade 2   RECOMMENDATIONS; repeat  colonoscopy in 5 yrs   high fiber diet , medicated [facility]     ATTENDING; Norman ALAMO

## 2021-03-16 LAB — SURGICAL PATHOLOGY STUDY: SIGNIFICANT CHANGE UP

## 2021-03-19 ENCOUNTER — APPOINTMENT (OUTPATIENT)
Dept: ORTHOPEDIC SURGERY | Facility: CLINIC | Age: 56
End: 2021-03-19
Payer: COMMERCIAL

## 2021-03-19 VITALS
BODY MASS INDEX: 31.47 KG/M2 | HEART RATE: 79 BPM | DIASTOLIC BLOOD PRESSURE: 75 MMHG | WEIGHT: 171 LBS | SYSTOLIC BLOOD PRESSURE: 120 MMHG | HEIGHT: 62 IN

## 2021-03-19 DIAGNOSIS — M19.019 PRIMARY OSTEOARTHRITIS, UNSPECIFIED SHOULDER: ICD-10-CM

## 2021-03-19 DIAGNOSIS — M75.21 BICIPITAL TENDINITIS, RIGHT SHOULDER: ICD-10-CM

## 2021-03-19 DIAGNOSIS — M75.41 IMPINGEMENT SYNDROME OF RIGHT SHOULDER: ICD-10-CM

## 2021-03-19 PROCEDURE — 73030 X-RAY EXAM OF SHOULDER: CPT | Mod: RT

## 2021-03-19 PROCEDURE — 99072 ADDL SUPL MATRL&STAF TM PHE: CPT

## 2021-03-19 PROCEDURE — 99203 OFFICE O/P NEW LOW 30 MIN: CPT

## 2021-03-19 NOTE — PHYSICAL EXAM
[de-identified] : Physical Examination\par General: well nourished, in no acute distress, alert and oriented to person, place and time\par Psychiatric: normal mood and affect, no abnormal movements or speech patterns\par Eyes: vision intact - glasses\par Throat: no thyromegaly\par Lymph: no enlarged nodes, no lymphedema in extremity\par Respiratory: no wheezing, no shortness of breath with ambulation\par Cardiac: no cardiac leg swelling, 2+ peripheral pulses\par Neurology: normal gross sensation in extremities to light touch\par Abdomen: soft, non-tender, tympanic, no masses\par \par Musculoskeletal Examination\par Cervical spine	Full painless range of motion and negative Spurling's test\par \par Shoulder			Right			Left\par Appearance\par      Skin/Swelling/Deformity	normal			normal\par      Scapular Winging		-			-\par Range of Motion\par      Forward Flexion		70 / 160		170 / 170\par      Abduction			450 / 150		170 / 170\par      External Rotation		45			45\par      Internal Rotation		T10			T10\par      SAbd Ext Rotation		90			90\par      SAbd Int Rotation		60			80\par      Painful Arc			+			-\par      Crepitus			-			-\par Palpation\par      Clavicle			-			-\par      AC Joint			-			-\par      Posterior Acromion		-			-\par      Levator Scapula		-			-\par      Lateral Bursa			+			-\par      Impingement Area		+			-\par      Biceps Tendon		+			-\par      Anterior Capsule		-			-\par Strength Examination\par      Supraspinatous 		3+ / +			5+ / 0\par      Infraspinatous			4+ / +			5+ / 0\par      Subscapularis			5- / +			5+ / 0\par      Belly Press			5+ / 0			5+ / 0\par      Lift Off			-			-\par      Drop-Arm			-			-\par Special Examination\par      Biceps Rock View's		+ B			-\par      Impingement Neer		+			-\par      Impingement Hawking		+			-\par \par Sensation\par      Axillary			normal			normal\par      LatAntCubBrach 		normal			normal\par      Median 			normal			normal\par      Ulnar 			normal			normal\par      Radial 			normal			normal\par Motor\par      AIN 				normal			normal\par      Ulnar 			normal			normal\par      Radial 			normal			normal\par      PIN 				normal			normal\par Pulses\par      Radial			2+			2+ [de-identified] : 4 views of the affected Right shoulder (AP, Glenoid, Y-View, Axillary)\par were ordered, obtained and evaluated by myself today and\par demonstrate:\par normal bony calcification without dislocation and no fracture\par 	Arch	2B, sharp lateral boarder\par 	AC Joint	mild/mod Arthrosis\par 	GH Joint	no Arthrosis\par 	Calcifications	none\par \par MRI RIGHT shoulder from [facility] on 3-2-21\par My impression of the images:\par Quality of the MRI is good\par Supraspinatous Tendon complete avulsion tear retracted to mid humeral head\par Infraspinatous Tendon complete anterior tear\par Subscapularis Tendon high grade superior tear\par Teres Minor Tendon ok\par Muscle Belly Atrophy moderate with mild fatty degeneratio\par Biceps Tendon isin the groove and looks swollen in groove but looks ok intra-articularly but poorly visualized with a stable attachment anchor\par Superior Labrum ok\par Anterior Labrum ok\par Posterior Labrum ok\par AC joint signal w cyst\par There is no full thickness chondral lesion of the glenoid and humeral head\par \par The Final Radiologist Impression:\par There is moderate supraspinatus tendinosis with evidence for full-thickness/full width tearing of the enthesis, with retraction of the level of the superior humeral head articular surface. There is extension of full-thickness tearing into the anterior one half of the infraspinatus enthesis. There is also high-grade nonretracted tear involving the upper two thirds of the subscapularis enthesis.. The muscle bulk of the shoulder is preserved.\par \par The biceps tendon is located within the bicipital groove. There is moderate proximal biceps tendinosis. There is small amount of fluid in the subacromial/subdeltoid bursa.\par \par There is no significant thickening of the coracohumeral ligament or evidence for effacement of the subcoracoid fat to suggest imaging evidence for adhesive capsulitis. Evaluation of the labrum reveals no evidence of a displaced labral tear. No focal chondral injury is identified.\par \par There are productive changes in the acromioclavicular joint. There is no bone marrow contusion or fracture.\par \par Impression:\par 1. Completely retracted tearing of the supraspinatus enthesis extending into the anterior one half of the infraspinatus enthesis.\par \par 2. High-grade nonretracted tearing of the subscapularis insertion.\par \par 3. Moderate proximal biceps tendinosis.\par 4. No imaging evidence for adhesive capsulitis.\par

## 2021-03-19 NOTE — HISTORY OF PRESENT ILLNESS
[de-identified] : CC Right shoulder\par \par HPI 54 yo female right HD presents with gradual onset of one year of activity related pain in the lateral Right shoulder [without injury]. The pain is worse, and rated a 10 out of 10, described as sharp stabbing burning throbbing, [without radiation]. Rest makes the pain better and told her motion makes the pain worse. The patient reports associated symptoms of loss of motion weakness. The patient + pain at night affecting sleep, - neck pain, and + similar pain previously.\par \par The patient has tried the following treatments:\par Activity modification	+\par Ice			+\par Nsaids    		+\par Physical Therapy  	+ 6 months\par Cortisone Injection	-\par Arthroscopy/Surgery	-\par \par Review of Systems is positive for the above musculoskeletal symptoms and is otherwise non-contributory for general, constitutional, psychiatric, neurologic, HEENT, cardiac, respiratory, gastrointestinal, reproductive, lymphatic, and dermatologic complaints.\par \par Consult by Dr Chantel Rodriguez

## 2021-03-19 NOTE — DISCUSSION/SUMMARY
[de-identified] : Right shoulder complete supraspinatus and anterior infraspinatus tear, high-grade superior border of the subscapularis tear, biceps tenosynovitis, impingement syndrome, a.c. joint arthrosis\par \par We discussed at length the anatomy, function and tear pattern of the rotator cuff using models, diagrams and drawings. We reviewed the patient's physical exam, radiographic images and history. We discussed the expected outcome of non-operative conservative treatment versus arthroscopic operative rotator cuff repair. Non-operative management consists of patient education, activity modification, corticosteroid injection, PO or topical NSAIDs, and formal physical therapy. Partial thickness tears do have some limited healing potential but infrequently heal completely and with time sometimes progress towards a focal full thickness tear. Smaller tears without retraction are expected to progress and enlarge overtime to larger full thickness tears with retraction. Full thickness rotator cuff tears, in a vast majority of circumstances, do not heal without surgical treatment. The larger the tear becomes, the more difficult the repair is technically and protracts and slows rehabilitation. As the full thickness rotator cuff tear progresses over time, the torn tendon edge retracted due to intrinsic tendon changes to its strength and elasticity, which along with progressive rotator cuff muscle belly atrophy and fatty degeneration makes surgical management both less effective and more difficult. Given enough chronic tendon changes and degenerative muscle changes, the cuff may become unrepairable, necessitating larger, more costly, less predictable poorer outcome, reconstructive arthroscopic or open surgeries including a reverse shoulder replacement.\par \par Patient is unsure she wishes proceed with operative management after long discussion held about BRA of surgrey vs non-op management with explaination of postop course and protocol\par \par We'll continue nonoperative management until the patient decided to proceed to surgery\par \par Physical therapy was prescribed for ROM exercises, cuff/periscapular strengthing, scapular posture, modalities PRN, home exercise program\par \par The patient was prescribed Meloxicam PO non-steroidal anti-inflammatory medication. 7.5 or 15 mg tablets daily to be taken for at least 1-2 weeks in a row and then PRN afterwards. Risks and benefits were discussed and include but not limited to renal damage and GI ulceration and bleeding.  They were advised to take with food to limit stomach upset as well as warned to stop the medication if worsening gastric pain or dizziness or other side effects. Also to immediately stop the medication and seek appriate medical attention if any severe stomach ache, gastritis, black/red vomit, black/red stools or any other medical concern.\par \par \par Given consideration of surgical management recommend withholding ketorolac injections due to risk of infection\par \par The patient verifies their understanding the the visit, diagnosis and plan. They agree with the treatment plan and will contact the office with any questions or problems.\par Follow up\par After PT completed

## 2021-04-09 ENCOUNTER — APPOINTMENT (OUTPATIENT)
Dept: RHEUMATOLOGY | Facility: CLINIC | Age: 56
End: 2021-04-09
Payer: COMMERCIAL

## 2021-04-09 DIAGNOSIS — M75.100 UNSPECIFIED ROTATOR CUFF TEAR OR RUPTURE OF UNSPECIFIED SHOULDER, NOT SPECIFIED AS TRAUMATIC: ICD-10-CM

## 2021-04-09 PROCEDURE — 99072 ADDL SUPL MATRL&STAF TM PHE: CPT

## 2021-04-09 PROCEDURE — 99213 OFFICE O/P EST LOW 20 MIN: CPT

## 2021-04-09 RX ORDER — MELOXICAM 15 MG/1
15 TABLET ORAL
Qty: 30 | Refills: 3 | Status: DISCONTINUED | COMMUNITY
Start: 2021-02-05 | End: 2021-04-09

## 2021-04-09 RX ORDER — VALSARTAN 160 MG/1
160 TABLET, COATED ORAL
Qty: 90 | Refills: 0 | Status: DISCONTINUED | COMMUNITY
Start: 2019-12-27 | End: 2021-04-09

## 2021-04-09 NOTE — HISTORY OF PRESENT ILLNESS
[___ Month(s) Ago] : [unfilled] month(s) ago [FreeTextEntry1] : pending shoulder surgery in 05/3 for complete RC tear\par ongoing PT\par knee pain and hips are better on meloxicam

## 2021-04-09 NOTE — PHYSICAL EXAM
[General Appearance - Alert] : alert [General Appearance - In No Acute Distress] : in no acute distress [Auscultation Breath Sounds / Voice Sounds] : lungs were clear to auscultation bilaterally [Heart Rate And Rhythm] : heart rate was normal and rhythm regular [Heart Sounds] : normal S1 and S2 [Heart Sounds Gallop] : no gallops [Murmurs] : no murmurs [Heart Sounds Pericardial Friction Rub] : no pericardial rub [Full Pulse] : the pedal pulses are present [Edema] : there was no peripheral edema [Bowel Sounds] : normal bowel sounds [Abdomen Soft] : soft [Abdomen Tenderness] : non-tender [] : no hepato-splenomegaly [Abdomen Mass (___ Cm)] : no abdominal mass palpated [Cervical Lymph Nodes Enlarged Posterior Bilaterally] : posterior cervical [Cervical Lymph Nodes Enlarged Anterior Bilaterally] : anterior cervical [Supraclavicular Lymph Nodes Enlarged Bilaterally] : supraclavicular [No CVA Tenderness] : no ~M costovertebral angle tenderness [No Spinal Tenderness] : no spinal tenderness [Abnormal Walk] : normal gait [Nail Clubbing] : no clubbing  or cyanosis of the fingernails [Musculoskeletal - Swelling] : no joint swelling seen [Motor Tone] : muscle strength and tone were normal [Oriented To Time, Place, And Person] : oriented to person, place, and time [Impaired Insight] : insight and judgment were intact [Affect] : the affect was normal [FreeTextEntry1] : bilateral hip pain over the trochanter - bilateral knee with crepitus frozen shoulder, left knee with effusion

## 2021-04-19 ENCOUNTER — NON-APPOINTMENT (OUTPATIENT)
Age: 56
End: 2021-04-19

## 2021-04-19 ENCOUNTER — APPOINTMENT (OUTPATIENT)
Dept: INTERNAL MEDICINE | Facility: CLINIC | Age: 56
End: 2021-04-19
Payer: COMMERCIAL

## 2021-04-19 VITALS
DIASTOLIC BLOOD PRESSURE: 74 MMHG | HEART RATE: 76 BPM | SYSTOLIC BLOOD PRESSURE: 118 MMHG | BODY MASS INDEX: 30.91 KG/M2 | WEIGHT: 168 LBS | OXYGEN SATURATION: 98 % | RESPIRATION RATE: 17 BRPM | HEIGHT: 62 IN | TEMPERATURE: 97.6 F

## 2021-04-19 PROCEDURE — 99214 OFFICE O/P EST MOD 30 MIN: CPT | Mod: 25

## 2021-04-19 PROCEDURE — 93000 ELECTROCARDIOGRAM COMPLETE: CPT

## 2021-04-19 PROCEDURE — 99072 ADDL SUPL MATRL&STAF TM PHE: CPT

## 2021-04-20 LAB
ABO + RH PNL BLD: NORMAL
ALBUMIN SERPL ELPH-MCNC: 4.9 G/DL
ALP BLD-CCNC: 87 U/L
ALT SERPL-CCNC: 32 U/L
ANION GAP SERPL CALC-SCNC: 14 MMOL/L
APPEARANCE: CLEAR
APTT BLD: 35.8 SEC
AST SERPL-CCNC: 26 U/L
BASOPHILS # BLD AUTO: 0.03 K/UL
BASOPHILS NFR BLD AUTO: 0.4 %
BILIRUB SERPL-MCNC: 0.5 MG/DL
BILIRUBIN URINE: NEGATIVE
BLOOD URINE: NEGATIVE
BUN SERPL-MCNC: 14 MG/DL
CALCIUM SERPL-MCNC: 10.1 MG/DL
CHLORIDE SERPL-SCNC: 101 MMOL/L
CHOLEST SERPL-MCNC: 176 MG/DL
CO2 SERPL-SCNC: 25 MMOL/L
COLOR: YELLOW
CREAT SERPL-MCNC: 0.84 MG/DL
EOSINOPHIL # BLD AUTO: 0.05 K/UL
EOSINOPHIL NFR BLD AUTO: 0.7 %
ESTIMATED AVERAGE GLUCOSE: 117 MG/DL
GGT SERPL-CCNC: 22 U/L
GLUCOSE QUALITATIVE U: NEGATIVE
GLUCOSE SERPL-MCNC: 94 MG/DL
HBA1C MFR BLD HPLC: 5.7 %
HCG SERPL-MCNC: 2 MIU/ML
HCT VFR BLD CALC: 45.2 %
HDLC SERPL-MCNC: 54 MG/DL
HGB BLD-MCNC: 14 G/DL
IMM GRANULOCYTES NFR BLD AUTO: 0.1 %
INR PPP: 1.1 RATIO
KETONES URINE: NEGATIVE
LDLC SERPL CALC-MCNC: 95 MG/DL
LEUKOCYTE ESTERASE URINE: NEGATIVE
LYMPHOCYTES # BLD AUTO: 1.87 K/UL
LYMPHOCYTES NFR BLD AUTO: 27.7 %
MAN DIFF?: NORMAL
MCHC RBC-ENTMCNC: 27.3 PG
MCHC RBC-ENTMCNC: 31 GM/DL
MCV RBC AUTO: 88.3 FL
MONOCYTES # BLD AUTO: 0.45 K/UL
MONOCYTES NFR BLD AUTO: 6.7 %
NEUTROPHILS # BLD AUTO: 4.35 K/UL
NEUTROPHILS NFR BLD AUTO: 64.4 %
NITRITE URINE: NEGATIVE
NONHDLC SERPL-MCNC: 122 MG/DL
PH URINE: 5.5
PLATELET # BLD AUTO: 335 K/UL
POTASSIUM SERPL-SCNC: 4.1 MMOL/L
PROT SERPL-MCNC: 7.8 G/DL
PROTEIN URINE: NORMAL
PT BLD: 12.9 SEC
RBC # BLD: 5.12 M/UL
RBC # FLD: 13.3 %
SODIUM SERPL-SCNC: 140 MMOL/L
SPECIFIC GRAVITY URINE: 1.02
T3 SERPL-MCNC: 112 NG/DL
T4 FREE SERPL-MCNC: 1.2 NG/DL
TRIGL SERPL-MCNC: 135 MG/DL
TSH SERPL-ACNC: 1.86 UIU/ML
UROBILINOGEN URINE: NORMAL
WBC # FLD AUTO: 6.76 K/UL

## 2021-04-20 NOTE — HISTORY OF PRESENT ILLNESS
[No Pertinent Cardiac History] : no history of aortic stenosis, atrial fibrillation, coronary artery disease, recent myocardial infarction, or implantable device/pacemaker [Asthma] : asthma [No Adverse Anesthesia Reaction] : no adverse anesthesia reaction in self or family member [Diabetes] : diabetes [(Patient denies any chest pain, claudication, dyspnea on exertion, orthopnea, palpitations or syncope)] : Patient denies any chest pain, claudication, dyspnea on exertion, orthopnea, palpitations or syncope [COPD] : no COPD [Sleep Apnea] : no sleep apnea [Smoker] : not a smoker [Chronic Anticoagulation] : no chronic anticoagulation [Chronic Kidney Disease] : no chronic kidney disease [FreeTextEntry1] : Right Shoulder Sx, as per ORTHO [FreeTextEntry2] : 05/03/21 [FreeTextEntry4] : 55 year old female with history of stable Hypertension, Type 2 Diabetes Mellitus, Asthma, Hypercholesterolemia, history as stated, presented for clearance evaluation prior to possible Right Shoulder Sx, as per ORTHO, for Right Shoulder - Torn Rotator Cuff. [FreeTextEntry3] : Unknown

## 2021-04-20 NOTE — ASSESSMENT
[Procedure Intermediate Risk] : the procedure risk is intermediate [Patient Intermediate Risk] : the patient is an intermediate risk [Optimized for Surgery Pending Laboratory Results] : the patient is optimized for surgery pending laboratory results [As per surgery] : as per surgery [Continue] : Continue medications as currently directed [FreeTextEntry4] : 55 year old female found to have stable Hypertension, Type 2 Diabetes Mellitus, Asthma, Hypercholesterolemia,with the current prescription regimen as recommended, diet and life style modifications, as counseled. Prior results reviewed, interpreted and discussed with the patient during today's examination, as appropriate. Follow up, treatment plan and tests, as ordered.\par \par Possible Right Shoulder Sx, as per ORTHO, for Right Shoulder - Torn Rotator Cuff.\par \par EKG showed NSR at the rate of 80 BPM, LAD, non-sp ST-T changes noted.\par \par Total time spent : 35 minutes\par Including:\par Preparation prior to visit - Reviewing prior record, results of tests and Consultation Reports as applicable\par Conducting an appropriate H & P during today's encounter\par Appropriate orders for tests, medications and procedures, as applicable\par Counseling patient \par Note completion\par

## 2021-04-20 NOTE — ADDENDUM
[FreeTextEntry1] : PST results from 04/19/2021 noted, discussed with the patient.\par \par Patient is medically optimized to the best at this time for the stated intervention.\par Patient is medically cleared.\par

## 2021-04-21 ENCOUNTER — OUTPATIENT (OUTPATIENT)
Dept: OUTPATIENT SERVICES | Facility: HOSPITAL | Age: 56
LOS: 1 days | End: 2021-04-21
Payer: COMMERCIAL

## 2021-04-21 DIAGNOSIS — Z90.710 ACQUIRED ABSENCE OF BOTH CERVIX AND UTERUS: Chronic | ICD-10-CM

## 2021-04-21 DIAGNOSIS — M75.121 COMPLETE ROTATOR CUFF TEAR OR RUPTURE OF RIGHT SHOULDER, NOT SPECIFIED AS TRAUMATIC: ICD-10-CM

## 2021-04-21 DIAGNOSIS — Z01.818 ENCOUNTER FOR OTHER PREPROCEDURAL EXAMINATION: ICD-10-CM

## 2021-04-21 DIAGNOSIS — Z98.890 OTHER SPECIFIED POSTPROCEDURAL STATES: Chronic | ICD-10-CM

## 2021-04-21 PROCEDURE — 71046 X-RAY EXAM CHEST 2 VIEWS: CPT

## 2021-04-21 PROCEDURE — 71046 X-RAY EXAM CHEST 2 VIEWS: CPT | Mod: 26

## 2021-05-04 ENCOUNTER — APPOINTMENT (OUTPATIENT)
Dept: INTERNAL MEDICINE | Facility: CLINIC | Age: 56
End: 2021-05-04

## 2021-06-07 ENCOUNTER — APPOINTMENT (OUTPATIENT)
Dept: INTERNAL MEDICINE | Facility: CLINIC | Age: 56
End: 2021-06-07
Payer: COMMERCIAL

## 2021-06-07 VITALS
HEIGHT: 62 IN | WEIGHT: 172 LBS | OXYGEN SATURATION: 80 % | BODY MASS INDEX: 31.65 KG/M2 | DIASTOLIC BLOOD PRESSURE: 72 MMHG | TEMPERATURE: 97.5 F | SYSTOLIC BLOOD PRESSURE: 114 MMHG | HEART RATE: 98 BPM

## 2021-06-07 DIAGNOSIS — U07.1 COVID-19: ICD-10-CM

## 2021-06-07 PROCEDURE — 99214 OFFICE O/P EST MOD 30 MIN: CPT

## 2021-06-07 RX ORDER — BISACODYL 5 MG/1
5 TABLET, COATED ORAL
Qty: 4 | Refills: 0 | Status: COMPLETED | COMMUNITY
Start: 2021-03-03 | End: 2021-06-07

## 2021-06-07 RX ORDER — FAMOTIDINE 20 MG/1
20 TABLET, FILM COATED ORAL
Qty: 180 | Refills: 0 | Status: COMPLETED | COMMUNITY
Start: 2020-12-30 | End: 2021-06-07

## 2021-06-07 RX ORDER — POLYETHYLENE GLYCOL 3350 17 G/17G
17 POWDER, FOR SOLUTION ORAL
Qty: 1 | Refills: 0 | Status: COMPLETED | COMMUNITY
Start: 2021-03-03 | End: 2021-06-07

## 2021-06-07 NOTE — ASSESSMENT
[FreeTextEntry1] : hpgracy -  She will be treated  with new guidelines using 4 drugs for eradication and also probiotic.  I discussed risks of treatment such as pseudomembranous colitis and to take probiotic for 6 months.  she also understands allergic reactions can occur and she is to stop the medication immediately and go to er.Most individuals with chronic gastritis or duodenitis have no symptoms. However, some people develop more serious problems, including stomach or duodenal ulcers.\par \par Ulcers can cause a variety of symptoms or no symptoms at all, with the most common ulcer symptoms including:\par \par ?Pain or discomfort (usually in the upper abdomen)\par \par ?Bloating\par \par ?Feeling full after eating a small amount of food\par \par ?Lack of appetite\par \par ?Nausea or vomiting\par \par ?Dark or tar-colored stools\par \par ?Ulcers that bleed can cause a low blood count and fatigue (see "Patient education: Peptic ulcer disease (Beyond the Basics)")\par \par \par Less commonly, chronic gastritis causes abnormal changes in the stomach lining, which can lead to certain forms of cancer. It is uncommon to develop cancer as a result of H. pylori infection. Nevertheless, because so many people in the world are infected with H. pylori, it is considered to be an important cause of stomach cancer. People who live in countries in which H. pylori infection occurs at an early age are at greatest risk of stomach cancer.\par \par \par H. PYLORI DIAGNOSIS\par There are several ways to diagnose H. pylori. The most commonly used tests include the following:\par \par Breath tests — Breath tests (known as urea breath tests) require that you drink a specialized solution containing a substance that is broken down by the H. pylori bacterium. The breakdown products can be detected in your breath.\par \par Stool tests — Tests are available that detect H. pylori proteins in stool.\par \par Blood tests — Blood tests can detect specific antibodies (proteins) that the body's immune system develops in response to the H. pylori bacterium. However, concerns over its accuracy have limited its use.\par \par \par WHO SHOULD BE TESTED FOR H. PYLORI?\par \par If you have symptoms — Diagnostic testing for H. pylori infection is recommended if you have active gastric or duodenal ulcers or if you have a past history of ulcers.\par \par Although H. pylori infection is the most common cause of ulcers, not all patients with ulcers have H. pylori. Certain medications (eg, aspirin, ibuprofen [Motrin, Advil], naproxen [Aleve]) can also cause peptic ulcers. (See "Patient education: Peptic ulcer disease (Beyond the Basics)".)\par \par If you do not have symptoms — H. pylori testing is usually not recommended if you have no symptoms and no past history of peptic ulcer disease. However, it may be considered for selected people, such as those with a family history or concern about stomach cancer, particularly individuals of Chinese, Pashto, French, or Central American descent; these groups have a higher incidence of stomach cancer.\par \par \par H. PYLORI TREATMENT\par People with a history of peptic ulcer disease, active gastric ulcer, or active duodenal ulcer associated with H. pylori infection should be treated. Successful treatment of H. pylori can help the ulcer to heal, prevent ulcers from coming back, and reduce the risk of ulcer complications (like bleeding). Guidelines in the [place] and other countries recommend that patients who require long-term anti-inflammatory medications such as aspirin, ibuprofen, naproxen, and similar drugs treatment for arthritis and other medical conditions should be tested for H. pylori and if infected undergo treatment to eradicate the H. pylori infection [1,2].\par \par Medications — No single drug cures H. pylori infection. Most treatment regimens involve taking several medications for 14 days.\par \par ?Most of the treatment regimens include a medication called a proton pump inhibitor. This medication decreases the stomach's production of acid, which allows the tissues damaged by the infection to heal. Examples of proton pump inhibitors include lansoprazole (Prevacid), omeprazole (Prilosec), pantoprazole (Protonix), rabeprazole (AcipHex), dexlansoprazole (Dexilant), and esomeprazole (Nexium). \par \par \par ?Two antibiotics are also generally recommended; this reduces the risk of treatment failure and antibiotic resistance.\par \par \par ?There are increasing numbers of patients with H. pylori infection that is resistant to antibiotics, so it is important to take all the medications prescribed and to have a test that confirms that the infection has been cleared.\par \par \par For H. pylori treatment to be effective, it is important to take the entire course of all medications.\par \par Side effects — Up to 50 percent of patients have side effects while taking H. pylori treatment. Side effects are usually mild, and fewer than 10 percent of patients stop treatment because of side effects. For those who do experience side effects, it may be possible to make adjustments in the dose or timing of medication. Some of the most common side effects are described below.\par \par ?Some of the treatment regimens use a medication called metronidazole (Flagyl) or clarithromycin (Biaxin). These medications can cause a metallic taste in the mouth.\par \par \par ?Alcoholic beverages (eg, beer, wine) should be avoided while taking metronidazole; the combination can cause skin flushing, headache, nausea, vomiting, sweating, and a rapid heart rate.\par \par \par ?Bismuth, which is contained in some of the regimens, causes the stool to become black and may cause constipation.\par \par \par ?Many of the regimens cause diarrhea and stomach cramps.\par \par \par Treatment failure — Up to 20 percent of patients with H. pylori infection are not cured after completing their first course of treatment. A second treatment regimen is usually recommended in this case. Retreatment usually requires that the patient take 14 days of a proton pump inhibitor and two antibiotics. At least one of the antibiotics is different from those used in the first treatment course.\par \par Follow-up — After completing H. pylori treatment, repeat testing is usually performed to ensure that the infection has resolved. This is typically done with a breath or stool test (see 'Breath tests' above). Blood tests are not recommended for follow up testing; the antibody detected by the blood test often remains in the blood for four or more months after treatment, even after the infection is eliminated.\par \par \par SUMMARY\par \par ?Helicobacter pylori, also known as H. pylori, is a bacterium that is commonly found in the stomach. Most people infected with H. pylori have no problems. However, some people develop problems, such as stomach ulcers.\par \par \par ?Ulcers may cause no symptoms, or may cause pain or discomfort (usually in the upper abdomen), bloating, feeling full after eating a small amount of food, lack of appetite, nausea, vomiting, and dark or tar-colored stools. Ulcers that bleed can cause a low blood count.\par \par \par ?H. pylori can be diagnosed with a test of the blood, breath, or stool.\par \par \par ?H. pylori testing is recommended for anyone with a peptic (stomach or duodenal) ulcer.\par \par \par ?Anyone diagnosed with H. pylori should be treated. H. pylori treatment helps to heal the ulcer, lowers the risk that the ulcer will return, and lowers the risk of bleeding from the ulcer.\par \par \par ?H. pylori treatment usually includes several medicines. At least two of the medicines are antibiotics that help to kill the bacteria. The other medication causes the stomach to make less acid; lower acid levels help the ulcer to heal.\par \par \par ?Most people are cured after finishing two weeks of medicine. Some people need to take another two weeks of medicine. It is important to finish all of the medicine to ensure that the bacteria are killed.\par \par \par ?Guidelines recommend that all patients treated for H. pylori undergo a breath or stool test two weeks after finishing the medication [1-3].This is done to be sure that the bacteria were killed. It is recommended that the test is performed 30 days after the treatment is completed and off proton pump medication for 1 to 2 weeks before eradication testing.\par She has been treated in past  and was eradicated  but has new infection and will have her family retested  .She does drink form other family members cups and forks.  \par 2 tubular adenoma   -  The finding of polyps in the colon or rectum often raises questions for patients and their family. What is the significance of finding a polyp? Does this mean that I have, or will develop, colon or rectal (colorectal) cancer? Will a polyp require surgery?\par \par Some types of polyps (called adenomas) have the potential to become cancerous, while others (hyperplastic or inflammatory polyps) have virtually no chance of becoming cancerous.\par \par When discussing colon polyps, the following points should be considered:\par \par ?Polyps are common (they occur in 30 to 50 percent of adults)\par \par ?Not all polyps will become cancer\par \par ?It takes many years for a polyp to become cancerous\par \par ?Polyps can be completely and safely removed\par \par \par The best course of action when a polyp is found depends upon the number, type, size, and location of the polyp. People who have an adenoma removed will require a follow-up examination; new polyps may develop over time that need to be removed.\par \par \par COLON POLYP CAUSES\par Polyps are very common in men and women of all races who live in industrialized countries, suggesting that dietary and environmental factors play a role in their development.\par \par Lifestyle — Although the exact causes are not completely understood, lifestyle risk factors include the following:\par \par ?A high-fat diet\par \par ?A diet high in red meat\par \par ?A low-fiber diet\par \par ?Cigarette smoking\par \par ?Obesity\par \par \par On the other hand, use of aspirin and other nonsteroidal anti-inflammatory drugs and a high-calcium diet may protect against the development of colon cancer. (See "Patient education: Screening for colorectal cancer (Beyond the Basics)".)\par \par Aging — Colorectal cancer and polyps are uncommon before age 40. Ninety percent of cases occur after age 50, with men somewhat more likely to develop polyps than women; therefore, colon cancer screening is usually recommended starting at age 50 for both sexes. It takes approximately 10 years for a small polyp to develop into cancer.\par \par Family history and genetics — Polyps and colon cancer tend to run in families, suggesting that genetic factors are important in their development.\par \par Any history of colon polyps or colon cancer in the family should be discussed with a healthcare provider, particularly if cancer developed at an early age, in close relatives, or in multiple family members. As a general rule, screening for colon cancer begins at an earlier age in people with a family history of cancer or polyps.\par \par Rare genetic diseases can cause high rates of colorectal cancer relatively early in adult life. Familial adenomatous polyposis and MUTYH-associated polyposis cause multiple colon polyps. Another, hereditary nonpolyposis colon cancer or Chung syndrome, increases the risk of colon cancer, but does not cause a large number of polyps. Testing for these genes may be recommended for families with high rates of cancer, but is not generally recommended for other groups.\par \par \par TYPES OF COLON POLYPS\par The most common types of polyps are hyperplastic and adenomatous polyps. Other types of polyps can also be found in the colon, although these are far less common and are not discussed here.\par \par Hyperplastic polyps — Hyperplastic polyps are usually small, located in the end-portion of the colon (the rectum and sigmoid colon), have no potential to become malignant, and are not worrisome (figure 1). It is not always possible to distinguish a hyperplastic polyp from an adenomatous polyp based upon appearance during colonoscopy, which means that hyperplastic polyps are often removed or biopsied to allow microscopic examination.\par \par Adenomatous polyps — Two-thirds of colon polyps are adenomas. Most of these polyps do not develop into cancer, although they have the potential to become cancerous. Adenomas are classified by their size, general appearance, and their specific features as seen under the microscope.\par \par As a general rule, the larger the adenoma, the more likely it is to eventually become a cancer. As a result, large polyps (larger than 5 millimeters, approximately 3/8 inch) are usually removed completely to prevent cancer and for microscopic examination to guide follow-up testing.\par \par Malignant polyps — Polyps that contain cancerous cells are known as malignant polyps. The optimal treatment for malignant polyps depends upon the extent of the cancer (when examined with a microscope) and other individual factors. (See "Overview of colon polyps".)\par \par \par COLON POLYP DIAGNOSIS\par Polyps usually do not cause symptoms but may be detected during a colon cancer screening examination (such as flexible sigmoidoscopy or colonoscopy) (picture 1) or after a positive screening test for occult blood in the stool. \par \par Colonoscopy is the best way to evaluate the colon because it allows the clinician to see the entire lining of the colon and remove most polyps that are found (occasionally, large polyps need to be removed during a separate procedure). During colonoscopy, a clinician inserts a very thin, flexible tube with a light source and small camera into the anus. The tube is advanced through the entire length of the large intestine (colon). (See "Patient education: Colonoscopy (Beyond the Basics)".)\par \par The inside of the colon is a tube-like structure with a flat surface with curved folds. A polyp appears as a lump that protrudes into the inside of the colon (picture 1). The tissue covering a polyp may look the same as normal colon tissue, or, there may be tissue changes ranging from subtle color changes to ulceration and bleeding. Some polyps are flat ("sessile") and others extend out on a stalk ("pedunculated").\par \par Colonoscopy is the best test for the follow-up examination of polyps. Virtual colonoscopy using computed tomography technology is another test used to detect polyps.\par \par \par COLON POLYP REMOVAL\par Colorectal cancer is preventable if precancerous polyps (ie, adenomas) are detected and removed before they become malignant (cancerous). Over time, small polyps can change their structure and become cancerous. Polyps are usually removed when they are found on colonoscopy, which eliminates the chance for that polyp to become cancerous.\par \par Procedure — The medical term for removing polyps is polypectomy. Most polypectomies can be performed through a colonoscope. Small polyps can be removed with an instrument that is inserted through the colonoscope and snips off small pieces of tissue. Larger polyps are usually removed by placing a noose, or snare, around the polyp base and burning through it with electric cautery (figure 2). The cautery also helps to stop bleeding after the polyp is removed.\par \par Polyp removal is not painful because the lining of the colon does not have the ability to feel pain. In addition, a sedative medication is given before the colonoscopy to prevent pain caused by stretching of the colon. Rarely, a polyp will be too large to remove during colonoscopy, which means that a surgical procedure will be needed at a later time.\par \par Complications — Polypectomy is safe although it has a few potential risks and complications. The most common complications are bleeding and perforation (creating a hole in the colon). Fortunately, this occurs infrequently (one in 1000 patients having colonoscopy). Bleeding can usually be controlled during colonoscopy by cauterizing (applying heat) to the bleeding site; surgery is sometimes required for perforation.\par \par Medication use — Nonsteroidal anti-inflammatory drugs including aspirin, ibuprofen (sample brand names: Advil, Motrin), and naproxen (sample brand name: Aleve) can usually be continued before your colonoscopy. Acetaminophen (sample brand name: Tylenol) is safe to take. People who require anti-clotting medications such as warfarin (sample brand name: Coumadin) should discuss how and when to stop and resume this medication with their clinician.\par \par \par COLON CANCER PREVENTION\par \par Follow-up colonoscopy — Patients should discuss the results of the tissue analysis when they are available, within a few weeks after the procedure, to decide if and when a follow-up examination is needed.\par   3 constiaptoion Dietary fiber — found mainly in fruits, vegetables, whole grains and legumes — is probably best known for its ability to prevent or relieve constipation. But foods containing fiber can provide other health benefits as well, such as helping to maintain a healthy weight and lowering your risk of diabetes and heart disease.\par \par Selecting tasty foods that provide fiber isn't difficult. Find out how much dietary fiber you need, the foods that contain it, and how to add them to meals and snacks.\par \par Dietary fiber, also known as roughage or bulk, includes the parts of plant foods your body can't digest or absorb. Unlike other food components, such as fats, proteins or carbohydrates — which your body breaks down and absorbs — fiber isn't digested by your body. Instead, it passes relatively intact through your stomach, small intestine and colon and out of your body.\par \par Fiber is commonly classified as soluble, which dissolves in water, or insoluble, which doesn't dissolve.\par •Soluble fiber. This type of fiber dissolves in water to form a gel-like material. It can help lower blood cholesterol and glucose levels. Soluble fiber is found in oats, peas, beans, apples, citrus fruits, carrots, barley and psyllium.\par •Insoluble fiber. This type of fiber promotes the movement of material through your digestive system and increases stool bulk, so it can be of benefit to those who struggle with constipation or irregular stools. Whole-wheat flour, wheat bran, nuts, beans and vegetables, such as cauliflower, green beans and potatoes, are good sources of insoluble fiber.\par \par Most plant-based foods, such as oatmeal and beans, contain both soluble and insoluble fiber. However, the amount of each type varies in different plant foods. To receive the greatest health benefit, eat a wide variety of high-fiber foods.\par \par A high-fiber diet has many benefits, which include:\par •Normalizes bowel movements. Dietary fiber increases the weight and size of your stool and softens it. A bulky stool is easier to pass, decreasing your chance of constipation. If you have loose, watery stools, fiber may help to solidify the stool because it absorbs water and adds bulk to stool.\par •Helps maintain bowel health. A high-fiber diet may lower your risk of developing hemorrhoids and small pouches in your colon (diverticular disease). Some fiber is fermented in the colon. Researchers are looking at how this may play a role in preventing diseases of the colon.\par •Lowers cholesterol levels. Soluble fiber found in beans, oats, flaxseed and oat bran may help lower total blood cholesterol levels by lowering low-density lipoprotein, or "bad," cholesterol levels. Studies also have shown that high-fiber foods may have other heart-health benefits, such as reducing blood pressure and inflammation.\par •Helps control blood sugar levels. In people with diabetes, fiber — particularly soluble fiber — can slow the absorption of sugar and help improve blood sugar levels. A healthy diet that includes insoluble fiber may also reduce the risk of developing type 2 diabetes.\par •Aids in achieving healthy weight. High-fiber foods tend to be more filling than low-fiber foods, so you're likely to eat less and stay satisfied longer. And high-fiber foods tend to take longer to eat and to be less "energy dense," which means they have fewer calories for the same volume of food.\par \par Another benefit attributed to dietary fiber is prevention of colorectal cancer. However, the evidence that fiber reduces colorectal cancer is mixed.\par \par The [employer], which provides science-based advice on matters of medicine and health, gives the following daily fiber recommendations for adults:\par \par \par \par Age 50 or younger\par \par Age 51 or older\par \par \par [employer] \par \par Men 38 grams 30 grams \par Women 25 grams 21 grams \par \par If you aren't getting enough fiber each day, you may need to boost your intake. Good choices include:\par •Whole-grain products\par •Fruits\par •Vegetables\par •Beans, peas and other legumes\par •Nuts and seeds\par \par Refined or processed foods — such as canned fruits and vegetables, pulp-free juices, white breads and pastas, and non-whole-grain cereals — are lower in fiber. The grain-refining process removes the outer coat (bran) from the grain, which lowers its fiber content. Enriched foods have some of the B vitamins and iron back after processing, but not the fiber.\par \par Whole foods rather than fiber supplements are generally better. Fiber supplements — such as Metamucil, Citrucel and FiberCon — don't provide the variety of fibers, vitamins, minerals and other beneficial nutrients that foods do.\par \par Another way to get more fiber is to eat foods, such as cereal, granola bars, yogurt, and ice cream, with fiber added. The added fiber usually is labeled as "inulin" or "chicory root." Some people complain of gassiness after eating foods with added fiber.\par \par However, some people may still need a fiber supplement if dietary changes aren't sufficient or if they have certain medical conditions, such as constipation, diarrhea or irritable bowel syndrome. Check with your doctor before taking fiber supplements.\par \par Need ideas for adding more fiber to your meals and snacks? Try these suggestions:\par •Jump-start your day. For breakfast choose a high-fiber breakfast cereal — 5 or more grams of fiber a serving. Opt for cereals with "whole grain," "bran" or "fiber" in the name. Or add a few tablespoons of unprocessed wheat bran to your favorite cereal.\par •Switch to whole grains. Consume at least half of all grains as whole grains. Look for breads that list whole wheat, whole-wheat flour or another whole grain as the first ingredient on the label and have least 2 grams of dietary fiber a serving. [place] with brown rice, wild rice, barley, whole-wheat pasta and bulgur wheat.\par •Bulk up baked goods. Substitute whole-grain flour for half or all of the white flour when baking. Try adding crushed bran cereal, unprocessed wheat bran or uncooked oatmeal to muffins, cakes and cookies.\par •Lean on legumes. Beans, peas and lentils are excellent sources of fiber. Add kidney beans to canned soup or a green salad. Or make nachos with refried black beans, lots of fresh veggies, whole-wheat tortilla chips and salsa.\par •Eat more fruit and vegetables. Fruits and vegetables are rich in fiber, as well as vitamins and minerals. Try to eat five or more servings daily.\par •Make snacks count. Fresh fruits, raw vegetables, low-fat popcorn and whole-grain crackers are all good choices. An occasional handful of nuts or dried fruits also is a healthy, high-fiber snack — although be aware that nuts and dried fruits are high in calories.\par \par High-fiber foods are good for your health. But adding too much fiber too quickly can promote intestinal gas, abdominal bloating and cramping. Increase fiber in your diet gradually over a period of a few weeks. This allows the natural bacteria in your digestive system to adjust to the change.\par \par Also, drink plenty of water. Fiber works best when it absorbs water, making your stool soft and bulky\par

## 2021-06-07 NOTE — HISTORY OF PRESENT ILLNESS
[Heartburn] : stable heartburn [Nausea] : denies nausea [Vomiting] : denies vomiting [Diarrhea] : denies diarrhea [Constipation] : denies constipation [Yellow Skin Or Eyes (Jaundice)] : denies jaundice [Abdominal Pain] : denies abdominal pain [Abdominal Swelling] : denies abdominal swelling [Rectal Pain] : denies rectal pain [GERD] : gastroesophageal reflux disease [Peptic Ulcer Disease] : peptic ulcer disease [Pancreatitis] : no pancreatitis [Cholelithiasis] : no cholelithiasis [Kidney Stone] : no kidney stone [Inflammatory Bowel Disease] : no inflammatory bowel disease [Irritable Bowel Syndrome] : no irritable bowel syndrome [Diverticulitis] : no diverticulitis [Alcohol Abuse] : no alcohol abuse [de-identified] : Pt had hpylori in past and was treated and eradicated  .Her  was also treated along with her three children.  She has a few hpylori on bx of antrum  and likely a new infection.  she has been treated with amoxicillin Biaxin and also rifabutin and was treated prior to seeing me in 2017. she has not been treated since 2018 and this is leikly a new infections and willt ry to  retreat with pylera.

## 2021-06-07 NOTE — PHYSICAL EXAM
[General Appearance - Alert] : alert [General Appearance - In No Acute Distress] : in no acute distress [PERRL With Normal Accommodation] : pupils were equal in size, round, and reactive to light [Oropharynx] : the oropharynx was normal [Auscultation Breath Sounds / Voice Sounds] : lungs were clear to auscultation bilaterally [Apical Impulse] : the apical impulse was normal [Heart Rate And Rhythm] : heart rate was normal and rhythm regular [Heart Sounds] : normal S1 and S2 [Heart Sounds Gallop] : no gallops [Edema] : there was no peripheral edema [Bowel Sounds] : normal bowel sounds [Abdomen Soft] : soft [Abdomen Tenderness] : non-tender [Abdomen Mass (___ Cm)] : no abdominal mass palpated [Cervical Lymph Nodes Enlarged Posterior Bilaterally] : posterior cervical [Cervical Lymph Nodes Enlarged Anterior Bilaterally] : anterior cervical [No CVA Tenderness] : no ~M costovertebral angle tenderness [Abnormal Walk] : normal gait [Nail Clubbing] : no clubbing  or cyanosis of the fingernails [Musculoskeletal - Swelling] : no joint swelling seen [Motor Tone] : muscle strength and tone were normal [Skin Color & Pigmentation] : normal skin color and pigmentation [Skin Turgor] : normal skin turgor [] : no rash [Skin Lesions] : no skin lesions [Deep Tendon Reflexes (DTR)] : deep tendon reflexes were 2+ and symmetric [Sensation] : the sensory exam was normal to light touch and pinprick [No Focal Deficits] : no focal deficits [Oriented To Time, Place, And Person] : oriented to person, place, and time [Impaired Insight] : insight and judgment were intact [Affect] : the affect was normal

## 2021-06-11 ENCOUNTER — APPOINTMENT (OUTPATIENT)
Dept: RHEUMATOLOGY | Facility: CLINIC | Age: 56
End: 2021-06-11
Payer: COMMERCIAL

## 2021-06-11 VITALS
RESPIRATION RATE: 16 BRPM | TEMPERATURE: 97.9 F | WEIGHT: 172 LBS | HEART RATE: 85 BPM | DIASTOLIC BLOOD PRESSURE: 77 MMHG | SYSTOLIC BLOOD PRESSURE: 117 MMHG | HEIGHT: 62 IN | OXYGEN SATURATION: 98 % | BODY MASS INDEX: 31.65 KG/M2

## 2021-06-11 PROCEDURE — 99212 OFFICE O/P EST SF 10 MIN: CPT

## 2021-06-11 NOTE — HISTORY OF PRESENT ILLNESS
[___ Month(s) Ago] : [unfilled] month(s) ago [FreeTextEntry1] : s/p right RC tear on 05/03 - still on sling\par  pending starting PT in another 3 weeks\par pain is management now on Tylenol and aleve\par has stopped meloxicam - as per surgeon request\par just started on H/Pylori medication

## 2021-06-11 NOTE — ASSESSMENT
[FreeTextEntry1] : 53 year-old female with positive FARSHAD and hx of FINCH with polyarthralgia - not responsive to prednisone\par \par 1. Polyarthralgia  and positive FARSHAD and borderline RF at 17  - now on aleve and tylenol s/p shoulder surgery - can use diclofenac gel as needed\par 2. Breast CA - now on tamoxifen\par 3. Lipoma - referral to derm for excision - not seen yet\par 4. Hip and knee pain -x-rays with mild arthritis -   can continue with meloxicam -not active at this time\par 5. Right shoulder- complete RC tear -s/p surgery to begin PT\par \par \par I reviewed previous labs results with patients.\par Diagnosis and Prognosis discussed\par Continue with current medications\par medications refilled\par F/u 2 months\par

## 2021-07-14 ENCOUNTER — APPOINTMENT (OUTPATIENT)
Dept: PULMONOLOGY | Facility: CLINIC | Age: 56
End: 2021-07-14

## 2021-07-19 ENCOUNTER — APPOINTMENT (OUTPATIENT)
Dept: INTERNAL MEDICINE | Facility: CLINIC | Age: 56
End: 2021-07-19
Payer: COMMERCIAL

## 2021-07-19 VITALS
SYSTOLIC BLOOD PRESSURE: 114 MMHG | WEIGHT: 165 LBS | HEIGHT: 62 IN | DIASTOLIC BLOOD PRESSURE: 64 MMHG | BODY MASS INDEX: 30.36 KG/M2 | HEART RATE: 82 BPM | TEMPERATURE: 97.7 F | OXYGEN SATURATION: 98 %

## 2021-07-19 PROCEDURE — 99214 OFFICE O/P EST MOD 30 MIN: CPT

## 2021-07-19 RX ORDER — BUTALBITAL, ACETAMINOPHEN AND CAFFEINE 300; 50; 40 MG/1; MG/1; MG/1
50-300-40 CAPSULE ORAL
Qty: 20 | Refills: 0 | Status: COMPLETED | COMMUNITY
Start: 2018-04-13 | End: 2021-07-19

## 2021-07-19 RX ORDER — DOCUSATE SODIUM 100 MG/1
100 CAPSULE, LIQUID FILLED ORAL
Qty: 30 | Refills: 0 | Status: COMPLETED | COMMUNITY
Start: 2021-05-03 | End: 2021-07-19

## 2021-07-19 RX ORDER — BISMUTH SUBCITRATE POTASSIUM, METRONIDAZOLE, AND TETRACYCLINE HYDROCHLORIDE 140; 125; 125 MG/1; MG/1; MG/1
140-125-125 CAPSULE ORAL
Qty: 120 | Refills: 0 | Status: COMPLETED | COMMUNITY
Start: 2021-06-07 | End: 2021-07-19

## 2021-07-19 RX ORDER — OMEPRAZOLE 40 MG/1
40 CAPSULE, DELAYED RELEASE ORAL TWICE DAILY
Qty: 20 | Refills: 0 | Status: COMPLETED | COMMUNITY
Start: 2021-06-07 | End: 2021-07-19

## 2021-07-19 RX ORDER — CHOLECALCIFEROL (VITAMIN D3) 1250 MCG
1.25 MG CAPSULE ORAL
Qty: 4 | Refills: 2 | Status: COMPLETED | COMMUNITY
Start: 2017-06-06 | End: 2021-07-19

## 2021-07-19 NOTE — REVIEW OF SYSTEMS
[As Noted in HPI] : as noted in HPI [Abdominal Pain] : no abdominal pain [Vomiting] : no vomiting [Diarrhea] : diarrhea [Heartburn] : heartburn [Melena] : no melena [Negative] : Heme/Lymph

## 2021-07-19 NOTE — PHYSICAL EXAM
[General Appearance - Alert] : alert [General Appearance - In No Acute Distress] : in no acute distress [PERRL With Normal Accommodation] : pupils were equal in size, round, and reactive to light [Oropharynx] : the oropharynx was normal [Auscultation Breath Sounds / Voice Sounds] : lungs were clear to auscultation bilaterally [Apical Impulse] : the apical impulse was normal [Heart Rate And Rhythm] : heart rate was normal and rhythm regular [Heart Sounds] : normal S1 and S2 [Heart Sounds Gallop] : no gallops [Edema] : there was no peripheral edema [Normal] : normal [Soft, Nontender] : the abdomen was soft and nontender [No Mass] : no masses were palpated [No HSM] : no hepatosplenomegaly noted [Cervical Lymph Nodes Enlarged Posterior Bilaterally] : posterior cervical [Cervical Lymph Nodes Enlarged Anterior Bilaterally] : anterior cervical [Supraclavicular Lymph Nodes Enlarged Bilaterally] : supraclavicular [Axillary Lymph Nodes Enlarged Bilaterally] : axillary [Femoral Lymph Nodes Enlarged Bilaterally] : femoral [Inguinal Lymph Nodes Enlarged Bilaterally] : inguinal [No CVA Tenderness] : no ~M costovertebral angle tenderness [No Spinal Tenderness] : no spinal tenderness [Abnormal Walk] : normal gait [Nail Clubbing] : no clubbing  or cyanosis of the fingernails [Musculoskeletal - Swelling] : no joint swelling seen [Motor Tone] : muscle strength and tone were normal [Skin Color & Pigmentation] : normal skin color and pigmentation [Skin Turgor] : normal skin turgor [] : no rash [Deep Tendon Reflexes (DTR)] : deep tendon reflexes were 2+ and symmetric [Sensation] : the sensory exam was normal to light touch and pinprick [No Focal Deficits] : no focal deficits [Oriented To Time, Place, And Person] : oriented to person, place, and time [Impaired Insight] : insight and judgment were intact [Affect] : the affect was normal

## 2021-07-19 NOTE — HISTORY OF PRESENT ILLNESS
[Heartburn] : stable heartburn [Nausea] : denies nausea [Vomiting] : denies vomiting [Constipation] : denies constipation [Yellow Skin Or Eyes (Jaundice)] : denies jaundice [Abdominal Pain] : denies abdominal pain [Abdominal Swelling] : denies abdominal swelling [Rectal Pain] : denies rectal pain [Wt Gain ___ Lbs] : no recent weight gain [Wt Loss ___ Lbs] : recent [unfilled] ~Upound(s) weight loss [Diarrhea] : diarrhea [GERD] : gastroesophageal reflux disease [Hiatus Hernia] : hiatus hernia [Peptic Ulcer Disease] : peptic ulcer disease [Pancreatitis] : no pancreatitis [Cholelithiasis] : cholelithiasis [Kidney Stone] : kidney stone [Inflammatory Bowel Disease] : no inflammatory bowel disease [Irritable Bowel Syndrome] : no irritable bowel syndrome [Diverticulitis] : no diverticulitis [Alcohol Abuse] : no alcohol abuse [Malignancy] : malignancy [Abdominal Surgery] : abdominal surgery [Cholecystectomy] : cholecystectomy [Good Compliance] : good compliance with treatment [de-identified] : Pt has been having diarrhea  every other day  liquid  4 times a day.  She is taking probiotic. she will stop pantoprazole to be tested for eradication and c diff.  No blood in her stools, no mucus .   No abdominal pain.  She has stopped stool softener.  She has had heartburn and has changed her diet and avoids spices and it occurs 2  times a day after eating.  She still feels bloating.

## 2021-07-19 NOTE — ASSESSMENT
[FreeTextEntry1] : hpgracy she has to stop ppi for  2-3 weeks  and will come back for testing and take  gaviscon instead when needed.   \par 2 .dyspepsia  discussed Rule of 2's; pt should avoid eating too much; too fast; too spicy; too lousy; less than two hours before bed \par -Things to avoid including overeating, spicy foods, tight clothing, eating within three hours of bed, this list is not all inclusive. \par -For treatment of reflux, possible options discussed including diet control, H2 blockers, PPIs, as well as coating motility agents discussed as treatment options. Timing of meals and proximity of last meal to sleep were discussed. If symptoms persist, a formal gastrointestinal evaluation is needed. \par \par 3.  diarrhea   She will be tested for c diff  number of factors can increase a person's risk of becoming infected with C. difficile:\par \par ?Current or recent antibiotic use – Certain antibiotics increase the risk of becoming infected with C. difficile more than others (table 1).\par \par \par ?Current or recent hospitalization – Up to 20 percent of people who are hospitalized and up to 50 percent of people in long-term care facilities (eg, nursing homes) carry C. difficile in their feces, but many do not have diarrhea or other symptoms. Exposure to these carriers significantly increases a person's risk of becoming infected.\par \par \par ?Older age – The risk of becoming infected with C. difficile is 10 times greater in people who are 65 years or older.\par \par \par ?Severe illness – People who have a weakened immune system as a result of an underlying medical condition or a treatment (eg, chemotherapy) are at increased risk of becoming infected with C. difficile, especially during a hospital stay.\par \par \par ?Recent infection with C. difficile – People who have been recently infected with C. difficile and treated have an increased risk of becoming infected again soon after stopping the treatment.\par \par \par ?Inflammatory bowel disease (ulcerative colitis or Crohn's disease with colitis) – People with colitis from inflammatory bowel disease have an increased risk of developing C. difficile infection. In this circumstance, C. difficile infection may develop in the absence of prior antibiotic treatment.\par \par \par \par C. DIFFICILE SYMPTOMS\par Symptoms of C. difficile may begin during antibiotic therapy or 5 to 10 days after the antibiotic is stopped; less commonly, symptoms do not develop until as late as 10 weeks later.\par \par The symptoms of C. difficile infection can vary in severity:\par \par ?Some people (called carriers) shed the bacteria in their feces but have no signs or symptoms of the infection and can spread the infection to others.\par \par \par ?The most common symptoms include diarrhea (three or more unformed bowel movements per day, or diarrhea associated with abdominal cramping).\par \par \par ?In more severe cases, patients may develop profuse watery diarrhea (up to 10 to 15 times per day), blood or pus in the stool, dehydration, abdominal tenderness and cramping, fever, nausea, loss of appetite, and weight loss. Anyone who develops one or more of these symptoms should seek medical care as soon as possible.\par \par \par ?Life-threatening complications of C. difficile infection develop in a small number of people. Signs and symptoms of severe infection may include abdominal distension, severe abdominal pain, fever (greater than 101ºF, or 38.3ºC), and profuse diarrhea. In rare cases, the bowels can rupture, potentially leading to a body-wide infection (sepsis), organ failure, or even death.\par \par \par \par C. DIFFICILE DIAGNOSIS\par The diagnosis of C. difficile is based upon laboratory analysis of a stool sample to identify toxin-producing C. difficile. (See "Clostridioides (formerly Clostridium) difficile infection in adults: Clinical manifestations and diagnosis".)\par \par \par C. DIFFICILE TREATMENT\par The most important step in treatment of C. difficile is to stop the antibiotic that allowed the infection to develop. If an antibiotic is necessary to treat an ongoing infection, the health care provider may choose an antibiotic that is less likely to allow further growth of C. difficile, when possible.\par \par Antibiotic treatment — Usually an oral antibiotic (most often vancomycin or fidaxomicin) is used to treat people who are infected with C. difficile. It is important to take each dose of the antibiotic on time and to finish the entire course of treatment (usually 10 to 14 days).\par \par Treatment of severe disease — People who become severely ill as a result of C. difficile are treated in the hospital with both oral and intravenous antibiotic and intravenous fluids. The person is monitored closely for signs of worsening disease. (See "Clostridioides (formerly Clostridium) difficile infection in adults: Treatment and prevention".)\par \par Surgery — If a person fails to improve with antibiotics and supportive care and the infection worsens, surgery (removal of the colon) may be warranted; this is generally limited to people with life-threatening illness.\par \par Supportive treatments for diarrhea — Diarrhea can cause a person to become dehydrated quickly, especially if it is severe. To avoid becoming dehydrated, several strategies are recommended.\par \par Drink adequate fluids — It is important to drink an adequate amount of fluids to counteract the loss of fluids from diarrhea. The fluids should contain water, salt, and sugar. The fluids used for sweat replacement (eg, Gatorade) are not optimal, although they may be sufficient for an adult with diarrhea who is not dehydrated and is otherwise healthy. Diluted fruit juices and flavored soft drinks along with saltine crackers and broths or soups may also be acceptable. Fluid replacement in children should be handled differently. (See "Patient education: Acute diarrhea in children (Beyond the Basics)".)\par \par One way to  hydration is by observing the color of the urine and how frequently the person urinates. Normally, urine should be light yellow to nearly colorless. A person who is well hydrated normally passes urine every three to five hours. A person who urinates infrequently or has urine that is dark yellow should drink more fluids.\par \par If a person becomes dehydrated and is unable to take fluids by mouth, a rehydration solution can be given into a vein (intravenous fluids) in a health care provider's office or in the emergency department.\par \par Diet — There is no particular food or group of foods that is best for a person with diarrhea. However, adequate nutrition is important during an episode of acute diarrhea. For patients without an appetite, it is acceptable to consume only liquids for a short period of time. Boiled starches and cereals (eg, potatoes, noodles, rice, wheat, and oats) with salt are recommended for people with watery diarrhea; crackers, bananas, soup, and boiled vegetables may also be eaten. It is advisable to avoid fresh fruits and vegetables and milk products until the diarrhea is gone.\par \par \par C. DIFFICILE PREVENTION\par It is uncommon for people who are not taking antibiotics to become infected with C. difficile. However, it is still important to avoid spreading the bacteria. A person can spread the bacteria for as long as the diarrhea continues.\par \par Hand washing — Hand washing is an effective way to prevent the spread of C. difficile. Hands should be washed after using the bathroom and before eating. Hands should ideally be wet with water and plain or antibacterial soap and rubbed together for 15 to 30 seconds. Special attention should be paid to the fingernails, between the fingers, and the wrists. Hands should be rinsed thoroughly and dried with a single-use towel. Patients and family members are encouraged to remind health care providers to wash their hands as well.\par \par Alcohol-based hand rubs may be less effective against C. difficile; using soap and running water is recommended if there is an outbreak of C. difficile infection.\par \par Fingernails that are artificial (eg, acrylic) are impossible to clean adequately, even after vigorous scrubbing or use of an antimicrobial soap. For this reason, health care workers are not allowed to wear artificial nails.\par \par Contact precautions — People who are hospitalized with C. difficile are placed on contact precautions, which mean that anyone who enters the patient's room must wash their hands before entering and after leaving. The person must also wear a clean gown (over their clothes) and clean gloves. These measures can help to prevent the spread of infection to other people in the hospital. Contact precautions and gowns and gloves are not recommended after patients are discharged. Person-to-person spread to family, work, or social contacts is very rare. Handwashing with soap and water after using the bathroom is recommended for all patients being treated for C. difficile. It is not necessary for a person who is being treated for C. difficile infection at home to avoid\par 4.fatty\par \par

## 2021-07-22 ENCOUNTER — APPOINTMENT (OUTPATIENT)
Dept: INTERNAL MEDICINE | Facility: CLINIC | Age: 56
End: 2021-07-22
Payer: COMMERCIAL

## 2021-07-22 ENCOUNTER — NON-APPOINTMENT (OUTPATIENT)
Age: 56
End: 2021-07-22

## 2021-07-22 PROCEDURE — 99212 OFFICE O/P EST SF 10 MIN: CPT | Mod: 95

## 2021-07-22 NOTE — ASSESSMENT
[FreeTextEntry1] : Time spent for this encounter :  15 minutes.\par More than 50 % of the time spent for - Disease Management and Medication Adherence.\par \par 55 year F patient found to have stable Asthma, Hypertension, Type 2 Diabetes Mellitus,with the current regimen, diet and life style modifications, as counseled. Prior results reviewed and discussed with the patient during today's encounter. Plan as ordered.\par \par \par Current symptoms and evaluation are consistent with possible COVID-19 Vaccination - Adverse reaction, Tylenol OTC PRN, unable to work from 07/22/21 to 07/23/21, may RTW from 07/24/21, without restriction, as counseled.\par \par Patient granted verbal permission to provide Telephonic/Tele Health service in reference to today's encounter, as a substitute form of a visit, for a standard office visit encounter in the phase of an ongoing Pandemic / Covid -19, with the awareness and acceptance of a possible limited nature of such an encounter, with a possibility of an inadvertent omission of possible findings and misleading treatment options, due to possible erroneous and/or incomplete diagnostic impressions.\par

## 2021-07-22 NOTE — HEALTH RISK ASSESSMENT
[No] : In the past 12 months have you used drugs other than those required for medical reasons? No [No falls in past year] : Patient reported no falls in the past year [0] : 2) Feeling down, depressed, or hopeless: Not at all (0) [] : No [de-identified] : None [[id number]] : 0

## 2021-07-22 NOTE — HISTORY OF PRESENT ILLNESS
[Home] : at home, [unfilled] , at the time of the visit. [Medical Office: ([facility])___] : at the medical office located in  [Verbal consent obtained from patient] : the patient, [unfilled] [FreeTextEntry4] : JACKIE Monteiro [de-identified] : 55 year female  patient with history of stable Asthma, Hypertension, Type 2 Diabetes Mellitus, history as stated, initiated AV-TH visit with C/O left arm pit swelling and pain, following Pfizer - COVID-19 Vaccine #, on 07/19/21, was unable to perform at work at this time and for Disease Management and Medication Adherence.\par

## 2021-08-03 LAB
GI PCR PANEL, STOOL: NORMAL
UREA BREATH TEST QL: NEGATIVE

## 2021-08-20 ENCOUNTER — APPOINTMENT (OUTPATIENT)
Dept: RHEUMATOLOGY | Facility: CLINIC | Age: 56
End: 2021-08-20
Payer: COMMERCIAL

## 2021-08-20 VITALS
WEIGHT: 165 LBS | HEIGHT: 62 IN | SYSTOLIC BLOOD PRESSURE: 111 MMHG | DIASTOLIC BLOOD PRESSURE: 72 MMHG | RESPIRATION RATE: 16 BRPM | TEMPERATURE: 98 F | HEART RATE: 74 BPM | BODY MASS INDEX: 30.36 KG/M2 | OXYGEN SATURATION: 98 %

## 2021-08-20 PROCEDURE — 99213 OFFICE O/P EST LOW 20 MIN: CPT

## 2021-08-23 NOTE — HISTORY OF PRESENT ILLNESS
[___ Month(s) Ago] : [unfilled] month(s) ago [FreeTextEntry1] : right shoulder RC tear repair in 06/28 - ongoing PT - feels well\par now with new onset of bilateral thigh paresthesia X 2 weeks - only when she lays down\par also while sitting - not while walking or standing\par has a hx of disc herniation and C4-C5 fracture\par no weakness

## 2021-08-23 NOTE — ASSESSMENT
[FreeTextEntry1] : 53 year-old female with positive FARSHAD and hx of FINCH with polyarthralgia - not responsive to prednisone\par \par 1. Polyarthralgia  and positive FARSHAD and borderline RF at 17  - now on aleve and tylenol s/p shoulder surgery - can use diclofenac gel as needed\par 2. Breast CA - now on tamoxifen\par 3. Lipoma - referral to derm for excision - not seen yet\par 4. Hip and knee pain -x-rays with mild arthritis -   can continue with meloxicam -not active at this time\par 5. Right shoulder- complete RC tear -s/p surgery to begin PT\par 6. bilateral thigh paresthesias - send for lumbar x-rays w-0 will likely need MRI - referral to physiatry - stat gabapentin 100 mg with increase up to 500 mg as tolerated\par \par \par \par I reviewed previous labs results with patients.\par Diagnosis and Prognosis discussed\par Continue with current medications\par medications refilled\par F/u 2 months\par

## 2021-08-23 NOTE — PHYSICAL EXAM
[General Appearance - Alert] : alert [General Appearance - In No Acute Distress] : in no acute distress [Auscultation Breath Sounds / Voice Sounds] : lungs were clear to auscultation bilaterally [Heart Rate And Rhythm] : heart rate was normal and rhythm regular [Heart Sounds] : normal S1 and S2 [Heart Sounds Gallop] : no gallops [Murmurs] : no murmurs [Heart Sounds Pericardial Friction Rub] : no pericardial rub [Full Pulse] : the pedal pulses are present [Edema] : there was no peripheral edema [Bowel Sounds] : normal bowel sounds [Abdomen Soft] : soft [Abdomen Tenderness] : non-tender [] : no hepato-splenomegaly [Abdomen Mass (___ Cm)] : no abdominal mass palpated [Cervical Lymph Nodes Enlarged Posterior Bilaterally] : posterior cervical [Cervical Lymph Nodes Enlarged Anterior Bilaterally] : anterior cervical [Supraclavicular Lymph Nodes Enlarged Bilaterally] : supraclavicular [No CVA Tenderness] : no ~M costovertebral angle tenderness [No Spinal Tenderness] : no spinal tenderness [Abnormal Walk] : normal gait [Nail Clubbing] : no clubbing  or cyanosis of the fingernails [Musculoskeletal - Swelling] : no joint swelling seen [Motor Tone] : muscle strength and tone were normal [Oriented To Time, Place, And Person] : oriented to person, place, and time [Impaired Insight] : insight and judgment were intact [Affect] : the affect was normal [FreeTextEntry1] : decreased sensation over bilateral thighs with pin prick and dull

## 2021-09-02 ENCOUNTER — APPOINTMENT (OUTPATIENT)
Dept: INTERNAL MEDICINE | Facility: CLINIC | Age: 56
End: 2021-09-02
Payer: COMMERCIAL

## 2021-09-02 VITALS
HEART RATE: 67 BPM | SYSTOLIC BLOOD PRESSURE: 116 MMHG | HEIGHT: 62 IN | DIASTOLIC BLOOD PRESSURE: 73 MMHG | TEMPERATURE: 97.3 F | BODY MASS INDEX: 32.2 KG/M2 | WEIGHT: 175 LBS | OXYGEN SATURATION: 98 %

## 2021-09-02 DIAGNOSIS — R19.7 DIARRHEA, UNSPECIFIED: ICD-10-CM

## 2021-09-02 DIAGNOSIS — Z86.19 PERSONAL HISTORY OF OTHER INFECTIOUS AND PARASITIC DISEASES: ICD-10-CM

## 2021-09-02 DIAGNOSIS — D12.6 BENIGN NEOPLASM OF COLON, UNSPECIFIED: ICD-10-CM

## 2021-09-02 PROCEDURE — 99214 OFFICE O/P EST MOD 30 MIN: CPT

## 2021-09-02 RX ORDER — DICLOFENAC SODIUM 1% 10 MG/G
1 GEL TOPICAL DAILY
Qty: 1 | Refills: 4 | Status: COMPLETED | COMMUNITY
Start: 2021-06-11 | End: 2021-09-02

## 2021-09-02 NOTE — ASSESSMENT
[FreeTextEntry1] : GERD   discussed Rule of 2's; pt should avoid eating too much; too fast; too spicy; too lousy; less than two hours before bed \par -Things to avoid including overeating, spicy foods, tight clothing, eating within three hours of bed, this list is not all inclusive. \par -For treatment of reflux, possible options discussed including diet control, H2 blockers, PPIs, as well as coating motility agents discussed as treatment options. Timing of meals and proximity of last meal to sleep were discussed. If symptoms persist, a formal gastrointestinal evaluation is needed. \par \par She is to elevate head of bed and  eat 5 small meals a day  She is on nsaids which we discussed risks of chronic use and will need to be on PPI while taking daily   Risks and benefits were discussed and include but not limited to renal damage and GI ulceration and bleeding. They were advised to take with food to limit stomach upset as well as warned to stop the medication if worsening gastric pain or dizziness or other side effects. Also to immediately stop the medication and seek appropriate medical attention if any severe stomach ache, gastritis, black/red vomit, black/red stools or any other medical concern.\par 2 .  Fatty Liver: The patient denies any jaundice or pruritus. The patient denies any alcohol use. The patient denies taking large doses of nonsteroidal anti-inflammatory drugs or acetaminophen. The findings are suggestive of fatty liver. The patient and I had a long discussion regarding the risks of fatty liver progressing to cirrhosis. The patient was told of the possible increased risk of developing liver failure, cirrhosis, ascites, GI bleeding secondary to varices, hepatic encephalopathy, bleeding tendencies and liver cancer. The patient was told of the importance of follow-up. The patient was advised to follow up every 6 months for blood work and imaging studies. The patient agreed and will follow up. The patient was advised to lose weight. I recommend a trial of vitamin E supplementation for the fatty liver. If the liver enzymes remain elevated, the patient may require a trial of Pioglitazone for the fatty liver. I recommend avoid alcohol and hepato-toxic agents. The patient was also advised to avoid NSAIDs, Acetaminophen and any other hepatotoxic drugs. The patient was also advised not to share needles, razors, scissors, nail clippers, etc.. The patient is to continue close follow-up in our office for blood work and exams. If the liver enzymes remain elevated, the patient may require a CT guided liver biopsy to assess the liver parenchyma and for possible treatment. We had a long discussion regarding the risks and benefits of the procedure. The patient was told of the risks of bleeding, perforation, infections, emergency surgery and missing lesions. The patient agreed and will follow-up to reassess the symptoms Patient has been counseled on life style interventions, including but not limited to: weight loss (3-5% loss of body weight might improve fat in the liver, while 7-10% needed for potential improvement of other components, including inflammation and scarring of the liver, called fibrosis); healthy diet, avoiding added sugars, sodas, avoiding saturated fats, limiting sodium, avoiding alcohol; and on the importance of regular exercise (> 150 min/week moderate intensity aerobic exercise with at least 2x/week muscle strengthening or exercise as tolerated). \par - I explained to patient the natural Hx of NAFLD. \par \par 3. tubular adenoma will need repeat colonoscopy in 5 yrs and we discussed screening also in pts with breast cancer.  .  Polyps are very common in men and women of all races who live in industrialized countries, suggesting that dietary and environmental factors play a role in their development.\par \par Lifestyle — Although the exact causes are not completely understood, lifestyle risk factors include the following:\par \par ?A high-fat diet\par \par ?A diet high in red meat\par \par ?A low-fiber diet\par \par ?Cigarette smoking\par \par ?Obesity\par \par \par On the other hand, use of aspirin and other nonsteroidal anti-inflammatory drugs and a high-calcium diet may protect against the development of colon cancer. (See "Patient education: Screening for colorectal cancer (Beyond the Basics)".)\par \par Aging — Colorectal cancer and polyps are uncommon before age 40. Ninety percent of cases occur after age 50, with men somewhat more likely to develop polyps than women; therefore, colon cancer screening is usually recommended starting at age 50 for both sexes. It takes approximately 10 years for a small polyp to develop into cancer.\par \par Family history and genetics — Polyps and colon cancer tend to run in families, suggesting that genetic factors are important in their development.\par \par Any history of colon polyps or colon cancer in the family should be discussed with a healthcare provider, particularly if cancer developed at an early age, in close relatives, or in multiple family members. As a general rule, screening for colon cancer begins at an earlier age in people with a family history of cancer or polyps.\par \par Rare genetic diseases can cause high rates of colorectal cancer relatively early in adult life. Familial adenomatous polyposis and MUTYH-associated polyposis cause multiple colon polyps. Another, hereditary nonpolyposis colon cancer or Chung syndrome, increases the risk of colon cancer, but does not cause a large number of polyps. Testing for these genes may be recommended for families with high rates of cancer, but is not generally recommended for other groups.\par \par \par TYPES OF COLON POLYPS\par The most common types of polyps are hyperplastic and adenomatous polyps. Other types of polyps can also be found in the colon, although these are far less common and are not discussed here.\par \par Hyperplastic polyps — Hyperplastic polyps are usually small, located in the end-portion of the colon (the rectum and sigmoid colon), have no potential to become malignant, and are not worrisome (figure 1). It is not always possible to distinguish a hyperplastic polyp from an adenomatous polyp based upon appearance during colonoscopy, which means that hyperplastic polyps are often removed or biopsied to allow microscopic examination.\par \par Adenomatous polyps — Two-thirds of colon polyps are adenomas. Most of these polyps do not develop into cancer, although they have the potential to become cancerous. Adenomas are classified by their size, general appearance, and their specific features as seen under the microscope.\par \par As a general rule, the larger the adenoma, the more likely it is to eventually become a cancer. As a result, large polyps (larger than 5 millimeters, approximately 3/8 inch) are usually removed completely to prevent cancer and for microscopic examination to guide follow-up testing.\par \par Malignant polyps — Polyps that contain cancerous cells are known as malignant polyps. The optimal treatment for malignant polyps depends upon the extent of the cancer (when examined with a microscope) and other individual factors. (See "Overview of colon polyps".)\par \par \par COLON POLYP DIAGNOSIS\par Polyps usually do not cause symptoms but may be detected during a colon cancer screening examination (such as flexible sigmoidoscopy or colonoscopy) (picture 1) or after a positive screening test for occult blood in the stool. \par \par Colonoscopy is the best way to evaluate the colon because it allows the clinician to see the entire lining of the colon and remove most polyps that are found (occasionally, large polyps need to be removed during a separate procedure). During colonoscopy, a clinician inserts a very thin, flexible tube with a light source and small camera into the anus. The tube is advanced through the entire length of the large intestine (colon). (See "Patient education: Colonoscopy (Beyond the Basics)".)\par \par The inside of the colon is a tube-like structure with a flat surface with curved folds. A polyp appears as a lump that protrudes into the inside of the colon (picture 1). The tissue covering a polyp may look the same as normal colon tissue, or, there may be tissue changes ranging from subtle color changes to ulceration and bleeding. Some polyps are flat ("sessile") and others extend out on a stalk ("pedunculated").\par \par Colonoscopy is the best test for the follow-up examination of polyps. Virtual colonoscopy using computed tomography technology is another test used to detect polyps.\par \par \par COLON POLYP REMOVAL\par Colorectal cancer is preventable if precancerous polyps (ie, adenomas) are detected and removed before they become malignant (cancerous). Over time, small polyps can change their structure and become cancerous. Polyps are usually removed when they are found on colonoscopy, which eliminates the chance for that polyp to become cancerous.\par \par Procedure — The medical term for removing polyps is polypectomy. Most polypectomies can be performed through a colonoscope. Small polyps can be removed with an instrument that is inserted through the colonoscope and snips off small pieces of tissue. Larger polyps are usually removed by placing a noose, or snare, around the polyp base and burning through it with electric cautery (figure 2). The cautery also helps to stop bleeding after the polyp is removed.\par \par Polyp removal is not painful because the lining of the colon does not have the ability to feel pain. In addition, a sedative medication is given before the colonoscopy to prevent pain caused by stretching of the colon. Rarely, a polyp will be too large to remove during colonoscopy, which means that a surgical procedure will be needed at a later time.\par \par Complications — Polypectomy is safe although it has a few potential risks and complications. The most common complications are bleeding and perforation (creating a hole in the colon). Fortunately, this occurs infrequently (one in 1000 patients having colonoscopy). Bleeding can usually be controlled during colonoscopy by cauterizing (applying heat) to the bleeding site; surgery is sometimes required for perforation.\par \par Medication use — Nonsteroidal anti-inflammatory drugs including aspirin, ibuprofen (sample brand names: Advil, Motrin), and naproxen (sample brand name: Aleve) can usually be continued before your colonoscopy. Acetaminophen (sample brand name: Tylenol) is safe to take. People who require anti-clotting medications such as warfarin (brand name: Jantoven) should discuss how and when to stop and resume this medication with their clinician.\par \par \par COLON CANCER PREVENTION\par \par Follow-up colonoscopy — Patients should discuss the results of the tissue analysis when they are available, within a few weeks after the procedure, to decide if and when a follow-up examination is needed. People with adenomatous polyps have an increased risk of developing more polyps, which are likely to be adenomatous. There is a 25 to 30 percent chance that adenomas will be present on a repeat colonoscopy done three years after the initial polypectomy. Some of these polyps may have been present during the original examination, but were too small to detect. Other new polyps may also have developed.\par \par After polyps are removed, repeat colonoscopy is recommended. The exact time interval for follow-up varies depending upon several factors:\par \par ?Microscopic characteristics of the polyp.\par \par \par ?Number and size of the polyps.\par \par \par ?Ability to see the colon during the colonoscopy. A bowel preparation is needed before colonoscopy to remove all traces of feces (stool). If the bowel prep was not adequate enough, feces may remain in the colon, making it more difficult to see small to moderate size polyps. In this situation, follow up colonoscopy may be recommended sooner than in other situations.\par  WE also discussed risks and prevention.  \par 4 ppi use we again discussed long term use and risks and she understands  but presently while on  nsaids it will help prevent  Gastric ulcers  and gastritis.   she should fu with  bun creatinine b12 mg  in 3months.\par \par

## 2021-09-02 NOTE — HISTORY OF PRESENT ILLNESS
[Heartburn] : improved heartburn [Nausea] : denies nausea [Vomiting] : denies vomiting [Diarrhea] : resolved diarrhea [Constipation] : denies constipation [Yellow Skin Or Eyes (Jaundice)] : denies jaundice [Abdominal Pain] : denies abdominal pain [Abdominal Swelling] : denies abdominal swelling [Rectal Pain] : denies rectal pain [Wt Gain ___ Lbs] : recent [unfilled] ~Upound(s) weight gain [GERD] : gastroesophageal reflux disease [Good Compliance] : good compliance with treatment [de-identified] : Pt has been cured of hyplori and was negative for c difficile.   she had negative gi pcr  as well.  She no longer has diarrhea .  She is taking the probiotics.  she feels as if she has increased gas and feels much better.   She takes meloxicam and should take  ppi  .  I will give her pantoprazole 20 mg .  while on nsaids .   No black stools . She still has reflux of acid  daily and at night when she eats spicy  or with sauce.

## 2021-09-02 NOTE — PHYSICAL EXAM
[General Appearance - Alert] : alert [General Appearance - In No Acute Distress] : in no acute distress [PERRL With Normal Accommodation] : pupils were equal in size, round, and reactive to light [Oropharynx] : the oropharynx was normal [Auscultation Breath Sounds / Voice Sounds] : lungs were clear to auscultation bilaterally [Apical Impulse] : the apical impulse was normal [Heart Rate And Rhythm] : heart rate was normal and rhythm regular [Heart Sounds] : normal S1 and S2 [Heart Sounds Gallop] : no gallops [Edema] : there was no peripheral edema [Bowel Sounds] : normal bowel sounds [Abdomen Soft] : soft [Abdomen Tenderness] : non-tender [Abdomen Mass (___ Cm)] : no abdominal mass palpated [Cervical Lymph Nodes Enlarged Posterior Bilaterally] : posterior cervical [Cervical Lymph Nodes Enlarged Anterior Bilaterally] : anterior cervical [Supraclavicular Lymph Nodes Enlarged Bilaterally] : supraclavicular [Axillary Lymph Nodes Enlarged Bilaterally] : axillary [Femoral Lymph Nodes Enlarged Bilaterally] : femoral [Inguinal Lymph Nodes Enlarged Bilaterally] : inguinal [Abnormal Walk] : normal gait [Nail Clubbing] : no clubbing  or cyanosis of the fingernails [Musculoskeletal - Swelling] : no joint swelling seen [Motor Tone] : muscle strength and tone were normal [Skin Color & Pigmentation] : normal skin color and pigmentation [Skin Turgor] : normal skin turgor [] : no rash [Oriented To Time, Place, And Person] : oriented to person, place, and time [Impaired Insight] : insight and judgment were intact [Affect] : the affect was normal

## 2021-09-03 ENCOUNTER — APPOINTMENT (OUTPATIENT)
Dept: PHYSICAL MEDICINE AND REHAB | Facility: CLINIC | Age: 56
End: 2021-09-03
Payer: COMMERCIAL

## 2021-09-03 VITALS — RESPIRATION RATE: 16 BRPM | HEIGHT: 62 IN | WEIGHT: 175 LBS | BODY MASS INDEX: 32.2 KG/M2

## 2021-09-03 DIAGNOSIS — Z86.39 PERSONAL HISTORY OF OTHER ENDOCRINE, NUTRITIONAL AND METABOLIC DISEASE: ICD-10-CM

## 2021-09-03 DIAGNOSIS — M76.01 GLUTEAL TENDINITIS, RIGHT HIP: ICD-10-CM

## 2021-09-03 DIAGNOSIS — Z86.79 PERSONAL HISTORY OF OTHER DISEASES OF THE CIRCULATORY SYSTEM: ICD-10-CM

## 2021-09-03 DIAGNOSIS — M76.31 ILIOTIBIAL BAND SYNDROME, RIGHT LEG: ICD-10-CM

## 2021-09-03 DIAGNOSIS — Z85.3 PERSONAL HISTORY OF MALIGNANT NEOPLASM OF BREAST: ICD-10-CM

## 2021-09-03 PROCEDURE — 99204 OFFICE O/P NEW MOD 45 MIN: CPT

## 2021-09-03 NOTE — DATA REVIEWED
[Plain X-Rays] : plain X-Rays [FreeTextEntry1] : XR LSPINE 08/2021: Disc space narrowing at L5-S1\par

## 2021-09-03 NOTE — PHYSICAL EXAM
[FreeTextEntry1] : LUMBAR\par GEN: AAOx3. NAD.\par LS ROM: Flexion, extension, side-bending, rotation, oblique extension all full/pain free.  \par HIP ROM: Full and pain B/L.\par PALP: (+) TTP R-GTB. No TTP midline, paravertebral muscles, SIJ B/L.\par INSP: Spine alignment is midline, with no evidence of scoliosis.\par STRENGTH: 5/5 bilateral hip flexors, knee extensors, ankle dorsiflexors, long toe extensors, ankle plantar flexors, hip abductors.\par SENS: Grossly intact to LT BLE.\par REFLEXES: Symmetric patella, achilles. \par TONE: Normal, No clonus.\par STANCE: No Trendelenburg with single leg stance.\par GAIT: Non antalgic, normal reciprocating heel to toe\par SPECIAL: SLR and Slump (-) bilaterally. FADIR (-) B/L. ERICKSON (+) RIGHT. (+) Resisted Hip ABd RIGHT

## 2021-09-03 NOTE — HISTORY OF PRESENT ILLNESS
[FreeTextEntry1] : Ms. JIM CATALAN is a very pleasant 55 year female who comes in for evaluation of right hip pain that has been ongoing for 2 weeks without any specific injury or inciting event. Patient tried heating pad, Gabapentin and Meloxicam without improvement. The pain is located primarily on her right lateral leg radiating to the knee intermittent and described as achy, throbbing, burning sensation and sharp. The pain is rated as 5/10 and ranges from 5-10/10. The patient's symptoms are aggravated by laying on her right side and alleviated by not laying on her right side . The patient works as an  which consists of sitting. The patient feels numbness/tingling but denies any night pain,, weakness, or bowel/bladder dysfunction. The patient has no other complaints at this time.\par

## 2021-09-03 NOTE — ASSESSMENT
[FreeTextEntry1] : Impression:\par 1. Right GTPS/ITBS\par \par Plan: The history, physical examination, and imaging were reviewed. The imaging results and diagnosis were discussed with the patient along with treatment options including physical therapy, oral medication, ultrasound guided injections, and surgery; as well as alternative therapeutics such as acupuncture and massage. We also discussed the importance of weight loss, activity modification, ergonomics, and posture as it pertains to the management of the condition as well as overall health and well being. I am recommending that the patient begin with a course of PT and Voltaren Gel in addition to her current NSAIDs. We emphasized the importance of the home exercise program. The patient will follow up in 4-6 weeks. Depending on her response we will consider CSI. The patient expressed verbal understanding and is in agreement with the plan of care. All of the patient's questions and concerns were addressed during today's visit.\par

## 2021-10-22 NOTE — ED PROVIDER NOTE - MEDICAL DECISION MAKING DETAILS
[age] y/o F pt presents with worsening bilateral flank pain. Hx and findings suggestive of renal colic. Plan: labs, urine, IV fluids, CAT scan and reassess.

## 2021-10-23 ENCOUNTER — APPOINTMENT (OUTPATIENT)
Dept: OTOLARYNGOLOGY | Facility: CLINIC | Age: 56
End: 2021-10-23
Payer: COMMERCIAL

## 2021-10-23 VITALS
OXYGEN SATURATION: 98 % | WEIGHT: 175 LBS | TEMPERATURE: 97.5 F | HEIGHT: 62 IN | SYSTOLIC BLOOD PRESSURE: 103 MMHG | BODY MASS INDEX: 32.2 KG/M2 | DIASTOLIC BLOOD PRESSURE: 68 MMHG | HEART RATE: 68 BPM

## 2021-10-23 DIAGNOSIS — H91.93 UNSPECIFIED HEARING LOSS, BILATERAL: ICD-10-CM

## 2021-10-23 PROCEDURE — 99203 OFFICE O/P NEW LOW 30 MIN: CPT | Mod: 25

## 2021-10-23 PROCEDURE — 69210 REMOVE IMPACTED EAR WAX UNI: CPT

## 2021-10-24 PROBLEM — H91.93 BILATERAL HEARING LOSS, UNSPECIFIED HEARING LOSS TYPE: Status: ACTIVE | Noted: 2021-10-24

## 2021-10-24 NOTE — PHYSICAL EXAM
[Binocular Microscopic Exam] : Binocular microscopic exam was performed [FreeTextEntry8] : cerumen impaction removed with a hook [Normal] : orientation to person, place, and time: normal

## 2021-10-24 NOTE — HISTORY OF PRESENT ILLNESS
[de-identified] : 1 wk of L>R tonal tinnitus w/ hearing loss that was of relatively sudden onset. Uses qtips.

## 2021-10-24 NOTE — ASSESSMENT
[FreeTextEntry1] : Explained that her cerumen was insufficient to explain her complaints; she will RTC Monday for an audiogram.

## 2021-10-24 NOTE — CONSULT LETTER
[Dear  ___] : Dear  [unfilled], [Consult Letter:] : I had the pleasure of evaluating your patient, [unfilled]. [Consult Closing:] : Thank you very much for allowing me to participate in the care of this patient.  If you have any questions, please do not hesitate to contact me. [Please see my note below.] : Please see my note below. [Sincerely,] : Sincerely, [FreeTextEntry3] : ADELAIDA Thomas Jr, MD, FAAOHNS\par Otolaryngologist\par [facility]

## 2021-10-25 ENCOUNTER — APPOINTMENT (OUTPATIENT)
Dept: OTOLARYNGOLOGY | Facility: CLINIC | Age: 56
End: 2021-10-25

## 2021-11-05 ENCOUNTER — APPOINTMENT (OUTPATIENT)
Dept: RHEUMATOLOGY | Facility: CLINIC | Age: 56
End: 2021-11-05

## 2021-11-19 ENCOUNTER — RESULT REVIEW (OUTPATIENT)
Age: 56
End: 2021-11-19

## 2021-11-24 ENCOUNTER — APPOINTMENT (OUTPATIENT)
Dept: PULMONOLOGY | Facility: CLINIC | Age: 56
End: 2021-11-24
Payer: COMMERCIAL

## 2021-11-24 VITALS
HEART RATE: 82 BPM | TEMPERATURE: 97.1 F | WEIGHT: 169 LBS | BODY MASS INDEX: 30.91 KG/M2 | OXYGEN SATURATION: 97 % | DIASTOLIC BLOOD PRESSURE: 78 MMHG | SYSTOLIC BLOOD PRESSURE: 120 MMHG

## 2021-11-24 PROCEDURE — G0008: CPT

## 2021-11-24 PROCEDURE — 94060 EVALUATION OF WHEEZING: CPT

## 2021-11-24 PROCEDURE — 94727 GAS DIL/WSHOT DETER LNG VOL: CPT

## 2021-11-24 PROCEDURE — 94729 DIFFUSING CAPACITY: CPT

## 2021-11-24 PROCEDURE — 99213 OFFICE O/P EST LOW 20 MIN: CPT | Mod: 25

## 2021-11-24 PROCEDURE — 90686 IIV4 VACC NO PRSV 0.5 ML IM: CPT

## 2021-11-26 ENCOUNTER — NON-APPOINTMENT (OUTPATIENT)
Age: 56
End: 2021-11-26

## 2021-11-27 NOTE — DISCUSSION/SUMMARY
[FreeTextEntry1] : Asthma, with increased symptoms\par Lungs are clear\par \par PFT demonstrated normal spirometry, lung volumes and diffusion\par \par \par history of COVID infection\par \par COVID vaccine   received\par She has not had influenza vaccine\par \par Will administer today\par \par SLEEP\par No snoring, witnessed apnea, excessive daytime sleepiness, morning headache \par \par \par PlAN\par continue Symbicort 160/4.5 2 puffs twice per day  with spacer\par \par continue montelukast\par \par may use albuterol via PPI or nebulizer\par \par \par Flaquito Sheriff [facility]

## 2021-11-27 NOTE — PHYSICAL EXAM
[No Acute Distress] : no acute distress [No Deformities] : no deformities [Turbinate hypertrophy] : turbinate hypertrophy [II] : Mallampati Class: II [Normal Appearance] : normal appearance [No Neck Mass] : no neck mass [Normal Rate/Rhythm] : normal rate/rhythm [No Resp Distress] : no resp distress [Clear to Auscultation Bilaterally] : clear to auscultation bilaterally [No Abnormalities] : no abnormalities [Lesions: ___] : lesions: [unfilled] [Benign] : benign [No HSM] : no hsm [No Clubbing] : no clubbing [No Edema] : no edema [No Focal Deficits] : no focal deficits [Oriented x3] : oriented x3 [TextBox_68] : improved

## 2021-11-27 NOTE — HISTORY OF PRESENT ILLNESS
[Never] : never [Snoring] : snoring [TextBox_4] : 56 year old woman with asthma for 5 years. she ha had symptoms prior that was seasonal\par \par She is currently on Breo and montelukast.. She does not use her rescue inhaler. She states that she sometimes has dyspnea and wheeze with exertion. She is most improved using nebulized albuterol.\par \par She developed COVID infection in March. She did not require hospitalization. She was treated with azithromycin. She has some dyspnea that improved.\par \par CXR was performed that did not demonstrate any residual infiltrate. \par \par Last visit she was treated with prednisone short course and  switched to Symbicort and continued montelukast.\par \par She states that she improved but has noted some increased dyspnea last week.  She has has nasal congestion and believes symptoms are due to this.\par \par She has increased chest tightness since change of weather.  She had been well and was not using inhaled bronchodilator since last week. \par \par She has been using Symbicort and occasionally uses nebulizer.  \par \par She states she has recurrence of breast cancer and may need surgery\par \par PMH\par \par DM\par \par \par \par PSH\par breast cancer, on anastrazole, lumpectomy\par HPV\par hysterectomy\par \par \par SH\par \par never smoked\par \par no ETOH\par \par \par ALLERGY\par \par NKDA\par  [Hypersomnolence] : denies hypersomnolence [Unintentional Sleep while Inactive] : no unintentional sleep while inactive

## 2021-11-27 NOTE — PROCEDURE
[FreeTextEntry1] : PFT\par \par normal spirometry, lung volumes and diffusion\par \par \par  [facility]

## 2021-11-30 ENCOUNTER — NON-APPOINTMENT (OUTPATIENT)
Age: 56
End: 2021-11-30

## 2021-11-30 ENCOUNTER — APPOINTMENT (OUTPATIENT)
Dept: SURGICAL ONCOLOGY | Facility: CLINIC | Age: 56
End: 2021-11-30
Payer: COMMERCIAL

## 2021-11-30 VITALS
TEMPERATURE: 97.8 F | HEIGHT: 62 IN | WEIGHT: 169 LBS | DIASTOLIC BLOOD PRESSURE: 78 MMHG | HEART RATE: 72 BPM | OXYGEN SATURATION: 98 % | SYSTOLIC BLOOD PRESSURE: 121 MMHG | BODY MASS INDEX: 31.1 KG/M2 | RESPIRATION RATE: 16 BRPM

## 2021-11-30 PROCEDURE — 99204 OFFICE O/P NEW MOD 45 MIN: CPT

## 2021-11-30 NOTE — PHYSICAL EXAM
[Normal] : supple, no neck mass and thyroid not enlarged [Normal Supraclavicular Lymph Nodes] : normal supraclavicular lymph nodes [Normal Axillary Lymph Nodes] : normal axillary lymph nodes [Normal] : oriented to person, place and time, with appropriate affect [FreeTextEntry1] : AB present during exam  [de-identified] : Normal S1, S2.  Regular rate and rhythm.  [de-identified] : Complete breast exam performed in supine and upright positions.  No palpable masses, tenderness, nipple discharge, inversion, deviation or enlarged axillary or supraclavicular lymph nodes bilaterally.  [de-identified] : Clear breath sounds bilaterally, normal respiratory effort.

## 2021-11-30 NOTE — HISTORY OF PRESENT ILLNESS
[de-identified] : 56 year female presents for an initial consultation, referred by her sister who is a patient of mine.\par \par She [place] [facility] [place] negative, [place]+), and required re excision for a positive margin in November 2015.  She completed adjuvant RT and is currently on a 10 year course of Tamoxifen under the care of Dr. Lizandro Barros.\par \par She has a family history of breast cancer involving her sister at age 50, niece at age 36 and maternal grandmother at age 81. .She is BRCA negative (tested 11/2015).\par \par She completed routine breast imaging in October 2021.  Ultrasound was negative.  Mammogram revealed suspicious calcifications in the right breast 9:00 axis (BIRADS 4).  Stereotactic biopsy demonstrated DCIS, intermediate grade with necrosis (ER+).\par \par Her past medical history history includes hypertension, hyperlipidemia, type 2 diabetes, GERD, cold induced intermittent asthma and NAFLD. \par \par She denies any palpable breast mass, nipple discharge, inversion or skin changes. \par \par

## 2021-11-30 NOTE — ASSESSMENT
[FreeTextEntry1] : IMP:\par Newly diagnosed recurrent DCIS involving the right breast (despite Tamoxifen therapy)\par \par PLAN:\par The patient and I discussed the surgical management of breast cancer. I explained that breast cancer can be treated with 2 main surgical approaches. One is breast conserving therapy. The other is mastectomy with or without immediate reconstruction by a plastic surgeon. Breast conserving therapy involves wide excision of the involved area. Negative margins must be achieved with this wide excision. In addition, evaluation of the lymph nodes either with a sentinel lymph node biopsy or an axillary lymph node dissection may be required. This treatment usually requires postoperative radiation to the breast. Treatment with mastectomy also involves evaluation of the lymph node with a sentinel lymph node biopsy or axillary lymph node dissection. Post operative radiation therapy may be needed even after mastectomy in certain advanced cases.\par  \par Discussed with patient that mastectomy would be the best option given her prior history of right breast radiation and failure of Tamoxifen.  She agrees to proceed.  Provided patient with contact for plastic surgeon to discuss reconstructive options.\par \par Breast MRI

## 2021-12-04 ENCOUNTER — APPOINTMENT (OUTPATIENT)
Dept: PLASTIC SURGERY | Facility: CLINIC | Age: 56
End: 2021-12-04
Payer: COMMERCIAL

## 2021-12-04 VITALS — HEIGHT: 62 IN | WEIGHT: 170 LBS | TEMPERATURE: 98.2 F | BODY MASS INDEX: 31.28 KG/M2

## 2021-12-04 DIAGNOSIS — Z42.1 ENCOUNTER FOR BREAST RECONSTRUCTION FOLLOWING MASTECTOMY: ICD-10-CM

## 2021-12-04 PROCEDURE — 99205 OFFICE O/P NEW HI 60 MIN: CPT

## 2021-12-06 ENCOUNTER — APPOINTMENT (OUTPATIENT)
Dept: CARDIOLOGY | Facility: CLINIC | Age: 56
End: 2021-12-06
Payer: COMMERCIAL

## 2021-12-06 ENCOUNTER — NON-APPOINTMENT (OUTPATIENT)
Age: 56
End: 2021-12-06

## 2021-12-06 VITALS
RESPIRATION RATE: 16 BRPM | SYSTOLIC BLOOD PRESSURE: 118 MMHG | TEMPERATURE: 97.7 F | WEIGHT: 170 LBS | HEART RATE: 82 BPM | DIASTOLIC BLOOD PRESSURE: 74 MMHG | OXYGEN SATURATION: 97 % | BODY MASS INDEX: 31.09 KG/M2

## 2021-12-06 PROCEDURE — 99215 OFFICE O/P EST HI 40 MIN: CPT

## 2021-12-06 PROCEDURE — 93000 ELECTROCARDIOGRAM COMPLETE: CPT | Mod: 59

## 2021-12-06 NOTE — REASON FOR VISIT
[Follow-Up - Clinic] : a clinic follow-up of [Chest Pain] : chest pain [Hyperlipidemia] : hyperlipidemia [Hypertension] : hypertension [Palpitations] : palpitations [FreeTextEntry2] : pre-operative assessment [FreeTextEntry1] : For bilateral mastectomy, and construction

## 2021-12-06 NOTE — DISCUSSION/SUMMARY
[Hypertension] : hypertension [Improving] : improving [Weight Loss] : weight loss [Anxiety] : anxiety disorder NOS [Rhythm Disorder] : rhythm disorder [Sinus Tachycardia] : sinus tachycardia [Stable] : stable [Patient] : the patient [Minutes Spent: ___] : for [unfilled] ~Uminutes [With Me] : with me [___ Month(s)] : in [unfilled] month(s) [None] : There are no changes in medication management [Exercise Regimen] : an exercise regimen [Dietary Modification] : dietary modification [ETOH Moderation] : alcohol moderation [Acetaminophen Avoidance] : acetaminophen  avoidance [Low Sodium Diet] : low sodium diet [NSAIDs Avoidance] : non-steroidal anti-inflammatory drugs avoidance [Responding to Treatment] : responding to treatment [Family] : the patient's family [de-identified] : no recurrence, resolved. [de-identified] : tolerable [de-identified] : continue bystolic. [de-identified] : resolved, no recurrence [de-identified] : reduce weight [FreeTextEntry1] : For the pre-operative assessment for vadim mastectomy and reconstruction\par 1. RCRI is  zero.\par 2. No active cardiac risk contradicting to the breast surgery.\par \par Rec:\par 1.beta-blocker not to be withheld for surgery.\par 2. Diabetic  medication to be reduced on the day of surgery.\par 3. antihypertensives  can be adjusted per findings.

## 2021-12-06 NOTE — PHYSICAL EXAM
[General Appearance - Well Developed] : well developed [Normal Appearance] : normal appearance [Well Groomed] : well groomed [General Appearance - Well Nourished] : well nourished [No Deformities] : no deformities [General Appearance - In No Acute Distress] : no acute distress [Normal Conjunctiva] : the conjunctiva exhibited no abnormalities [Eyelids - No Xanthelasma] : the eyelids demonstrated no xanthelasmas [Normal Oral Mucosa] : normal oral mucosa [No Oral Pallor] : no oral pallor [No Oral Cyanosis] : no oral cyanosis [Normal Jugular Venous A Waves Present] : normal jugular venous A waves present [Normal Jugular Venous V Waves Present] : normal jugular venous V waves present [No Jugular Venous [place] A Waves] : no jugular venous [place] A waves [Respiration, Rhythm And Depth] : normal respiratory rhythm and effort [Exaggerated Use Of Accessory Muscles For Inspiration] : no accessory muscle use [Auscultation Breath Sounds / Voice Sounds] : lungs were clear to auscultation bilaterally [Chest Palpation] : palpation of the chest revealed no abnormalities [Lungs Percussion] : the lungs were normal to percussion [Heart Rate And Rhythm] : heart rate and rhythm were normal [Heart Sounds] : normal S1 and S2 [Murmurs] : no murmurs present [Arterial Pulses Normal] : the arterial pulses were normal [Edema] : no peripheral edema present [Veins - Varicosity Changes] : no varicosital changes were noted in the lower extremities [Bowel Sounds] : normal bowel sounds [Abdomen Soft] : soft [Abdomen Tenderness] : non-tender [Abdomen Mass (___ Cm)] : no abdominal mass palpated [Abdomen Hernia] : no hernia was discovered [Abnormal Walk] : normal gait [Gait - Sufficient For Exercise Testing] : the gait was sufficient for exercise testing [Nail Clubbing] : no clubbing of the fingernails [Cyanosis, Localized] : no localized cyanosis [Petechial Hemorrhages (___cm)] : no petechial hemorrhages [Skin Turgor] : normal skin turgor [] : no rash [No Venous Stasis] : no venous stasis [No Skin Ulcers] : no skin ulcer [No Xanthoma] : no  xanthoma was observed [Oriented To Time, Place, And Person] : oriented to person, place, and time [Affect] : the affect was normal [Mood] : the mood was normal [No Anxiety] : not feeling anxious [Normal Radial B/L] : normal radial B/L [Normal PT B/L] : normal PT B/L [Normal DP B/L] : normal DP B/L [FreeTextEntry1] : no calf tenderness, no edema in my examination, no  asymmetry of calf. " right shoulder  with Rheumatoid arthritis.

## 2021-12-06 NOTE — HISTORY OF PRESENT ILLNESS
[FreeTextEntry1] : She, a 56 year old female had hypertension sometimes back. It was controlled by nifedipine along with Bystolic. However, she  had on and off palpitations which bothers her. She had repeated Holter examination without findings of  dysrhythmia\par  She had lumpectomy, right, s/p RT and on tamoxifen . She was told having  recurrence. She is going to have bilateral mastectomy and  reconstruction.\par In March this year ( 2020) she was contracted with  COVID.  After that she had exertional shortness of breath. She had echocardiogram and was negative.

## 2021-12-07 ENCOUNTER — APPOINTMENT (OUTPATIENT)
Dept: INTERNAL MEDICINE | Facility: CLINIC | Age: 56
End: 2021-12-07
Payer: COMMERCIAL

## 2021-12-07 ENCOUNTER — LABORATORY RESULT (OUTPATIENT)
Age: 56
End: 2021-12-07

## 2021-12-07 VITALS
TEMPERATURE: 97.7 F | OXYGEN SATURATION: 98 % | WEIGHT: 171 LBS | SYSTOLIC BLOOD PRESSURE: 119 MMHG | RESPIRATION RATE: 16 BRPM | HEIGHT: 62 IN | DIASTOLIC BLOOD PRESSURE: 72 MMHG | HEART RATE: 65 BPM | BODY MASS INDEX: 31.47 KG/M2

## 2021-12-07 PROCEDURE — 99214 OFFICE O/P EST MOD 30 MIN: CPT

## 2021-12-07 RX ORDER — MELOXICAM 15 MG/1
15 TABLET ORAL
Qty: 60 | Refills: 1 | Status: DISCONTINUED | COMMUNITY
Start: 2021-03-19 | End: 2021-12-07

## 2021-12-07 RX ORDER — GABAPENTIN 100 MG/1
100 CAPSULE ORAL
Qty: 90 | Refills: 3 | Status: DISCONTINUED | COMMUNITY
Start: 2021-08-20 | End: 2021-12-07

## 2021-12-07 RX ORDER — NICOTINE POLACRILEX 2 MG
GUM BUCCAL
Qty: 90 | Refills: 3 | Status: DISCONTINUED | COMMUNITY
Start: 2021-06-07 | End: 2021-12-07

## 2021-12-08 ENCOUNTER — MED ADMIN CHARGE (OUTPATIENT)
Age: 56
End: 2021-12-08

## 2021-12-09 ENCOUNTER — APPOINTMENT (OUTPATIENT)
Dept: MRI IMAGING | Facility: CLINIC | Age: 56
End: 2021-12-09

## 2021-12-09 LAB
ABO + RH PNL BLD: NORMAL
ALBUMIN SERPL ELPH-MCNC: 4.6 G/DL
ALP BLD-CCNC: 85 U/L
ALT SERPL-CCNC: 26 U/L
ANION GAP SERPL CALC-SCNC: 10 MMOL/L
APPEARANCE: ABNORMAL
APTT BLD: 36.5 SEC
AST SERPL-CCNC: 22 U/L
BASOPHILS # BLD AUTO: 0.05 K/UL
BASOPHILS NFR BLD AUTO: 0.8 %
BILIRUB SERPL-MCNC: 0.6 MG/DL
BILIRUBIN URINE: NEGATIVE
BLOOD URINE: NORMAL
BUN SERPL-MCNC: 12 MG/DL
CALCIUM SERPL-MCNC: 9.5 MG/DL
CHLORIDE SERPL-SCNC: 104 MMOL/L
CHOLEST SERPL-MCNC: 129 MG/DL
CO2 SERPL-SCNC: 26 MMOL/L
COLOR: YELLOW
CREAT SERPL-MCNC: 0.81 MG/DL
EOSINOPHIL # BLD AUTO: 0.05 K/UL
EOSINOPHIL NFR BLD AUTO: 0.8 %
ESTIMATED AVERAGE GLUCOSE: 117 MG/DL
GGT SERPL-CCNC: 19 U/L
GLUCOSE QUALITATIVE U: NEGATIVE
GLUCOSE SERPL-MCNC: 94 MG/DL
HBA1C MFR BLD HPLC: 5.7 %
HCT VFR BLD CALC: 41.2 %
HDLC SERPL-MCNC: 46 MG/DL
HGB BLD-MCNC: 13 G/DL
IMM GRANULOCYTES NFR BLD AUTO: 0.2 %
INR PPP: 1.05 RATIO
KETONES URINE: NEGATIVE
LDLC SERPL CALC-MCNC: 63 MG/DL
LEUKOCYTE ESTERASE URINE: ABNORMAL
LYMPHOCYTES # BLD AUTO: 2.07 K/UL
LYMPHOCYTES NFR BLD AUTO: 31.5 %
MAN DIFF?: NORMAL
MCHC RBC-ENTMCNC: 27.3 PG
MCHC RBC-ENTMCNC: 31.6 GM/DL
MCV RBC AUTO: 86.4 FL
MONOCYTES # BLD AUTO: 0.54 K/UL
MONOCYTES NFR BLD AUTO: 8.2 %
NEUTROPHILS # BLD AUTO: 3.86 K/UL
NEUTROPHILS NFR BLD AUTO: 58.5 %
NITRITE URINE: NEGATIVE
NONHDLC SERPL-MCNC: 84 MG/DL
PH URINE: 5.5
PLATELET # BLD AUTO: 319 K/UL
POTASSIUM SERPL-SCNC: 3.5 MMOL/L
PROT SERPL-MCNC: 7.2 G/DL
PROTEIN URINE: NORMAL
PT BLD: 12.4 SEC
RBC # BLD: 4.77 M/UL
RBC # FLD: 13.3 %
SODIUM SERPL-SCNC: 140 MMOL/L
SPECIFIC GRAVITY URINE: 1.03
TRIGL SERPL-MCNC: 106 MG/DL
UROBILINOGEN URINE: ABNORMAL
WBC # FLD AUTO: 6.58 K/UL

## 2021-12-09 NOTE — ASSESSMENT
[Procedure Intermediate Risk] : the procedure risk is intermediate [Patient Intermediate Risk] : the patient is an intermediate risk [Optimized for Surgery Pending Laboratory Results] : the patient is optimized for surgery pending laboratory results [As per surgery] : as per surgery [Continue] : Continue medications as currently directed [FreeTextEntry4] : 56 year old female found to have stable Hypertension, Asthma, Type 2 Diabetes Mellitus, Hypertriglyceridemia, NAFLD,with the current prescription regimen as recommended, diet and life style modifications, as counseled. Prior results reviewed, interpreted and discussed with the patient during today's examination, as appropriate. Follow up, treatment plan and tests, as ordered.\par \par Possible B/L Mastectomy with reconstruction, as per plastic Sx, for Recurrent - Right Breast DCIS.\par \par CARD clearance - EKG from 12/6/21, noted and results discussed with the patient.\par \par Total time spent : 30 minutes\par Including:\par Preparation prior to visit - Reviewing prior record, results of tests and Consultation Reports as applicable\par Conducting an appropriate H & P during today's encounter\par Appropriate orders for tests, medications and procedures, as applicable\par Counseling patient \par Note completion\par

## 2021-12-09 NOTE — ADDENDUM
[FreeTextEntry1] : PST results from 12/7/21 noted.\par \par Patient is medically optimized to the best at this time for the stated intervention.\par Patient is medically cleared.\par

## 2021-12-09 NOTE — HISTORY OF PRESENT ILLNESS
[No Pertinent Cardiac History] : no history of aortic stenosis, atrial fibrillation, coronary artery disease, recent myocardial infarction, or implantable device/pacemaker [Asthma] : asthma [No Adverse Anesthesia Reaction] : no adverse anesthesia reaction in self or family member [Diabetes] : diabetes [(Patient denies any chest pain, claudication, dyspnea on exertion, orthopnea, palpitations or syncope)] : Patient denies any chest pain, claudication, dyspnea on exertion, orthopnea, palpitations or syncope [COPD] : no COPD [Sleep Apnea] : no sleep apnea [Smoker] : not a smoker [Chronic Anticoagulation] : no chronic anticoagulation [Chronic Kidney Disease] : no chronic kidney disease [FreeTextEntry1] : B/L Mastectomy with reconstruction, as per plastic Sx [FreeTextEntry2] : TBD [FreeTextEntry3] : Dr. Alec Tate [FreeTextEntry4] : 56 year old female with history of stable Hypertension, Asthma, Type 2 Diabetes Mellitus, Hypertriglyceridemia, NAFLD, history as stated, presented for clearance evaluation prior to possible B/L Mastectomy with reconstruction, as per plastic Sx, for Recurrent - Right Breast DCIS.

## 2021-12-09 NOTE — HEALTH RISK ASSESSMENT
[No] : In the past 12 months have you used drugs other than those required for medical reasons? No [No falls in past year] : Patient reported no falls in the past year [0] : 2) Feeling down, depressed, or hopeless: Not at all (0) [] : No [de-identified] : CARD/ONC SX/Plastic Sx [[id number]] : 0

## 2021-12-10 ENCOUNTER — APPOINTMENT (OUTPATIENT)
Dept: MRI IMAGING | Facility: CLINIC | Age: 56
End: 2021-12-10
Payer: COMMERCIAL

## 2021-12-10 PROCEDURE — 74185 MRA ABD W OR W/O CNTRST: CPT

## 2021-12-10 PROCEDURE — 72198 MR ANGIO PELVIS W/O & W/DYE: CPT

## 2021-12-28 ENCOUNTER — OUTPATIENT (OUTPATIENT)
Dept: OUTPATIENT SERVICES | Facility: HOSPITAL | Age: 56
LOS: 1 days | End: 2021-12-28
Payer: COMMERCIAL

## 2021-12-28 VITALS
WEIGHT: 169.32 LBS | SYSTOLIC BLOOD PRESSURE: 127 MMHG | RESPIRATION RATE: 16 BRPM | OXYGEN SATURATION: 96 % | TEMPERATURE: 98 F | HEART RATE: 81 BPM | DIASTOLIC BLOOD PRESSURE: 83 MMHG | HEIGHT: 61 IN

## 2021-12-28 DIAGNOSIS — Z42.1 ENCOUNTER FOR BREAST RECONSTRUCTION FOLLOWING MASTECTOMY: ICD-10-CM

## 2021-12-28 DIAGNOSIS — D05.11 INTRADUCTAL CARCINOMA IN SITU OF RIGHT BREAST: ICD-10-CM

## 2021-12-28 DIAGNOSIS — Z90.13 ACQUIRED ABSENCE OF BILATERAL BREASTS AND NIPPLES: ICD-10-CM

## 2021-12-28 DIAGNOSIS — K21.9 GASTRO-ESOPHAGEAL REFLUX DISEASE WITHOUT ESOPHAGITIS: ICD-10-CM

## 2021-12-28 DIAGNOSIS — Z98.890 OTHER SPECIFIED POSTPROCEDURAL STATES: Chronic | ICD-10-CM

## 2021-12-28 DIAGNOSIS — I10 ESSENTIAL (PRIMARY) HYPERTENSION: ICD-10-CM

## 2021-12-28 DIAGNOSIS — Z85.3 PERSONAL HISTORY OF MALIGNANT NEOPLASM OF BREAST: ICD-10-CM

## 2021-12-28 DIAGNOSIS — Z01.818 ENCOUNTER FOR OTHER PREPROCEDURAL EXAMINATION: ICD-10-CM

## 2021-12-28 DIAGNOSIS — Z90.710 ACQUIRED ABSENCE OF BOTH CERVIX AND UTERUS: Chronic | ICD-10-CM

## 2021-12-28 LAB — BLD GP AB SCN SERPL QL: SIGNIFICANT CHANGE UP

## 2021-12-28 NOTE — H&P PST ADULT - PROBLEM SELECTOR PLAN 1
Patient provided with pre-operative instructions and verbalized understanding.  Patient will be NPO on day of surgery. Patient will stop NSAIDs, aspirin, herbal supplements or vitamins 1 week prior to surgery.  Chlorohexadine wash provided with instructions.  IS provided with instructions and patient able to demonstrate correctl. yPending medical clearance as per surgeon.  COVID PCR pending per policy.

## 2021-12-28 NOTE — H&P PST ADULT - HISTORY OF PRESENT ILLNESS
55 year old female with pmhx of hypertension, T2DM, GERD, HLD, mild intermittent asthma.  Patient was originally diagnosed with b/l breast cancer in 2015.  s/p lumpectomy b/l.  Routine mammogram done 11/2021, a lesion was noted in right breast.  s/p biopsy confirmed DCIS. Otherwise patient feels well today and denies any complaints.

## 2021-12-28 NOTE — H&P PST ADULT - FALL HARM RISK - UNIVERSAL INTERVENTIONS
Bed in lowest position, wheels locked, appropriate side rails in place/Call bell, personal items and telephone in reach/Instruct patient to call for assistance before getting out of bed or chair/Non-slip footwear when patient is out of bed/[place] to call system/Physically safe environment - no spills, clutter or unnecessary equipment/Purposeful Proactive Rounding/Room/bathroom lighting operational, light cord in reach

## 2021-12-28 NOTE — H&P PST ADULT - NSICDXPASTMEDICALHX_GEN_ALL_CORE_FT
PAST MEDICAL HISTORY:  Breast cancer     Chronic Peptic Ulcer     CN (Constipation)     DM (diabetes mellitus) type 2    GERD (Gastroesophageal Reflux Disease)     Herniated Cervical Disc uses occasional tylenol    History of Tubal Ligation 2003    HTN (Hypertension)     Intraductal carcinoma in situ of right breast     Kidney stones     Migraines     RAD (Reactive Airway Disease) with cold or allergies    Seasonal allergies     Uterine leiomyoma

## 2021-12-28 NOTE — H&P PST ADULT - NSICDXPASTSURGICALHX_GEN_ALL_CORE_FT
PAST SURGICAL HISTORY:   Delivery 2001    History of hysterectomy partial- 2010    History of lumpectomy     S/P Dilation and Curettage     S/P Endometrial Ablation     S/P Laparoscopic Cholecystectomy     S/P rotator cuff repair 2021

## 2021-12-29 ENCOUNTER — OUTPATIENT (OUTPATIENT)
Dept: OUTPATIENT SERVICES | Facility: HOSPITAL | Age: 56
LOS: 1 days | End: 2021-12-29
Payer: COMMERCIAL

## 2021-12-29 DIAGNOSIS — Z98.890 OTHER SPECIFIED POSTPROCEDURAL STATES: Chronic | ICD-10-CM

## 2021-12-29 DIAGNOSIS — Z42.1 ENCOUNTER FOR BREAST RECONSTRUCTION FOLLOWING MASTECTOMY: ICD-10-CM

## 2021-12-29 DIAGNOSIS — Z90.710 ACQUIRED ABSENCE OF BOTH CERVIX AND UTERUS: Chronic | ICD-10-CM

## 2021-12-29 DIAGNOSIS — Z85.3 PERSONAL HISTORY OF MALIGNANT NEOPLASM OF BREAST: ICD-10-CM

## 2021-12-29 DIAGNOSIS — Z90.13 ACQUIRED ABSENCE OF BILATERAL BREASTS AND NIPPLES: ICD-10-CM

## 2021-12-29 DIAGNOSIS — D05.11 INTRADUCTAL CARCINOMA IN SITU OF RIGHT BREAST: ICD-10-CM

## 2021-12-29 PROCEDURE — G0463: CPT

## 2021-12-29 PROCEDURE — 88321 CONSLTJ&REPRT SLD PREP ELSWR: CPT

## 2021-12-29 PROCEDURE — 36415 COLL VENOUS BLD VENIPUNCTURE: CPT

## 2021-12-29 PROCEDURE — 86900 BLOOD TYPING SEROLOGIC ABO: CPT

## 2021-12-29 PROCEDURE — 86901 BLOOD TYPING SEROLOGIC RH(D): CPT

## 2021-12-29 PROCEDURE — 86850 RBC ANTIBODY SCREEN: CPT

## 2021-12-30 PROBLEM — E11.9 TYPE 2 DIABETES MELLITUS WITHOUT COMPLICATIONS: Chronic | Status: ACTIVE | Noted: 2018-03-28

## 2021-12-30 LAB — SURGICAL PATHOLOGY STUDY: SIGNIFICANT CHANGE UP

## 2022-01-03 ENCOUNTER — TRANSCRIPTION ENCOUNTER (OUTPATIENT)
Age: 57
End: 2022-01-03

## 2022-01-03 LAB — SARS-COV-2 N GENE NPH QL NAA+PROBE: NOT DETECTED

## 2022-01-04 ENCOUNTER — APPOINTMENT (OUTPATIENT)
Dept: SURGICAL ONCOLOGY | Facility: HOSPITAL | Age: 57
End: 2022-01-04
Payer: COMMERCIAL

## 2022-01-04 ENCOUNTER — APPOINTMENT (OUTPATIENT)
Dept: PLASTIC SURGERY | Facility: HOSPITAL | Age: 57
End: 2022-01-04

## 2022-01-04 ENCOUNTER — INPATIENT (INPATIENT)
Facility: HOSPITAL | Age: 57
LOS: 1 days | Discharge: HOME HEALTH | DRG: 581 | End: 2022-01-06
Attending: PLASTIC SURGERY | Admitting: PLASTIC SURGERY
Payer: COMMERCIAL

## 2022-01-04 ENCOUNTER — RESULT REVIEW (OUTPATIENT)
Age: 57
End: 2022-01-04

## 2022-01-04 ENCOUNTER — APPOINTMENT (OUTPATIENT)
Dept: PLASTIC SURGERY | Facility: HOSPITAL | Age: 57
End: 2022-01-04
Payer: COMMERCIAL

## 2022-01-04 ENCOUNTER — APPOINTMENT (OUTPATIENT)
Dept: PLASTIC SURGERY | Facility: CLINIC | Age: 57
End: 2022-01-04

## 2022-01-04 VITALS
DIASTOLIC BLOOD PRESSURE: 75 MMHG | RESPIRATION RATE: 16 BRPM | HEART RATE: 83 BPM | WEIGHT: 169.32 LBS | HEIGHT: 61 IN | TEMPERATURE: 98 F | SYSTOLIC BLOOD PRESSURE: 128 MMHG | OXYGEN SATURATION: 99 %

## 2022-01-04 DIAGNOSIS — Z90.710 ACQUIRED ABSENCE OF BOTH CERVIX AND UTERUS: Chronic | ICD-10-CM

## 2022-01-04 DIAGNOSIS — D05.11 INTRADUCTAL CARCINOMA IN SITU OF RIGHT BREAST: ICD-10-CM

## 2022-01-04 DIAGNOSIS — Z98.890 OTHER SPECIFIED POSTPROCEDURAL STATES: Chronic | ICD-10-CM

## 2022-01-04 PROBLEM — C50.919 MALIGNANT NEOPLASM OF UNSPECIFIED SITE OF UNSPECIFIED FEMALE BREAST: Chronic | Status: ACTIVE | Noted: 2019-04-10

## 2022-01-04 LAB
GLUCOSE BLDC GLUCOMTR-MCNC: 133 MG/DL — HIGH (ref 70–99)
GLUCOSE BLDC GLUCOMTR-MCNC: 169 MG/DL — HIGH (ref 70–99)

## 2022-01-04 PROCEDURE — 38792 RA TRACER ID OF SENTINL NODE: CPT | Mod: 50,59

## 2022-01-04 PROCEDURE — S2068: CPT | Mod: 82,LT

## 2022-01-04 PROCEDURE — 15877 SUCTION LIPECTOMY TRUNK: CPT

## 2022-01-04 PROCEDURE — 38530 BIOPSY/REMOVAL LYMPH NODES: CPT | Mod: LT

## 2022-01-04 PROCEDURE — 88305 TISSUE EXAM BY PATHOLOGIST: CPT | Mod: 26

## 2022-01-04 PROCEDURE — 38900 IO MAP OF SENT LYMPH NODE: CPT | Mod: 50

## 2022-01-04 PROCEDURE — 21600 PARTIAL REMOVAL OF RIB: CPT | Mod: LT,59

## 2022-01-04 PROCEDURE — 19303 MAST SIMPLE COMPLETE: CPT | Mod: AS,50

## 2022-01-04 PROCEDURE — 38530 BIOPSY/REMOVAL LYMPH NODES: CPT | Mod: RT

## 2022-01-04 PROCEDURE — S2068: CPT | Mod: LT

## 2022-01-04 PROCEDURE — 88307 TISSUE EXAM BY PATHOLOGIST: CPT | Mod: 26

## 2022-01-04 PROCEDURE — 38900 IO MAP OF SENT LYMPH NODE: CPT | Mod: AS,50

## 2022-01-04 PROCEDURE — 88334 PATH CONSLTJ SURG CYTO XM EA: CPT | Mod: 26

## 2022-01-04 PROCEDURE — 19303 MAST SIMPLE COMPLETE: CPT | Mod: 50

## 2022-01-04 PROCEDURE — 88333 PATH CONSLTJ SURG CYTO XM 1: CPT | Mod: 26

## 2022-01-04 PROCEDURE — 21600 PARTIAL REMOVAL OF RIB: CPT | Mod: RT,59

## 2022-01-04 PROCEDURE — 38525 BIOPSY/REMOVAL LYMPH NODES: CPT | Mod: 50

## 2022-01-04 DEVICE — MESH PHASIX 2.4X6.3IN: Type: IMPLANTABLE DEVICE | Site: BILATERAL | Status: FUNCTIONAL

## 2022-01-04 DEVICE — COUPLER VESSEL ANASTOMOTIC 3MM: Type: IMPLANTABLE DEVICE | Site: BILATERAL | Status: FUNCTIONAL

## 2022-01-04 DEVICE — CLIP APPLIER ETHICON LIGACLIP 11.5" MEDIUM: Type: IMPLANTABLE DEVICE | Site: BILATERAL | Status: FUNCTIONAL

## 2022-01-04 DEVICE — HEMOCLIP MED BLUE 6 CARTRIDGE TITANIUM: Type: IMPLANTABLE DEVICE | Site: BILATERAL | Status: FUNCTIONAL

## 2022-01-04 DEVICE — CARTRIDGE MICROCLIP 30: Type: IMPLANTABLE DEVICE | Site: BILATERAL | Status: FUNCTIONAL

## 2022-01-04 DEVICE — CLIP 24 SMALL TITAN: Type: IMPLANTABLE DEVICE | Site: BILATERAL | Status: FUNCTIONAL

## 2022-01-04 DEVICE — COUPLER VESSEL MICROVASC ANAST 2MM GRN: Type: IMPLANTABLE DEVICE | Site: BILATERAL | Status: FUNCTIONAL

## 2022-01-04 DEVICE — CLIP APPLIER ETHICON LIGACLIP 9 3/8" SMALL: Type: IMPLANTABLE DEVICE | Site: BILATERAL | Status: FUNCTIONAL

## 2022-01-04 RX ORDER — METFORMIN HYDROCHLORIDE 850 MG/1
500 TABLET ORAL
Refills: 0 | Status: DISCONTINUED | OUTPATIENT
Start: 2022-01-05 | End: 2022-01-06

## 2022-01-04 RX ORDER — SODIUM CHLORIDE 9 MG/ML
1000 INJECTION, SOLUTION INTRAVENOUS
Refills: 0 | Status: DISCONTINUED | OUTPATIENT
Start: 2022-01-04 | End: 2022-01-04

## 2022-01-04 RX ORDER — DEXTROSE 50 % IN WATER 50 %
15 SYRINGE (ML) INTRAVENOUS ONCE
Refills: 0 | Status: DISCONTINUED | OUTPATIENT
Start: 2022-01-04 | End: 2022-01-06

## 2022-01-04 RX ORDER — CEFAZOLIN SODIUM 1 G
2000 VIAL (EA) INJECTION EVERY 8 HOURS
Refills: 0 | Status: COMPLETED | OUTPATIENT
Start: 2022-01-04 | End: 2022-01-05

## 2022-01-04 RX ORDER — ONDANSETRON 8 MG/1
4 TABLET, FILM COATED ORAL EVERY 6 HOURS
Refills: 0 | Status: DISCONTINUED | OUTPATIENT
Start: 2022-01-04 | End: 2022-01-06

## 2022-01-04 RX ORDER — SODIUM CHLORIDE 9 MG/ML
1000 INJECTION, SOLUTION INTRAVENOUS
Refills: 0 | Status: DISCONTINUED | OUTPATIENT
Start: 2022-01-04 | End: 2022-01-06

## 2022-01-04 RX ORDER — ACETAMINOPHEN 500 MG
1000 TABLET ORAL EVERY 8 HOURS
Refills: 0 | Status: COMPLETED | OUTPATIENT
Start: 2022-01-04 | End: 2022-01-05

## 2022-01-04 RX ORDER — PANTOPRAZOLE SODIUM 20 MG/1
40 TABLET, DELAYED RELEASE ORAL
Refills: 0 | Status: DISCONTINUED | OUTPATIENT
Start: 2022-01-05 | End: 2022-01-06

## 2022-01-04 RX ORDER — KETOROLAC TROMETHAMINE 30 MG/ML
15 SYRINGE (ML) INJECTION EVERY 6 HOURS
Refills: 0 | Status: DISCONTINUED | OUTPATIENT
Start: 2022-01-04 | End: 2022-01-06

## 2022-01-04 RX ORDER — NEBIVOLOL HYDROCHLORIDE 5 MG/1
5 TABLET ORAL DAILY
Refills: 0 | Status: DISCONTINUED | OUTPATIENT
Start: 2022-01-05 | End: 2022-01-06

## 2022-01-04 RX ORDER — OXYCODONE HYDROCHLORIDE 5 MG/1
5 TABLET ORAL EVERY 4 HOURS
Refills: 0 | Status: DISCONTINUED | OUTPATIENT
Start: 2022-01-04 | End: 2022-01-06

## 2022-01-04 RX ORDER — ONDANSETRON 8 MG/1
4 TABLET, FILM COATED ORAL ONCE
Refills: 0 | Status: DISCONTINUED | OUTPATIENT
Start: 2022-01-04 | End: 2022-01-04

## 2022-01-04 RX ORDER — HYDROMORPHONE HYDROCHLORIDE 2 MG/ML
1 INJECTION INTRAMUSCULAR; INTRAVENOUS; SUBCUTANEOUS
Refills: 0 | Status: DISCONTINUED | OUTPATIENT
Start: 2022-01-04 | End: 2022-01-04

## 2022-01-04 RX ORDER — OXYCODONE HYDROCHLORIDE 5 MG/1
10 TABLET ORAL EVERY 4 HOURS
Refills: 0 | Status: DISCONTINUED | OUTPATIENT
Start: 2022-01-04 | End: 2022-01-06

## 2022-01-04 RX ORDER — DEXTROSE 50 % IN WATER 50 %
25 SYRINGE (ML) INTRAVENOUS ONCE
Refills: 0 | Status: DISCONTINUED | OUTPATIENT
Start: 2022-01-04 | End: 2022-01-06

## 2022-01-04 RX ORDER — DEXTROSE 50 % IN WATER 50 %
12.5 SYRINGE (ML) INTRAVENOUS ONCE
Refills: 0 | Status: DISCONTINUED | OUTPATIENT
Start: 2022-01-04 | End: 2022-01-06

## 2022-01-04 RX ORDER — AMLODIPINE BESYLATE 2.5 MG/1
5 TABLET ORAL DAILY
Refills: 0 | Status: DISCONTINUED | OUTPATIENT
Start: 2022-01-05 | End: 2022-01-06

## 2022-01-04 RX ORDER — ENOXAPARIN SODIUM 100 MG/ML
40 INJECTION SUBCUTANEOUS DAILY
Refills: 0 | Status: DISCONTINUED | OUTPATIENT
Start: 2022-01-05 | End: 2022-01-06

## 2022-01-04 RX ORDER — ATORVASTATIN CALCIUM 80 MG/1
10 TABLET, FILM COATED ORAL AT BEDTIME
Refills: 0 | Status: DISCONTINUED | OUTPATIENT
Start: 2022-01-05 | End: 2022-01-06

## 2022-01-04 RX ORDER — VALSARTAN 80 MG/1
160 TABLET ORAL DAILY
Refills: 0 | Status: DISCONTINUED | OUTPATIENT
Start: 2022-01-05 | End: 2022-01-06

## 2022-01-04 RX ORDER — ACETAMINOPHEN 500 MG
650 TABLET ORAL EVERY 6 HOURS
Refills: 0 | Status: DISCONTINUED | OUTPATIENT
Start: 2022-01-04 | End: 2022-01-06

## 2022-01-04 RX ORDER — HYDROMORPHONE HYDROCHLORIDE 2 MG/ML
0.5 INJECTION INTRAMUSCULAR; INTRAVENOUS; SUBCUTANEOUS
Refills: 0 | Status: DISCONTINUED | OUTPATIENT
Start: 2022-01-04 | End: 2022-01-04

## 2022-01-04 RX ORDER — GLUCAGON INJECTION, SOLUTION 0.5 MG/.1ML
1 INJECTION, SOLUTION SUBCUTANEOUS ONCE
Refills: 0 | Status: DISCONTINUED | OUTPATIENT
Start: 2022-01-04 | End: 2022-01-06

## 2022-01-04 RX ORDER — INSULIN LISPRO 100/ML
VIAL (ML) SUBCUTANEOUS
Refills: 0 | Status: DISCONTINUED | OUTPATIENT
Start: 2022-01-04 | End: 2022-01-06

## 2022-01-04 RX ADMIN — OXYCODONE HYDROCHLORIDE 5 MILLIGRAM(S): 5 TABLET ORAL at 21:30

## 2022-01-04 RX ADMIN — Medication 15 MILLIGRAM(S): at 17:54

## 2022-01-04 RX ADMIN — OXYCODONE HYDROCHLORIDE 5 MILLIGRAM(S): 5 TABLET ORAL at 22:30

## 2022-01-04 RX ADMIN — Medication 400 MILLIGRAM(S): at 22:10

## 2022-01-04 RX ADMIN — Medication 1000 MILLIGRAM(S): at 22:45

## 2022-01-04 RX ADMIN — Medication 15 MILLIGRAM(S): at 17:05

## 2022-01-04 RX ADMIN — Medication 100 MILLIGRAM(S): at 22:10

## 2022-01-04 NOTE — PROGRESS NOTE ADULT - ASSESSMENT
PLAN:    -admit to ICU  -Q1H Vioptix tissue oxygenation monitoring. Baseline vioptix as noted in HPI.  If VIOPTIX noted to drop by 20%  or more in a 30 minute time frame will alert primary surgical team  -ensure vioptix signal strength >80%  -serial flap assessment for signs of perfusion  -skin assessment for color, firmness, signs of hematoma or other changes  -abdominal incisions site  monitoring  -hourly checks on ISHAAN drain output  -pain control  -Morning LABS  -have discussed case with eICU team, including attending physician  -any and all changes or concerns regarding JESIKA procedure, flap, etc will be immedietly addressed with primary surgical team

## 2022-01-04 NOTE — PATIENT PROFILE ADULT - FALL HARM RISK - UNIVERSAL INTERVENTIONS
Bed in lowest position, wheels locked, appropriate side rails in place/Call bell, personal items and telephone in reach/Instruct patient to call for assistance before getting out of bed or chair/Non-slip footwear when patient is out of bed/[place] to call system/Physically safe environment - no spills, clutter or unnecessary equipment/Purposeful Proactive Rounding/Room/bathroom lighting operational, light cord in reach

## 2022-01-04 NOTE — PROGRESS NOTE ADULT - SUBJECTIVE AND OBJECTIVE BOX
HPI:  Admit Note  56y Female PMH of Breast CA, now POD#__0___ from b/l Mastectomy, with JESIKA/free flap placement, as well as VIOPTIX tissue/flap oxygenation monitoring.     Current Vioptix:    Right Breast: 62%  Left Breast: 69%    Current Signal Strength    Right Breast: 91  Left Breast:  96            PAST MEDICAL & SURGICAL HISTORY:  HTN (Hypertension)    Migraines    GERD (Gastroesophageal Reflux Disease)    Chronic Peptic Ulcer    CN (Constipation)    RAD (Reactive Airway Disease)  with cold or allergies    History of Tubal Ligation      Herniated Cervical Disc  uses occasional tylenol    Breast cancer    Seasonal allergies    Kidney stones    DM (diabetes mellitus)  type 2    Breast cancer    Uterine leiomyoma    Intraductal carcinoma in situ of right breast    S/P Laparoscopic Cholecystectomy       Delivery  2001    S/P Endometrial Ablation    S/P Dilation and Curettage      History of lumpectomy    History of hysterectomy  -     S/P rotator cuff repair  2021 right        MEDICATIONS  (STANDING):  acetaminophen     Tablet .. 650 milliGRAM(s) Oral every 6 hours  acetaminophen   IVPB .. 1000 milliGRAM(s) IV Intermittent every 8 hours  ceFAZolin   IVPB 2000 milliGRAM(s) IV Intermittent every 8 hours  dextrose 40% Gel 15 Gram(s) Oral once  dextrose 5%. 1000 milliLiter(s) (50 mL/Hr) IV Continuous <Continuous>  dextrose 5%. 1000 milliLiter(s) (100 mL/Hr) IV Continuous <Continuous>  dextrose 50% Injectable 25 Gram(s) IV Push once  dextrose 50% Injectable 12.5 Gram(s) IV Push once  dextrose 50% Injectable 25 Gram(s) IV Push once  glucagon  Injectable 1 milliGRAM(s) IntraMuscular once  insulin lispro (ADMELOG) corrective regimen sliding scale   SubCutaneous three times a day before meals  ketorolac   Injectable 15 milliGRAM(s) IV Push every 6 hours  lactated ringers. 1000 milliLiter(s) (75 mL/Hr) IV Continuous <Continuous>  lactated ringers. 1000 milliLiter(s) (75 mL/Hr) IV Continuous <Continuous>    MEDICATIONS  (PRN):  HYDROmorphone  Injectable 0.5 milliGRAM(s) IV Push every 10 minutes PRN Moderate Pain (4 - 6)  HYDROmorphone  Injectable 1 milliGRAM(s) IV Push every 10 minutes PRN Severe Pain (7 - 10)  ondansetron Injectable 4 milliGRAM(s) IV Push once PRN Nausea and/or Vomiting  ondansetron Injectable 4 milliGRAM(s) IV Push every 6 hours PRN Nausea and/or Vomiting  oxyCODONE    IR 5 milliGRAM(s) Oral every 4 hours PRN Moderate Pain (4 - 6)  oxyCODONE    IR 10 milliGRAM(s) Oral every 4 hours PRN Severe Pain (7 - 10)      Review of Systems:  CONSTITUTIONAL: No fever, chills, or fatigue  EYES: No eye pain, visual disturbances, or discharge  ENMT:  No difficulty hearing, tinnitus, vertigo; No sinus or throat pain  NECK: No pain or stiffness  RESPIRATORY: No cough, wheezing, chills or hemoptysis; No shortness of breath  CARDIOVASCULAR: No chest pain, palpitations, dizziness, or leg swelling  GASTROINTESTINAL: No abdominal or epigastric pain. No nausea, vomiting, or hematemesis; No diarrhea or constipation. No melena or hematochezia.  GENITOURINARY: No dysuria, frequency, hematuria, or incontinence  NEUROLOGICAL: No headaches, memory loss, loss of strength, numbness, or tremors  SKIN: No itching, burning, rashes, or lesions   MUSCULOSKELETAL: No joint pain or swelling; No muscle, back, or extremity pain  PSYCHIATRIC: No depression, anxiety, mood swings, or difficulty sleeping      ICU Vital Signs Last 24 Hrs  T(C): 36.2 (2022 19:00), Max: 36.7 (2022 05:43)  T(F): 97.2 (2022 19:00), Max: 98.1 (2022 05:43)  HR: 83 (2022 19:00) (83 - 100)  BP: 110/66 (2022 19:00) (91/58 - 128/75)  BP(mean): 71 (2022 17:25) (65 - 73)  RR: 16 (2022 19:00) (12 - 17)  SpO2: 100% (2022 19:00) (99% - 100%)                        Physical Examination:    General: No acute distress.  Alert, oriented, interactive, nonfocal    BREAST: breasts appears symmetrical, no signs of hematoma, soft to palpation, non tender, well perfused, turgor maintained,cap refill adequate. Viotpix in place over b/l nipples functioning well. ISHAAN drains in place with serosanguinous drainage.     HEENT: Pupils equal, reactive to light.  Symmetric.    PULM: Clear to auscultation bilaterally, no significant sputum production    CVS: Regular rate and rhythm, no murmurs, rubs, or gallops    ABD: Soft, nondistended, nontender, normoactive bowel sounds, no masses. Flap removal site well approximated, staples in palce. No ative drainage, bleeding, or signs of infection.    EXT: No edema, nontender    SKIN: Warm and well perfused, no rashes noted.

## 2022-01-04 NOTE — CONSULT NOTE ADULT - ASSESSMENT
56 year old female with PMH migraines, GERD, PUD, constipation, asthma, DM 2 POD #0 Immediate bilateral breast reconstruction with deep inferior epigastric artery  flaps following bilateral mastectomies.    Plan  Monitor bioptips, VS  Analgesia/antiemetics PRN  Monitor drain output  Labs per surgical team  NPO and [place] until AM  DVT/GI PPx per surgical team

## 2022-01-04 NOTE — CONSULT NOTE ADULT - SUBJECTIVE AND OBJECTIVE BOX
HPI  56 year old female with PMH migraines, GERD, PUD, constipation, asthma, DM 2.  Patient was originally diagnosed with b/l breast cancer in 2015.  s/p lumpectomy b/l.  Routine mammogram done 2021, a lesion was noted in right breast.  s/p biopsy confirmed DCIS.  Now POD #0 Immediate bilateral breast reconstruction with deep inferior epigastric artery  flaps following bilateral mastectomies.    PMH    HTN (Hypertension)    Migraines    GERD (Gastroesophageal Reflux Disease)    Chronic Peptic Ulcer    CN (Constipation)    RAD (Reactive Airway Disease)  with cold or allergies    History of Tubal Ligation      Herniated Cervical Disc  uses occasional tylenol    Breast cancer    Seasonal allergies    Kidney stones    DM (diabetes mellitus)  type 2    Breast cancer    Uterine leiomyoma    Intraductal carcinoma in situ of right breast    PSH    S/P Laparoscopic Cholecystectomy       Delivery  2001    S/P Endometrial Ablation    S/P Dilation and Curettage      History of lumpectomy    History of hysterectomy  -     S/P rotator cuff repair  2021 right    Allergies    No Known Allergies    Current Medications  ketorolac   Injectable 15 milliGRAM(s) IV Push every 6 hours  lactated ringers. 1000 milliLiter(s) (75 mL/Hr) IV Continuous <Continuous>  HYDROmorphone  Injectable 0.5 milliGRAM(s) IV Push every 10 minutes PRN Moderate Pain (4 - 6)  HYDROmorphone  Injectable 1 milliGRAM(s) IV Push every 10 minutes PRN Severe Pain (7 - 10)  ondansetron Injectable 4 milliGRAM(s) IV Push once PRN Nausea and/or Vomiting    Psoc    · History of COVID-19 vaccination	Yes  · Brand of COVID-19 vaccination	Pfizer dose 1 and 2  · Date of last vaccination	16-Aug-2021  · Have you had a COVID-19 booster?	No  · Will the patient accept the COVID-19 vaccine if eligible and it is available?	Not applicable    Social History:  · Marital Status	  · Occupation	  · Lives With	children; spouse    Substance Use History:  · Substance Use	never used    Alcohol Use History:  · Have you ever consumed alcohol	never    Tobacco Usage:  · Tobacco Usage: Never smoker    Passive Smoke Exposure:  · Passive Smoke Exposure	No    Presurgical Screening:   Cardiovascular:  · Activity	Daily walks, ADLs, house chores, able to climb 3flights of stairs w/o symptoms    Pfam  Family history of hypertension (Sibling)    Family history of diabetes mellitus (Sibling)    Family history of hyperlipidemia    Family history of cancer (Sibling, Grandparent)  Breast cancer and prostate cancer    Review of Systems  	CONSTITUTIONAL: Denies fever, weight loss, or fatigue  	ENT: Denies dysphagia, difficulty chewing, tinnitus, blurred vision, double vision  	RESPIRATORY: Denies cough, wheeze, hemoptysis, shortness of breath  	CARDIOVASCULAR: Denies chest pain, palpitations irregular HR  	GASTROINTESTINAL:  Denies nausea, vomiting, abdominal pain, diarrhea, constipation, melena, BRBPR, food intolerances  	GENITOURINARY: Denies dysuria, hematuria, urinary frequency, urinary incontinence  	NEUROLOGICAL: Denies headaches, syncope, near syncope, dizziness, lightheadedness, headaches, seizures  	SKIN: Denies rashes, masses, bruising, lesions   	MUSCULOSKELETAL: Denies history of OA or gout, joint swelling  	PSYCHIATRIC: Denies depression, anxiety, mood swings, or difficulty sleeping  	HEME: Denies history of DVT/PE, blood transfusion    Vital Signs Last 24 Hrs  T(C): 36.2 (2022 15:10), Max: 36.7 (2022 05:43)  T(F): 97.2 (2022 15:10), Max: 98.1 (2022 05:43)  HR: 94 (2022 16:25) (83 - 100)  BP: 91/58 (2022 16:10) (91/58 - 128/75)  BP(mean): 70 (2022 16:10) (65 - 73)  RR: 14 (2022 16:25) (12 - 16)  SpO2: 100% 2L NC (2022 16:25) (99% - 100%)    Physical Exam  Gen:  WN/WD Female resting in bed, NAD  ENT:  NC/AT, no JVD noted  Lung:  CTA b/l  CV:  S1, S2. RRR  Extrem:  No C/C/E, DP/radial pulses +2  Neuro:  No gross motor/sensory deficits  Psych:  Awake, alert and calm

## 2022-01-04 NOTE — BRIEF OPERATIVE NOTE - OPERATION/FINDINGS
Immediate bilateral breast reconstruction with deep inferior epigastric artery  flaps following bilateral mastectomies.

## 2022-01-05 LAB
A1C WITH ESTIMATED AVERAGE GLUCOSE RESULT: 5.7 % — HIGH (ref 4–5.6)
ANION GAP SERPL CALC-SCNC: 7 MMOL/L — SIGNIFICANT CHANGE UP (ref 5–17)
BUN SERPL-MCNC: 14 MG/DL — SIGNIFICANT CHANGE UP (ref 7–23)
CALCIUM SERPL-MCNC: 8.5 MG/DL — SIGNIFICANT CHANGE UP (ref 8.4–10.5)
CHLORIDE SERPL-SCNC: 105 MMOL/L — SIGNIFICANT CHANGE UP (ref 96–108)
CO2 SERPL-SCNC: 26 MMOL/L — SIGNIFICANT CHANGE UP (ref 22–31)
CREAT SERPL-MCNC: 0.94 MG/DL — SIGNIFICANT CHANGE UP (ref 0.5–1.3)
ESTIMATED AVERAGE GLUCOSE: 117 MG/DL — HIGH (ref 68–114)
GLUCOSE BLDC GLUCOMTR-MCNC: 100 MG/DL — HIGH (ref 70–99)
GLUCOSE BLDC GLUCOMTR-MCNC: 128 MG/DL — HIGH (ref 70–99)
GLUCOSE BLDC GLUCOMTR-MCNC: 91 MG/DL — SIGNIFICANT CHANGE UP (ref 70–99)
GLUCOSE SERPL-MCNC: 125 MG/DL — HIGH (ref 70–99)
HCT VFR BLD CALC: 29.9 % — LOW (ref 34.5–45)
HGB BLD-MCNC: 10 G/DL — LOW (ref 11.5–15.5)
MCHC RBC-ENTMCNC: 27.9 PG — SIGNIFICANT CHANGE UP (ref 27–34)
MCHC RBC-ENTMCNC: 33.4 GM/DL — SIGNIFICANT CHANGE UP (ref 32–36)
MCV RBC AUTO: 83.5 FL — SIGNIFICANT CHANGE UP (ref 80–100)
NRBC # BLD: 0 /100 WBCS — SIGNIFICANT CHANGE UP (ref 0–0)
PLATELET # BLD AUTO: 270 K/UL — SIGNIFICANT CHANGE UP (ref 150–400)
POTASSIUM SERPL-MCNC: 3.9 MMOL/L — SIGNIFICANT CHANGE UP (ref 3.5–5.3)
POTASSIUM SERPL-SCNC: 3.9 MMOL/L — SIGNIFICANT CHANGE UP (ref 3.5–5.3)
RBC # BLD: 3.58 M/UL — LOW (ref 3.8–5.2)
RBC # FLD: 13.2 % — SIGNIFICANT CHANGE UP (ref 10.3–14.5)
SODIUM SERPL-SCNC: 138 MMOL/L — SIGNIFICANT CHANGE UP (ref 135–145)
WBC # BLD: 15.57 K/UL — HIGH (ref 3.8–10.5)
WBC # FLD AUTO: 15.57 K/UL — HIGH (ref 3.8–10.5)

## 2022-01-05 RX ADMIN — Medication 15 MILLIGRAM(S): at 06:34

## 2022-01-05 RX ADMIN — NEBIVOLOL HYDROCHLORIDE 5 MILLIGRAM(S): 5 TABLET ORAL at 05:22

## 2022-01-05 RX ADMIN — ATORVASTATIN CALCIUM 10 MILLIGRAM(S): 80 TABLET, FILM COATED ORAL at 21:23

## 2022-01-05 RX ADMIN — Medication 15 MILLIGRAM(S): at 06:00

## 2022-01-05 RX ADMIN — Medication 100 MILLIGRAM(S): at 04:36

## 2022-01-05 RX ADMIN — Medication 15 MILLIGRAM(S): at 17:52

## 2022-01-05 RX ADMIN — AMLODIPINE BESYLATE 5 MILLIGRAM(S): 2.5 TABLET ORAL at 05:22

## 2022-01-05 RX ADMIN — METFORMIN HYDROCHLORIDE 500 MILLIGRAM(S): 850 TABLET ORAL at 17:11

## 2022-01-05 RX ADMIN — Medication 1000 MILLIGRAM(S): at 13:30

## 2022-01-05 RX ADMIN — Medication 1000 MILLIGRAM(S): at 21:53

## 2022-01-05 RX ADMIN — Medication 15 MILLIGRAM(S): at 00:00

## 2022-01-05 RX ADMIN — PANTOPRAZOLE SODIUM 40 MILLIGRAM(S): 20 TABLET, DELAYED RELEASE ORAL at 05:24

## 2022-01-05 RX ADMIN — Medication 15 MILLIGRAM(S): at 00:38

## 2022-01-05 RX ADMIN — Medication 400 MILLIGRAM(S): at 06:00

## 2022-01-05 RX ADMIN — ENOXAPARIN SODIUM 40 MILLIGRAM(S): 100 INJECTION SUBCUTANEOUS at 11:46

## 2022-01-05 RX ADMIN — METFORMIN HYDROCHLORIDE 500 MILLIGRAM(S): 850 TABLET ORAL at 05:22

## 2022-01-05 RX ADMIN — Medication 400 MILLIGRAM(S): at 21:23

## 2022-01-05 RX ADMIN — Medication 400 MILLIGRAM(S): at 13:14

## 2022-01-05 RX ADMIN — VALSARTAN 160 MILLIGRAM(S): 80 TABLET ORAL at 05:21

## 2022-01-05 RX ADMIN — Medication 15 MILLIGRAM(S): at 12:10

## 2022-01-05 RX ADMIN — Medication 15 MILLIGRAM(S): at 11:46

## 2022-01-05 RX ADMIN — Medication 15 MILLIGRAM(S): at 17:10

## 2022-01-05 RX ADMIN — Medication 1000 MILLIGRAM(S): at 06:34

## 2022-01-05 NOTE — PROGRESS NOTE ADULT - SUBJECTIVE AND OBJECTIVE BOX
doing well  patient denies cp, sob, fevers, calf pain  she denies any pain    on exam, both breasts are soft. flaps are viable. JESIKA flaps have good color and cap refill. stable vioptix.  no mastectomy skin flap ischemia.    abdomen soft. incisions intact. umbiliucus viable.    all surgical sites soft. no evidence of hematoma

## 2022-01-05 NOTE — PROGRESS NOTE ADULT - SUBJECTIVE AND OBJECTIVE BOX
HPI: 57 y/o F w/ pmh of migraines headaches, gerd, PUD, constipation, asthma, DM2, dx w/ b/l breast ca s/p lumpectomy now s/p b/l JESIKA procedure now pod#1.    24 hours events: pt seen and examined tonight, comfortable in bed w/out any complains, reed removed today and diet started.      Review of Systems:  Constitutional: No fever, chills, fatigue  Neuro: No headache, numbness, weakness  Resp: No cough, wheezing, shortness of breath  CVS: No chest pain, palpitations, leg swelling  GI: No abdominal pain, nausea, vomiting, diarrhea   : No dysuria, frequency, incontinence  Skin: No itching, burning, rashes, or lesions   Msk: No joint pain or swelling  Psych: No depression, anxiety, mood swings    T(F): 99.2 (01-05-22 @ 16:33), Max: 99.2 (01-05-22 @ 16:33)  HR: 81 (01-05-22 @ 16:33) (69 - 87)  BP: 98/62 (01-05-22 @ 16:33) (90/52 - 111/62)  RR: 16 (01-05-22 @ 16:33) (13 - 16)  SpO2: 100% (01-05-22 @ 16:33) (96% - 100%)  Wt(kg): --      01-04-22 @ 07:01  -  01-05-22 @ 07:00  --------------------------------------------------------  IN: 1275 mL / OUT: 1269.5 mL / NET: 5.5 mL    01-05-22 @ 07:01  -  01-05-22 @ 20:24  --------------------------------------------------------  IN: 525 mL / OUT: 290 mL / NET: 235 mL        CAPILLARY BLOOD GLUCOSE      POCT Blood Glucose.: 100 mg/dL (05 Jan 2022 17:00)      I&O's Summary    04 Jan 2022 07:01  -  05 Jan 2022 07:00  --------------------------------------------------------  IN: 1275 mL / OUT: 1269.5 mL / NET: 5.5 mL    05 Jan 2022 07:01  -  05 Jan 2022 20:24  --------------------------------------------------------  IN: 525 mL / OUT: 290 mL / NET: 235 mL        Physical Exam:   Gen: Comfortable in bed in NAD  Neuro: AAOx3  Resp: CTA b/l  CVS: +RRR  Chest: Both breast soft, flaps with good color, R. vioptix 68% w/ SQ of 91 and L. vioptix 93 w/ SQ of 93%  Abd: abd incision siet c/d/i  Ext: no edema   Skin: warm/dry    Meds:    amLODIPine   Tablet Oral  nebivolol Oral  valsartan Oral    atorvastatin Oral  dextrose 40% Gel Oral  dextrose 50% Injectable IV Push  dextrose 50% Injectable IV Push  dextrose 50% Injectable IV Push  glucagon  Injectable IntraMuscular  insulin lispro (ADMELOG) corrective regimen sliding scale SubCutaneous  metFORMIN Oral      acetaminophen     Tablet .. Oral  acetaminophen   IVPB .. IV Intermittent  ketorolac   Injectable IV Push  ondansetron Injectable IV Push PRN  oxyCODONE    IR Oral PRN  oxyCODONE    IR Oral PRN      enoxaparin Injectable SubCutaneous    pantoprazole    Tablet Oral      dextrose 5%. IV Continuous  dextrose 5%. IV Continuous  lactated ringers. IV Continuous                                  10.0   15.57 )-----------( 270      ( 05 Jan 2022 06:00 )             29.9       01-05    138  |  105  |  14  ----------------------------<  125<H>  3.9   |  26  |  0.94    Ca    8.5      05 Jan 2022 06:00        CODE STATUS: FULL CODE

## 2022-01-05 NOTE — PROGRESS NOTE ADULT - ASSESSMENT
s/p bilateral JESIKA, POD 1  plan:  OOB to chair  regular diet  d/c brianna  d/c telemetry  cont DVT chemoppx  cont vioptix  encourage water intake and incentive spirometer  please ensure glycemic control

## 2022-01-05 NOTE — PROGRESS NOTE ADULT - ASSESSMENT
56 year old female with PMH migraines, GERD, PUD, constipation, asthma, DM 2 POD #1 Immediate bilateral breast reconstruction with deep inferior epigastric artery  flaps following bilateral mastectomies.  1. S/p JESIKA  2. Breast cancer    Plan  - Continue to monitor vioptix and VS  - Continue pain management, as per surgical team  - Continue to Monitor drain output  - Harmon catheter removed, voiding freely  - Continue oral diet as tolerated, monitor for BM  - Labs and medical management as per surgical team  - Possible discharge to home in AM  - DVT/GI PPx per surgical team 56 year old female with PMH migraines, GERD, PUD, constipation, asthma, DM 2 POD #1 Immediate bilateral breast reconstruction with deep inferior epigastric artery  flaps following bilateral mastectomies.  1. S/p JESIKA  2. Breast cancer  3. GERD   4. Asthma   5. Diabetes Mellitus Type 2    Plan  - Continue to monitor vioptix and VS  - Continue pain management, as per surgical team  - Continue to Monitor drain output  - Harmon catheter removed, voiding freely  - Continue oral diet as tolerated, monitor for BM  - Labs and medical management as per surgical team  - Possible discharge to home in AM  - DVT/GI PPx per surgical team

## 2022-01-05 NOTE — PROGRESS NOTE ADULT - ASSESSMENT
57 y/o F w/ pmh of migraines headaches, gerd, PUD, constipation, asthma, DM2, dx w/ b/l breast ca s/p lumpectomy now s/p b/l JESIKA procedure now pod#1.      -Neuro: No acute issues, tylenol/toradol and oxycodone for pain control per pain scale, hold opoids for sedation  -Cardiac: HDS maintain MAP >65  -Resp: No acute issues maintain o2 >92%  -GI: advance diet as ordered/tolerated per surgery   -Renal: Renal function stable  -ID: no acute infectious process  -Heme: DVT ppx w/ lovenox  -Endo: DM on home metformin  -Surgical: S/p b/l JESIKA procedure cont to monitor Vioptix, will notify surgical team for drops >10%  -Dispo: Pt remains under MICU service as surgical border for close observation, dispo pending hospital course 55 y/o F w/ pmh of migraines headaches, gerd, PUD, constipation, asthma, DM2, dx w/ b/l breast ca s/p lumpectomy now s/p b/l JESIKA procedure now pod#1.      -Neuro: No acute issues, tylenol/toradol and oxycodone for pain control per pain scale, hold opoids for sedation  -Cardiac: HDS maintain MAP >65  -Resp: No acute issues maintain o2 >92%  -GI: advance diet as ordered/tolerated per surgery   -Renal: Renal function stable, reed removed  -ID: no acute infectious process  -Heme: DVT ppx w/ lovenox  -Endo: DM on home metformin  -Surgical: S/p b/l JESIKA procedure cont to monitor Vioptix, will notify surgical team for drops >10%, monitor JPD  -Dispo: Pt remains under MICU service as surgical border for close observation, dispo pending hospital course

## 2022-01-05 NOTE — PROGRESS NOTE ADULT - SUBJECTIVE AND OBJECTIVE BOX
Patient is a 56y old  Female who presents with a chief complaint of Post operative management (2022 16:43)      BRIEF HOSPITAL COURSE: ***    Events last 24 hours: ***    PAST MEDICAL & SURGICAL HISTORY:  HTN (Hypertension)    Migraines    GERD (Gastroesophageal Reflux Disease)    Chronic Peptic Ulcer    CN (Constipation)    RAD (Reactive Airway Disease)  with cold or allergies    History of Tubal Ligation      Herniated Cervical Disc  uses occasional tylenol    Breast cancer    Seasonal allergies    Kidney stones    DM (diabetes mellitus)  type 2    Breast cancer    Uterine leiomyoma    Intraductal carcinoma in situ of right breast    S/P Laparoscopic Cholecystectomy       Delivery  2001    S/P Endometrial Ablation    S/P Dilation and Curettage      History of lumpectomy    History of hysterectomy  -     S/P rotator cuff repair  2021 right        Review of Systems:  CONSTITUTIONAL: No fever, chills, or fatigue  EYES: No eye pain, visual disturbances, or discharge  ENMT:  No difficulty hearing, tinnitus, vertigo; No sinus or throat pain  NECK: No pain or stiffness  RESPIRATORY: No cough, wheezing, chills or hemoptysis; No shortness of breath  CARDIOVASCULAR: No chest pain, palpitations, dizziness, or leg swelling  GASTROINTESTINAL: No abdominal or epigastric pain. No nausea, vomiting, or hematemesis; No diarrhea or constipation. No melena or hematochezia.  GENITOURINARY: No dysuria, frequency, hematuria, or incontinence  NEUROLOGICAL: No headaches, memory loss, loss of strength, numbness, or tremors  SKIN: No itching, burning, rashes, or lesions   MUSCULOSKELETAL: No joint pain or swelling; No muscle, back, or extremity pain  PSYCHIATRIC: No depression, anxiety, mood swings, or difficulty sleeping      Medications:    amLODIPine   Tablet 5 milliGRAM(s) Oral daily  nebivolol 5 milliGRAM(s) Oral daily  valsartan 160 milliGRAM(s) Oral daily      acetaminophen     Tablet .. 650 milliGRAM(s) Oral every 6 hours  acetaminophen   IVPB .. 1000 milliGRAM(s) IV Intermittent every 8 hours  ketorolac   Injectable 15 milliGRAM(s) IV Push every 6 hours  ondansetron Injectable 4 milliGRAM(s) IV Push every 6 hours PRN  oxyCODONE    IR 5 milliGRAM(s) Oral every 4 hours PRN  oxyCODONE    IR 10 milliGRAM(s) Oral every 4 hours PRN      enoxaparin Injectable 40 milliGRAM(s) SubCutaneous daily    pantoprazole    Tablet 40 milliGRAM(s) Oral before breakfast      atorvastatin 10 milliGRAM(s) Oral at bedtime  dextrose 40% Gel 15 Gram(s) Oral once  dextrose 50% Injectable 25 Gram(s) IV Push once  dextrose 50% Injectable 12.5 Gram(s) IV Push once  dextrose 50% Injectable 25 Gram(s) IV Push once  glucagon  Injectable 1 milliGRAM(s) IntraMuscular once  insulin lispro (ADMELOG) corrective regimen sliding scale   SubCutaneous three times a day before meals  metFORMIN 500 milliGRAM(s) Oral two times a day    dextrose 5%. 1000 milliLiter(s) IV Continuous <Continuous>  dextrose 5%. 1000 milliLiter(s) IV Continuous <Continuous>  lactated ringers. 1000 milliLiter(s) IV Continuous <Continuous>                ICU Vital Signs Last 24 Hrs  T(C): 37.1 (2022 04:00), Max: 37.1 (2022 04:00)  T(F): 98.7 (2022 04:00), Max: 98.7 (2022 04:00)  HR: 73 (2022 07:00) (69 - 100)  BP: 95/55 (2022 07:00) (91/58 - 119/65)  BP(mean): 61 (2022 04:00) (61 - 85)  ABP: --  ABP(mean): --  RR: 14 (2022 07:00) (12 - 17)  SpO2: 97% (2022 06:00) (97% - 100%)          I&O's Detail    2022 07:01  -  2022 07:00  --------------------------------------------------------  IN:    IV PiggyBack: 100 mL    IV PiggyBack: 200 mL    Lactated Ringers: 450 mL    Lactated Ringers: 525 mL  Total IN: 1275 mL    OUT:    Bulb (mL): 67 mL    Bulb (mL): 82.5 mL    Bulb (mL): 60 mL    Bulb (mL): 55 mL    Indwelling Catheter - Urethral (mL): 1005 mL  Total OUT: 1269.5 mL    Total NET: 5.5 mL      2022 07:01  -  2022 12:27  --------------------------------------------------------  IN:    Lactated Ringers: 150 mL  Total IN: 150 mL    OUT:    Bulb (mL): 30 mL    Bulb (mL): 30 mL    Bulb (mL): 25 mL    Bulb (mL): 50 mL  Total OUT: 135 mL    Total NET: 15 mL            LABS:                        10.0   15.57 )-----------( 270      ( 2022 06:00 )             29.9     -    138  |  105  |  14  ----------------------------<  125<H>  3.9   |  26  |  0.94    Ca    8.5      2022 06:00            CAPILLARY BLOOD GLUCOSE      POCT Blood Glucose.: 91 mg/dL (2022 11:51)        CULTURES:      Physical Examination:    General: No acute distress.  Alert, oriented, interactive, nonfocal    HEENT: Pupils equal, reactive to light.  Symmetric.    PULM: Clear to auscultation bilaterally, no significant sputum production    CVS: Regular rate and rhythm, no murmurs, rubs, or gallops    ABD: Soft, nondistended, nontender, normoactive bowel sounds, no masses    EXT: No edema, nontender    SKIN: Warm and well perfused, no rashes noted.    RADIOLOGY: ***    CRITICAL CARE TIME SPENT:  minutes of critical care time spent providing medical care for patient's acute illness/conditions that impairs at least one vital organ system and/or poses a high risk of imminent or life threatening deterioration in the patient's condition. It includes time spent evaluating and treating the patient's acute illness as well as time spent reviewing labs, radiology, discussing goals of care with patient and/or patient's family, and discussing the case with a multidisciplinary team, including the eICU, in an effort to prevent further life threatening deterioration or end organ damage. This time is independent of any procedures performed.       Patient is a 56y old  Female who presents with a chief complaint of Post operative management (2022 16:43)      BRIEF HOSPITAL COURSE:   56 year old female with PMH migraines, GERD, PUD, constipation, asthma, DM 2.  Patient was originally diagnosed with b/l breast cancer in 2015.  s/p lumpectomy b/l.  Routine mammogram done 2021, a lesion was noted in right breast. S/p biopsy confirmed DCIS.  Now POD #1 Immediate bilateral breast reconstruction with deep inferior epigastric artery  flaps following bilateral mastectomies.    Events last 24 hours:   Patient had an uneventful night. She is tolerating oral diet well. She is voiding freely, no BM as of yet. Pain is moderately relieved with medication.    PAST MEDICAL & SURGICAL HISTORY:  HTN (Hypertension)    Migraines    GERD (Gastroesophageal Reflux Disease)    Chronic Peptic Ulcer    CN (Constipation)    RAD (Reactive Airway Disease)  with cold or allergies    History of Tubal Ligation      Herniated Cervical Disc  uses occasional tylenol    Breast cancer    Seasonal allergies    Kidney stones    DM (diabetes mellitus)  type 2    Breast cancer    Uterine leiomyoma    Intraductal carcinoma in situ of right breast    S/P Laparoscopic Cholecystectomy       Delivery  2001    S/P Endometrial Ablation    S/P Dilation and Curettage      History of lumpectomy    History of hysterectomy  partial- 2010    S/P rotator cuff repair  2021 right      Medications:    amLODIPine   Tablet 5 milliGRAM(s) Oral daily  nebivolol 5 milliGRAM(s) Oral daily  valsartan 160 milliGRAM(s) Oral daily      acetaminophen     Tablet .. 650 milliGRAM(s) Oral every 6 hours  acetaminophen   IVPB .. 1000 milliGRAM(s) IV Intermittent every 8 hours  ketorolac   Injectable 15 milliGRAM(s) IV Push every 6 hours  ondansetron Injectable 4 milliGRAM(s) IV Push every 6 hours PRN  oxyCODONE    IR 5 milliGRAM(s) Oral every 4 hours PRN  oxyCODONE    IR 10 milliGRAM(s) Oral every 4 hours PRN      enoxaparin Injectable 40 milliGRAM(s) SubCutaneous daily    pantoprazole    Tablet 40 milliGRAM(s) Oral before breakfast      atorvastatin 10 milliGRAM(s) Oral at bedtime  dextrose 40% Gel 15 Gram(s) Oral once  dextrose 50% Injectable 25 Gram(s) IV Push once  dextrose 50% Injectable 12.5 Gram(s) IV Push once  dextrose 50% Injectable 25 Gram(s) IV Push once  glucagon  Injectable 1 milliGRAM(s) IntraMuscular once  insulin lispro (ADMELOG) corrective regimen sliding scale   SubCutaneous three times a day before meals  metFORMIN 500 milliGRAM(s) Oral two times a day    dextrose 5%. 1000 milliLiter(s) IV Continuous <Continuous>  dextrose 5%. 1000 milliLiter(s) IV Continuous <Continuous>  lactated ringers. 1000 milliLiter(s) IV Continuous <Continuous>        ICU Vital Signs Last 24 Hrs  T(C): 37.1 (2022 04:00), Max: 37.1 (2022 04:00)  T(F): 98.7 (2022 04:00), Max: 98.7 (2022 04:00)  HR: 73 (2022 07:00) (69 - 100)  BP: 95/55 (2022 07:00) (91/58 - 119/65)  BP(mean): 61 (2022 04:00) (61 - 85)  ABP: --  ABP(mean): --  RR: 14 (2022 07:00) (12 - 17)  SpO2: 97% (2022 06:00) (97% - 100%)          I&O's Detail    2022 07:01  -  2022 07:00  --------------------------------------------------------  IN:    IV PiggyBack: 100 mL    IV PiggyBack: 200 mL    Lactated Ringers: 450 mL    Lactated Ringers: 525 mL  Total IN: 1275 mL    OUT:    Bulb (mL): 67 mL    Bulb (mL): 82.5 mL    Bulb (mL): 60 mL    Bulb (mL): 55 mL    Indwelling Catheter - Urethral (mL): 1005 mL  Total OUT: 1269.5 mL    Total NET: 5.5 mL      2022 07:01  -  2022 12:27  --------------------------------------------------------  IN:    Lactated Ringers: 150 mL  Total IN: 150 mL    OUT:    Bulb (mL): 30 mL    Bulb (mL): 30 mL    Bulb (mL): 25 mL    Bulb (mL): 50 mL  Total OUT: 135 mL    Total NET: 15 mL            LABS:                        10.0   15.57 )-----------( 270      ( 2022 06:00 )             29.9     01-05    138  |  105  |  14  ----------------------------<  125<H>  3.9   |  26  |  0.94    Ca    8.5      2022 06:00            CAPILLARY BLOOD GLUCOSE      POCT Blood Glucose.: 91 mg/dL (2022 11:51)        CULTURES:      Physical Exam  Gen:  WN/WD Female resting in bed, NAD  ENT:  NC/AT, no JVD noted  Lung:  CTA b/l  CV:  S1, S2. RRR  Extrem:  No C/C/E, DP/radial pulses +2  Neuro:  No gross motor/sensory deficits  Psych:  Awake, alert and calm

## 2022-01-06 ENCOUNTER — TRANSCRIPTION ENCOUNTER (OUTPATIENT)
Age: 57
End: 2022-01-06

## 2022-01-06 VITALS
DIASTOLIC BLOOD PRESSURE: 61 MMHG | RESPIRATION RATE: 16 BRPM | HEART RATE: 86 BPM | TEMPERATURE: 98 F | SYSTOLIC BLOOD PRESSURE: 100 MMHG | OXYGEN SATURATION: 98 %

## 2022-01-06 LAB — GLUCOSE BLDC GLUCOMTR-MCNC: 93 MG/DL — SIGNIFICANT CHANGE UP (ref 70–99)

## 2022-01-06 PROCEDURE — 82962 GLUCOSE BLOOD TEST: CPT

## 2022-01-06 PROCEDURE — 83036 HEMOGLOBIN GLYCOSYLATED A1C: CPT

## 2022-01-06 PROCEDURE — A9541: CPT

## 2022-01-06 PROCEDURE — 88333 PATH CONSLTJ SURG CYTO XM 1: CPT

## 2022-01-06 PROCEDURE — C9399: CPT

## 2022-01-06 PROCEDURE — 88307 TISSUE EXAM BY PATHOLOGIST: CPT

## 2022-01-06 PROCEDURE — C1889: CPT

## 2022-01-06 PROCEDURE — 88334 PATH CONSLTJ SURG CYTO XM EA: CPT

## 2022-01-06 PROCEDURE — 80048 BASIC METABOLIC PNL TOTAL CA: CPT

## 2022-01-06 PROCEDURE — 88305 TISSUE EXAM BY PATHOLOGIST: CPT

## 2022-01-06 PROCEDURE — 85027 COMPLETE CBC AUTOMATED: CPT

## 2022-01-06 PROCEDURE — 36415 COLL VENOUS BLD VENIPUNCTURE: CPT

## 2022-01-06 PROCEDURE — C1781: CPT

## 2022-01-06 RX ORDER — ACETAMINOPHEN 500 MG
1 TABLET ORAL
Qty: 0 | Refills: 0 | DISCHARGE

## 2022-01-06 RX ADMIN — Medication 15 MILLIGRAM(S): at 05:12

## 2022-01-06 RX ADMIN — Medication 15 MILLIGRAM(S): at 05:30

## 2022-01-06 RX ADMIN — PANTOPRAZOLE SODIUM 40 MILLIGRAM(S): 20 TABLET, DELAYED RELEASE ORAL at 05:11

## 2022-01-06 RX ADMIN — Medication 15 MILLIGRAM(S): at 02:13

## 2022-01-06 RX ADMIN — Medication 15 MILLIGRAM(S): at 02:30

## 2022-01-06 RX ADMIN — METFORMIN HYDROCHLORIDE 500 MILLIGRAM(S): 850 TABLET ORAL at 05:11

## 2022-01-06 RX ADMIN — AMLODIPINE BESYLATE 5 MILLIGRAM(S): 2.5 TABLET ORAL at 05:12

## 2022-01-06 RX ADMIN — NEBIVOLOL HYDROCHLORIDE 5 MILLIGRAM(S): 5 TABLET ORAL at 05:11

## 2022-01-06 RX ADMIN — VALSARTAN 160 MILLIGRAM(S): 80 TABLET ORAL at 05:11

## 2022-01-06 NOTE — PROGRESS NOTE ADULT - SUBJECTIVE AND OBJECTIVE BOX
doing well  no c/o  feeling little pain  denies any cp, sob, fevers, calf pain    on exam, both breasts soft. no mastectomy ischemia.  JESIKA flap viable with good color and cap refill   stable vioptix  abdomen soft. incisions intact.  drain output serosang  no evidence of hematoma

## 2022-01-06 NOTE — PROGRESS NOTE ADULT - ASSESSMENT
s/p bilateral mastectomy with JESIKA flap reconstruction, POD 2  plan:  d/c home  no showering  office to call patient for office appt on 1/13  no heavy lifting; no strenuous activity  prescription percocet given preop  daily ASA 81 mg   sleep on back  empty drains bid  encourage water intake and incentive spirometer

## 2022-01-06 NOTE — DISCHARGE NOTE PROVIDER - CARE PROVIDERS DIRECT ADDRESSES
[email or web address]

## 2022-01-06 NOTE — DISCHARGE NOTE PROVIDER - HOSPITAL COURSE
57 yo female underwent b/l mastectomy and JESIKA flap reconstruction. Pt had an uneventful postoperative course and was seen Dr. Tate this morning and cleared to go home

## 2022-01-06 NOTE — DISCHARGE NOTE PROVIDER - NSDCCPTREATMENT_GEN_ALL_CORE_FT
PRINCIPAL PROCEDURE  Procedure: Mastectomy, bilateral, with bilateral reconstruction using JESIKA flap  Findings and Treatment:

## 2022-01-06 NOTE — DISCHARGE NOTE PROVIDER - CARE PROVIDER_API CALL
Alec Tate; PURNIMA)  Otolaryngology; Plastic Surgery  [address] [place] [address] [address]  [place] [address]  Phone: [phone]  Fax: [phone]  Follow Up Time:     Dewayne Franco)  Surgery  [address] [place] [id number]  Phone: [phone]  Fax: [phone]  Follow Up Time:

## 2022-01-06 NOTE — DISCHARGE NOTE PROVIDER - NSDCFUSCHEDAPPT_GEN_ALL_CORE_FT
JIM CATALAN S ; 02/18/2022 ; [facility] [address]  JIM CATALAN S ; 03/02/2022 ; [facility] [address]

## 2022-01-06 NOTE — DISCHARGE NOTE PROVIDER - NSDCMRMEDTOKEN_GEN_ALL_CORE_FT
Bystolic 5 mg oral tablet: 1 tab(s) orally once a day  Exforge 5 mg-160 mg oral tablet: 1 tab(s) orally once a day  Lipitor 10 mg oral tablet: 1 tab(s) orally once a day  metFORMIN 500 mg oral tablet: 1 tab(s) orally 2 times a day  pantoprazole 20 mg oral delayed release tablet: 1 tab(s) orally once a day

## 2022-01-06 NOTE — DISCHARGE NOTE PROVIDER - NSDCFUADDINST_GEN_ALL_CORE_FT
TAKE PERCOCET FOR SEVERE PAIN ONLY OTHERWISE TAKE TYLENOL    TAKE ASPIRIN 81 MG DAILY    DO NOT SHOWER    FOLLOW UP WITH DR. GLASS 1/13/22    FOLLOW UP WITH DR. OVALLE IN 2 WEEKS, PLEASE CALL THE OFFICE FOR AN APPOINTMENT     NO HEAVY LIFTING, BENDING OR STRAINING    SLEEP ON BACK    EMPTY DRAINS TWICE A DAY    DRINK WATER DAILY    USE INCENTIVE SPIROMETER 10X/HOUR WHILE AWAKE

## 2022-01-06 NOTE — DISCHARGE NOTE NURSING/CASE MANAGEMENT/SOCIAL WORK - PATIENT PORTAL LINK FT
You can access the FollowMyHealth Patient Portal offered by [facility] by registering at the following website: [email or web address]. By joining [facility]’s FollowMyHealth portal, you will also be able to view your health information using other applications (apps) compatible with our system.

## 2022-01-07 ENCOUNTER — FORM ENCOUNTER (OUTPATIENT)
Age: 57
End: 2022-01-07

## 2022-01-07 ENCOUNTER — NON-APPOINTMENT (OUTPATIENT)
Age: 57
End: 2022-01-07

## 2022-01-13 ENCOUNTER — APPOINTMENT (OUTPATIENT)
Dept: PLASTIC SURGERY | Facility: CLINIC | Age: 57
End: 2022-01-13
Payer: COMMERCIAL

## 2022-01-13 PROCEDURE — 99024 POSTOP FOLLOW-UP VISIT: CPT

## 2022-01-13 NOTE — HISTORY OF PRESENT ILLNESS
[FreeTextEntry1] : Richelle is a 56 year old female who presents for a postop evaluation.  She is s/p bilateral breast reconstruction on  1/4/22.  She has been healing well.  Reports drains x 4 > 25 ml output over the past 24 hours.  She reports some redness and discomfort at the right abdominal drain.  Also appreciated redness of the left mastectomy flap just lateral to the skin paddle.  States it has improved.  Denies fever, chills, increased pain, or swelling.  Is taking Tylenol for pain with good relief.  States the back of her right arm is tender to the touch and bruised.

## 2022-01-13 NOTE — PHYSICAL EXAM
[de-identified] : alert, calm, cooperative\par  [de-identified] : respirations are even and unlabored\par  [de-identified] : bilateral breast incisions are well-approximated, c/d/i, with dermabond in place.  There is moderate, generalized edema.  Mild, blanchable erythema appreciated to the left mastectomy flap just lateral of the skin paddle.  No signs of wound dehiscence or fluctuance. Flap is soft, skin paddle pink with good capillary refill. Bilateral Tate drains are to self suction with serosanguineous fluid appreciated.\par  [de-identified] : transverse abdominal and umbilical incisions are well-approximated with moderate, generalized edema.  Incisions are c/d/i with Prineo in place.  No signs of wound dehiscence or fluctuance.  Bilateral Tate drains are to self-suction with serosanguineous fluid present. Erythema and tenderness to the right abdominal drain site noted.  \par  [de-identified] : Ecchymosis 2/2 Lovenox injections to the right posterior upper arm.

## 2022-01-14 LAB — SURGICAL PATHOLOGY STUDY: SIGNIFICANT CHANGE UP

## 2022-01-19 ENCOUNTER — APPOINTMENT (OUTPATIENT)
Dept: PLASTIC SURGERY | Facility: CLINIC | Age: 57
End: 2022-01-19
Payer: COMMERCIAL

## 2022-01-19 PROCEDURE — 99024 POSTOP FOLLOW-UP VISIT: CPT

## 2022-01-25 ENCOUNTER — APPOINTMENT (OUTPATIENT)
Dept: SURGICAL ONCOLOGY | Facility: CLINIC | Age: 57
End: 2022-01-25
Payer: COMMERCIAL

## 2022-01-25 ENCOUNTER — APPOINTMENT (OUTPATIENT)
Dept: PLASTIC SURGERY | Facility: CLINIC | Age: 57
End: 2022-01-25
Payer: COMMERCIAL

## 2022-01-25 VITALS
HEIGHT: 62 IN | BODY MASS INDEX: 31.47 KG/M2 | WEIGHT: 171 LBS | HEART RATE: 71 BPM | TEMPERATURE: 97.6 F | OXYGEN SATURATION: 98 % | RESPIRATION RATE: 16 BRPM | DIASTOLIC BLOOD PRESSURE: 63 MMHG | SYSTOLIC BLOOD PRESSURE: 103 MMHG

## 2022-01-25 PROCEDURE — 99024 POSTOP FOLLOW-UP VISIT: CPT

## 2022-01-25 NOTE — ASSESSMENT
[FreeTextEntry1] : IMP:\par Newly diagnosed recurrent DCIS involving the right breast (despite Tamoxifen therapy)\par s/p Bilateral total mastectomies and bilateral axillary SLNB, with bilateral immediate breast reconstruction using contralateral deep inferior epigastric  flaps to the internal mammary vessels on 1/4/22. \par FINAL PATH: \par 1) Left breast:  Complex sclerosing lesion, IDP, fibrocystic changes. Negative nodes. \par 2) Right breast: DCIS, 1.9 cm, no invasive carcinoma or lymphovascular invasion. Negative margins.  Negative nodes. pTis pN0;  ER(+) GA not reported. \par Infection resolved\par \par PLAN:\par F/u with med-onc\par Continue f/u with plastics\par RTO 1 year

## 2022-01-25 NOTE — REASON FOR VISIT
[Post Op: _________] : a [unfilled] post op visit [FreeTextEntry1] : s/p bilateral breast reconstruction with sylwia flaps

## 2022-01-25 NOTE — HISTORY OF PRESENT ILLNESS
[de-identified] : 56 year female presents for an initial post op visit s/p Bilateral total mastectomies and bilateral axillary SLNB, with bilateral immediate breast reconstruction using contralateral deep inferior epigastric  flaps to the internal mammary vessels on 1/4/22. \par FINAL PATH: \par 1) Left breast:  Complex sclerosing lesion, IDP, fibrocystic changes. Negative nodes. \par 2) Right breast: DCIS, 1.9 cm, no invasive carcinoma or lymphovascular invasion. Negative margins.  Negative nodes. pTis pN0;  ER(+) TX not reported. \par \par \par 1/25/22- She is following up with Dr Tate and is doing well.  Pt. states she had a post op infection and was prescribed antibiotics by Dr. Tate. She completed her last dose of antibiotic yesterday. States the pain and chills are improving. Denies fevers. States she takes Advil or Aleeve for pain with good relief.  Has 1 ISHAAN to the left abdominal wall. She is scheduled to f/u with Dr. Tate today. \par \par \par PERTINENT HISTORY:\par She [place] a right breast lumpectomy and [place] [facility] (node negative, ER+), and required re excision for a positive margin in November 2015.  She completed adjuvant RT and is currently on a 10 year course of Tamoxifen under the care of Dr. Lizandro Barros.\par \par She has a family history of breast cancer involving her sister at age 50, niece at age 36 and maternal grandmother at age 81. .She is BRCA negative (tested 11/2015).\par \par She completed routine breast imaging in October 2021.  Ultrasound was negative.  Mammogram revealed suspicious calcifications in the right breast 9:00 axis (BIRADS 4).  Stereotactic biopsy demonstrated DCIS, intermediate grade with necrosis (ER+).\par \par Her past medical history history includes hypertension, hyperlipidemia, type 2 diabetes, GERD, cold induced intermittent asthma and NAFLD. \par \par She denies any palpable breast mass, nipple discharge, inversion or skin changes.

## 2022-01-25 NOTE — REASON FOR VISIT
[Post-Op] : a post-op for [Breast Cancer] : breast cancer [FreeTextEntry2] : Bilateral total mastectomies and bilateral axillary SLNB, with bilateral immediate breast reconstruction using contralateral deep inferior epigastric  flaps to the internal mammary vessels on 1/4/22. \par

## 2022-01-27 ENCOUNTER — APPOINTMENT (OUTPATIENT)
Dept: PLASTIC SURGERY | Facility: CLINIC | Age: 57
End: 2022-01-27
Payer: COMMERCIAL

## 2022-01-27 VITALS — BODY MASS INDEX: 31.47 KG/M2 | WEIGHT: 171 LBS | TEMPERATURE: 98.2 F | HEIGHT: 62 IN

## 2022-01-27 PROCEDURE — 99024 POSTOP FOLLOW-UP VISIT: CPT

## 2022-02-01 ENCOUNTER — TRANSCRIPTION ENCOUNTER (OUTPATIENT)
Age: 57
End: 2022-02-01

## 2022-02-01 ENCOUNTER — OUTPATIENT (OUTPATIENT)
Dept: OUTPATIENT SERVICES | Facility: HOSPITAL | Age: 57
LOS: 1 days | Discharge: HOME | End: 2022-02-01

## 2022-02-01 DIAGNOSIS — Z98.890 OTHER SPECIFIED POSTPROCEDURAL STATES: Chronic | ICD-10-CM

## 2022-02-01 DIAGNOSIS — Z00.00 ENCOUNTER FOR GENERAL ADULT MEDICAL EXAMINATION WITHOUT ABNORMAL FINDINGS: ICD-10-CM

## 2022-02-01 DIAGNOSIS — Z90.710 ACQUIRED ABSENCE OF BOTH CERVIX AND UTERUS: Chronic | ICD-10-CM

## 2022-02-02 ENCOUNTER — APPOINTMENT (OUTPATIENT)
Dept: HEMATOLOGY ONCOLOGY | Facility: CLINIC | Age: 57
End: 2022-02-02
Payer: COMMERCIAL

## 2022-02-02 VITALS
HEIGHT: 62.01 IN | HEART RATE: 74 BPM | TEMPERATURE: 97.5 F | OXYGEN SATURATION: 99 % | BODY MASS INDEX: 30.44 KG/M2 | DIASTOLIC BLOOD PRESSURE: 71 MMHG | RESPIRATION RATE: 16 BRPM | WEIGHT: 167.55 LBS | SYSTOLIC BLOOD PRESSURE: 108 MMHG

## 2022-02-02 PROCEDURE — 99214 OFFICE O/P EST MOD 30 MIN: CPT

## 2022-02-12 RX ORDER — TAMOXIFEN CITRATE 20 MG/1
20 TABLET, FILM COATED ORAL DAILY
Qty: 30 | Refills: 3 | Status: DISCONTINUED | COMMUNITY
Start: 2019-11-08 | End: 2022-02-12

## 2022-02-12 NOTE — ASSESSMENT
[FreeTextEntry1] : 57 y/o postmenopausal F with right breast DCIS on lower dose tamoxifen as per [place] study SAB 2018 but was found to have new DCIS on the right breast in 2021. We reviewed her pathology which showed DCIS and no other distant recurrence. We explained the with DCIS, she has definitive surgery with mastectomy and no additional endocrine therapy would be recommended.  We reviewed chest wall surveillance and low fat diet with exercise as per WINS. She will continue to follow up with Dr Franco and Dr Tate.

## 2022-02-12 NOTE — PHYSICAL EXAM
[Fully active, able to carry on all pre-disease performance without restriction] : Status 0 - Fully active, able to carry on all pre-disease performance without restriction [Normal] : affect appropriate [de-identified] : B mastectomy with JESIKA flap reconstruction healing

## 2022-02-12 NOTE — HISTORY OF PRESENT ILLNESS
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 She underwent right lumpectomy with SLNB  10/2/15. The pathology showed DCIS with close superior margin and returned for re-excision 11/4/15 with Dr Hawkins at [facility] [place]. She then underwent RT to the lumpectomy site with Dr Mann: 4240 cGy with boost. She started on tamoxifen chemoprevention on 1/2016 and has been followed by Dr Gunderson. Since initiation of the tamoxifen, she has experienced worsening headaches, joint pain, fatigue, fatty liver, and concerned about blood clots. She had seen neurologists for atypical migraine and had MRI which was negative for suspicious lesions. She was offered different abortive agents: imitrex and gabapentin which she is not taking. She stopped the tamoxifen for 2 months in 8/2017 to 9/2017 and her headaches were much improved along with her liver enzymes. On 11/2021, she had mammogram with new findings of R breast calcifications: she had stereotactic biopsy done on 11/19/2021 which showed DCIS intermediate grade. On 1/4/2022, she underwent B mastectomy with JESIKA flap reconstruction. The pathology showed DCIS measuring 1.9 cm %.  [de-identified] : DCIS: intermediate to high grade ER 95% [de-identified] : Tamoxifen 1/2016 to 9/2017\par tamoxifen 2018 to present: MWF stopped 2021 due to new DCIS s/p B mastectomy [de-identified] : She tolerated B mastectomy and continuing to heal. She is wondering if she needs to do anything else for the DCIS.

## 2022-02-12 NOTE — REASON FOR VISIT
[Follow-Up Visit] : a follow-up [Family Member] : family member [FreeTextEntry2] : follow up for DCIS s/p bilateral mastectomy with JESIKA flap reconstruction

## 2022-02-12 NOTE — REVIEW OF SYSTEMS
[Skin Rash] : no skin rash [Skin Wound] : no skin wound [Negative] : Allergic/Immunologic [de-identified] : tenderness over the mastectomy site

## 2022-02-15 ENCOUNTER — TRANSCRIPTION ENCOUNTER (OUTPATIENT)
Age: 57
End: 2022-02-15

## 2022-02-18 ENCOUNTER — APPOINTMENT (OUTPATIENT)
Dept: PULMONOLOGY | Facility: CLINIC | Age: 57
End: 2022-02-18
Payer: COMMERCIAL

## 2022-02-18 VITALS
BODY MASS INDEX: 29.8 KG/M2 | OXYGEN SATURATION: 99 % | TEMPERATURE: 97.3 F | DIASTOLIC BLOOD PRESSURE: 71 MMHG | HEART RATE: 74 BPM | RESPIRATION RATE: 16 BRPM | HEIGHT: 62.1 IN | SYSTOLIC BLOOD PRESSURE: 114 MMHG | WEIGHT: 164 LBS

## 2022-02-18 PROCEDURE — 99213 OFFICE O/P EST LOW 20 MIN: CPT

## 2022-03-02 ENCOUNTER — APPOINTMENT (OUTPATIENT)
Dept: INTERNAL MEDICINE | Facility: CLINIC | Age: 57
End: 2022-03-02

## 2022-03-07 ENCOUNTER — APPOINTMENT (OUTPATIENT)
Dept: PLASTIC SURGERY | Facility: CLINIC | Age: 57
End: 2022-03-07
Payer: COMMERCIAL

## 2022-03-07 VITALS
SYSTOLIC BLOOD PRESSURE: 122 MMHG | HEIGHT: 62 IN | BODY MASS INDEX: 31.28 KG/M2 | OXYGEN SATURATION: 97 % | DIASTOLIC BLOOD PRESSURE: 77 MMHG | WEIGHT: 170 LBS | HEART RATE: 81 BPM | TEMPERATURE: 97.8 F

## 2022-03-07 PROCEDURE — 99024 POSTOP FOLLOW-UP VISIT: CPT

## 2022-03-25 ENCOUNTER — LABORATORY RESULT (OUTPATIENT)
Age: 57
End: 2022-03-25

## 2022-03-25 ENCOUNTER — APPOINTMENT (OUTPATIENT)
Dept: RHEUMATOLOGY | Facility: CLINIC | Age: 57
End: 2022-03-25
Payer: COMMERCIAL

## 2022-03-25 VITALS
OXYGEN SATURATION: 98 % | WEIGHT: 167 LBS | TEMPERATURE: 97.6 F | HEART RATE: 79 BPM | BODY MASS INDEX: 30.73 KG/M2 | DIASTOLIC BLOOD PRESSURE: 68 MMHG | HEIGHT: 62 IN | RESPIRATION RATE: 16 BRPM | SYSTOLIC BLOOD PRESSURE: 108 MMHG

## 2022-03-25 DIAGNOSIS — R20.2 PARESTHESIA OF SKIN: ICD-10-CM

## 2022-03-25 PROCEDURE — 99214 OFFICE O/P EST MOD 30 MIN: CPT

## 2022-03-25 RX ORDER — METFORMIN HYDROCHLORIDE 625 MG/1
TABLET ORAL
Refills: 0 | Status: DISCONTINUED | COMMUNITY
End: 2022-03-25

## 2022-03-25 RX ORDER — NAPROXEN 500 MG/1
500 TABLET ORAL
Qty: 60 | Refills: 0 | Status: DISCONTINUED | COMMUNITY
Start: 2021-09-03 | End: 2022-03-25

## 2022-03-25 RX ORDER — CEFADROXIL 500 MG/1
500 CAPSULE ORAL TWICE DAILY
Qty: 14 | Refills: 1 | Status: DISCONTINUED | COMMUNITY
Start: 2022-01-13 | End: 2022-03-25

## 2022-03-25 NOTE — PHYSICAL EXAM
[General Appearance - Alert] : alert [General Appearance - In No Acute Distress] : in no acute distress [Neck Appearance] : the appearance of the neck was normal [Neck Cervical Mass (___cm)] : no neck mass was observed [Jugular Venous Distention Increased] : there was no jugular-venous distention [Thyroid Diffuse Enlargement] : the thyroid was not enlarged [Thyroid Nodule] : there were no palpable thyroid nodules [Auscultation Breath Sounds / Voice Sounds] : lungs were clear to auscultation bilaterally [Heart Rate And Rhythm] : heart rate was normal and rhythm regular [Heart Sounds] : normal S1 and S2 [Heart Sounds Gallop] : no gallops [Murmurs] : no murmurs [Heart Sounds Pericardial Friction Rub] : no pericardial rub [Full Pulse] : the pedal pulses are present [Edema] : there was no peripheral edema [Bowel Sounds] : normal bowel sounds [Abdomen Soft] : soft [Abdomen Tenderness] : non-tender [] : no hepato-splenomegaly [Abdomen Mass (___ Cm)] : no abdominal mass palpated [Cervical Lymph Nodes Enlarged Posterior Bilaterally] : posterior cervical [Cervical Lymph Nodes Enlarged Anterior Bilaterally] : anterior cervical [Supraclavicular Lymph Nodes Enlarged Bilaterally] : supraclavicular [No CVA Tenderness] : no ~M costovertebral angle tenderness [No Spinal Tenderness] : no spinal tenderness [Nail Clubbing] : no clubbing  or cyanosis of the fingernails [Abnormal Walk] : normal gait [Musculoskeletal - Swelling] : no joint swelling seen [Motor Tone] : muscle strength and tone were normal [Oriented To Time, Place, And Person] : oriented to person, place, and time [Impaired Insight] : insight and judgment were intact [Affect] : the affect was normal [FreeTextEntry1] : decreased sensation over bilateral thighs with pin prick and dull

## 2022-03-25 NOTE — ASSESSMENT
[FreeTextEntry1] : 53 year-old female with positive FARSHAD and hx of FINCH with polyarthralgia - not responsive to prednisone\par \par 1. Polyarthralgia  and positive FARSHAD and borderline RF at 17  -  no joint pain \par 2. Breast CA - s/p bilateral mastectomy\par 3. Lipoma - referral to derm for excision - not seen yet\par 4. Hip and knee pain -x-rays with mild arthritis -   can continue with meloxicam -not active at this time\par 5. Right shoulder- complete RC tear -s/p surgery ongoing PT - \par 6. bilateral thigh paresthesias - send for lumbar x-rays w-0 will likely need MRI - stopped gabapentin due to Gi distress - will try again\par 7. Cervical pain - send for new x-rays - previous hx of fracture - start methocarbamol\par \par \par \par I reviewed previous labs results with patients.\par lab today\par Diagnosis and Prognosis discussed\par Continue with current medications\par medications refilled\par F/u 2 months\par

## 2022-03-25 NOTE — HISTORY OF PRESENT ILLNESS
[___ Month(s) Ago] : [unfilled] month(s) ago [FreeTextEntry1] : right shoulder RC tear repair in 06/28 - ongoing PT - feels well\par now with new onset of bilateral thigh paresthesia  only when she lays down and sitting - not while walking or standing\par s/p bilateral mastectomy - now with worsening back pain and radiating to the arms\par  and worse pain over the right with shooting pain\par only taking meloxican

## 2022-03-30 LAB
ALBUMIN SERPL ELPH-MCNC: 4.7 G/DL
ALP BLD-CCNC: 107 U/L
ALT SERPL-CCNC: 27 U/L
ANA PAT FLD IF-IMP: ABNORMAL
ANA SER IF-ACNC: ABNORMAL
ANION GAP SERPL CALC-SCNC: 15 MMOL/L
APPEARANCE: ABNORMAL
AST SERPL-CCNC: 20 U/L
BASOPHILS # BLD AUTO: 0.05 K/UL
BASOPHILS NFR BLD AUTO: 0.8 %
BILIRUB SERPL-MCNC: 0.4 MG/DL
BILIRUBIN URINE: NEGATIVE
BLOOD URINE: NEGATIVE
BUN SERPL-MCNC: 11 MG/DL
CALCIUM SERPL-MCNC: 9.9 MG/DL
CCP AB SER IA-ACNC: <8 UNITS
CHLORIDE SERPL-SCNC: 105 MMOL/L
CO2 SERPL-SCNC: 24 MMOL/L
COLOR: YELLOW
CREAT SERPL-MCNC: 0.75 MG/DL
CRP SERPL-MCNC: 5 MG/L
DEPRECATED KAPPA LC FREE/LAMBDA SER: 1.49 RATIO
DSDNA AB SER-ACNC: 13 IU/ML
EGFR: 93 ML/MIN/1.73M2
ENA RNP AB SER IA-ACNC: <0.2 AL
ENA SM AB SER IA-ACNC: <0.2 AL
ENA SS-A AB SER IA-ACNC: 0.2 AL
ENA SS-B AB SER IA-ACNC: <0.2 AL
EOSINOPHIL # BLD AUTO: 0.12 K/UL
EOSINOPHIL NFR BLD AUTO: 1.9 %
ERYTHROCYTE [SEDIMENTATION RATE] IN BLOOD BY WESTERGREN METHOD: 21 MM/HR
FOLATE SERPL-MCNC: >20 NG/ML
GLUCOSE QUALITATIVE U: NEGATIVE
GLUCOSE SERPL-MCNC: 101 MG/DL
HCT VFR BLD CALC: 40.5 %
HGB BLD-MCNC: 12.4 G/DL
IGA SER QL IEP: 191 MG/DL
IGG SER QL IEP: 1255 MG/DL
IGM SER QL IEP: 148 MG/DL
IMM GRANULOCYTES NFR BLD AUTO: 0.3 %
IRON SATN MFR SERPL: 10 %
IRON SERPL-MCNC: 40 UG/DL
KAPPA LC CSF-MCNC: 1.45 MG/DL
KAPPA LC SERPL-MCNC: 2.16 MG/DL
KETONES URINE: NEGATIVE
LEUKOCYTE ESTERASE URINE: ABNORMAL
LYMPHOCYTES # BLD AUTO: 1.77 K/UL
LYMPHOCYTES NFR BLD AUTO: 27.4 %
MAN DIFF?: NORMAL
MCHC RBC-ENTMCNC: 25.4 PG
MCHC RBC-ENTMCNC: 30.6 GM/DL
MCV RBC AUTO: 82.8 FL
MONOCYTES # BLD AUTO: 0.71 K/UL
MONOCYTES NFR BLD AUTO: 11 %
NEUTROPHILS # BLD AUTO: 3.78 K/UL
NEUTROPHILS NFR BLD AUTO: 58.6 %
NITRITE URINE: NEGATIVE
PH URINE: 5.5
PLATELET # BLD AUTO: 345 K/UL
POTASSIUM SERPL-SCNC: 3.9 MMOL/L
PROT SERPL-MCNC: 7.5 G/DL
PROTEIN URINE: NORMAL
RBC # BLD: 4.89 M/UL
RBC # FLD: 14.6 %
RF+CCP IGG SER-IMP: NEGATIVE
RHEUMATOID FACT SER QL: 16 IU/ML
SODIUM SERPL-SCNC: 144 MMOL/L
SPECIFIC GRAVITY URINE: 1.02
TIBC SERPL-MCNC: 383 UG/DL
TSH SERPL-ACNC: 2.4 UIU/ML
UIBC SERPL-MCNC: 343 UG/DL
UROBILINOGEN URINE: NORMAL
VIT B12 SERPL-MCNC: 557 PG/ML
WBC # FLD AUTO: 6.45 K/UL

## 2022-04-07 ENCOUNTER — APPOINTMENT (OUTPATIENT)
Dept: INTERNAL MEDICINE | Facility: CLINIC | Age: 57
End: 2022-04-07
Payer: COMMERCIAL

## 2022-04-07 VITALS
OXYGEN SATURATION: 96 % | TEMPERATURE: 96.5 F | HEIGHT: 62 IN | WEIGHT: 169.25 LBS | SYSTOLIC BLOOD PRESSURE: 120 MMHG | HEART RATE: 78 BPM | RESPIRATION RATE: 16 BRPM | BODY MASS INDEX: 31.15 KG/M2 | DIASTOLIC BLOOD PRESSURE: 73 MMHG

## 2022-04-07 PROCEDURE — 99213 OFFICE O/P EST LOW 20 MIN: CPT

## 2022-04-07 NOTE — HISTORY OF PRESENT ILLNESS
[de-identified] : 56 year old  female patient with history of stable Hypertension, Type 2 Diabetes Mellitus, Asthma, Hypertriglyceridemia, history as stated, presented for follow up examination. Patient is compliant with all medications. ROS as stated.\par

## 2022-04-07 NOTE — HEALTH RISK ASSESSMENT
[Never] : Never [No] : In the past 12 months have you used drugs other than those required for medical reasons? No [No falls in past year] : Patient reported no falls in the past year [0] : 2) Feeling down, depressed, or hopeless: Not at all (0) [de-identified] : None [[id number]] : 0

## 2022-04-07 NOTE — ASSESSMENT
[FreeTextEntry1] : 56 year old female found to have stable Hypertension, Type 2 Diabetes Mellitus, Asthma, Hypertriglyceridemia,with the current prescription regimen as recommended, diet and life style modifications, as counseled. Prior results reviewed, interpreted and discussed with the patient during today's examination, as appropriate. Follow up, treatment plan and tests, as ordered.\par \par Total time spent : 25 minutes\par Including:\par Preparation prior to visit - Reviewing prior record, results of tests and Consultation Reports as applicable\par Conducting an appropriate H & P during today's encounter\par Appropriate orders for tests, medications and procedures, as applicable\par Counseling patient \par Note completion\par

## 2022-04-08 ENCOUNTER — OUTPATIENT (OUTPATIENT)
Dept: OUTPATIENT SERVICES | Facility: HOSPITAL | Age: 57
LOS: 1 days | End: 2022-04-08
Payer: COMMERCIAL

## 2022-04-08 ENCOUNTER — APPOINTMENT (OUTPATIENT)
Dept: HEPATOLOGY | Facility: CLINIC | Age: 57
End: 2022-04-08
Payer: COMMERCIAL

## 2022-04-08 ENCOUNTER — APPOINTMENT (OUTPATIENT)
Dept: ULTRASOUND IMAGING | Facility: HOSPITAL | Age: 57
End: 2022-04-08
Payer: COMMERCIAL

## 2022-04-08 VITALS
SYSTOLIC BLOOD PRESSURE: 125 MMHG | WEIGHT: 168 LBS | BODY MASS INDEX: 30.91 KG/M2 | RESPIRATION RATE: 16 BRPM | DIASTOLIC BLOOD PRESSURE: 79 MMHG | HEIGHT: 62 IN | TEMPERATURE: 97.8 F | HEART RATE: 80 BPM | OXYGEN SATURATION: 98 %

## 2022-04-08 DIAGNOSIS — Z98.890 OTHER SPECIFIED POSTPROCEDURAL STATES: Chronic | ICD-10-CM

## 2022-04-08 DIAGNOSIS — R10.11 RIGHT UPPER QUADRANT PAIN: ICD-10-CM

## 2022-04-08 DIAGNOSIS — Z90.710 ACQUIRED ABSENCE OF BOTH CERVIX AND UTERUS: Chronic | ICD-10-CM

## 2022-04-08 PROCEDURE — 76700 US EXAM ABDOM COMPLETE: CPT

## 2022-04-08 PROCEDURE — 99214 OFFICE O/P EST MOD 30 MIN: CPT

## 2022-04-08 PROCEDURE — 76700 US EXAM ABDOM COMPLETE: CPT | Mod: 26

## 2022-04-08 NOTE — PHYSICAL EXAM
[General Appearance - Alert] : alert [General Appearance - In No Acute Distress] : in no acute distress [General Appearance - Well Nourished] : well nourished [General Appearance - Well Developed] : well developed [General Appearance - Well-Appearing] : healthy appearing [Sclera] : the sclera and conjunctiva were normal [Oropharynx] : the oropharynx was normal [Neck Appearance] : the appearance of the neck was normal [] : no respiratory distress [Respiration, Rhythm And Depth] : normal respiratory rhythm and effort [Exaggerated Use Of Accessory Muscles For Inspiration] : no accessory muscle use [Auscultation Breath Sounds / Voice Sounds] : lungs were clear to auscultation bilaterally [Heart Rate And Rhythm] : heart rate was normal and rhythm regular [Heart Sounds] : normal S1 and S2 [Edema] : there was no peripheral edema [No Spinal Tenderness] : no spinal tenderness [Skin Color & Pigmentation] : normal skin color and pigmentation [Skin Turgor] : normal skin turgor [Oriented To Time, Place, And Person] : oriented to person, place, and time [Impaired Insight] : insight and judgment were intact [Affect] : the affect was normal [Mood] : the mood was normal [Memory Recent] : recent memory was not impaired [Memory Remote] : remote memory was not impaired [Rounded] : rounded [Normal] : normal to percussion [Epigastric] : in the epigastric area [RUQ] : in the right upper quadrant [No Mass] : no masses were palpated [No HSM] : no hepatosplenomegaly noted [None] : no CVA tenderness [Scleral Icterus] : No Scleral Icterus [Spider Angioma] : No spider angioma(s) were observed [Abdominal Bruit] : no abdominal bruit [Abdominal  Ascites] : no ascites [Asterixis] : no asterixis observed [Jaundice] : No jaundice [Palmar Erythema] : no Palmar Erythema [Depression] : no depression [FreeTextEntry1] : Grossly intact [Dilated Collat. Veins] : no collateral vein dilation [Striae] : no striae [Firm] : not firm [Rigid] : not rigid [Rebound] : no rebound [Guarding] : no guarding [Denis's] : a negative Denis's sign

## 2022-04-08 NOTE — REVIEW OF SYSTEMS
[Abdominal Pain] : abdominal pain [Heartburn] : heartburn [Negative] : Cardiovascular [Fever] : no fever [Chills] : no chills [Feeling Poorly] : not feeling poorly [Feeling Tired] : not feeling tired [Recent Weight Gain (___ Lbs)] : no recent weight gain [Recent Weight Loss (___ Lbs)] : no recent weight loss [Shortness Of Breath] : no shortness of breath [Cough] : no cough [Vomiting] : no vomiting [Constipation] : no constipation [Diarrhea] : no diarrhea [Melena] : no melena [Itching] : no itching [Depression] : no depression [Easy Bleeding] : no tendency for easy bleeding [Easy Bruising] : no tendency for easy bruising [FreeTextEntry7] : RUQ pain as in HPI [FreeTextEntry8] : Following with Dr. Barnes for urinary frequency

## 2022-04-08 NOTE — COUNSELING
[Potential consequences of obesity discussed] : Potential consequences of obesity discussed [Benefits of weight loss discussed] : Benefits of weight loss discussed [Encouraged to maintain food diary] : Encouraged to maintain food diary [Encouraged to increase physical activity] : Encouraged to increase physical activity [Encouraged to use exercise tracking device] : Encouraged to use exercise tracking device [Good understanding] : Patient has a good understanding of disease, goals and obesity follow-up plan [FreeTextEntry2] : \par  [de-identified] : Patient has been counseled on life style interventions, including but not limited to: weight loss (3-5% loss of body weight might improve fat in the liver, while 7-10% needed for potential improvement of other components, including inflammation and scarring of the liver, called fibrosis); healthy diet, avoiding added sugars, sodas, avoiding saturated fats, limiting sodium, avoiding alcohol; and on the importance of regular exercise (> 150 min/week moderate intensity aerobic exercise with at least 2x/week muscle strengthening or exercise as tolerated). \par \par Discussed with patient anti-reflux measures as well.

## 2022-04-08 NOTE — ASSESSMENT
[FreeTextEntry1] : 55 yo female with extensive medical history including diabetes mellitus (diagnosed in 2016, HbA1c: 5.5 in 7/2020), dyslipidemia, hypertension, obesity (BMI 31), s/p cholecystectomy (6/2017), hx of breast cancer s/p R lumpectomy (2015), on Tamoxifen (1/2016-9-2017, then 2018 till present), FARSHAD positivity (1:640, 1:160) with normal Immunoglobulins, nephrolithiasis and asthma is being followed by hepatology for fatty liver disease (US abd 6/2017: moderate to severe fatty infiltration of the liver, CT abd 10/2018: hepatic steatosis) with hepatic fibrosis (transient elastography 9/2019: F2 fibrosis, S3 steatosis, prior in 2017: F2-F3 fibrosis, now with significant improvement S1, F0-F1 in 10/2020). She was last seen by Dr. Ball on 10/14/2019  and transferred care on 10/2/2020. Fibroscan 10/2020 showed improvement, S1 steatosis and F0-1 fibrosis.\par \par NAFLD\par - Will repeat Fibroscan and US abd\par - Recent blood work from 3/25 reviewed, LFTs normal\par - She is not on tamoxifen anymore\par - Advised on diet, exercise as tolerated, weight loss. \par - Discussed natural Hx of NAFLD.\par - Pos FARSHAD but normal enzymes, normal Igs, thus never had liver biopsy.\par \par RUQ pain btu also R flank tenderness\par - US abd ordered STAT and spoke to [facility] US and patient can walk in now for the test\par - Ordered repeat labs b/o pain and UAw/ culture\par - Advised to call after US performed before leaving the hospital to discuss results\par \par RTC 3 months if pain resolves and US unremarkable. If pain persists, advised to go to ER. Discussed when to seek immediate medical attention, including fever, chills, persistent pain, N/V, change in urine or stool color or any other new symptoms. \par

## 2022-04-08 NOTE — HISTORY OF PRESENT ILLNESS
[de-identified] : 55 yo female with extensive medical history including diabetes mellitus (diagnosed in 2016, HbA1c: 5.7 in 12/2021), dyslipidemia, hypertension, obesity (BMI 31), s/p cholecystectomy (6/2017), hx of breast cancer s/p R lumpectomy (2015), on Tamoxifen (1/2016-9-2017, then 2018 till present), FARSHAD positivity (1:640, 1:160) with normal Immunoglobulins, nephrolithiasis and asthma is being followed by hepatology for fatty liver disease (US abd 6/2017: moderate to severe fatty infiltration of the liver, CT abd 10/2018: hepatic steatosis) with hepatic fibrosis (transient elastography 9/2019: F2 fibrosis, S3 steatosis, prior in 2017: F2-F3 fibrosis). She was last seen by Dr. Ball on 10/14/2019  and transferred care on 10/2/2020. \par \par She was noted to have abnormal liver tests in 2/2017 (mild elevation of AST/ALT with peak 57/74 in 2018, normal ALP and bilirubin). Tamoxifen was discontinued in 10/2017 (1/2016-9/2017) and resumed again in 2018; she is taking 20 mg/day MWF at present. \par Her prior liver workup showed normal ceruloplasmin, neg tTGIgA, normal iron panel, normal IgG, IgA and IgM, negative SMA ab, positive FARSHAD, positive RF, negative HCV ab, immune for Hep A, not immune or exposed to Hep B. Latest liver enzymes were WNL in 7/2020: AST 14, ALT 26, ALP 69, Se bi 0.4, GGT 17. Her lipid profile improved:  -99, Cholesterol 242-257, -85, HDL 52 and she lost weight, from 186 lb to 171lb approximately in 1 year. \par US abdomen (9/12/2020) showed hepatic steatosis and bilateral non-obstructing renal stones (patient is following with urology). \par She had FibroScan in 10/2020, showing significant improvement, with S1 steatosis and F0-F1 fibrosis (from F2-F3). \par \par She had COVID in 03/2020, did not require hospitalization. She has been following with pulmonology.\par She is also being followed by GI (Dr. Campoverde) for GERD, constipation.\par She also had urological procedure for kidney stones, following with . \par \par She was seen in Liver clinic 1/2021. She lost follow up and is here today for follow up.\par She had recurrent breast cancer (Dx 11/2021, DCIS) and had b/l mastectomy on 1/4/2022 with JESIKA flap reconstruction. She reports that was told that would be monitored and tamoxifen was stopped because she had definitive surgery with the mastectomy. \par \par She had also had R rotator cuff repair in 5/2021. \par \par She has not followed for kidney stones. \par She continues to have heartburn, following with Dr. Campoverde. She reports RUQ pain since last night, worse when relaxes muscles. No N/V, no fever, chills, no diarrhea, constipation, no blood in stool or melena. \par \par \par

## 2022-04-13 ENCOUNTER — APPOINTMENT (OUTPATIENT)
Dept: HEPATOLOGY | Facility: CLINIC | Age: 57
End: 2022-04-13
Payer: COMMERCIAL

## 2022-04-13 PROCEDURE — 91200 LIVER ELASTOGRAPHY: CPT

## 2022-04-14 ENCOUNTER — APPOINTMENT (OUTPATIENT)
Dept: UROLOGY | Facility: CLINIC | Age: 57
End: 2022-04-14
Payer: COMMERCIAL

## 2022-04-14 PROCEDURE — 99214 OFFICE O/P EST MOD 30 MIN: CPT

## 2022-04-17 NOTE — PHYSICAL EXAM
[General Appearance - Well Nourished] : well nourished [General Appearance - Well Developed] : well developed [Normal Appearance] : normal appearance [Well Groomed] : well groomed [General Appearance - In No Acute Distress] : no acute distress [Abdomen Tenderness] : non-tender [Abdomen Soft] : soft [Abdomen Mass (___ Cm)] : no abdominal mass palpated [Abdomen Hernia] : no hernia was discovered [Costovertebral Angle Tenderness] : no ~M costovertebral angle tenderness [FreeTextEntry1] : abdominal incision c/d/i [Skin Color & Pigmentation] : normal skin color and pigmentation [Urinary Bladder Findings] : the bladder was normal on palpation [Skin Turgor] : supple [Skin Lesions] : no skin lesions [Heart Rate And Rhythm] : Heart rate and rhythm were normal [Edema] : no peripheral edema [] : no respiratory distress [Respiration, Rhythm And Depth] : normal respiratory rhythm and effort [Exaggerated Use Of Accessory Muscles For Inspiration] : no accessory muscle use [Auscultation Breath Sounds / Voice Sounds] : lungs were clear to auscultation bilaterally [Affect] : the affect was normal [Oriented To Time, Place, And Person] : oriented to person, place, and time [Mood] : the mood was normal [Not Anxious] : not anxious [Normal Station and Gait] : the gait and station were normal for the patient's age [No Focal Deficits] : no focal deficits [Motor Exam] : the motor exam was normal [No Palpable Adenopathy] : no palpable adenopathy

## 2022-04-17 NOTE — HISTORY OF PRESENT ILLNESS
[FreeTextEntry1] : 50 yo F s/p left URS/HLL last year. Most recent CT in June showed two small stones in left kidney. Was doing well until 2 weeks ago, went to ER for severe flank pain. Stone from kidney had dropped into ureter (obstructing 3mm stone). Also went to urgent care last week for LUTS and dysuria. Told she had UTI and finished course of cipro. No fever, chills, nausea or vomiting\par \par 11/26/2018 Interval history: Pt found to have obstructing left ureteral stone and presented to the ER with intractable pain. Pt underwent left URS with stone basket extraction. NO issues since procedure.\par \par 4/1/19 Interval history: Has been having some left sided flank pain for a month. US done when symptoms initialyl started was neg for hydronephrosis. Last few days have been having dysuria, suprapubic pain. No gross hematuria, fever, or chills\par \par 10/7/19 Interval history: Was doing well until 9/26/19 - ER for left flank pain\par CT showed 5mm nonobstructing left sided stone, otherwise no hydronephrosis\par Was told she likely had UTI and was sent home with a course of levaquin\par Symptoms improved but still having some LUTS - frequency but only small amounts coming out\par Still with some mild left sided pain\par no gross hematuria, fever, or chills\par Of note, pt states she did repeat 24 hr urine collection in April after last visit but no record of it at this time. Also did 24 hr urine collection last year with nephrologist and was told everything was normal.\par \par 9/10/20 Interval history: Had episode of gross hematuria a month ago for two days = red urine with little clots\par Last symptomatic UTI was 2 months ago\par Haven't passed stones since last visit\par Left flank pain = intermittent, sometimes sharp, seems to be worse in the afternoon\par Drinks 1.5 gallon, chamomile tea sometimes\par Currently no urinary issues \par \par 10/8/20 Interval history: Had some flank pain about 2 weeks ago\par No fever, chills, nausea or vomiting\par Abdominal US since last visit showed 1.4cm nonobstructing left renal stone\par \par 12/21/20 Interval history: Pt is s/p URS/HLL with subsequent stent removal\par Some hematuria afterward but now clear\par \par 4/14/22 Interval history: Pt hasn't been seen since 2020\par Since then, s/p breast cancer treatment\par Recently had some right abdominal pain - US showed bilateral nonobstructing stone\par Also complaining of dysuria for 1 week\par Drinks 1 gallon of water

## 2022-04-17 NOTE — ASSESSMENT
[FreeTextEntry1] : 55 yo F with nephrolithaisis, dysuria\par \par - UA, culture\par - Reviewed abdominal US from 4/8/22 through PACS and confirmed findings as stated above. Once side , the stone is measured at 8mm which is significant\par - CT stone hunt to accurately assess burden\par - Discussed possible etiologies for nephrolithiasis. Reviewed behavioral modifications including adequate hydration, cutting back on coffee, dark sodas, dark teas, low sodium diet, increasing citrate levels with lemon juice. \par - Discussed importance of seeking medical attention should intractable flank pain with nausea, vomiting or fever occur\par

## 2022-04-19 ENCOUNTER — APPOINTMENT (OUTPATIENT)
Dept: OTOLARYNGOLOGY | Facility: CLINIC | Age: 57
End: 2022-04-19
Payer: COMMERCIAL

## 2022-04-19 VITALS
OXYGEN SATURATION: 98 % | WEIGHT: 168 LBS | HEART RATE: 81 BPM | BODY MASS INDEX: 30.91 KG/M2 | DIASTOLIC BLOOD PRESSURE: 62 MMHG | HEIGHT: 62 IN | TEMPERATURE: 98 F | SYSTOLIC BLOOD PRESSURE: 95 MMHG

## 2022-04-19 PROCEDURE — 99213 OFFICE O/P EST LOW 20 MIN: CPT | Mod: 25

## 2022-04-19 PROCEDURE — G0268 REMOVAL OF IMPACTED WAX MD: CPT

## 2022-04-19 PROCEDURE — 92550 TYMPANOMETRY & REFLEX THRESH: CPT

## 2022-04-19 PROCEDURE — 92557 COMPREHENSIVE HEARING TEST: CPT

## 2022-04-19 NOTE — DATA REVIEWED
[de-identified] : today: borderline w/ conduc comps AD, nl AS; WR 90/100\par - Immitance testing w/ type A AU\par

## 2022-04-19 NOTE — PHYSICAL EXAM
[Binocular Microscopic Exam] : Binocular microscopic exam was performed [Normal] : the left middle ear was normal [FreeTextEntry8] : cerumen impaction removed with a hook

## 2022-04-19 NOTE — HISTORY OF PRESENT ILLNESS
[de-identified] : f/u for occasional L>R tonal tinnitus w/ hearing loss that was of relatively sudden onset 10/21; this is unchanged except that the tinnitus is rare. She was lost to f/u for a period of time but now returns s/p audio. \par Using qtips

## 2022-04-19 NOTE — ASSESSMENT
[FreeTextEntry1] : Discussed today's findings; RTC any worsening & 6 months for a wax check. \par \par Discussed the importance not putting qtips or other foreign objects in the ears.\par

## 2022-04-20 DIAGNOSIS — A49.9 URINARY TRACT INFECTION, SITE NOT SPECIFIED: ICD-10-CM

## 2022-04-20 DIAGNOSIS — N39.0 URINARY TRACT INFECTION, SITE NOT SPECIFIED: ICD-10-CM

## 2022-04-20 LAB
APPEARANCE: CLEAR
BACTERIA UR CULT: ABNORMAL
BACTERIA: ABNORMAL
BILIRUBIN URINE: NEGATIVE
BLOOD URINE: NEGATIVE
COLOR: NORMAL
GLUCOSE QUALITATIVE U: NEGATIVE
HYALINE CASTS: 0 /LPF
KETONES URINE: NEGATIVE
LEUKOCYTE ESTERASE URINE: ABNORMAL
MICROSCOPIC-UA: NORMAL
NITRITE URINE: NEGATIVE
PH URINE: 5.5
PROTEIN URINE: NORMAL
RED BLOOD CELLS URINE: 2 /HPF
SPECIFIC GRAVITY URINE: 1.02
SQUAMOUS EPITHELIAL CELLS: 1 /HPF
UROBILINOGEN URINE: NORMAL
WHITE BLOOD CELLS URINE: 38 /HPF

## 2022-04-22 ENCOUNTER — RESULT REVIEW (OUTPATIENT)
Age: 57
End: 2022-04-22

## 2022-04-22 ENCOUNTER — APPOINTMENT (OUTPATIENT)
Dept: CT IMAGING | Facility: CLINIC | Age: 57
End: 2022-04-22

## 2022-04-22 ENCOUNTER — OUTPATIENT (OUTPATIENT)
Dept: OUTPATIENT SERVICES | Facility: HOSPITAL | Age: 57
LOS: 1 days | End: 2022-04-22
Payer: COMMERCIAL

## 2022-04-22 DIAGNOSIS — Z98.890 OTHER SPECIFIED POSTPROCEDURAL STATES: Chronic | ICD-10-CM

## 2022-04-22 DIAGNOSIS — Z90.710 ACQUIRED ABSENCE OF BOTH CERVIX AND UTERUS: Chronic | ICD-10-CM

## 2022-04-22 PROCEDURE — 74176 CT ABD & PELVIS W/O CONTRAST: CPT | Mod: 26

## 2022-04-28 ENCOUNTER — APPOINTMENT (OUTPATIENT)
Dept: CT IMAGING | Facility: HOSPITAL | Age: 57
End: 2022-04-28

## 2022-05-09 ENCOUNTER — APPOINTMENT (OUTPATIENT)
Dept: PLASTIC SURGERY | Facility: CLINIC | Age: 57
End: 2022-05-09
Payer: COMMERCIAL

## 2022-05-09 VITALS
DIASTOLIC BLOOD PRESSURE: 72 MMHG | BODY MASS INDEX: 31.28 KG/M2 | HEART RATE: 79 BPM | OXYGEN SATURATION: 98 % | WEIGHT: 170 LBS | HEIGHT: 62 IN | SYSTOLIC BLOOD PRESSURE: 114 MMHG | TEMPERATURE: 97.3 F

## 2022-05-09 PROCEDURE — 99215 OFFICE O/P EST HI 40 MIN: CPT

## 2022-05-09 NOTE — ASSESSMENT
[FreeTextEntry1] : All medical record entries were at my, Dr. Alec Tate, direction and personally dictated by me. I have reviewed the chart and agree that the record accurately reflects my personal performance of the history, physical exam, assessment and plan.\par

## 2022-05-11 ENCOUNTER — RESULT REVIEW (OUTPATIENT)
Age: 57
End: 2022-05-11

## 2022-05-11 ENCOUNTER — APPOINTMENT (OUTPATIENT)
Dept: ULTRASOUND IMAGING | Facility: IMAGING CENTER | Age: 57
End: 2022-05-11
Payer: COMMERCIAL

## 2022-05-11 ENCOUNTER — OUTPATIENT (OUTPATIENT)
Dept: OUTPATIENT SERVICES | Facility: HOSPITAL | Age: 57
LOS: 1 days | End: 2022-05-11
Payer: COMMERCIAL

## 2022-05-11 DIAGNOSIS — Z00.8 ENCOUNTER FOR OTHER GENERAL EXAMINATION: ICD-10-CM

## 2022-05-11 DIAGNOSIS — Z98.890 OTHER SPECIFIED POSTPROCEDURAL STATES: Chronic | ICD-10-CM

## 2022-05-11 DIAGNOSIS — Z90.710 ACQUIRED ABSENCE OF BOTH CERVIX AND UTERUS: Chronic | ICD-10-CM

## 2022-05-11 PROBLEM — Z00.00 ENCOUNTER FOR PREVENTIVE HEALTH EXAMINATION: Noted: 2022-05-11

## 2022-05-11 PROCEDURE — G0279: CPT

## 2022-05-11 PROCEDURE — G0279: CPT | Mod: 26

## 2022-05-11 PROCEDURE — 77066 DX MAMMO INCL CAD BI: CPT | Mod: 26

## 2022-05-11 PROCEDURE — 76641 ULTRASOUND BREAST COMPLETE: CPT

## 2022-05-11 PROCEDURE — 76641 ULTRASOUND BREAST COMPLETE: CPT | Mod: 26,50

## 2022-05-11 PROCEDURE — 77062 BREAST TOMOSYNTHESIS BI: CPT | Mod: 26

## 2022-05-11 PROCEDURE — 77066 DX MAMMO INCL CAD BI: CPT

## 2022-05-18 ENCOUNTER — APPOINTMENT (OUTPATIENT)
Dept: SURGICAL ONCOLOGY | Facility: CLINIC | Age: 57
End: 2022-05-18
Payer: COMMERCIAL

## 2022-05-18 VITALS
SYSTOLIC BLOOD PRESSURE: 135 MMHG | RESPIRATION RATE: 16 BRPM | OXYGEN SATURATION: 98 % | HEART RATE: 87 BPM | BODY MASS INDEX: 31.28 KG/M2 | WEIGHT: 170 LBS | HEIGHT: 62 IN | DIASTOLIC BLOOD PRESSURE: 82 MMHG | TEMPERATURE: 97.5 F

## 2022-05-18 PROCEDURE — 99213 OFFICE O/P EST LOW 20 MIN: CPT

## 2022-05-18 NOTE — HISTORY OF PRESENT ILLNESS
[de-identified] : 55 y/o female presents for a follow up visit.  She is s/p bilateral total mastectomies and bilateral axillary SLNB, with bilateral immediate breast reconstruction using contralateral deep inferior epigastric  flaps to the internal mammary vessels on 1/4/22. \par FINAL PATH: \par 1) Left breast:  Complex sclerosing lesion, IDP, fibrocystic changes. Negative nodes. \par 2) Right breast: DCIS, 1.9 cm, no invasive carcinoma or lymphovascular invasion. Negative margins.  Negative nodes. pTis pN0;  ER(+) AK not reported. \par \par 2/2/22- She saw Dr. Lizandro Barros who did not feel that any additional endocrine therapy was warranted and Tamoxifen was discontinued.\par \par MMG/US (5/11/2022) to evaluate bilateral palpable abnormalities in the reconstructed breasts: no mammographic or sonographic evidence of malignancy. Bilateral areas of palpable concern correlating to scarring, postsurgical changes and fat necrosis seen on both sonography and mammography. BIRADS-2 \par \par 5/18/22- Patient states that she is scheduled for excision of the larger regions of fat necrosis as well as additional breast revision and nipple reconstruction.  Denies constitutional symptoms.  Continues to follow up with Dr. Barros. \par \par \par PERTINENT HISTORY:\par She underwent a right breast lumpectomy and sentinel lymph node biopsy in October 2015 at [facility] for DCIS (node negative, ER+), and required re excision for a positive margin in November 2015.  She completed adjuvant RT and is currently on a 10 year course of Tamoxifen under the care of Dr. Lizandro Barros.\par \par She has a family history of breast cancer involving her sister at age 50, niece at age 36 and maternal grandmother at age 81. .She is BRCA negative (tested 11/2015).\par \par She completed routine breast imaging in October 2021.  Ultrasound was negative.  Mammogram revealed suspicious calcifications in the right breast 9:00 axis (BIRADS 4).  Stereotactic biopsy demonstrated DCIS, intermediate grade with necrosis (ER+).\par \par Her past medical history history includes hypertension, hyperlipidemia, type 2 diabetes, GERD, cold induced intermittent asthma and NAFLD. \par \par She denies any palpable breast mass, nipple discharge, inversion or skin changes.

## 2022-05-18 NOTE — REASON FOR VISIT
[Follow-Up Visit] : a follow-up visit for [Breast Cancer] : breast cancer [FreeTextEntry2] : s/p bilateral total mastectomies and bilateral axillary SLNB, with bilateral immediate breast reconstruction using contralateral deep inferior epigastric  flaps to the internal mammary vessels on 1/4/22. \par

## 2022-05-18 NOTE — PHYSICAL EXAM
[Normal Supraclavicular Lymph Nodes] : normal supraclavicular lymph nodes [Normal Axillary Lymph Nodes] : normal axillary lymph nodes [Normal] : oriented to person, place and time, with appropriate affect [FreeTextEntry1] : AB present during exam  [de-identified] : Normal S1, S2.  Regular rate and rhythm. [de-identified] : s/p bilateral mastectomies with JESIKA reconstruction.  Palpable abnormalities corresponding to regions of fat necrosis on ultrasound in the left central upper breast, right lateral breast 8:00,  left lateral breast 3:00 [de-identified] : Clear breath sounds bilaterally, normal respiratory effort.

## 2022-05-18 NOTE — ASSESSMENT
[FreeTextEntry1] : IMP:\par No evidence of new or recurrent lesions.  Breast imaging failed to identify any suspicious lesions.  No suspicious lesions on exam.  Areas of palpable concern correspond to scarring/fat necrosis on ultrasound with no worrisome masses.\par  \par PLAN:\par F/u with med-onc\par Continue f/u with plastics\par RTO 1 year

## 2022-05-31 ENCOUNTER — APPOINTMENT (OUTPATIENT)
Dept: INTERNAL MEDICINE | Facility: CLINIC | Age: 57
End: 2022-05-31

## 2022-06-03 ENCOUNTER — APPOINTMENT (OUTPATIENT)
Dept: CARDIOLOGY | Facility: CLINIC | Age: 57
End: 2022-06-03
Payer: COMMERCIAL

## 2022-06-03 ENCOUNTER — NON-APPOINTMENT (OUTPATIENT)
Age: 57
End: 2022-06-03

## 2022-06-03 VITALS
WEIGHT: 163 LBS | OXYGEN SATURATION: 95 % | BODY MASS INDEX: 29.81 KG/M2 | HEART RATE: 88 BPM | SYSTOLIC BLOOD PRESSURE: 126 MMHG | RESPIRATION RATE: 18 BRPM | TEMPERATURE: 97.7 F | DIASTOLIC BLOOD PRESSURE: 77 MMHG

## 2022-06-03 PROCEDURE — 99214 OFFICE O/P EST MOD 30 MIN: CPT

## 2022-06-03 PROCEDURE — 93000 ELECTROCARDIOGRAM COMPLETE: CPT

## 2022-06-03 NOTE — PHYSICAL EXAM
[Normal Radial B/L] : normal radial B/L [Normal PT B/L] : normal PT B/L [Normal DP B/L] : normal DP B/L [General Appearance - Well Developed] : well developed [Normal Appearance] : normal appearance [Well Groomed] : well groomed [General Appearance - Well Nourished] : well nourished [No Deformities] : no deformities [General Appearance - In No Acute Distress] : no acute distress [Normal Conjunctiva] : the conjunctiva exhibited no abnormalities [Eyelids - No Xanthelasma] : the eyelids demonstrated no xanthelasmas [Normal Oral Mucosa] : normal oral mucosa [No Oral Pallor] : no oral pallor [No Oral Cyanosis] : no oral cyanosis [Normal Jugular Venous A Waves Present] : normal jugular venous A waves present [Normal Jugular Venous V Waves Present] : normal jugular venous V waves present [No Jugular Venous [place] A Waves] : no jugular venous cartagena A waves [Exaggerated Use Of Accessory Muscles For Inspiration] : no accessory muscle use [Respiration, Rhythm And Depth] : normal respiratory rhythm and effort [Auscultation Breath Sounds / Voice Sounds] : lungs were clear to auscultation bilaterally [Chest Palpation] : palpation of the chest revealed no abnormalities [Lungs Percussion] : the lungs were normal to percussion [Heart Rate And Rhythm] : heart rate and rhythm were normal [Heart Sounds] : normal S1 and S2 [Murmurs] : no murmurs present [Arterial Pulses Normal] : the arterial pulses were normal [Edema] : no peripheral edema present [Veins - Varicosity Changes] : no varicosital changes were noted in the lower extremities [Bowel Sounds] : normal bowel sounds [Abdomen Soft] : soft [Abdomen Tenderness] : non-tender [Abdomen Mass (___ Cm)] : no abdominal mass palpated [Abdomen Hernia] : no hernia was discovered [Abnormal Walk] : normal gait [Gait - Sufficient For Exercise Testing] : the gait was sufficient for exercise testing [Nail Clubbing] : no clubbing of the fingernails [Cyanosis, Localized] : no localized cyanosis [Petechial Hemorrhages (___cm)] : no petechial hemorrhages [Skin Turgor] : normal skin turgor [] : no rash [No Venous Stasis] : no venous stasis [No Skin Ulcers] : no skin ulcer [No Xanthoma] : no  xanthoma was observed [Oriented To Time, Place, And Person] : oriented to person, place, and time [Affect] : the affect was normal [Mood] : the mood was normal [No Anxiety] : not feeling anxious [FreeTextEntry1] : no calf tenderness, no edema in my examination, no  asymmetry of calf. " right shoulder  with Rheumatoid arthritis.

## 2022-06-03 NOTE — DISCUSSION/SUMMARY
[Hypertension] : hypertension [Improving] : improving [Exercise Regimen] : an exercise regimen [Dietary Modification] : dietary modification [Weight Loss] : weight loss [ETOH Moderation] : alcohol moderation [Acetaminophen Avoidance] : acetaminophen  avoidance [NSAIDs Avoidance] : non-steroidal anti-inflammatory drugs avoidance [Anxiety] : anxiety disorder NOS [Rhythm Disorder] : rhythm disorder [Sinus Tachycardia] : sinus tachycardia [Low Sodium Diet] : low sodium diet [Family] : the patient's family [Minutes Spent: ___] : for [unfilled] ~Uminutes [With Me] : with me [___ Month(s)] : in [unfilled] month(s) [Stable] : stable [Responding to Treatment] : responding to treatment [None] : There are no changes in medication management [Patient] : the patient [de-identified] : past complaints,  avani to sinus tachycardia. [de-identified] : continue bystolic. [de-identified] : tolerable [de-identified] : reduce weight [de-identified] : resolved, no recurrence [de-identified] : possible for  palpitation, not recorded [FreeTextEntry1] : For the pre-operative assessment for vadim breast reconstruction\par 1. RCRI is  zero.\par 2. No active cardiac risk contradicting to the breast surgery.\par \par Rec:\par 1.beta-blocker not to be withheld for surgery.\par 2. Diabetic  medication to be reduced on the day of surgery.\par 3. antihypertensives  can be adjusted per findings.\par 4. aspirin may/can be held for 6-7 days for the procedure.

## 2022-06-03 NOTE — HISTORY OF PRESENT ILLNESS
[FreeTextEntry1] : She, a 56 year old female had hypertension sometimes back. It was controlled by nifedipine along with Bystolic. However, she  had on and off palpitations which bothers her. She had repeated Holter examination without findings of  dysrhythmia\par  She had lumpectomy, right, s/p RT and on tamoxifen . She was told having  recurrence. She  tolerated the mastectomy. She is here for the pre-procedural cardiac assessment  for the reconstruction of breasts.\par In March this year ( 2020) she was contracted with  COVID.  After that she had exertional shortness of breath. She had echocardiogram and was negative.\par She has been free of symptoms of chest pain, shortness of breath, dizziness or palpitation with ordinary activities.

## 2022-06-03 NOTE — REASON FOR VISIT
[Follow-Up - Clinic] : a clinic follow-up of [Chest Pain] : chest pain [Hyperlipidemia] : hyperlipidemia [Hypertension] : hypertension [Palpitations] : palpitations [FreeTextEntry2] : pre-operative cardiac assessment for the breast reconstruction [FreeTextEntry1] : pre-operation assessment for breast reconstruction.

## 2022-06-06 ENCOUNTER — APPOINTMENT (OUTPATIENT)
Dept: INTERNAL MEDICINE | Facility: CLINIC | Age: 57
End: 2022-06-06
Payer: COMMERCIAL

## 2022-06-06 VITALS
TEMPERATURE: 97.6 F | BODY MASS INDEX: 30 KG/M2 | SYSTOLIC BLOOD PRESSURE: 124 MMHG | DIASTOLIC BLOOD PRESSURE: 79 MMHG | WEIGHT: 163 LBS | HEIGHT: 62 IN | OXYGEN SATURATION: 98 % | RESPIRATION RATE: 18 BRPM | HEART RATE: 74 BPM

## 2022-06-06 PROCEDURE — 99214 OFFICE O/P EST MOD 30 MIN: CPT

## 2022-06-07 ENCOUNTER — OUTPATIENT (OUTPATIENT)
Dept: OUTPATIENT SERVICES | Facility: HOSPITAL | Age: 57
LOS: 1 days | End: 2022-06-07
Payer: COMMERCIAL

## 2022-06-07 VITALS
SYSTOLIC BLOOD PRESSURE: 104 MMHG | HEART RATE: 79 BPM | OXYGEN SATURATION: 98 % | WEIGHT: 164.24 LBS | HEIGHT: 61 IN | DIASTOLIC BLOOD PRESSURE: 69 MMHG | TEMPERATURE: 98 F | RESPIRATION RATE: 16 BRPM

## 2022-06-07 DIAGNOSIS — K21.9 GASTRO-ESOPHAGEAL REFLUX DISEASE WITHOUT ESOPHAGITIS: ICD-10-CM

## 2022-06-07 DIAGNOSIS — Z85.3 PERSONAL HISTORY OF MALIGNANT NEOPLASM OF BREAST: ICD-10-CM

## 2022-06-07 DIAGNOSIS — Z42.1 ENCOUNTER FOR BREAST RECONSTRUCTION FOLLOWING MASTECTOMY: ICD-10-CM

## 2022-06-07 DIAGNOSIS — Z01.818 ENCOUNTER FOR OTHER PREPROCEDURAL EXAMINATION: ICD-10-CM

## 2022-06-07 DIAGNOSIS — Z90.13 ACQUIRED ABSENCE OF BILATERAL BREASTS AND NIPPLES: ICD-10-CM

## 2022-06-07 DIAGNOSIS — Z90.13 ACQUIRED ABSENCE OF BILATERAL BREASTS AND NIPPLES: Chronic | ICD-10-CM

## 2022-06-07 DIAGNOSIS — I10 ESSENTIAL (PRIMARY) HYPERTENSION: ICD-10-CM

## 2022-06-07 DIAGNOSIS — E11.9 TYPE 2 DIABETES MELLITUS WITHOUT COMPLICATIONS: ICD-10-CM

## 2022-06-07 DIAGNOSIS — N65.1 DISPROPORTION OF RECONSTRUCTED BREAST: ICD-10-CM

## 2022-06-07 DIAGNOSIS — Z98.890 OTHER SPECIFIED POSTPROCEDURAL STATES: Chronic | ICD-10-CM

## 2022-06-07 DIAGNOSIS — N65.0 DEFORMITY OF RECONSTRUCTED BREAST: ICD-10-CM

## 2022-06-07 DIAGNOSIS — Z90.710 ACQUIRED ABSENCE OF BOTH CERVIX AND UTERUS: Chronic | ICD-10-CM

## 2022-06-07 LAB
ALBUMIN SERPL ELPH-MCNC: 4.6 G/DL
ALP BLD-CCNC: 95 U/L
ALT SERPL-CCNC: 38 U/L
ANION GAP SERPL CALC-SCNC: 16 MMOL/L
APPEARANCE: CLEAR
APTT BLD: 32.1 SEC
AST SERPL-CCNC: 24 U/L
BASOPHILS # BLD AUTO: 0.03 K/UL
BASOPHILS NFR BLD AUTO: 0.4 %
BILIRUB SERPL-MCNC: 0.4 MG/DL
BILIRUBIN URINE: NEGATIVE
BLOOD URINE: NEGATIVE
BUN SERPL-MCNC: 16 MG/DL
CALCIUM SERPL-MCNC: 9.9 MG/DL
CHLORIDE SERPL-SCNC: 102 MMOL/L
CO2 SERPL-SCNC: 22 MMOL/L
COLOR: YELLOW
CREAT SERPL-MCNC: 0.78 MG/DL
EGFR: 89 ML/MIN/1.73M2
EOSINOPHIL # BLD AUTO: 0.03 K/UL
EOSINOPHIL NFR BLD AUTO: 0.4 %
ESTIMATED AVERAGE GLUCOSE: 128 MG/DL
GGT SERPL-CCNC: 25 U/L
GLUCOSE QUALITATIVE U: NEGATIVE
GLUCOSE SERPL-MCNC: 101 MG/DL
HBA1C MFR BLD HPLC: 6.1 %
HCT VFR BLD CALC: 40.4 %
HGB BLD-MCNC: 12.6 G/DL
IMM GRANULOCYTES NFR BLD AUTO: 0.3 %
INR PPP: 1.04 RATIO
KETONES URINE: NEGATIVE
LEUKOCYTE ESTERASE URINE: NEGATIVE
LYMPHOCYTES # BLD AUTO: 1.91 K/UL
LYMPHOCYTES NFR BLD AUTO: 27.5 %
MAN DIFF?: NORMAL
MCHC RBC-ENTMCNC: 25 PG
MCHC RBC-ENTMCNC: 31.2 GM/DL
MCV RBC AUTO: 80.3 FL
MONOCYTES # BLD AUTO: 0.55 K/UL
MONOCYTES NFR BLD AUTO: 7.9 %
NEUTROPHILS # BLD AUTO: 4.41 K/UL
NEUTROPHILS NFR BLD AUTO: 63.5 %
NITRITE URINE: NEGATIVE
PH URINE: 5.5
PLATELET # BLD AUTO: 347 K/UL
POTASSIUM SERPL-SCNC: 4.1 MMOL/L
PROT SERPL-MCNC: 7.5 G/DL
PROTEIN URINE: NORMAL
PT BLD: 12.2 SEC
RBC # BLD: 5.03 M/UL
RBC # FLD: 16.8 %
SARS-COV-2 RNA SPEC QL NAA+PROBE: SIGNIFICANT CHANGE UP
SODIUM SERPL-SCNC: 139 MMOL/L
SPECIFIC GRAVITY URINE: 1.03
UROBILINOGEN URINE: NORMAL
WBC # FLD AUTO: 6.95 K/UL

## 2022-06-07 PROCEDURE — U0005: CPT

## 2022-06-07 PROCEDURE — U0003: CPT

## 2022-06-07 PROCEDURE — 36415 COLL VENOUS BLD VENIPUNCTURE: CPT

## 2022-06-07 PROCEDURE — G0463: CPT

## 2022-06-07 NOTE — ADDENDUM
[FreeTextEntry1] : PST results from 6/6/22 noted.\par \par Patient is medically optimized to the best at this time for the stated intervention.\par Patient is medically cleared.\par \par

## 2022-06-07 NOTE — ASU PREOP CHECKLIST - AS BP NONINV METHOD
Detail Level: Zone
Detail Level: Simple
Patient Specific Counseling (Will Not Stick From Patient To Patient): She in the past has used cantharidin and requests that today.
Detail Level: Generalized
electronic

## 2022-06-07 NOTE — H&P PST ADULT - FALL HARM RISK - UNIVERSAL INTERVENTIONS
Bed in lowest position, wheels locked, appropriate side rails in place/Call bell, personal items and telephone in reach/Instruct patient to call for assistance before getting out of bed or chair/Non-slip footwear when patient is out of bed/[place] to call system/Physically safe environment - no spills, clutter or unnecessary equipment/Purposeful Proactive Rounding/Room/bathroom lighting operational, light cord in reach

## 2022-06-07 NOTE — H&P PST ADULT - HISTORY OF PRESENT ILLNESS
56 year old female with PMHX of hypertension, T2DM, GERD, HLD, mild intermittent asthma.  Patient was originally diagnosed with b/l breast cancer in 2015.  s/p lumpectomy b/l 20 2015.  Due to recent diagnosis of DCIS, pt underwent b/l mastectomy with JESIKA FLAP 1/2022.  She is now here for scheduled b/l breast reconstruction.   Otherwise patient feels well today and denies any complaints.     56 year old female with PMHX of hypertension, T2DM, GERD, HLD, mild intermittent asthma.  Patient was originally diagnosed with b/l breast cancer in 2015.  s/p lumpectomy b/l 2015.  Due to recent diagnosis of DCIS, pt underwent b/l mastectomy with JESIKA FLAP 1/2022.  She is now here for scheduled b/l breast reconstruction.   Otherwise patient feels well today and denies any complaints.

## 2022-06-07 NOTE — HISTORY OF PRESENT ILLNESS
[No Pertinent Cardiac History] : no history of aortic stenosis, atrial fibrillation, coronary artery disease, recent myocardial infarction, or implantable device/pacemaker [Asthma] : asthma [No Adverse Anesthesia Reaction] : no adverse anesthesia reaction in self or family member [Diabetes] : diabetes [(Patient denies any chest pain, claudication, dyspnea on exertion, orthopnea, palpitations or syncope)] : Patient denies any chest pain, claudication, dyspnea on exertion, orthopnea, palpitations or syncope [COPD] : no COPD [Sleep Apnea] : no sleep apnea [Smoker] : not a smoker [Chronic Anticoagulation] : no chronic anticoagulation [Chronic Kidney Disease] : no chronic kidney disease [FreeTextEntry1] : Bilateral Breasts Reconstruction, As per Plastic Surgeon [FreeTextEntry2] : 6/10/22 [FreeTextEntry3] : Dr. Tate [FreeTextEntry4] : 56 year old female with history of stable Hypertension, Type 2 Diabetes Mellitus, Asthma, Hypertriglyceridemia, NAFLD, history as stated, presented for clearance evaluation prior to possible Bilateral Breasts Reconstruction, As per Plastic Surgeon, S/P bilateral mastectomies.\par

## 2022-06-07 NOTE — H&P PST ADULT - MALE-SPECIFIC SYMPTOMS
[facility] [id number]  Phone: [phone]  Fax: [phone]    Lynne Zita        June 30, 2020     Patient: Vlad Schaeffer   YOB: 1958   Date of Visit: 6/30/2020       To Whom it May Concern:    Sohail Gilliam was seen in my clinic on 6/30/2020. She may return to school on 7/3/2020. If you have any questions or concerns, please don't hesitate to call.     Sincerely,         Eros Sloan PA-C not applicable

## 2022-06-07 NOTE — H&P PST ADULT - NSICDXPASTMEDICALHX_GEN_ALL_CORE_FT
PAST MEDICAL HISTORY:  Breast cancer     Breast cancer     Chronic Peptic Ulcer     CN (Constipation)     DM (diabetes mellitus) type 2    GERD (Gastroesophageal Reflux Disease)     Herniated Cervical Disc uses occasional tylenol    History of Tubal Ligation 2003    HTN (Hypertension)     Intraductal carcinoma in situ of right breast     Kidney stones     Migraines     RAD (Reactive Airway Disease) with cold or allergies    Seasonal allergies     Uterine leiomyoma

## 2022-06-07 NOTE — HEALTH RISK ASSESSMENT
[Never] : Never [No] : In the past 12 months have you used drugs other than those required for medical reasons? No [No falls in past year] : Patient reported no falls in the past year [0] : 2) Feeling down, depressed, or hopeless: Not at all (0) [de-identified] : None [[id number]] : 0

## 2022-06-07 NOTE — H&P PST ADULT - NSICDXPASTSURGICALHX_GEN_ALL_CORE_FT
No
PAST SURGICAL HISTORY:   Delivery 2001    H/O bilateral mastectomy JESIKA FLAP    History of hysterectomy partial- 2010    History of lumpectomy     S/P Dilation and Curettage     S/P Endometrial Ablation     S/P Laparoscopic Cholecystectomy     S/P rotator cuff repair 2021 right

## 2022-06-07 NOTE — ASSESSMENT
[Procedure Intermediate Risk] : the procedure risk is intermediate [Patient Intermediate Risk] : the patient is an intermediate risk [Optimized for Surgery Pending Laboratory Results] : the patient is optimized for surgery pending laboratory results [As per surgery] : as per surgery [Continue] : Continue medications as currently directed [FreeTextEntry4] : 56 year old female found to have stable Hypertension, Type 2 Diabetes Mellitus, Asthma, Hypertriglyceridemia, NAFLD, with the current prescription regimen as recommended, diet and life style modifications, as counseled. Prior results reviewed, interpreted and discussed with the patient during today's examination, as appropriate. Follow up, treatment plan and tests, as ordered.\par \par Possible Bilateral Breasts Reconstruction, As per Plastic Surgeon, S/P bilateral mastectomies.\par \par EKG from [facility], 2022 showed NSR at the rate of 86 BPM, known LAD, no new ST-T changes noted.\par \par Total time spent : 30 minutes\par Including:\par Preparation prior to visit - Reviewing prior record, results of tests and Consultation Reports as applicable\par Conducting an appropriate H & P during today's encounter\par Appropriate orders for tests, medications and procedures, as applicable\par Counseling patient \par Note completion\par

## 2022-06-09 ENCOUNTER — LABORATORY RESULT (OUTPATIENT)
Age: 57
End: 2022-06-09

## 2022-06-09 ENCOUNTER — APPOINTMENT (OUTPATIENT)
Dept: INTERNAL MEDICINE | Facility: CLINIC | Age: 57
End: 2022-06-09
Payer: COMMERCIAL

## 2022-06-09 ENCOUNTER — APPOINTMENT (OUTPATIENT)
Dept: INTERNAL MEDICINE | Facility: CLINIC | Age: 57
End: 2022-06-09

## 2022-06-09 ENCOUNTER — TRANSCRIPTION ENCOUNTER (OUTPATIENT)
Age: 57
End: 2022-06-09

## 2022-06-09 VITALS
OXYGEN SATURATION: 97 % | SYSTOLIC BLOOD PRESSURE: 129 MMHG | HEART RATE: 90 BPM | RESPIRATION RATE: 15 BRPM | TEMPERATURE: 98 F | HEIGHT: 62 IN | BODY MASS INDEX: 29.26 KG/M2 | DIASTOLIC BLOOD PRESSURE: 78 MMHG | WEIGHT: 159 LBS

## 2022-06-09 DIAGNOSIS — Z91.89 OTHER SPECIFIED PERSONAL RISK FACTORS, NOT ELSEWHERE CLASSIFIED: ICD-10-CM

## 2022-06-09 DIAGNOSIS — K44.9 DIAPHRAGMATIC HERNIA W/OUT OBSTRUCTION OR GANGRENE: ICD-10-CM

## 2022-06-09 PROBLEM — Z90.13 ACQUIRED ABSENCE OF BILATERAL BREASTS AND NIPPLES: Chronic | Status: ACTIVE | Noted: 2022-06-07

## 2022-06-09 PROCEDURE — 82270 OCCULT BLOOD FECES: CPT

## 2022-06-09 PROCEDURE — 99214 OFFICE O/P EST MOD 30 MIN: CPT | Mod: 25

## 2022-06-09 RX ORDER — METHOCARBAMOL 500 MG/1
500 TABLET, FILM COATED ORAL
Qty: 30 | Refills: 0 | Status: COMPLETED | COMMUNITY
Start: 2022-03-25 | End: 2022-06-09

## 2022-06-09 RX ORDER — ASPIRIN ENTERIC COATED TABLETS 81 MG 81 MG/1
81 TABLET, DELAYED RELEASE ORAL
Qty: 21 | Refills: 0 | Status: COMPLETED | COMMUNITY
Start: 2021-12-30 | End: 2022-06-09

## 2022-06-09 RX ORDER — OXYCODONE AND ACETAMINOPHEN 5; 325 MG/1; MG/1
5-325 TABLET ORAL
Qty: 20 | Refills: 0 | Status: COMPLETED | COMMUNITY
Start: 2021-12-30 | End: 2022-06-09

## 2022-06-09 RX ORDER — SULFAMETHOXAZOLE AND TRIMETHOPRIM 800; 160 MG/1; MG/1
800-160 TABLET ORAL
Qty: 14 | Refills: 0 | Status: COMPLETED | COMMUNITY
Start: 2022-04-20 | End: 2022-06-09

## 2022-06-09 RX ORDER — MELOXICAM 15 MG/1
TABLET ORAL
Refills: 0 | Status: COMPLETED | COMMUNITY
End: 2022-06-09

## 2022-06-09 NOTE — PHYSICAL EXAM
[Well Developed] : well developed [Well Nourished] : well nourished [PERRL With Normal Accommodation] : pupils were equal in size, round, and reactive to light [Oropharynx] : the oropharynx was normal [Neck Appearance] : the appearance of the neck was normal [Auscultation Breath Sounds / Voice Sounds] : lungs were clear to auscultation bilaterally [Heart Rate And Rhythm] : heart rate was normal and rhythm regular [Heart Sounds] : normal S1 and S2 [Heart Sounds Gallop] : no gallops [Heart Sounds Pericardial Friction Rub] : no pericardial rub [Edema] : there was no peripheral edema [Normal] : normal [Scar] : a scar was noted [Suprapubic] : in the suprapubic area [Soft, Nontender] : the abdomen was soft and nontender [No Mass] : no masses were palpated [No HSM] : no hepatosplenomegaly noted [None] : no CVA tenderness [Normal rectal exam] : was normal [Occult Blood Positive] : was negative for occult blood [Gross Blood] : no gross blood [Stool Sample Taken] : a stool sample was obtained [Fecal Impaction] : no fecal impaction was present [Cervical Lymph Nodes Enlarged Posterior Bilaterally] : posterior cervical [Cervical Lymph Nodes Enlarged Anterior Bilaterally] : anterior cervical [Supraclavicular Lymph Nodes Enlarged Bilaterally] : supraclavicular [Axillary Lymph Nodes Enlarged Bilaterally] : axillary [Femoral Lymph Nodes Enlarged Bilaterally] : femoral [Inguinal Lymph Nodes Enlarged Bilaterally] : inguinal [No CVA Tenderness] : no ~M costovertebral angle tenderness [No Spinal Tenderness] : no spinal tenderness [Abnormal Walk] : normal gait [Nail Clubbing] : no clubbing  or cyanosis of the fingernails [Musculoskeletal - Swelling] : no joint swelling seen [Motor Tone] : muscle strength and tone were normal [Skin Color & Pigmentation] : normal skin color and pigmentation [Skin Turgor] : normal skin turgor [] : no rash [Deep Tendon Reflexes (DTR)] : deep tendon reflexes were 2+ and symmetric [Sensation] : the sensory exam was normal to light touch and pinprick [No Focal Deficits] : no focal deficits [Oriented To Time, Place, And Person] : oriented to person, place, and time [Impaired Insight] : insight and judgment were intact [Affect] : the affect was normal

## 2022-06-09 NOTE — HISTORY OF PRESENT ILLNESS
[Heartburn] : denies heartburn [Nausea] : denies nausea [Vomiting] : denies vomiting [Constipation] : denies constipation [Yellow Skin Or Eyes (Jaundice)] : denies jaundice [Abdominal Pain] : denies abdominal pain [Abdominal Swelling] : denies abdominal swelling [Rectal Pain] : denies rectal pain [Wt Loss ___ Lbs] : recent [unfilled] ~Upound(s) weight loss [Diarrhea] : diarrhea [GERD] : gastroesophageal reflux disease [Kidney Stone] : kidney stone [Malignancy] : malignancy [Abdominal Surgery] : abdominal surgery [Cholecystectomy] : cholecystectomy [Good Compliance] : good compliance with treatment [Wt Gain ___ Lbs] : no recent weight gain [Hiatus Hernia] : no hiatus hernia [Peptic Ulcer Disease] : no peptic ulcer disease [Pancreatitis] : no pancreatitis [Cholelithiasis] : no cholelithiasis [Inflammatory Bowel Disease] : no inflammatory bowel disease [Irritable Bowel Syndrome] : no irritable bowel syndrome [Diverticulitis] : no diverticulitis [Alcohol Abuse] : no alcohol abuse [Appendectomy] : no appendectomy [de-identified] : Pt states she had recurrence of breast cancer in [place] and had double mastectomy in Jan 2022 and reconstruction with fat and did a flap.   Presently she is to have reconstruction of her breasts tomorrow.   Dr Tate.    Pt states she has increased gas and diarrhea  and is watery 4 times a day.  She has not been on antibiotics  right now but had a uti  in  April and was on antibiotics for 10 days.   The diarrhea started one month ago no blood or mucus.    She is on ppi and pepcid    .  she has not had new medication.   She is taking probiotics  .  she doesn  thave abdominal pain .  She has not traveled outside of the [place].    Her thyroid function  was normal in March  she is well controlled with her diabetes  hgbA1c is 6.1    She states the diarrhea doesn’t wake her up in the night.  She is under stress due to her dx and surgery.  She has the loose stools daily and  after she eats as well as not.  She states her heart burn has been controlled  .  She is taking aleve once a week.

## 2022-06-09 NOTE — ASSESSMENT
[FreeTextEntry1] : 1 diarrhea   will test for c diff  and tsh, mg level  continue probiotics  A wide range of problems can cause chronic diarrhea; some of the most common causes include irritable bowel syndrome (IBS), inflammatory bowel disease (Crohn disease and ulcerative colitis), malabsorption syndromes in which food cannot be digested and absorbed, and chronic infections. There are also many other less common causes of chronic diarrhea.\par \par Irritable bowel syndrome — Irritable bowel syndrome is one of the most common causes of chronic diarrhea. IBS can cause crampy abdominal pain and changes in bowel habits (diarrhea, constipation, or both). IBS can develop after having an infection. (See "Patient education: Irritable bowel syndrome\par \par Inflammatory bowel disease — There are several types of inflammatory bowel disease, two of the most common of which are Crohn disease and ulcerative colitis. These conditions may develop when the body's immune system attacks parts of the digestive tract. A clue to the presence of inflammatory bowel disease is the presence of blood in the stool. Infections — Intestinal infections are a cause of chronic diarrhea. Infections that cause chronic diarrhea can be seen in people who travel or live in tropical or developing countries. Intestinal infections can also develop after eating contaminated food or drinking contaminated water or unpasteurized ("raw") milk. (See "Patient education: Foodborne illness (food poisoning) (Beyond the Basics)".)\par \par Endocrine disorders — An overactive thyroid (hyperthyroidism) can cause chronic diarrhea and weight loss. Diabetes can cause chronic diarrhea if the nerves that supply the digestive tract are injured.\par \par Food allergy or sensitivity — Food allergies and hypersensitivity can cause chronic diarrhea. People with celiac disease are sensitive to gluten, a major component of wheat flour which can cause diarrhea and weight loss. Patients with lactose intolerance develop diarrhea and gas when they ingest milk. \par Medicines — Medicines (prescription and nonprescription), herbs, and dietary supplements can cause diarrhea as a side effect. To determine if a medicine could be the cause of your diarrhea, review your list of medicines with your doctor, nurse, or pharmacist. This information may also be available on the medicine bottle or paperwork that comes with most prescriptions.\par Treatment of chronic diarrhea aims to eliminate the underlying cause (if the cause is known), firm up the bowel movements, and treat any diarrhea-related complications.\par \par Treating the cause — The underlying cause of chronic diarrhea should be found and treated whenever possible. For example, infections may be treated with antibiotics. In people with Crohn disease or ulcerative colitis, long-term treatment and follow-up is needed.\par \par In some cases, treatment may be as simple as eliminating a food or medicine.\par \par ?For people with lactose intolerance, this may include foods or drinks that contain lactose (table 1). \par \par \par ?Other ingredients that are known to cause diarrhea include sugar-free products made with sorbitol and foods made with fat replacements (eg, Olestra).\par \par \par ?Certain medicines can also cause diarrhea (such as laxatives and antacids).\par \par \par Treating diarrhea — In some people, the goal is simply to have less diarrhea. This approach is often used before testing, when the results of tests are normal or not helpful, or if diarrhea is caused by a chronic medical problem.\par \par Diarrhea treatments include:\par \par ?Bismuth (sold as Kaopectate, Pepto-Bismol)\par \par \par ?Treatments that make the stools more formed and less watery, such as a high-fiber diet or fiber supplement (see "Patient education: High-fiber diet (Beyond the Basics)")\par \par \par ?Antidiarrhea medicines – Examples include loperamide (sold as Imodium, available without a prescription) or prescription medicines, such as diphenoxylate-atropine (Lomotil). If you take loperamide, be careful to never exceed the dose on the label unless specifically instructed by your doctor. Taking more than the recommended dose has led to serious heart problems in some people.\par \par \par ?Octreotide, a prescription medicine that might be given to people with severe diarrhea\par \par \par Treatment trial — Your doctor or nurse might recommend trying a treatment before further testing. This approach can help to narrow down the list of possible causes of your diarrhea.\par 2  gerd stable  discussed Rule of 2's; pt should avoid eating too much; too fast; too spicy; too lousy; less than two hours before bed \par -Things to avoid including overeating, spicy foods, tight clothing, eating within three hours of bed, this list is not all inclusive. \par -For treatment of reflux, possible options discussed including diet control, H2 blockers, PPIs, as well as coating motility agents discussed as treatment options. Timing of meals and proximity of last meal to sleep were discussed. If symptoms persist, a formal gastrointestinal evaluation is needed. \par \par 3.  fatty liver Fatty Liver: The patient denies any jaundice or pruritus. The patient denies any alcohol use. The patient denies taking large doses of nonsteroidal anti-inflammatory drugs or acetaminophen. The findings are suggestive of fatty liver. The patient and I had a long discussion regarding the risks of fatty liver progressing to cirrhosis. The patient was told of the possible increased risk of developing liver failure, cirrhosis, ascites, GI bleeding secondary to varices, hepatic encephalopathy, bleeding tendencies and liver cancer. The patient was told of the importance of follow-up. The patient was advised to follow up every 6 months for blood work and imaging studies. The patient agreed and will follow up. The patient was advised to lose weight. I recommend a trial of vitamin E supplementation for the fatty liver. If the liver enzymes remain elevated, the patient may require a trial of Pioglitazone for the fatty liver. I recommend avoid alcohol and hepato-toxic agents. The patient was also advised to avoid NSAIDs, Acetaminophen and any other hepatotoxic drugs. The patient was also advised not to share needles, razors, scissors, nail clippers, etc.. The patient is to continue close follow-up in our office for blood work and exams. If the liver enzymes remain elevated, the patient may require a CT guided liver biopsy to assess the liver parenchyma and for possible treatment. We had a long discussion regarding the risks and benefits of the procedure. The patient was told of the risks of bleeding, perforation, infections, emergency surgery and missing lesions. The patient agreed and will follow-up to reassess the symptoms Patient has been counseled on life style interventions, including but not limited to: weight loss (3-5% loss of body weight might improve fat in the liver, while 7-10% needed for potential improvement of other components, including inflammation and scarring of the liver, called fibrosis); healthy diet, avoiding added sugars, sodas, avoiding saturated fats, limiting sodium, avoiding alcohol; and on the importance of regular exercise (> 150 min/week moderate intensity aerobic exercise with at least 2x/week muscle strengthening or exercise as tolerated). \par - I explained to patient the natural Hx of NAFLD. \par \par 4.  ppi use   Long-term PPI use has been associated with several safety concerns. However, few of these concerns are supported by consistent data demonstrating a causal relationship. (See "Physiology of gastrin", section on 'Hypergastrinemia'.)\par \par Gastrointestinal effects\par \par Clostridioides difficile and other enteric infections — PPI use has been associated with an increased risk of C. difficile infection, even in the absence of antibiotic use  Associations with other enteric infections, including salmonellosis and campylobacteriosis, have also been reported  However, the pathophysiologic mechanism involved in the increased risk of infection is unclear.\par \par A 2017 meta-analysis of 50 observational studies found that PPI use was significantly associated with an increased risk of C. difficile infection (relative risk [RR] 1.3; 95% CI 1.1-14). The risk of C. difficile infection appears to be greater with PPIs as compared to H2 receptor antagonists \par \par PPI use has also been associated with an increased risk of recurrent C. difficile infection In a 2017 meta-analysis of 16 observational studies that included 7703 patients with C. difficile infection of whom 1525 (20 percent) had recurrent C. difficile infection, gastric acid suppression was significantly associated with an increased risk of recurrent C. difficile infection (odds ratio [OR] 1.5; 95% CI 1.2-1.9) . There was significant heterogeneity among the studies included in the meta-analysis. In adjusted analysis using data from nine studies, PPI use was associated with an increased risk of recurrent C. difficile infection after controlling for patient age and other co-morbid conditions (OR 1.4; 95% CI 1.1-1.8). (See "Clostridioides difficile infection in adults: Epidemiology, microbiology, and pathophysiology", section on 'Gastric acid suppression'.)\par \par Microscopic colitis — PPI use has been associated with microscopic colitis, including lymphocytic and collagenous colitis. In a case-control study that included 95 cases of microscopic colitis, exposure to PPIs was significantly higher in patients with microscopic colitis as compared with controls (38 versus 13 percent, OR 4.5, 95% CI 2.0-9.5) [41]. Similar results have been reported in other case-control studies, however, it is unclear if this association varies by PPI and if there is a dose-response relationship in either dose or duration of use  (See "Microscopic (lymphocytic and collagenous) colitis: Clinical manifestations, diagnosis, and management", section on 'Medications'.)\par \par Hypergastrinemia — Induction of hypergastrinemia has been associated with gastric carcinoid tumors in rats. However, these observations are not generalizable to species with gastrin physiology more analogous to humans . While patients treated with omeprazole for up to 11 years have shown some enterochromaffin-like cell hyperplasia, no dysplasia or neoplastic changes have been observed . An increased risk of colon cancer due to hypergastrinemia has also not been established . (See "Physiology of gastrin".)\par \par Atrophic gastritis — Patients on long-term PPI therapy have a propensity to develop chronic atrophic gastritis. However, the risk of atrophic gastritis is small, and in the rare patient who develops atrophic gastritis, the clinical consequences are uncertain \par \par Intestinal colonization of multi-drug resistant organisms — PPIs may increase the risk of intestinal colonization with multi-drug resistant organisms. In a meta-analysis of 12 observational studies that included 22,305 patients, after adjusting for potential confounders, acid suppression increased the odds of intestinal carriage of multi-drug resistant organisms of the Enterobacterales order (producing extended-spectrum beta-lactamases, carbapenemases, or plasmid-mediated AmpC beta-lactamases) and of vancomycin-resistant enterococci (OR 1.74; 95% CI 1.4-2.2) Possible mechanisms include an increase in bacteria that survive transit from the stomach to the intestine due to reduction in gastric acid by PPIs and direct alteration of the composition of intestinal microbiota, leading to a decrease in mean species diversity.  \par \par Inflammatory bowel disease — In a study that pooled data from three observational cohorts and included >600,000 individuals followed for a median of 12 years, the risk of inflammatory bowel disease (IBD) was increased in regular PPI users as compared with nonusers (hazard ratio [HR] 1.42; 95% CI 1.22–1.65) [50]. However, the absolute risk of IBD was low, with a number needed to harm of 3770. In addition, absence of data on PPI dosing precluded assessment of a dose-response relationship between PPI use and IBD.\par \par Malabsorption of minerals and vitamins\par \par Magnesium malabsorption — PPIs can cause hypomagnesemia due to reduced intestinal absorption . A meta-analysis of nine observational studies that included a total of 109,798 patients found that those who took a PPI had a significantly higher risk (RR 1.43; 95% CI 1.08-1.88) of developing hypomagnesemia as compared with those who did not . Clinical manifestations of hypomagnesemia include neuromuscular excitability (eg, tremor, tetany, convulsions), weakness, and apathy. Severe PPI-induced hypomagnesemia has been associated with QT interval prolongation and torsades de pointes [ The risk of hypomagnesemia appears to be mainly in patients who have been on PPIs long-term (generally longer than one year) but cases have been reported within one year of starting PPI therapy [53,55]. This potential risk has led to recommendations to monitor serum magnesium levels in specific patients at high risk for hypomagnesemia. Monitoring for hypomagnesemia in patients on PPIs is discussed in detail separately. (See 'Laboratory testing' above and "Hypomagnesemia: Clinical manifestations of magnesium depletion", section on 'Overview of clinical manifestations'.)\par \par Calcium and fracture risk — Although hypochlorhydria could theoretically reduce calcium absorption, the effect appears to be relevant only for the absorption of water insoluble calcium (eg, calcium carbonate) and can be overcome by ingestion of a slightly acidic meal  The absorption of water soluble calcium salts or calcium in dairy products are not impacted by PPI-induced hypochlorhydria. When calcium supplementation is necessary in patients taking PPIs, we use calcium supplements that do not require acid for absorption, such as calcium citrate. (See 'Laboratory testing' above and "Drugs that affect bone metabolism", section on 'Proton pump inhibitors'.)\par \par PPI-induced hypochlorhydria can augment osteoclastic activity, thereby decreasing bone density . Although an association between PPI use and bone fracture is plausible, causality has not been established]. Nonetheless, the FDA has mandated revised safety information on all PPIs about a possible increased risk of fractures of the hip, wrist, and spine with the use of these medications  The association between PPIs and bone metabolism and risk of fracture are discussed in detail separately. (See "Drugs that affect bone metabolism", section on 'Proton pump inhibitors'.)\par \par Vitamin B12 malabsorption — Long-term therapy with PPIs has been associated with vitamin B12 malabsorption  However, absorption of oral B12 supplements is not affected. (See 'Laboratory testing' above and "Treatment of vitamin B12 and folate deficiencies", section on 'Treatment of vitamin B12 deficiency'.)\par \par Iron malabsorption — Gastric acid plays a role in the absorption of nonheme iron, and the use of PPIs has been associated with decreased iron absorption [63-67]. However, in most cases the decreased absorption does not appear to be of clinical significance. One exception may be in patients who require oral iron supplementation [66,68]. Such patients may need a higher dose or longer duration of supplementation .\par 5  breast cancer   fu with oncologist and pcp  \par 6.  dm  ---The following has been discussed:---\par -Targets for weight and HgA1c have been discussed with patient \par -FS goals have been reviewed with the patient in detail:\par AM <130 post meal<160-180\par -Diet and weight goals have been discussed with the patient in detail.\par -The importance of exercise in the treatment of diabetes has been discussed \par with the patient in detail.\par -Extensive dietary advice provided to patient and the need to avoid concentrated \par sweets/simple carbohydrates and to ensure to consume protein with each meal. \par -Patient instructed to limit carbohydrates to 60 gms per meal and 15 gms per \par snacks. \par -Patient to keep a blood sugar log to check fasting and before meals\par -Patient instructed on importance of daily feet inspection and to reports any \par open lesions to physician promptly\par \par well controlled  metformin can cause diarrhea  will monitor  she is on probiotics as well.   She has not had increase in her dose of medication  of metformin and had diarrhea in past which stopped   so unlikely but still considered.  IBS  is considered as well will wait  and fu  She was on antibiotics and c diff is considered and we discussed

## 2022-06-10 ENCOUNTER — APPOINTMENT (OUTPATIENT)
Dept: PLASTIC SURGERY | Facility: HOSPITAL | Age: 57
End: 2022-06-10
Payer: COMMERCIAL

## 2022-06-10 ENCOUNTER — OUTPATIENT (OUTPATIENT)
Dept: OUTPATIENT SERVICES | Facility: HOSPITAL | Age: 57
LOS: 1 days | End: 2022-06-10
Payer: COMMERCIAL

## 2022-06-10 ENCOUNTER — TRANSCRIPTION ENCOUNTER (OUTPATIENT)
Age: 57
End: 2022-06-10

## 2022-06-10 ENCOUNTER — RESULT REVIEW (OUTPATIENT)
Age: 57
End: 2022-06-10

## 2022-06-10 VITALS
HEART RATE: 67 BPM | SYSTOLIC BLOOD PRESSURE: 130 MMHG | WEIGHT: 164.24 LBS | TEMPERATURE: 97 F | OXYGEN SATURATION: 97 % | RESPIRATION RATE: 14 BRPM | DIASTOLIC BLOOD PRESSURE: 79 MMHG | HEIGHT: 61 IN

## 2022-06-10 VITALS
OXYGEN SATURATION: 97 % | RESPIRATION RATE: 16 BRPM | DIASTOLIC BLOOD PRESSURE: 75 MMHG | HEART RATE: 80 BPM | SYSTOLIC BLOOD PRESSURE: 115 MMHG | TEMPERATURE: 97 F

## 2022-06-10 DIAGNOSIS — Z90.13 ACQUIRED ABSENCE OF BILATERAL BREASTS AND NIPPLES: Chronic | ICD-10-CM

## 2022-06-10 DIAGNOSIS — Z90.710 ACQUIRED ABSENCE OF BOTH CERVIX AND UTERUS: Chronic | ICD-10-CM

## 2022-06-10 DIAGNOSIS — Z98.890 OTHER SPECIFIED POSTPROCEDURAL STATES: Chronic | ICD-10-CM

## 2022-06-10 DIAGNOSIS — N65.1 DISPROPORTION OF RECONSTRUCTED BREAST: ICD-10-CM

## 2022-06-10 DIAGNOSIS — N65.0 DEFORMITY OF RECONSTRUCTED BREAST: ICD-10-CM

## 2022-06-10 DIAGNOSIS — Z85.3 PERSONAL HISTORY OF MALIGNANT NEOPLASM OF BREAST: ICD-10-CM

## 2022-06-10 DIAGNOSIS — Z90.13 ACQUIRED ABSENCE OF BILATERAL BREASTS AND NIPPLES: ICD-10-CM

## 2022-06-10 DIAGNOSIS — Z42.1 ENCOUNTER FOR BREAST RECONSTRUCTION FOLLOWING MASTECTOMY: ICD-10-CM

## 2022-06-10 LAB
25(OH)D3 SERPL-MCNC: 32.8 NG/ML
FOLATE SERPL-MCNC: >20 NG/ML
GLUCOSE BLDC GLUCOMTR-MCNC: 108 MG/DL — HIGH (ref 70–99)
GLUCOSE BLDC GLUCOMTR-MCNC: 139 MG/DL — HIGH (ref 70–99)
TSH SERPL-ACNC: 1.05 UIU/ML
VIT B12 SERPL-MCNC: 536 PG/ML

## 2022-06-10 PROCEDURE — 19499 UNLISTED PROCEDURE BREAST: CPT

## 2022-06-10 PROCEDURE — 19350 NIPPLE/AREOLA RECONSTRUCTION: CPT | Mod: 50

## 2022-06-10 PROCEDURE — 88304 TISSUE EXAM BY PATHOLOGIST: CPT

## 2022-06-10 PROCEDURE — 88304 TISSUE EXAM BY PATHOLOGIST: CPT | Mod: 26

## 2022-06-10 PROCEDURE — 22901 EXC ABDL TUM DEEP 5 CM/>: CPT | Mod: AS

## 2022-06-10 PROCEDURE — 19350 NIPPLE/AREOLA RECONSTRUCTION: CPT | Mod: LT

## 2022-06-10 PROCEDURE — 15734 MUSCLE-SKIN GRAFT TRUNK: CPT

## 2022-06-10 PROCEDURE — 15734 MUSCLE-SKIN GRAFT TRUNK: CPT | Mod: AS

## 2022-06-10 PROCEDURE — 19380 REVJ RECONSTRUCTED BREAST: CPT | Mod: 50

## 2022-06-10 PROCEDURE — 82962 GLUCOSE BLOOD TEST: CPT

## 2022-06-10 PROCEDURE — 15877 SUCTION LIPECTOMY TRUNK: CPT | Mod: 59

## 2022-06-10 PROCEDURE — 22901 EXC ABDL TUM DEEP 5 CM/>: CPT

## 2022-06-10 DEVICE — CLIP 24 SMALL TITAN: Type: IMPLANTABLE DEVICE | Status: FUNCTIONAL

## 2022-06-10 DEVICE — CARTRIDGE MICROCLIP 30: Type: IMPLANTABLE DEVICE | Status: FUNCTIONAL

## 2022-06-10 DEVICE — HEMOCLIP MED BLUE 6 CARTRIDGE TITANIUM: Type: IMPLANTABLE DEVICE | Status: FUNCTIONAL

## 2022-06-10 DEVICE — CLIP APPLIER ETHICON LIGACLIP 11.5" MEDIUM: Type: IMPLANTABLE DEVICE | Status: FUNCTIONAL

## 2022-06-10 DEVICE — CANNULA IMA BULB TIP 1MMX4.4CM: Type: IMPLANTABLE DEVICE | Status: FUNCTIONAL

## 2022-06-10 DEVICE — CLIP APPLIER ETHICON LIGACLIP 9 3/8" SMALL: Type: IMPLANTABLE DEVICE | Status: FUNCTIONAL

## 2022-06-10 DEVICE — IMPLANTABLE DEVICE: Type: IMPLANTABLE DEVICE | Status: FUNCTIONAL

## 2022-06-10 RX ORDER — METFORMIN HYDROCHLORIDE 850 MG/1
1 TABLET ORAL
Qty: 0 | Refills: 0 | DISCHARGE

## 2022-06-10 RX ORDER — ONDANSETRON 8 MG/1
4 TABLET, FILM COATED ORAL ONCE
Refills: 0 | Status: DISCONTINUED | OUTPATIENT
Start: 2022-06-10 | End: 2022-06-10

## 2022-06-10 RX ORDER — PANTOPRAZOLE SODIUM 20 MG/1
1 TABLET, DELAYED RELEASE ORAL
Qty: 0 | Refills: 0 | DISCHARGE

## 2022-06-10 RX ORDER — HYDROMORPHONE HYDROCHLORIDE 2 MG/ML
0.5 INJECTION INTRAMUSCULAR; INTRAVENOUS; SUBCUTANEOUS
Refills: 0 | Status: DISCONTINUED | OUTPATIENT
Start: 2022-06-10 | End: 2022-06-10

## 2022-06-10 RX ORDER — SODIUM CHLORIDE 9 MG/ML
1000 INJECTION, SOLUTION INTRAVENOUS
Refills: 0 | Status: DISCONTINUED | OUTPATIENT
Start: 2022-06-10 | End: 2022-06-10

## 2022-06-10 RX ORDER — OXYCODONE HYDROCHLORIDE 5 MG/1
5 TABLET ORAL ONCE
Refills: 0 | Status: DISCONTINUED | OUTPATIENT
Start: 2022-06-10 | End: 2022-06-10

## 2022-06-10 RX ORDER — ATORVASTATIN CALCIUM 80 MG/1
1 TABLET, FILM COATED ORAL
Qty: 0 | Refills: 0 | DISCHARGE

## 2022-06-10 RX ORDER — ACETAMINOPHEN 500 MG
2 TABLET ORAL
Qty: 0 | Refills: 0 | DISCHARGE

## 2022-06-10 RX ORDER — CHOLECALCIFEROL (VITAMIN D3) 125 MCG
1 CAPSULE ORAL
Qty: 0 | Refills: 0 | DISCHARGE

## 2022-06-10 RX ORDER — HYDROMORPHONE HYDROCHLORIDE 2 MG/ML
1 INJECTION INTRAMUSCULAR; INTRAVENOUS; SUBCUTANEOUS
Refills: 0 | Status: DISCONTINUED | OUTPATIENT
Start: 2022-06-10 | End: 2022-06-10

## 2022-06-10 RX ADMIN — SODIUM CHLORIDE 50 MILLILITER(S): 9 INJECTION, SOLUTION INTRAVENOUS at 08:46

## 2022-06-10 NOTE — ASU PATIENT PROFILE, ADULT - NSICDXPASTSURGICALHX_GEN_ALL_CORE_FT
PAST SURGICAL HISTORY:   Delivery 2001    H/O bilateral mastectomy JESIKA FLAP    History of hysterectomy partial- 2010    History of lumpectomy     S/P Dilation and Curettage     S/P Endometrial Ablation     S/P Laparoscopic Cholecystectomy     S/P rotator cuff repair 2021 right

## 2022-06-10 NOTE — ASU DISCHARGE PLAN (ADULT/PEDIATRIC) - HISTORY OF COVID-19 VACCINATION
DISPLAY PLAN FREE TEXT
Yes

## 2022-06-10 NOTE — BRIEF OPERATIVE NOTE - NSICDXBRIEFPOSTOP_GEN_ALL_CORE_FT
POST-OP DIAGNOSIS:  Acquired absence of both breasts and nipples 10-Rene-2022 13:20:25  Ximena Skelton

## 2022-06-10 NOTE — ASU DISCHARGE PLAN (ADULT/PEDIATRIC) - NURSING INSTRUCTIONS
All discharge information reviewed with patient including pain, safety, medication and follow up care . Pt acknowledges understanding of discharge instructions. Pt instructed on care and emptying of ISHAAN drain

## 2022-06-10 NOTE — ASU DISCHARGE PLAN (ADULT/PEDIATRIC) - CARE PROVIDER_API CALL
Alec Tate; PURNIMA)  Otolaryngology; Plastic Surgery  [address] [place] [address] [address]  [place] [address]  Phone: [phone]  Fax: [phone]  Follow Up Time:

## 2022-06-10 NOTE — BRIEF OPERATIVE NOTE - NSICDXBRIEFPREOP_GEN_ALL_CORE_FT
PRE-OP DIAGNOSIS:  Acquired absence of both breasts and nipples 10-Rene-2022 13:20:14  Ximena Skelton

## 2022-06-10 NOTE — BRIEF OPERATIVE NOTE - OPERATION/FINDINGS
Revision of bilateral breast reconstruction with bilateral nipple reconstruction with CV flaps and bilateral areola reconstruction, revision of abdominal donor site with trunk liposuction.

## 2022-06-10 NOTE — ASU PATIENT PROFILE, ADULT - FALL HARM RISK - UNIVERSAL INTERVENTIONS
Bed in lowest position, wheels locked, appropriate side rails in place/Call bell, personal items and telephone in reach/Instruct patient to call for assistance before getting out of bed or chair/Non-slip footwear when patient is out of bed/[place] to call system/Physically safe environment - no spills, clutter or unnecessary equipment/Purposeful Proactive Rounding/Room/bathroom lighting operational, light cord in reach

## 2022-06-13 LAB — MAGNESIUM SERPL-MCNC: 1.8 MG/DL

## 2022-06-16 ENCOUNTER — APPOINTMENT (OUTPATIENT)
Dept: PLASTIC SURGERY | Facility: CLINIC | Age: 57
End: 2022-06-16
Payer: COMMERCIAL

## 2022-06-16 VITALS
HEART RATE: 78 BPM | BODY MASS INDEX: 29.26 KG/M2 | OXYGEN SATURATION: 99 % | TEMPERATURE: 98.2 F | HEIGHT: 62 IN | DIASTOLIC BLOOD PRESSURE: 83 MMHG | SYSTOLIC BLOOD PRESSURE: 146 MMHG | WEIGHT: 159 LBS

## 2022-06-16 PROCEDURE — 99024 POSTOP FOLLOW-UP VISIT: CPT

## 2022-06-19 LAB — SURGICAL PATHOLOGY STUDY: SIGNIFICANT CHANGE UP

## 2022-06-22 ENCOUNTER — APPOINTMENT (OUTPATIENT)
Dept: PLASTIC SURGERY | Facility: CLINIC | Age: 57
End: 2022-06-22
Payer: COMMERCIAL

## 2022-06-22 VITALS
BODY MASS INDEX: 29.44 KG/M2 | WEIGHT: 160 LBS | DIASTOLIC BLOOD PRESSURE: 81 MMHG | TEMPERATURE: 97.7 F | SYSTOLIC BLOOD PRESSURE: 135 MMHG | HEIGHT: 62 IN | OXYGEN SATURATION: 98 % | HEART RATE: 77 BPM

## 2022-06-22 LAB — GI PCR PANEL, STOOL: ABNORMAL

## 2022-06-22 PROCEDURE — 99024 POSTOP FOLLOW-UP VISIT: CPT

## 2022-06-23 ENCOUNTER — APPOINTMENT (OUTPATIENT)
Dept: PLASTIC SURGERY | Facility: CLINIC | Age: 57
End: 2022-06-23
Payer: COMMERCIAL

## 2022-06-23 VITALS
BODY MASS INDEX: 29.44 KG/M2 | SYSTOLIC BLOOD PRESSURE: 109 MMHG | HEART RATE: 82 BPM | HEIGHT: 62 IN | WEIGHT: 160 LBS | DIASTOLIC BLOOD PRESSURE: 71 MMHG | OXYGEN SATURATION: 99 % | TEMPERATURE: 97.8 F

## 2022-06-23 PROCEDURE — 99024 POSTOP FOLLOW-UP VISIT: CPT

## 2022-06-29 ENCOUNTER — APPOINTMENT (OUTPATIENT)
Dept: PLASTIC SURGERY | Facility: CLINIC | Age: 57
End: 2022-06-29

## 2022-06-29 VITALS
SYSTOLIC BLOOD PRESSURE: 132 MMHG | WEIGHT: 160 LBS | BODY MASS INDEX: 29.44 KG/M2 | HEIGHT: 62 IN | HEART RATE: 74 BPM | TEMPERATURE: 97.3 F | DIASTOLIC BLOOD PRESSURE: 79 MMHG | OXYGEN SATURATION: 99 %

## 2022-06-29 PROCEDURE — 99024 POSTOP FOLLOW-UP VISIT: CPT

## 2022-07-05 ENCOUNTER — APPOINTMENT (OUTPATIENT)
Dept: INTERNAL MEDICINE | Facility: CLINIC | Age: 57
End: 2022-07-05

## 2022-07-07 ENCOUNTER — APPOINTMENT (OUTPATIENT)
Dept: PLASTIC SURGERY | Facility: CLINIC | Age: 57
End: 2022-07-07

## 2022-07-07 VITALS
SYSTOLIC BLOOD PRESSURE: 122 MMHG | TEMPERATURE: 97.6 F | BODY MASS INDEX: 29.26 KG/M2 | OXYGEN SATURATION: 99 % | DIASTOLIC BLOOD PRESSURE: 74 MMHG | HEIGHT: 62 IN | HEART RATE: 84 BPM | WEIGHT: 159 LBS

## 2022-07-07 PROCEDURE — 99024 POSTOP FOLLOW-UP VISIT: CPT

## 2022-07-13 ENCOUNTER — APPOINTMENT (OUTPATIENT)
Dept: HEPATOLOGY | Facility: CLINIC | Age: 57
End: 2022-07-13

## 2022-07-20 ENCOUNTER — APPOINTMENT (OUTPATIENT)
Dept: PLASTIC SURGERY | Facility: CLINIC | Age: 57
End: 2022-07-20

## 2022-07-20 PROCEDURE — 99024 POSTOP FOLLOW-UP VISIT: CPT

## 2022-07-22 ENCOUNTER — APPOINTMENT (OUTPATIENT)
Dept: PULMONOLOGY | Facility: CLINIC | Age: 57
End: 2022-07-22

## 2022-07-22 VITALS
SYSTOLIC BLOOD PRESSURE: 123 MMHG | HEIGHT: 62 IN | BODY MASS INDEX: 28.34 KG/M2 | OXYGEN SATURATION: 99 % | WEIGHT: 154 LBS | DIASTOLIC BLOOD PRESSURE: 74 MMHG | HEART RATE: 86 BPM | TEMPERATURE: 98 F | RESPIRATION RATE: 16 BRPM

## 2022-07-22 PROCEDURE — 99214 OFFICE O/P EST MOD 30 MIN: CPT

## 2022-07-24 NOTE — PHYSICAL EXAM
[No Acute Distress] : no acute distress [No Deformities] : no deformities [Turbinate hypertrophy] : turbinate hypertrophy [II] : Mallampati Class: II [Normal Appearance] : normal appearance [No Neck Mass] : no neck mass [Normal Rate/Rhythm] : normal rate/rhythm [No Resp Distress] : no resp distress [Clear to Auscultation Bilaterally] : clear to auscultation bilaterally [No Abnormalities] : no abnormalities [Lesions: ___] : lesions: [unfilled] [Benign] : benign [No HSM] : no hsm [No Clubbing] : no clubbing [No Edema] : no edema [No Focal Deficits] : no focal deficits [Oriented x3] : oriented x3 [TextBox_68] : no wheeze, full aeration clears

## 2022-07-24 NOTE — DISCUSSION/SUMMARY
[FreeTextEntry1] : Asthma, mild dyspnea on exertion postoperatively\par Lungs are clear\par \par history of COVID infection\par \par COVID vaccine   received\par She has  had influenza vaccine\par \par Breast cancer underwent reconstruction\par \par \par SLEEP\par No snoring, witnessed apnea, excessive daytime sleepiness, morning headache \par \par \par PlAN\par Encouraged to use   Symbicort 160/4.5 2 puffs twice per day\par \par may use albuterol MDI as needed \par \par She will continue montelukast 10 mg at night\par \par \par Total time spent : 30 minutes\par Including:\par Preparation prior to visit - Reviewing prior record, results of tests and Consultation Reports as applicable\par Conducting an appropriate H & P during today's encounter\par Appropriate orders for tests, medications and procedures, as applicable\par Counseling patient \par Note completion \par \par \par Flaquito Sheriff MD [facility]

## 2022-07-24 NOTE — HISTORY OF PRESENT ILLNESS
[Never] : never [Snoring] : snoring [TextBox_4] : 56 year old woman with asthma for 5 years. she has had symptoms prior that were seasonal\par \par She is currently on Breo and montelukast.. She does not use her rescue inhaler. She states that she sometimes has dyspnea and wheeze with exertion. She is most improved using nebulized albuterol.\par \par She developed COVID infection in March. She did not require hospitalization. She was treated with azithromycin. She has some dyspnea that improved.\par \par CXR was performed that did not demonstrate any residual infiltrate. \par \par She was treated with prednisone short course and  switched to Symbicort and continued montelukast.\par \par She states that she improved but has noted some increased dyspnea\par She has increased chest tightness since change of weather.  She had been well and was not using inhaled bronchodilator since last week. \par \par She has been using Symbicort and occasionally uses nebulizer.  \par \par She does not use Symbicort regularly, but finds she is using albuterol more.\par \par She has not had exacerbation. \par \par She states she has recurrence of breast cancer.  She has had bilateral mastectomy and reconstruction\par \par She tolerated it well\par \par \par PMH\par \par DM\par \par \par \par PSH\par breast cancer, on anastrazole, lumpectomy\par HPV\par hysterectomy\par \par \par SH\par \par never smoked\par \par no ETOH\par \par \par ALLERGY\par \par NKDA\par  [Hypersomnolence] : denies hypersomnolence [Unintentional Sleep while Inactive] : no unintentional sleep while inactive

## 2022-08-03 ENCOUNTER — APPOINTMENT (OUTPATIENT)
Dept: PLASTIC SURGERY | Facility: CLINIC | Age: 57
End: 2022-08-03

## 2022-08-03 VITALS
TEMPERATURE: 97.2 F | BODY MASS INDEX: 27.6 KG/M2 | WEIGHT: 150 LBS | OXYGEN SATURATION: 99 % | HEIGHT: 62 IN | HEART RATE: 72 BPM | SYSTOLIC BLOOD PRESSURE: 106 MMHG | DIASTOLIC BLOOD PRESSURE: 73 MMHG

## 2022-08-03 PROCEDURE — 99024 POSTOP FOLLOW-UP VISIT: CPT

## 2022-08-10 ENCOUNTER — APPOINTMENT (OUTPATIENT)
Dept: HEPATOLOGY | Facility: CLINIC | Age: 57
End: 2022-08-10

## 2022-08-10 VITALS
HEART RATE: 74 BPM | DIASTOLIC BLOOD PRESSURE: 79 MMHG | RESPIRATION RATE: 16 BRPM | HEIGHT: 62 IN | BODY MASS INDEX: 29.44 KG/M2 | OXYGEN SATURATION: 97 % | WEIGHT: 160 LBS | TEMPERATURE: 97.7 F | SYSTOLIC BLOOD PRESSURE: 117 MMHG

## 2022-08-10 DIAGNOSIS — Z91.89 OTHER SPECIFIED PERSONAL RISK FACTORS, NOT ELSEWHERE CLASSIFIED: ICD-10-CM

## 2022-08-10 PROCEDURE — 99214 OFFICE O/P EST MOD 30 MIN: CPT

## 2022-08-14 PROBLEM — Z91.89 AT RISK FOR HEPATOTOXICITY: Status: RESOLVED | Noted: 2022-08-14 | Resolved: 2022-08-14

## 2022-08-14 NOTE — HISTORY OF PRESENT ILLNESS
[de-identified] : 55 yo female with extensive medical history including diabetes mellitus (diagnosed in 2016, HbA1c: 5.7 in 12/2021), dyslipidemia, hypertension, obesity (BMI 31), s/p cholecystectomy (6/2017), hx of breast cancer s/p R lumpectomy (2015), on Tamoxifen (1/2016-9-2017, then 2018 till present), FARSHAD positivity (1:640, 1:160) with normal Immunoglobulins, nephrolithiasis and asthma is being followed by hepatology for fatty liver disease (US abd 6/2017: moderate to severe fatty infiltration of the liver, CT abd 10/2018: hepatic steatosis) with hepatic fibrosis (transient elastography 9/2019: F2 fibrosis, S3 steatosis, prior in 2017: F2-F3 fibrosis). She was last seen by Dr. Ball on 10/14/2019  and transferred care on 10/2/2020. \par \par She was noted to have abnormal liver tests in 2/2017 (mild elevation of AST/ALT with peak 57/74 in 2018, normal ALP and bilirubin). Tamoxifen was discontinued in 10/2017 (1/2016-9/2017) and resumed again in 2018; she is taking 20 mg/day MWF at present. \par Her prior liver workup showed normal ceruloplasmin, neg tTGIgA, normal iron panel, normal IgG, IgA and IgM, negative SMA ab, positive FARSHAD, positive RF, negative HCV ab, immune for Hep A, not immune or exposed to Hep B. Latest liver enzymes were WNL in 7/2020: AST 14, ALT 26, ALP 69, Se bi 0.4, GGT 17. Her lipid profile improved:  -99, Cholesterol 242-257, -85, HDL 52 and she lost weight, from 186 lb to 171lb approximately in 1 year. \par US abdomen (9/12/2020) showed hepatic steatosis and bilateral non-obstructing renal stones (patient is following with urology). \par She had FibroScan in 10/2020, showing significant improvement, with S1 steatosis and F0-F1 fibrosis (from F2-F3). \par \par She had COVID in 03/2020, did not require hospitalization. She has been following with pulmonology.\par She is also being followed by GI (Dr. Campoverde) for GERD, constipation.\par She also had urological procedure for kidney stones, following with . \par \par She was seen in Liver clinic 1/2021. She lost follow up and RTC 4/2022.\par She had recurrent breast cancer (Dx 11/2021, DCIS) and had b/l mastectomy on 1/4/2022 with JESIKA flap reconstruction. She reports that was told that would be monitored and tamoxifen was stopped because she had definitive surgery with the mastectomy. \par \par She had also had R rotator cuff repair in 5/2021. \par She has been followed for kidney stones. \par \par She is here for follow up. \par She continues to have heartburn, following with Dr. Campoverde. \par She reports RUQ pain intermittently. No N/V, no fever, chills, no diarrhea, constipation, no blood in stool or melena. \par US abd was non revealing. \par Fibroscan 4/13/22 showed F1-2 fibrosis , 8.2 kPA nad S2 steatosis ().

## 2022-08-14 NOTE — REASON FOR VISIT
[Follow-Up: _____] : a [unfilled] follow-up visit [FreeTextEntry1] : fatty liver w/ fibrosis (metabolic risk factors + tamoxifen)

## 2022-08-14 NOTE — ASSESSMENT
[FreeTextEntry1] : 57 yo female with extensive medical history including diabetes mellitus (diagnosed in 2016, HbA1c: 5.5 in 7/2020), dyslipidemia, hypertension, obesity (BMI 31), s/p cholecystectomy (6/2017), hx of breast cancer s/p R lumpectomy (2015), on Tamoxifen (1/2016-9-2017, then 2018 till present), FARSHAD positivity (1:640, 1:160) with normal Immunoglobulins, nephrolithiasis and asthma is being followed by hepatology for fatty liver disease (US abd 6/2017: moderate to severe fatty infiltration of the liver, CT abd 10/2018: hepatic steatosis) with hepatic fibrosis (transient elastography 9/2019: F2 fibrosis, S3 steatosis, prior in 2017: F2-F3 fibrosis, now with significant improvement S1, F0-F1 in 10/2020). She was last seen by Dr. Ball on 10/14/2019  and transferred care on 10/2/2020. Fibroscan 10/2020 showed improvement, S1 steatosis and F0-1 fibrosis. however, Fibroscan 4/2022 showed F1-2 fibrosis and S2 steatosis. She had tamoxifen resumed, and was stopped only in 1/2022.\par \par NAFLD, likely combination of metabolic risk factors and prior tamoxifen use\par - I have explained to her the natural history of nonalcoholic fatty liver disease, disease progression to FINCH, hepatic fibrosis, and cirrhosis and risk of hepatocellular carcinoma. \par - Avoid hepatotoxic agents, herbal supplements, alcohol \par - Advised to c/w statin \par - DM is controlled, currently on Metformin, mgmt. per PCP (if get uncontrolled and additional treatment needed, can consider liraglutide if no contraindications)\par - She is not on tamoxifen anymore since 1/2022 due to mastectomy, per hem/onc\par - Advised on diet, exercise as tolerated, weight loss. \par - Will repeat Fibroscan in 1 year for monitoring\par - Recent blood work from 6/2022 reviewed, liver enzymes and LFTs normal\par \par Positive FARSHAD\par - Pos FARSHAD but normal enzymes, normal Igs, thus never had liver biopsy.\par \par RUQ pain w. non revealing US abd \par - Cannot have MRI b/o metal, ordered CT a/p b/o persistent R sided abdominal pain\par - Being treated for GERD by Dr. Essex\par - States that somewhat different from her "kidney stone pain"\par - Occasionally provoked by food\par - s/p cholecystectomy without biliary dilation on US abd from 9/2020 and on CT abd 10/2020\par - Given persistent complaint, and having metal, ordered CT a/p instead of MRCP\par - No additional symptoms\par \par # HCM\par - Will need to recheck Hep B immunity with next blood work\par - Hep A immune \par - Hep C negative \par - HIV neg\par - Vitamin D was normal 6/2022 \par \par Following with PCP, , Pulmonology, GI, cardiology, hem/onc. \par \par RTC 3 months but to call office to discuss result.  Discussed when to seek immediate medical attention, including fever, chills, persistent pain, N/V, change in urine or stool color or any other new symptoms. \par

## 2022-08-18 ENCOUNTER — APPOINTMENT (OUTPATIENT)
Dept: PLASTIC SURGERY | Facility: CLINIC | Age: 57
End: 2022-08-18

## 2022-08-18 VITALS
TEMPERATURE: 97.8 F | DIASTOLIC BLOOD PRESSURE: 70 MMHG | HEIGHT: 62 IN | WEIGHT: 160 LBS | OXYGEN SATURATION: 99 % | SYSTOLIC BLOOD PRESSURE: 105 MMHG | HEART RATE: 70 BPM | BODY MASS INDEX: 29.44 KG/M2

## 2022-08-18 PROCEDURE — 99024 POSTOP FOLLOW-UP VISIT: CPT

## 2022-08-26 ENCOUNTER — APPOINTMENT (OUTPATIENT)
Dept: RHEUMATOLOGY | Facility: CLINIC | Age: 57
End: 2022-08-26

## 2022-08-26 VITALS
HEART RATE: 69 BPM | RESPIRATION RATE: 16 BRPM | OXYGEN SATURATION: 98 % | DIASTOLIC BLOOD PRESSURE: 74 MMHG | HEIGHT: 62 IN | BODY MASS INDEX: 29.44 KG/M2 | WEIGHT: 160 LBS | TEMPERATURE: 97.7 F | SYSTOLIC BLOOD PRESSURE: 110 MMHG

## 2022-08-26 PROCEDURE — 99213 OFFICE O/P EST LOW 20 MIN: CPT

## 2022-08-26 RX ORDER — AZITHROMYCIN 250 MG/1
250 TABLET, FILM COATED ORAL
Qty: 1 | Refills: 0 | Status: DISCONTINUED | COMMUNITY
Start: 2022-06-22 | End: 2022-08-26

## 2022-08-26 RX ORDER — CEFADROXIL 500 MG/1
500 CAPSULE ORAL TWICE DAILY
Qty: 14 | Refills: 1 | Status: DISCONTINUED | COMMUNITY
Start: 2022-06-22 | End: 2022-08-26

## 2022-08-26 NOTE — ASSESSMENT
[FreeTextEntry1] : 53 year-old female with positive FARSHAD and hx of FINCH with polyarthralgia - not responsive to prednisone\par \par 1. Polyarthralgia  and positive FARSHAD and borderline RF at 17  -  no joint pain \par 2. Breast CA - s/p bilateral mastectomy\par 3. Lipoma - referral to derm for excision - not seen yet\par 4. Hip and knee pain -x-rays with mild arthritis -   can continue with meloxicam -not active at this time\par 5. Right shoulder- complete RC tear -s/p surgery ongoing PT - \par 6. bilateral thigh paresthesias -better since\par 7. Cervical pain - send for new x-rays - previous hx of fracture - start methocarbamol\par 8. knee pain - worse over the left -  send for x-rays - referral to PT\par \par \par \par I reviewed previous labs results with patients.\par Diagnosis and Prognosis discussed\par Continue with current medications\par medications refilled\par F/u 2 months\par

## 2022-08-26 NOTE — PHYSICAL EXAM
[General Appearance - Alert] : alert [General Appearance - In No Acute Distress] : in no acute distress [Neck Appearance] : the appearance of the neck was normal [Neck Cervical Mass (___cm)] : no neck mass was observed [Jugular Venous Distention Increased] : there was no jugular-venous distention [Thyroid Diffuse Enlargement] : the thyroid was not enlarged [Thyroid Nodule] : there were no palpable thyroid nodules [Auscultation Breath Sounds / Voice Sounds] : lungs were clear to auscultation bilaterally [Heart Rate And Rhythm] : heart rate was normal and rhythm regular [Heart Sounds] : normal S1 and S2 [Heart Sounds Gallop] : no gallops [Murmurs] : no murmurs [Heart Sounds Pericardial Friction Rub] : no pericardial rub [Full Pulse] : the pedal pulses are present [Edema] : there was no peripheral edema [Bowel Sounds] : normal bowel sounds [Abdomen Soft] : soft [Abdomen Tenderness] : non-tender [] : no hepato-splenomegaly [Abdomen Mass (___ Cm)] : no abdominal mass palpated [Cervical Lymph Nodes Enlarged Posterior Bilaterally] : posterior cervical [Cervical Lymph Nodes Enlarged Anterior Bilaterally] : anterior cervical [Supraclavicular Lymph Nodes Enlarged Bilaterally] : supraclavicular [No CVA Tenderness] : no ~M costovertebral angle tenderness [No Spinal Tenderness] : no spinal tenderness [Abnormal Walk] : normal gait [Nail Clubbing] : no clubbing  or cyanosis of the fingernails [Musculoskeletal - Swelling] : no joint swelling seen [Motor Tone] : muscle strength and tone were normal [Oriented To Time, Place, And Person] : oriented to person, place, and time [Impaired Insight] : insight and judgment were intact [Affect] : the affect was normal [FreeTextEntry1] : decreased sensation over bilateral thighs with pin prick and dull

## 2022-09-07 ENCOUNTER — APPOINTMENT (OUTPATIENT)
Dept: PLASTIC SURGERY | Facility: CLINIC | Age: 57
End: 2022-09-07

## 2022-09-07 VITALS
OXYGEN SATURATION: 97 % | SYSTOLIC BLOOD PRESSURE: 102 MMHG | HEART RATE: 78 BPM | DIASTOLIC BLOOD PRESSURE: 73 MMHG | TEMPERATURE: 97.8 F | BODY MASS INDEX: 29.44 KG/M2 | WEIGHT: 160 LBS | HEIGHT: 62 IN

## 2022-09-07 PROCEDURE — 99024 POSTOP FOLLOW-UP VISIT: CPT

## 2022-09-23 RX ORDER — ALBUTEROL SULFATE 2.5 MG/3ML
(2.5 MG/3ML) SOLUTION RESPIRATORY (INHALATION) EVERY 6 HOURS
Qty: 1 | Refills: 3 | Status: ACTIVE | COMMUNITY
Start: 2020-03-30 | End: 1900-01-01

## 2022-10-19 ENCOUNTER — RESULT REVIEW (OUTPATIENT)
Age: 57
End: 2022-10-19

## 2022-10-19 ENCOUNTER — APPOINTMENT (OUTPATIENT)
Dept: SURGICAL ONCOLOGY | Facility: CLINIC | Age: 57
End: 2022-10-19

## 2022-10-19 VITALS
SYSTOLIC BLOOD PRESSURE: 120 MMHG | DIASTOLIC BLOOD PRESSURE: 77 MMHG | RESPIRATION RATE: 15 BRPM | HEART RATE: 68 BPM | HEIGHT: 62 IN | OXYGEN SATURATION: 97 % | BODY MASS INDEX: 30.36 KG/M2 | WEIGHT: 165 LBS

## 2022-10-19 DIAGNOSIS — R93.5 ABNORMAL FINDINGS ON DIAGNOSTIC IMAGING OF OTHER ABDOMINAL REGIONS, INCLUDING RETROPERITONEUM: ICD-10-CM

## 2022-10-19 PROCEDURE — 99214 OFFICE O/P EST MOD 30 MIN: CPT

## 2022-10-19 NOTE — HISTORY OF PRESENT ILLNESS
[de-identified] : Ms. Richelle Guevara is a 58 y/o female presents for a follow up visit.  She is s/p bilateral total mastectomies and bilateral axillary SLNB, with bilateral immediate breast reconstruction using contralateral deep inferior epigastric  flaps to the internal mammary vessels on 1/4/22. \par FINAL PATH: \par 1) Left breast:  Complex sclerosing lesion, IDP, fibrocystic changes. Negative nodes. \par 2) Right breast: DCIS, 1.9 cm, no invasive carcinoma or lymphovascular invasion. Negative margins.  Negative nodes. pTis pN0;  ER(+) [place] not reported. \par \par 2/2/22- She saw Dr. Lizandro Barros who did not feel that any additional endocrine therapy was warranted and Tamoxifen was discontinued.\par \par MMG/US (5/11/2022) to evaluate bilateral palpable abnormalities in the reconstructed breasts: no mammographic or sonographic evidence of malignancy. Bilateral areas of palpable concern correlating to scarring, postsurgical changes and fat necrosis seen on both sonography and mammography. BIRADS-2 \par \par 5/18/22- Patient states that she is scheduled for excision of the larger regions of fat necrosis as well as additional breast revision and nipple reconstruction.  Denies constitutional symptoms.  Continues to follow up with Dr. Barros. \par \par Richelle had a revision of b/l breast reconstruction and abdominal donor site with nipple reconstruction in June 2022 with Dr. Tate\par She underwent a CT A/P Sept 2022 which showed a 2.8 cm soft tissue density in the inferior left breast with adjacent surgical clip, may be related to scar tissue, recommend MMG (last MMG May 2022).\par \par Denies chest wall masses\par \par PERTINENT HISTORY:\par She underwent a right breast lumpectomy and sentinel lymph node biopsy in October 2015 at [facility] for DCIS (node negative, ER+), and required re excision for a positive margin in November 2015.  She completed adjuvant RT and is currently on a 10 year course of Tamoxifen under the care of Dr. Lizandro Barros.\par \par She has a family history of breast cancer involving her sister at age 50, niece at age 36 and maternal grandmother at age 81. .She is BRCA negative (tested 11/2015).\par \par She completed routine breast imaging in October 2021.  Ultrasound was negative.  Mammogram revealed suspicious calcifications in the right breast 9:00 axis (BIRADS 4).  Stereotactic biopsy demonstrated DCIS, intermediate grade with necrosis (ER+).\par \par Her past medical history history includes hypertension, hyperlipidemia, type 2 diabetes, GERD, cold induced intermittent asthma and NAFLD. \par \par She denies any palpable breast mass, nipple discharge, inversion or skin changes.

## 2022-10-19 NOTE — ASSESSMENT
[FreeTextEntry1] : IMP:\par No evidence of new or recurrent lesions.  Breast imaging failed to identify any suspicious lesions.  No suspicious lesions on exam.  Areas of palpable concern correspond to scarring/fat necrosis on ultrasound with no worrisome masses.\par  CT A/P Sept 2022 which showed a 2.8 cm soft tissue density in the inferior left breast with adjacent surgical clip, may be related to scar tissue, recommend MMG (last MMG May 2022).\par \par PLAN:\par MMG of Left breast now- ordered\par Follow up in 1 year\par \par \par All medical entries were at my, Dr. Dewayne Franco, direction. I have reviewed the chart and agree that the record accurately reflects my personal performance of the history, physical exam, assessment and plan. Our office Nurse Practitioner was present of the duration of the office visit.

## 2022-10-19 NOTE — PHYSICAL EXAM
[Normal Supraclavicular Lymph Nodes] : normal supraclavicular lymph nodes [Normal Axillary Lymph Nodes] : normal axillary lymph nodes [Normal] : oriented to person, place and time, with appropriate affect [FreeTextEntry1] : SC present for exam  [de-identified] : Normal S1, S2.  Regular rate and rhythm. [de-identified] : s/p bilateral mastectomies with JESIKA reconstruction. [de-identified] : Clear breath sounds bilaterally, normal respiratory effort.

## 2022-10-20 ENCOUNTER — APPOINTMENT (OUTPATIENT)
Dept: PLASTIC SURGERY | Facility: CLINIC | Age: 57
End: 2022-10-20

## 2022-10-20 VITALS
WEIGHT: 165 LBS | SYSTOLIC BLOOD PRESSURE: 116 MMHG | HEART RATE: 77 BPM | DIASTOLIC BLOOD PRESSURE: 77 MMHG | HEIGHT: 62 IN | OXYGEN SATURATION: 97 % | TEMPERATURE: 98 F | BODY MASS INDEX: 30.36 KG/M2

## 2022-10-20 PROCEDURE — 99212 OFFICE O/P EST SF 10 MIN: CPT

## 2022-11-15 ENCOUNTER — APPOINTMENT (OUTPATIENT)
Dept: ULTRASOUND IMAGING | Facility: IMAGING CENTER | Age: 57
End: 2022-11-15

## 2022-11-15 ENCOUNTER — OUTPATIENT (OUTPATIENT)
Dept: OUTPATIENT SERVICES | Facility: HOSPITAL | Age: 57
LOS: 1 days | End: 2022-11-15
Payer: COMMERCIAL

## 2022-11-15 ENCOUNTER — RESULT REVIEW (OUTPATIENT)
Age: 57
End: 2022-11-15

## 2022-11-15 DIAGNOSIS — Z90.13 ACQUIRED ABSENCE OF BILATERAL BREASTS AND NIPPLES: Chronic | ICD-10-CM

## 2022-11-15 DIAGNOSIS — R93.5 ABNORMAL FINDINGS ON DIAGNOSTIC IMAGING OF OTHER ABDOMINAL REGIONS, INCLUDING RETROPERITONEUM: ICD-10-CM

## 2022-11-15 DIAGNOSIS — Z98.890 OTHER SPECIFIED POSTPROCEDURAL STATES: Chronic | ICD-10-CM

## 2022-11-15 DIAGNOSIS — Z90.710 ACQUIRED ABSENCE OF BOTH CERVIX AND UTERUS: Chronic | ICD-10-CM

## 2022-11-15 PROCEDURE — 76642 ULTRASOUND BREAST LIMITED: CPT

## 2022-11-15 PROCEDURE — 76642 ULTRASOUND BREAST LIMITED: CPT | Mod: 26,LT

## 2022-12-12 ENCOUNTER — APPOINTMENT (OUTPATIENT)
Dept: INTERNAL MEDICINE | Facility: CLINIC | Age: 57
End: 2022-12-12
Payer: COMMERCIAL

## 2022-12-12 ENCOUNTER — RX RENEWAL (OUTPATIENT)
Age: 57
End: 2022-12-12

## 2022-12-12 VITALS
HEIGHT: 62 IN | TEMPERATURE: 98.2 F | HEART RATE: 73 BPM | RESPIRATION RATE: 15 BRPM | DIASTOLIC BLOOD PRESSURE: 75 MMHG | OXYGEN SATURATION: 97 % | SYSTOLIC BLOOD PRESSURE: 113 MMHG | WEIGHT: 162 LBS | BODY MASS INDEX: 29.81 KG/M2

## 2022-12-12 PROCEDURE — 90662 IIV NO PRSV INCREASED AG IM: CPT

## 2022-12-12 PROCEDURE — G0008: CPT

## 2022-12-12 PROCEDURE — 99214 OFFICE O/P EST MOD 30 MIN: CPT | Mod: 25

## 2022-12-12 RX ORDER — CHLORHEXIDINE GLUCONATE, 0.12% ORAL RINSE 1.2 MG/ML
0.12 SOLUTION DENTAL
Qty: 473 | Refills: 0 | Status: DISCONTINUED | COMMUNITY
Start: 2022-10-26

## 2022-12-12 RX ORDER — VITAMIN K2 90 MCG
125 MCG CAPSULE ORAL
Qty: 90 | Refills: 0 | Status: DISCONTINUED | COMMUNITY
Start: 2022-06-21

## 2022-12-12 NOTE — ASSESSMENT
[FreeTextEntry1] : 57 year old female found to have stable Hypertension, Type 2 Diabetes Mellitus, Asthma, Hypertriglyceridemia, Nephrolithiasis, with the current prescription regimen as recommended, diet and life style modifications, as counseled. Prior results reviewed, interpreted and discussed with the patient during today's examination, as appropriate. Follow up, treatment plan and tests, as ordered.\par \par Total time spent : 30 minutes\par Including:\par Preparation prior to visit - Reviewing prior record, results of tests and Consultation Reports as applicable\par Conducting an appropriate H & P during today's encounter\par Appropriate orders for tests, medications and procedures, as applicable\par Counseling patient \par Note completion\par

## 2022-12-12 NOTE — REVIEW OF SYSTEMS
[FreeTextEntry9] : Resolving lower back pain. Patient was recently evaluated in ER, findings and recommendations reviewed with the patient during today's examination.

## 2022-12-12 NOTE — HEALTH RISK ASSESSMENT
[Never] : Never [No] : In the past 12 months have you used drugs other than those required for medical reasons? No [No falls in past year] : Patient reported no falls in the past year [0] : 2) Feeling down, depressed, or hopeless: Not at all (0) [Intercurrent ED visits] : went to ED [de-identified] : 12/22 [de-identified] : None [[id number]] : 0

## 2022-12-12 NOTE — HISTORY OF PRESENT ILLNESS
[de-identified] : 57 year old  female patient with history of stable Hypertension, Type 2 Diabetes Mellitus, Asthma, Hypertriglyceridemia, history as stated, presented for follow up examination after ER discharge. Patient is compliant with all medications. ROS as stated.\par

## 2022-12-23 ENCOUNTER — APPOINTMENT (OUTPATIENT)
Dept: PULMONOLOGY | Facility: CLINIC | Age: 57
End: 2022-12-23

## 2022-12-23 VITALS
OXYGEN SATURATION: 98 % | HEART RATE: 84 BPM | BODY MASS INDEX: 30.18 KG/M2 | TEMPERATURE: 97.8 F | DIASTOLIC BLOOD PRESSURE: 75 MMHG | SYSTOLIC BLOOD PRESSURE: 122 MMHG | HEIGHT: 62 IN | WEIGHT: 164 LBS | RESPIRATION RATE: 16 BRPM

## 2022-12-23 PROCEDURE — 99214 OFFICE O/P EST MOD 30 MIN: CPT

## 2022-12-23 NOTE — PHYSICAL EXAM
[No Acute Distress] : no acute distress [No Deformities] : no deformities [Turbinate hypertrophy] : turbinate hypertrophy [II] : Mallampati Class: II [Normal Appearance] : normal appearance [No Neck Mass] : no neck mass [Normal Rate/Rhythm] : normal rate/rhythm [No Resp Distress] : no resp distress [Clear to Auscultation Bilaterally] : clear to auscultation bilaterally [No Abnormalities] : no abnormalities [Lesions: ___] : lesions: [unfilled] [Benign] : benign [No HSM] : no hsm [No Clubbing] : no clubbing [No Edema] : no edema [No Focal Deficits] : no focal deficits [Oriented x3] : oriented x3 [TextBox_68] : no wheeze, full aeration

## 2022-12-23 NOTE — HISTORY OF PRESENT ILLNESS
[Snoring] : snoring [Never] : never [TextBox_4] : 56 year old woman with asthma for 5 years. she has had symptoms prior that were seasonal\par \par She is currently on ICS LABA and montelukast.. She does not use her rescue inhaler. She states that she sometimes has dyspnea and wheeze with exertion. She is most improved using nebulized albuterol.\par \par She developed COVID infection in March. She did not require hospitalization. She was treated with azithromycin. She had some dyspnea that improved.\par \par CXR was performed that did not demonstrate any residual infiltrate. \par \par She was treated with prednisone short course and  switched to Symbicort and continued montelukast.\par \par She states that she improved but has noted some increased dyspnea\par She has increased chest tightness since change of weather.  She had been well and was not using inhaled bronchodilator since last week. \par \par She has been using Symbicort and montelukast with benefit  \par \par She had recurrence of breast cancer.  She has had bilateral mastectomy and reconstruction\par \par \par PMH\par \par DM\par \par \par \par PSH\par breast cancer, on anastrazole, lumpectomy\par HPV\par hysterectomy\par \par \par SH\par \par never smoked\par \par no ETOH\par \par \par ALLERGY\par \par NKDA\par  [Hypersomnolence] : denies hypersomnolence [Unintentional Sleep while Inactive] : no unintentional sleep while inactive

## 2022-12-23 NOTE — HISTORY OF PRESENT ILLNESS
[Never] : never [Snoring] : snoring [TextBox_4] : 56 year old woman with asthma for 5 years. she has had symptoms prior that were seasonal\par \par She is currently on Breo and montelukast.. She does not use her rescue inhaler. She states that she sometimes has dyspnea and wheeze with exertion. She is most improved using nebulized albuterol.\par \par She developed COVID infection in March. She did not require hospitalization. She was treated with azithromycin. She has some dyspnea that improved.\par \par CXR was performed that did not demonstrate any residual infiltrate. \par \par She was treated with prednisone short course and  switched to Symbicort and continued montelukast.\par \par She states that she improved but has noted some increased dyspnea\par She has increased chest tightness since change of weather.  She had been well and was not using inhaled bronchodilator since last week. \par \par She has been using Symbicort and occasionally uses nebulizer.  \par \par She does not use Symbicort regularly, but finds she is using albuterol more.\par \par She has not had exacerbation. \par \par She states she has recurrence of breast cancer.  She has had bilateral mastectomy and reconstruction\par \par She tolerated it well\par \par She is using Symbicort and montelukast\par \par She has not had exacerbation\par \par Breast reconstruction is in progress.\par \par \par SLEEP\par No snoring, witnessed apnea, excessive daytime sleepiness, morning headache \par \par \par PMH\par \par DM\par \par \par \par PSH\par breast cancer, on anastrazole, lumpectomy\par HPV\par hysterectomy\par \par \par SH\par \par never smoked\par \par no ETOH\par \par \par ALLERGY\par \par NKDA\par  [Hypersomnolence] : denies hypersomnolence [Unintentional Sleep while Active] : no unintentional sleep while active [Unintentional Sleep while Inactive] : no unintentional sleep while inactive [Witnessed Apneas] : no witnessed apneas

## 2022-12-23 NOTE — DISCUSSION/SUMMARY
[FreeTextEntry1] : Asthma, mild dyspnea on exertion postoperatively, improved on ICS LABA and montelukast\par \par Lungs are clear\par \par She has history of COVID infection, recovered\par \par She has  had influenza vaccine and pneumococcal\par \par \par SLEEP\par No snoring, witnessed apnea, excessive daytime sleepiness, morning headache \par \par \par PlAN\par Continue   Symbicort 160/4.5 2 puffs twice per day  with spacer\par \par continue montelukast\par \par may use albuterol via PPI or nebulizer\par \par Total time spent : 30 minutes\par Including:\par Preparation prior to visit - Reviewing prior record, results of tests and Consultation Reports as applicable\par Conducting an appropriate H & P during today's encounter\par Appropriate orders for tests, medications and procedures, as applicable\par Counseling patient \par Note completion \par \par Flaquito Sheriff MD [facility]

## 2022-12-23 NOTE — DISCUSSION/SUMMARY
[FreeTextEntry1] : Asthma, mild dyspnea on exertion postoperatively\par Lungs are clear\par \par history of COVID infection\par \par COVID vaccine   received\par She has  had influenza vaccine and PPSV\par \par Breast cancer underwent reconstruction\par \par \par \par \par PlAN\par Continue  to use   Symbicort 160/4.5 2 puffs twice per day\par \par Use  albuterol MDI as needed \par \par She will continue montelukast 10 mg at night\par \par \par Total time spent : 30 minutes\par Including:\par Preparation prior to visit - Reviewing prior record, results of tests and Consultation Reports as applicable\par Conducting an appropriate H & P during today's encounter\par Appropriate orders for tests, medications and procedures, as applicable\par Counseling patient \par Note completion \par \par \par Flaquito Sheriff MD [facility]

## 2023-01-13 ENCOUNTER — NON-APPOINTMENT (OUTPATIENT)
Age: 58
End: 2023-01-13

## 2023-01-18 ENCOUNTER — APPOINTMENT (OUTPATIENT)
Dept: INTERNAL MEDICINE | Facility: CLINIC | Age: 58
End: 2023-01-18
Payer: COMMERCIAL

## 2023-01-18 VITALS — BODY MASS INDEX: 29.63 KG/M2 | HEIGHT: 62 IN

## 2023-01-18 VITALS
HEIGHT: 62 IN | WEIGHT: 162 LBS | RESPIRATION RATE: 15 BRPM | SYSTOLIC BLOOD PRESSURE: 113 MMHG | BODY MASS INDEX: 29.81 KG/M2 | DIASTOLIC BLOOD PRESSURE: 71 MMHG | HEART RATE: 79 BPM | TEMPERATURE: 97.2 F | OXYGEN SATURATION: 98 %

## 2023-01-18 DIAGNOSIS — E55.9 VITAMIN D DEFICIENCY, UNSPECIFIED: ICD-10-CM

## 2023-01-18 DIAGNOSIS — Z80.42 FAMILY HISTORY OF MALIGNANT NEOPLASM OF PROSTATE: ICD-10-CM

## 2023-01-18 DIAGNOSIS — Z82.49 FAMILY HISTORY OF ISCHEMIC HEART DISEASE AND OTHER DISEASES OF THE CIRCULATORY SYSTEM: ICD-10-CM

## 2023-01-18 DIAGNOSIS — H61.21 IMPACTED CERUMEN, RIGHT EAR: ICD-10-CM

## 2023-01-18 DIAGNOSIS — T50.B95A ADVERSE EFFECT OF OTHER VIRAL VACCINES, INITIAL ENCOUNTER: ICD-10-CM

## 2023-01-18 DIAGNOSIS — Z90.13 ACQUIRED ABSENCE OF BILATERAL BREASTS AND NIPPLES: ICD-10-CM

## 2023-01-18 DIAGNOSIS — E78.1 PURE HYPERGLYCERIDEMIA: ICD-10-CM

## 2023-01-18 DIAGNOSIS — A04.0 ENTEROPATHOGENIC ESCHERICHIA COLI INFECTION: ICD-10-CM

## 2023-01-18 DIAGNOSIS — Z80.8 FAMILY HISTORY OF MALIGNANT NEOPLASM OF OTHER ORGANS OR SYSTEMS: ICD-10-CM

## 2023-01-18 DIAGNOSIS — H93.13 TINNITUS, BILATERAL: ICD-10-CM

## 2023-01-18 DIAGNOSIS — Z80.3 FAMILY HISTORY OF MALIGNANT NEOPLASM OF BREAST: ICD-10-CM

## 2023-01-18 DIAGNOSIS — Z01.818 ENCOUNTER FOR OTHER PREPROCEDURAL EXAMINATION: ICD-10-CM

## 2023-01-18 DIAGNOSIS — Z82.3 FAMILY HISTORY OF STROKE: ICD-10-CM

## 2023-01-18 DIAGNOSIS — Z01.810 ENCOUNTER FOR PREPROCEDURAL CARDIOVASCULAR EXAMINATION: ICD-10-CM

## 2023-01-18 DIAGNOSIS — M75.121 COMPLETE ROTATOR CUFF TEAR OR RUPTURE OF RIGHT SHOULDER, NOT SPECIFIED AS TRAUMATIC: ICD-10-CM

## 2023-01-18 DIAGNOSIS — Z79.1 LONG TERM (CURRENT) USE OF NON-STEROIDAL ANTI-INFLAMMATORIES (NSAID): ICD-10-CM

## 2023-01-18 DIAGNOSIS — M75.01 ADHESIVE CAPSULITIS OF RIGHT SHOULDER: ICD-10-CM

## 2023-01-18 DIAGNOSIS — R19.7 DIARRHEA, UNSPECIFIED: ICD-10-CM

## 2023-01-18 PROCEDURE — 99396 PREV VISIT EST AGE 40-64: CPT

## 2023-01-18 PROCEDURE — 99213 OFFICE O/P EST LOW 20 MIN: CPT | Mod: 25

## 2023-01-18 RX ORDER — OXYCODONE AND ACETAMINOPHEN 5; 325 MG/1; MG/1
5-325 TABLET ORAL
Qty: 20 | Refills: 0 | Status: COMPLETED | COMMUNITY
Start: 2022-06-08 | End: 2023-01-18

## 2023-01-18 RX ORDER — FLUTICASONE PROPIONATE 50 UG/1
50 SPRAY, METERED NASAL
Qty: 3 | Refills: 1 | Status: COMPLETED | COMMUNITY
Start: 2022-04-19 | End: 2023-01-18

## 2023-01-18 RX ORDER — NAPROXEN 500 MG/1
500 TABLET ORAL
Qty: 30 | Refills: 0 | Status: COMPLETED | COMMUNITY
Start: 2022-08-26 | End: 2023-01-18

## 2023-01-18 NOTE — PHYSICAL EXAM
[Well Developed] : well developed [Normal Voice Quality] : was normal [Normal Verbal Skills] : the patient had normal verbal communication skills [Conjunctiva] : the conjunctiva were normal in both eyes [PERRL] : pupils were equal in size, round, and reactive to light [EOM Intact] : extraocular movements were intact [Normal Appearance] : was normal in appearance [Neck Supple] : was supple [Rate ___] : at [unfilled] breaths per minute [Normal Rhythm/Effort] : normal respiratory rhythm and effort [Clear Bilaterally] : the lungs were clear to auscultation bilaterally [Normal to Percussion] : the lungs were normal to percussion [5th Left ICS - MCL] : palpated at the 5th LICS in the midclavicular line [Heart Rate ___] : [unfilled] bpm [Rhythm Regular] : regular [Normal Rate] : normal [Normal S1] : normal S1 [Normal S2] : normal S2 [No Murmur] : no murmurs heard [No Pitting Edema] : no pitting edema present [2+] : left 2+ [No Abnormalities] : the abdominal aorta was not enlarged and no bruit was heard [Right Breast Absent] : a total mastectomy [Breast Reconstruction Right] : breast reconstruction [Left Breast Absent] : a total mastectomy [Breast Reconstruction Left] : breast reconstruction [Soft, Nontender] : the abdomen was soft and nontender [No Mass] : no masses were palpated [No HSM] : no hepatosplenomegaly noted [None] : no CVA tenderness [No Lymphangitis] : no lymphangitis observed [Normal Kyphosis] : normal kyphosis [No Visual Abnormalities] : no visible abnormalities [Normal Lordosis] : normal lordosis [No Scoliosis] : no scoliosis [No Tenderness to Palpation] : no spine tenderness on palpation [No Masses] : no masses [Full ROM] : full ROM [No Pain with ROM] : no pain with motion in any direction [Intact] : all reflexes within normal limits bilaterally [Normal Station and Gait] : the gait and station were normal [Normal Motor Tone] : the muscle tone was normal [Involuntary Movements] : no involuntary movements were seen [Normal Scalp] : inspection of the scalp showed no abnormalities [Examination Of The Hair] : texture and distribution of hair was normal [Complexion Medium] : medium complexion [Normal] : the deep tendon reflexes were normal [Normal Mental Status] : the patient's orientation, memory, attention, language and fund of knowledge were normal [Appropriate] : appropriate [Comprehensive Foot Exam Normal] : Right and left foot were examined and both feet are normal. No ulcers in either foot. Toes are normal and with full ROM.  Normal tactile sensation with monofilament testing throughout both feet [Well Nourished] : not well nourished [Enlarged Diffusely] : was not enlarged [JVP Elevated ___cm] : the JVP was not elevated [S3] : no S3 [S4] : no S4 [Rt] : no varicose veins of the right leg [Lt] : no varicose veins of the left leg [Right Carotid Bruit] : no bruit heard over the right carotid [Left Carotid Bruit] : no bruit heard over the left carotid [Right Femoral Bruit] : no bruit heard over the right femoral artery [Left Femoral Bruit] : no bruit heard over the left femoral artery [Bruit] : no bruit heard [Postauricular Lymph Nodes Enlarged Bilaterally] : nodes not enlarged [Preauricular Lymph Nodes Enlarged Bilaterally] : nodes not enlarged [Submandibular Lymph Nodes Enlarged Bilaterally] : nodes not enlarged [Suboccipital Lymph Nodes Enlarged Bilaterally] : nodes not enlarged [Submental Lymph Nodes Enlarged] : nodes not enlarged [Cervical Lymph Nodes Enlarged Posterior Bilaterally] : nodes not enlarged [Cervical Lymph Nodes Enlarged Anterior Bilaterally] : nodes not enlarged [Supraclavicular Lymph Nodes Enlarged Bilaterally] : nodes not enlarged [Axillary Lymph Nodes Enlarged Bilaterally] : nodes not enlarged [Epitrochlear Lymph Nodes Enlarged Bilaterally] : nodes not enlarged [Femoral Lymph Nodes Enlarged Bilaterally] : nodes not enlarged [Inguinal Lymph Nodes Enlarged Bilaterally] : nodes not enlarged [Abnormal Color] : normal color and pigmentation [Skin Lesions 1] : no skin lesions were observed [Tattoo - Single] : no tattoos observed [Skin Turgor Decreased] : normal skin turgor [Impaired judgment] : intact judgment [Impaired Insight] : intact insight [de-identified] : tongue normal teeth in good repair   [de-identified] : examined sitting and supine   [de-identified] : abdominoplasty scar noted

## 2023-01-18 NOTE — HEALTH RISK ASSESSMENT
[Good] : ~his/her~  mood as  good [Never] : Never [No] : In the past 12 months have you used drugs other than those required for medical reasons? No [No falls in past year] : Patient reported no falls in the past year [0] : 2) Feeling down, depressed, or hopeless: Not at all (0) [PHQ-2 Negative - No further assessment needed] : PHQ-2 Negative - No further assessment needed [Patient reported mammogram was normal] : Patient reported mammogram was normal [Patient reported PAP Smear was normal] : Patient reported PAP Smear was normal [Patient reported colonoscopy was abnormal] : Patient reported colonoscopy was abnormal [HIV Test offered] : HIV Test offered [Hepatitis C test offered] : Hepatitis C test offered [None] : None [With Family] : lives with family [# of Members in Household ___] :  household currently consist of [unfilled] member(s) [Employed] : employed [Unemployed] : unemployed [College] : College [] :  [# Of Children ___] : has [unfilled] children [Feels Safe at Home] : Feels safe at home [Fully functional (bathing, dressing, toileting, transferring, walking, feeding)] : Fully functional (bathing, dressing, toileting, transferring, walking, feeding) [Fully functional (using the telephone, shopping, preparing meals, housekeeping, doing laundry, using] : Fully functional and needs no help or supervision to perform IADLs (using the telephone, shopping, preparing meals, housekeeping, doing laundry, using transportation, managing medications and managing finances) [Smoke Detector] : smoke detector [Carbon Monoxide Detector] : carbon monoxide detector [Safety elements used in home] : safety elements used in home [Seat Belt] :  uses seat belt [FreeTextEntry1] : none  [de-identified] : pulmonary Dr Jaziel Tate  plastics   [de-identified] : walks  and goes to gym  2 times a week.   [de-identified] : low carb  [[id number]] : 0 [Change in mental status noted] : No change in mental status noted [Language] : denies difficulty with language [Behavior] : denies difficulty with behavior [Learning/Retaining New Information] : denies difficulty learning/retaining new information [Handling Complex Tasks] : denies difficulty handling complex tasks [Reasoning] : denies difficulty with reasoning [Spatial Ability and Orientation] : denies difficulty with spatial ability and orientation [Sexually Active] : not sexually active [Reports changes in hearing] : Reports no changes in hearing [Reports changes in vision] : Reports no changes in vision [Reports changes in dental health] : Reports no changes in dental health [Guns at Home] : no guns at home [Sunscreen] : does not use sunscreen [Travel to Developing Areas] : does not  travel to developing areas [TB Exposure] : is not being exposed to tuberculosis [Caregiver Concerns] : does not have caregiver concerns [MammogramDate] : 5/22 [MammogramComments] : reepat 2021    normal  [PapSmearDate] : 12/22 [BoneDensityDate] : 2017 [ColonoscopyDate] : 3/21 [ColonoscopyComments] : colon polyps  [de-identified] : last eye exam   12/22 [de-identified] : last dental  10/22

## 2023-01-18 NOTE — ASSESSMENT
[FreeTextEntry1] : health she needs  prevnar vaccine  ,  and is up to date with gyn  bone density, eye and dental and colonoscopy \par 2   bmi 29 continue weight loss exercise   Weight loss, exercise, and diet control were discussed and are highly encouraged. Treatment options were given such as, aqua therapy, and contacting a nutritionist. Recommended to use the elliptical, stationary bike, less use of treadmill. Mindful eating was explained to the patient Obesity is associated with worsening asthma, shortness of breath, and potential for cardiac disease, diabetes, and other underlying medical conditions.\par \par \par -Nutrition and lifestyle concepts reviewed in detail. Recommending an unprocessed diet with >= 50% of nutrients from whole plant sources; most food early in the day; most calories before 4 pm, and no food after 8 pm; advised starting with small sustainable changes and building up over time. The long-term plan for this type of dietary change was reviewed. A number of resources to help in initiating these changes were provided.\par -A particular emphasis was placed on the need to remove processed foods from the diet. The concept of insulin resistance was introduced as important for understanding effective weight loss measures. Educational handouts explaining these concepts were provided.\par \par 3  asthma stable  Asthma is believed to be caused by inherited (genetic) and environmental factor, but its exact cause is unknown. Asthma may be triggered by allergens, lung infections, or irritants in the air. Asthma triggers are different for each person \par -Inhaler technique reviewed as well as oral hygiene techniques reviewed with patient. Avoidance of cold air, extremes of temperature, rescue inhaler should be used before exercise. Order of medication reviewed with patient. Recommended use of a cool mist humidifier in the bedroom. \par \par 4  gerd  discussed Rule of 2's; pt should avoid eating too much; too fast; too spicy; too lousy; less than two hours before bed \par -Things to avoid including overeating, spicy foods, tight clothing, eating within three hours of bed, this list is not all inclusive. \par -For treatment of reflux, possible options discussed including diet control, H2 blockers, PPIs, as well as coating motility agents discussed as treatment options. Timing of meals and proximity of last meal to sleep were discussed. If symptoms persist, a formal gastrointestinal evaluation is needed. \par \par 5  ppi use  Long-term PPI use has been associated with several safety concerns. However, few of these concerns are supported by consistent data demonstrating a causal relationship. (See "Physiology of gastrin", section on 'Hypergastrinemia'.)\par \par Gastrointestinal effects\par \par Clostridioides difficile and other enteric infections — PPI use has been associated with an increased risk of C. difficile infection, even in the absence of antibiotic use  Associations with other enteric infections, including salmonellosis and campylobacteriosis, have also been reported  However, the pathophysiologic mechanism involved in the increased risk of infection is unclear.\par \par A 2017 meta-analysis of 50 observational studies found that PPI use was significantly associated with an increased risk of C. difficile infection (relative risk [RR] 1.3; 95% CI 1.1-14). The risk of C. difficile infection appears to be greater with PPIs as compared to H2 receptor antagonists \par \par PPI use has also been associated with an increased risk of recurrent C. difficile infection In a 2017 meta-analysis of 16 observational studies that included 7703 patients with C. difficile infection of whom 1525 (20 percent) had recurrent C. difficile infection, gastric acid suppression was significantly associated with an increased risk of recurrent C. difficile infection (odds ratio [OR] 1.5; 95% CI 1.2-1.9) . There was significant heterogeneity among the studies included in the meta-analysis. In adjusted analysis using data from nine studies, PPI use was associated with an increased risk of recurrent C. difficile infection after controlling for patient age and other co-morbid conditions (OR 1.4; 95% CI 1.1-1.8). (See "Clostridioides difficile infection in adults: Epidemiology, microbiology, and pathophysiology", section on 'Gastric acid suppression'.)\par \par Microscopic colitis — PPI use has been associated with microscopic colitis, including lymphocytic and collagenous colitis. In a case-control study that included 95 cases of microscopic colitis, exposure to PPIs was significantly higher in patients with microscopic colitis as compared with controls (38 versus 13 percent, OR 4.5, 95% CI 2.0-9.5) [41]. Similar results have been reported in other case-control studies, however, it is unclear if this association varies by PPI and if there is a dose-response relationship in either dose or duration of use  (See "Microscopic (lymphocytic and collagenous) colitis: Clinical manifestations, diagnosis, and management", section on 'Medications'.)\par \par Hypergastrinemia — Induction of hypergastrinemia has been associated with gastric carcinoid tumors in rats. However, these observations are not generalizable to species with gastrin physiology more analogous to humans . While patients treated with omeprazole for up to 11 years have shown some enterochromaffin-like cell hyperplasia, no dysplasia or neoplastic changes have been observed . An increased risk of colon cancer due to hypergastrinemia has also not been established . (See "Physiology of gastrin".)\par \par Atrophic gastritis — Patients on long-term PPI therapy have a propensity to develop chronic atrophic gastritis. However, the risk of atrophic gastritis is small, and in the rare patient who develops atrophic gastritis, the clinical consequences are uncertain \par \par Intestinal colonization of multi-drug resistant organisms — PPIs may increase the risk of intestinal colonization with multi-drug resistant organisms. In a meta-analysis of 12 observational studies that included 22,305 patients, after adjusting for potential confounders, acid suppression increased the odds of intestinal carriage of multi-drug resistant organisms of the Enterobacterales order (producing extended-spectrum beta-lactamases, carbapenemases, or plasmid-mediated AmpC beta-lactamases) and of vancomycin-resistant enterococci (OR 1.74; 95% CI 1.4-2.2) Possible mechanisms include an increase in bacteria that survive transit from the stomach to the intestine due to reduction in gastric acid by PPIs and direct alteration of the composition of intestinal microbiota, leading to a decrease in mean species diversity.  \par \par Inflammatory bowel disease — In a study that pooled data from three observational cohorts and included >600,000 individuals followed for a median of 12 years, the risk of inflammatory bowel disease (IBD) was increased in regular PPI users as compared with nonusers (hazard ratio [HR] 1.42; 95% CI 1.22–1.65) [50]. However, the absolute risk of IBD was low, with a number needed to harm of 3770. In addition, absence of data on PPI dosing precluded assessment of a dose-response relationship between PPI use and IBD.\par \par Malabsorption of minerals and vitamins\par \par Magnesium malabsorption — PPIs can cause hypomagnesemia due to reduced intestinal absorption . A meta-analysis of nine observational studies that included a total of 109,798 patients found that those who took a PPI had a significantly higher risk (RR 1.43; 95% CI 1.08-1.88) of developing hypomagnesemia as compared with those who did not . Clinical manifestations of hypomagnesemia include neuromuscular excitability (eg, tremor, tetany, convulsions), weakness, and apathy. Severe PPI-induced hypomagnesemia has been associated with QT interval prolongation and torsades de pointes [ The risk of hypomagnesemia appears to be mainly in patients who have been on PPIs long-term (generally longer than one year) but cases have been reported within one year of starting PPI therapy [53,55]. This potential risk has led to recommendations to monitor serum magnesium levels in specific patients at high risk for hypomagnesemia. Monitoring for hypomagnesemia in patients on PPIs is discussed in detail separately. (See 'Laboratory testing' above and "Hypomagnesemia: Clinical manifestations of magnesium depletion", section on 'Overview of clinical manifestations'.)\par \par Calcium and fracture risk — Although hypochlorhydria could theoretically reduce calcium absorption, the effect appears to be relevant only for the absorption of water insoluble calcium (eg, calcium carbonate) and can be overcome by ingestion of a slightly acidic meal  The absorption of water soluble calcium salts or calcium in dairy products are not impacted by PPI-induced hypochlorhydria. When calcium supplementation is necessary in patients taking PPIs, we use calcium supplements that do not require acid for absorption, such as calcium citrate. (See 'Laboratory testing' above and "Drugs that affect bone metabolism", section on 'Proton pump inhibitors'.)\par \par PPI-induced hypochlorhydria can augment osteoclastic activity, thereby decreasing bone density . Although an association between PPI use and bone fracture is plausible, causality has not been established]. Nonetheless, the FDA has mandated revised safety information on all PPIs about a possible increased risk of fractures of the hip, wrist, and spine with the use of these medications  The association between PPIs and bone metabolism and risk of fracture are discussed in detail separately. (See "Drugs that affect bone metabolism", section on 'Proton pump inhibitors'.)\par \par Vitamin B12 malabsorption — Long-term therapy with PPIs has been associated with vitamin B12 malabsorption  However, absorption of oral B12 supplements is not affected. (See 'Laboratory testing' above and "Treatment of vitamin B12 and folate deficiencies", section on 'Treatment of vitamin B12 deficiency'.)\par \par Iron malabsorption — Gastric acid plays a role in the absorption of nonheme iron, and the use of PPIs has been associated with decreased iron absorption [63-67]. However, in most cases the decreased absorption does not appear to be of clinical significance. One exception may be in patients who require oral iron supplementation [66,68]. Such patients may need a higher dose or longer duration of supplementation .\par 6hld  to lower  LDL and non HDL  cholesterol levels     1 limit your intake of foods full of saturated fats  , trans fats, and dietary cholesterol .  Food with a lot of saturated fate include butter, fatty flesh like red meat, full fat and low fat dairy  products  , palm oil and coconut oil .   If you see partially hydrogenated fat in the ingredient  list of food label that food has trans fats.  Top sources of dietary chol include egg yolks , organ meats and shell fish.  one Type of fat omega 3 Fatty acids has been shown to protect against heart disease  . Good sources are cold water fish like salmon, mackerel , halibut ., trout  herring and sardines.     Limit  your intake of meat , poultry and fish to no more than 3.5  ounces per day     Eat a lot more fiber rich foods  like beans , oats, barley fruits and vegetables   .  Food naturally rich in soluble fiber  are good at lowering cholesterol .    Excellent choices  are  oats , oat bran , barley , peas , yams sweet potatoes and legumes  or beans .  Good fruit sources are berries passion fruit, oranges pears,, apricots , apples  and nectarines  .    choose protein rich plant foods   such as legumes or beans nuts and seeds over meat.     Lose as much weight as possible and exercise   Take plant sterol supplements   .A Daily intake  of 1-2 gms  of plant sterols  lowers ldl .    Best choice is supplements  such as Cholestoff  by [employer] made   because they don’t have calories  sugar trans fats   an salt  of many foods enriched with plant sterols.    Take  Metamucil or psyllium   .   Studies have shown 9-10 gms as daily of psyllium   the equivalent of  about  3 teaspoons  of sugar free Metamucil   reduced LDL levels  .\par 7 hpn  stable   continue present management \par 8  fatty liver  Fatty Liver: The patient denies any jaundice or pruritus. The patient denies any alcohol use. The patient denies taking large doses of nonsteroidal anti-inflammatory drugs or acetaminophen. The findings are suggestive of fatty liver. The patient and I had a long discussion regarding the risks of fatty liver progressing to cirrhosis. The patient was told of the possible increased risk of developing liver failure, cirrhosis, ascites, GI bleeding secondary to varices, hepatic encephalopathy, bleeding tendencies and liver cancer. The patient was told of the importance of follow-up. The patient was advised to follow up every 6 months for blood work and imaging studies. The patient agreed and will follow up. The patient was advised to lose weight. I recommend a trial of vitamin E supplementation for the fatty liver. If the liver enzymes remain elevated, the patient may require a trial of Pioglitazone for the fatty liver. I recommend avoid alcohol and hepato-toxic agents. The patient was also advised to avoid NSAIDs, Acetaminophen and any other hepatotoxic drugs. The patient was also advised not to share needles, razors, scissors, nail clippers, etc.. The patient is to continue close follow-up in our office for blood work and exams. If the liver enzymes remain elevated, the patient may require a CT guided liver biopsy to assess the liver parenchyma and for possible treatment. We had a long discussion regarding the risks and benefits of the procedure. The patient was told of the risks of bleeding, perforation, infections, emergency surgery and missing lesions. The patient agreed and will follow-up to reassess the symptoms Patient has been counseled on life style interventions, including but not limited to: weight loss (3-5% loss of body weight might improve fat in the liver, while 7-10% needed for potential improvement of other components, including inflammation and scarring of the liver, called fibrosis); healthy diet, avoiding added sugars, sodas, avoiding saturated fats, limiting sodium, avoiding alcohol; and on the importance of regular exercise (> 150 min/week moderate intensity aerobic exercise with at least 2x/week muscle strengthening or exercise as tolerated). \par - I explained to patient the natural Hx of NAFLD. \par \par 9  dm ---The following has been discussed:---\par -Targets for weight and HgA1c have been discussed with patient \par -FS goals have been reviewed with the patient in detail:\par AM <130 post meal<160-180\par -Diet and weight goals have been discussed with the patient in detail.\par -The importance of exercise in the treatment of diabetes has been discussed \par with the patient in detail.\par -Extensive dietary advice provided to patient and the need to avoid concentrated \par sweets/simple carbohydrates and to ensure to consume protein with each meal. \par -Patient instructed to limit carbohydrates to 60 gms per meal and 15 gms per \par snacks. \par -Patient to keep a blood sugar log to check fasting and before meals\par -Patient instructed on importance of daily feet inspection and to reports any \par open lesions to physician promptly\par \par 10 breast cancer    fu with oncologist  She maddies strong family hx of  cancer and will refer to  genetics

## 2023-01-18 NOTE — COUNSELING
Continue: prednisol ace-gatiflox-bromfen (prednisol ace-gatiflox-bromfen): drops,suspension: 1-0.5-0.075% 1 drop three times a day as [facility] [Fall prevention counseling provided] : Fall prevention counseling provided [Adequate lighting] : Adequate lighting [No throw rugs] : No throw rugs [Use proper foot wear] : Use proper foot wear [Use recommended devices] : Use recommended devices [Sleep ___ hours/day] : Sleep [unfilled] hours/day [Engage in a relaxing activity] : Engage in a relaxing activity [Plan in advance] : Plan in advance [Potential consequences of obesity discussed] : Potential consequences of obesity discussed [Benefits of weight loss discussed] : Benefits of weight loss discussed [Structured Weight Management Program suggested:] : Structured weight management program suggested [Encouraged to maintain food diary] : Encouraged to maintain food diary [Encouraged to increase physical activity] : Encouraged to increase physical activity [Encouraged to use exercise tracking device] : Encouraged to use exercise tracking device [Weigh Self Weekly] : weigh self weekly [Decrease Portions] : decrease portions [____ min/wk Activity] : [unfilled] min/wk activity [Keep Food Diary] : keep food diary [FreeTextEntry2] : bmi 29   162

## 2023-01-18 NOTE — HISTORY OF PRESENT ILLNESS
[FreeTextEntry1] : new pt  cpe  [de-identified] : Pt is a 57 yr old woman with  asthma,  gerd, htn, hld,  fatty liver migraines,  arthritis ,  hiatal hernia,  breast cancer  , renal stones , DM Type 2,  arrhythmia,  and obesity who is here to establish medical care  .     she -denies any headaches, nausea, vomiting, fever, chills, sweats, chest pain, chest pressure, diarrhea, constipation, dysphagia, sour taste in the mouth, dizziness, leg swelling, leg pain, myalgias, arthralgias, itchy eyes, itchy ears, heartburn, or reflux.\par s/p cholecystectomy (6/2017), hx of breast cancer s/p R lumpectomy (2015), on Tamoxifen (1/2016-9-2017, then 2018 till present), FARSHAD positivity (1:640, 1:160) with normal Immunoglobulins, nephrolithiasis and asthma is being followed by hepatology for fatty liver disease (US abd 6/2017: moderate to severe fatty infiltration of the liver, CT abd 10/2018: hepatic steatosis) with hepatic fibrosis (transient elastography 9/2019: F2 fibrosis, S3 steatosis, prior in 2017: F2-F3 fibrosis). She was last seen by Dr. Ball on 10/14/2019 and transferred care on 10/2/2020. \par \par She was noted to have abnormal liver tests in 2/2017 (mild elevation of AST/ALT with peak 57/74 in 2018, normal ALP and bilirubin). Tamoxifen was discontinued in 10/2017 (1/2016-9/2017) and resumed again in 2018; she is taking 20 mg/day MWF at present. \par Her prior liver workup showed normal ceruloplasmin, neg tTGIgA, normal iron panel, normal IgG, IgA and IgM, negative SMA ab, positive FARSHAD, positive RF, negative HCV ab, immune for Hep A, not immune or exposed to Hep B. Latest liver enzymes were WNL in 7/2020: AST 14, ALT 26, ALP 69, Se bi 0.4, GGT 17. Her lipid profile improved: -99, Cholesterol 242-257, -85, HDL 52 and she lost weight, from 186 lb to 171lb approximately in 1 year. \par US abdomen (9/12/2020) showed hepatic steatosis and bilateral non-obstructing renal stones (patient is following with urology). \par She had FibroScan in 10/2020, showing significant improvement, with S1 steatosis and F0-F1 fibrosis (from F2-F3). \par \par She had COVID in 03/2020, did not require hospitalization. She has been following with pulmonology.\par She is also being followed by GI (Dr. Campoverde) for GERD, constipation.\par She also had urological procedure for kidney stones, following with . \par \par She was seen in Liver clinic 1/2021. She lost follow up and RTC 4/2022.\par She had recurrent breast cancer (Dx 11/2021, DCIS) and had b/l mastectomy on 1/4/2022 with JESIKA flap reconstruction. She reports that was told that would be monitored and tamoxifen was stopped because she had definitive surgery with the mastectomy. \par \par She had also had R rotator cuff repair in 5/2021. \par She has been followed for kidney stones. \par US abd was non revealing. \par Fibroscan 4/13/22 showed F1-2 fibrosis , 8.2 kPA nad S2 steatosis (). \par \par \par

## 2023-01-18 NOTE — PAST MEDICAL HISTORY
[Surgical Menopause] : in surgical menopause [Menarche Age ____] : age at menarche was [unfilled] [Definite ___ (Date)] : the last menstrual period was [unfilled] [Total Preg ___] : G[unfilled] [Live Births ___] : P[unfilled]  [Full Term ___] : Full Term: [unfilled] [Living ___] : Living: [unfilled] [AB Spont ___] : miscarriages: [unfilled]

## 2023-01-18 NOTE — REVIEW OF SYSTEMS
[Patient Intake Form Reviewed] : Patient intake form was reviewed [Shortness Of Breath] : shortness of breath [Heartburn] : heartburn [Joint Pain] : joint pain [Headache] : headache [Negative] : Heme/Lymph [Abdominal Pain] : no abdominal pain [Constipation] : no constipation [Diarrhea] : diarrhea [Vomiting] : no vomiting [Melena] : no melena [FreeTextEntry6] : when having asthma

## 2023-01-19 LAB
25(OH)D3 SERPL-MCNC: 25.5 NG/ML
ALBUMIN SERPL ELPH-MCNC: 4.7 G/DL
ALP BLD-CCNC: 91 U/L
ALT SERPL-CCNC: 28 U/L
ANION GAP SERPL CALC-SCNC: 15 MMOL/L
APPEARANCE: CLEAR
AST SERPL-CCNC: 25 U/L
BASOPHILS # BLD AUTO: 0.04 K/UL
BASOPHILS NFR BLD AUTO: 0.5 %
BILIRUB SERPL-MCNC: 0.4 MG/DL
BILIRUBIN URINE: NEGATIVE
BLOOD URINE: NEGATIVE
BUN SERPL-MCNC: 14 MG/DL
CALCIUM SERPL-MCNC: 10.1 MG/DL
CHLORIDE SERPL-SCNC: 105 MMOL/L
CHOLEST SERPL-MCNC: 176 MG/DL
CO2 SERPL-SCNC: 23 MMOL/L
COLOR: YELLOW
CREAT SERPL-MCNC: 0.8 MG/DL
CREAT SPEC-SCNC: 160 MG/DL
EGFR: 85 ML/MIN/1.73M2
EOSINOPHIL # BLD AUTO: 0.04 K/UL
EOSINOPHIL NFR BLD AUTO: 0.5 %
ESTIMATED AVERAGE GLUCOSE: 117 MG/DL
FERRITIN SERPL-MCNC: 25 NG/ML
FOLATE SERPL-MCNC: >20 NG/ML
GLUCOSE QUALITATIVE U: NEGATIVE
GLUCOSE SERPL-MCNC: 84 MG/DL
HBA1C MFR BLD HPLC: 5.7 %
HCT VFR BLD CALC: 41 %
HCV AB SER QL: NONREACTIVE
HCV S/CO RATIO: 0.24 S/CO
HDLC SERPL-MCNC: 53 MG/DL
HGB BLD-MCNC: 13.3 G/DL
HIV1+2 AB SPEC QL IA.RAPID: NONREACTIVE
IMM GRANULOCYTES NFR BLD AUTO: 0.3 %
IRON SATN MFR SERPL: 18 %
IRON SERPL-MCNC: 75 UG/DL
KETONES URINE: NEGATIVE
LDLC SERPL CALC-MCNC: 101 MG/DL
LEUKOCYTE ESTERASE URINE: NEGATIVE
LYMPHOCYTES # BLD AUTO: 1.81 K/UL
LYMPHOCYTES NFR BLD AUTO: 23.4 %
MAGNESIUM SERPL-MCNC: 1.8 MG/DL
MAN DIFF?: NORMAL
MCHC RBC-ENTMCNC: 27 PG
MCHC RBC-ENTMCNC: 32.4 GM/DL
MCV RBC AUTO: 83.2 FL
MICROALBUMIN 24H UR DL<=1MG/L-MCNC: <1.2 MG/DL
MICROALBUMIN/CREAT 24H UR-RTO: NORMAL MG/G
MONOCYTES # BLD AUTO: 0.57 K/UL
MONOCYTES NFR BLD AUTO: 7.4 %
NEUTROPHILS # BLD AUTO: 5.24 K/UL
NEUTROPHILS NFR BLD AUTO: 67.9 %
NITRITE URINE: NEGATIVE
NONHDLC SERPL-MCNC: 123 MG/DL
PH URINE: 5.5
PLATELET # BLD AUTO: 341 K/UL
POTASSIUM SERPL-SCNC: 3.9 MMOL/L
PROT SERPL-MCNC: 7.7 G/DL
PROTEIN URINE: NEGATIVE
RBC # BLD: 4.93 M/UL
RBC # FLD: 13.7 %
SODIUM SERPL-SCNC: 143 MMOL/L
SPECIFIC GRAVITY URINE: 1.02
T PALLIDUM AB SER QL IA: NEGATIVE
TIBC SERPL-MCNC: 412 UG/DL
TRIGL SERPL-MCNC: 110 MG/DL
TSH SERPL-ACNC: 1.65 UIU/ML
UIBC SERPL-MCNC: 337 UG/DL
UROBILINOGEN URINE: NORMAL
VIT B12 SERPL-MCNC: 513 PG/ML
WBC # FLD AUTO: 7.72 K/UL

## 2023-01-19 RX ORDER — ERGOCALCIFEROL 1.25 MG/1
1.25 MG CAPSULE, LIQUID FILLED ORAL
Qty: 13 | Refills: 2 | Status: ACTIVE | COMMUNITY
Start: 2020-09-11 | End: 1900-01-01

## 2023-01-25 ENCOUNTER — APPOINTMENT (OUTPATIENT)
Dept: ULTRASOUND IMAGING | Facility: CLINIC | Age: 58
End: 2023-01-25

## 2023-01-25 ENCOUNTER — OUTPATIENT (OUTPATIENT)
Dept: OUTPATIENT SERVICES | Facility: HOSPITAL | Age: 58
LOS: 1 days | End: 2023-01-25
Payer: COMMERCIAL

## 2023-01-25 DIAGNOSIS — Z98.890 OTHER SPECIFIED POSTPROCEDURAL STATES: Chronic | ICD-10-CM

## 2023-01-25 DIAGNOSIS — Z90.710 ACQUIRED ABSENCE OF BOTH CERVIX AND UTERUS: Chronic | ICD-10-CM

## 2023-01-25 DIAGNOSIS — Z90.13 ACQUIRED ABSENCE OF BILATERAL BREASTS AND NIPPLES: Chronic | ICD-10-CM

## 2023-01-25 PROCEDURE — 76700 US EXAM ABDOM COMPLETE: CPT | Mod: 26

## 2023-02-02 NOTE — ASSESSMENT
[FreeTextEntry1] : 52 year old female found to have stable Hypertension, Type 2 Diabetes Mellitus, symptomatic Asthma, Left Ureteric Stone with left Hydronephrosis, with the current regimen, diet and life style modifications, as counseled. Prior results reviewed and discussed with the patient during today's examination. Plan as ordered.\par URO/GYN f/us, as counseled.\par Prednisone may increase blood sugar, as counseled.
23

## 2023-02-03 ENCOUNTER — TRANSCRIPTION ENCOUNTER (OUTPATIENT)
Age: 58
End: 2023-02-03

## 2023-03-22 ENCOUNTER — RX RENEWAL (OUTPATIENT)
Age: 58
End: 2023-03-22

## 2023-05-05 ENCOUNTER — APPOINTMENT (OUTPATIENT)
Dept: RHEUMATOLOGY | Facility: CLINIC | Age: 58
End: 2023-05-05
Payer: COMMERCIAL

## 2023-05-05 ENCOUNTER — NON-APPOINTMENT (OUTPATIENT)
Age: 58
End: 2023-05-05

## 2023-05-05 DIAGNOSIS — M17.12 UNILATERAL PRIMARY OSTEOARTHRITIS, LEFT KNEE: ICD-10-CM

## 2023-05-05 PROCEDURE — 99214 OFFICE O/P EST MOD 30 MIN: CPT

## 2023-05-07 PROBLEM — M17.12 ARTHRITIS OF KNEE, LEFT: Status: ACTIVE | Noted: 2021-02-05

## 2023-05-07 NOTE — ASSESSMENT
[FreeTextEntry1] : 53 year-old female with positive FARSHAD and hx of FINCH with polyarthralgia - not responsive to prednisone\par \par 1. Polyarthralgia  and positive FARSHAD and borderline RF at 17  -  no joint pain \par 2. Breast CA - s/p bilateral mastectomy\par 3. Lipoma - referral to derm for excision - not seen yet\par 4. Hip and knee pain -x-rays with mild arthritis -   referral to PT\par 5. Right shoulder- complete RC tear -s/p surgery - minimal pain today\par 6. bilateral thigh paresthesias -better since\par 7. Cervical pain - with new onset of bilateral arm paresthesias - send for EMG - start methocarbamol at bed time\par referral to physiatry\par \par \par \par I reviewed previous labs results with patients.\par Diagnosis and Prognosis discussed\par Continue with current medications\par medications refilled\par F/u 2 months\par

## 2023-05-07 NOTE — PHYSICAL EXAM
[General Appearance - Alert] : alert [General Appearance - In No Acute Distress] : in no acute distress [Neck Appearance] : the appearance of the neck was normal [Neck Cervical Mass (___cm)] : no neck mass was observed [Jugular Venous Distention Increased] : there was no jugular-venous distention [Thyroid Diffuse Enlargement] : the thyroid was not enlarged [Thyroid Nodule] : there were no palpable thyroid nodules [Auscultation Breath Sounds / Voice Sounds] : lungs were clear to auscultation bilaterally [Heart Sounds] : normal S1 and S2 [Heart Rate And Rhythm] : heart rate was normal and rhythm regular [Heart Sounds Gallop] : no gallops [Murmurs] : no murmurs [Full Pulse] : the pedal pulses are present [Heart Sounds Pericardial Friction Rub] : no pericardial rub [Edema] : there was no peripheral edema [Bowel Sounds] : normal bowel sounds [Abdomen Tenderness] : non-tender [Abdomen Soft] : soft [] : no hepato-splenomegaly [Abdomen Mass (___ Cm)] : no abdominal mass palpated [Cervical Lymph Nodes Enlarged Posterior Bilaterally] : posterior cervical [Cervical Lymph Nodes Enlarged Anterior Bilaterally] : anterior cervical [Supraclavicular Lymph Nodes Enlarged Bilaterally] : supraclavicular [No Spinal Tenderness] : no spinal tenderness [No CVA Tenderness] : no ~M costovertebral angle tenderness [Abnormal Walk] : normal gait [Nail Clubbing] : no clubbing  or cyanosis of the fingernails [Musculoskeletal - Swelling] : no joint swelling seen [Motor Tone] : muscle strength and tone were normal [Oriented To Time, Place, And Person] : oriented to person, place, and time [Impaired Insight] : insight and judgment were intact [Affect] : the affect was normal [FreeTextEntry1] : decreased sensation over bilateral thighs with pin prick and dull

## 2023-05-07 NOTE — HISTORY OF PRESENT ILLNESS
[___ Month(s) Ago] : [unfilled] month(s) ago [Arthralgias] : arthralgias [FreeTextEntry1] : ongoing neck pain has difficulty sleeping because of the pain\par new onset of bilateral arm and hand numbness - day and night - unrelated to activity\par denies any swelling or redness\par has a hx of herniated discs

## 2023-05-10 NOTE — ASU DISCHARGE PLAN (ADULT/PEDIATRIC) - DISCHARGE TO
Size Of Lesion In Cm: 1.2 Size Of Margin In Cm: 0.4 Anesthesia Volume In Cc: 2.4 Home Was An Eye Clamp Used?: No Eye Clamp Note Details: An eye clamp was used during the procedure. Excision Method: Fusiform Saucerization Depth: dermis and superficial adipose tissue Repair Type: Intermediate Suturegard Retention Suture: 2-0 Nylon Retention Suture Bite Size: 3 mm Length To Time In Minutes Device Was In Place: 10 Number Of Hemigard Strips Per Side: 1 Intermediate / Complex Repair - Final Wound Length In Cm: 4.5 Undermining Type: Entire Wound Debridement Text: The wound edges were debrided prior to proceeding with the closure to facilitate wound healing. Helical Rim Text: The closure involved the helical rim. Vermilion Border Text: The closure involved the vermilion border. Nostril Rim Text: The closure involved the nostril rim. Retention Suture Text: Retention sutures were placed to support the closure and prevent dehiscence. Primary Defect Length (In Cm): 0 Suture Removal: 14 days Lab: [id number] Lab Facility: [id number] Graft Donor Site Bandage (Optional-Leave Blank If You Don't Want In Note): Steri-strips and a pressure bandage were applied to the donor site. Epidermal Closure Graft Donor Site (Optional): simple interrupted Billing Type: Third-Party Bill Excision Depth: adipose tissue Scalpel Size: 15 blade Anesthesia Type: 2% lidocaine with epinephrine Hemostasis: Electrocautery Estimated Blood Loss (Cc): minimal Detail Level: Detailed Anesthesia Type: 1% lidocaine with epinephrine Anesthesia Volume In Cc: 6 Deep Sutures: 3-0 PDO Epidermal Sutures: 3-0 Polypropylene Epidermal Closure: running locked Wound Care: Petrolatum Dressing: pressure dressing with telfa Suturegard Intro: Intraoperative tissue expansion was performed, utilizing the SUTUREGARD device, in order to reduce wound tension. Suturegard Body: The suture ends were repeatedly re-tightened and re-clamped to achieve the desired tissue expansion. Hemigard Intro: Due to skin fragility and wound tension, it was decided to use HEMIGARD adhesive retention suture devices to permit a linear closure. The skin was cleaned and dried for a 6cm distance away from the wound. Excessive hair, if present, was removed to allow for adhesion. Hemigard Postcare Instructions: The HEMIGARD strips are to remain completely dry for at least 5-7 days. Positioning (Leave Blank If You Do Not Want): The patient was placed in a comfortable position exposing the surgical site. Pre-Excision Curettage Text (Leave Blank If You Do Not Want): Prior to drawing the surgical margin the visible lesion was removed with electrodesiccation and curettage to clearly define the lesion size. Complex Repair Preamble Text (Leave Blank If You Do Not Want): Extensive wide undermining was performed. Intermediate Repair Preamble Text (Leave Blank If You Do Not Want): Undermining was performed with blunt dissection. Curvilinear Excision Additional Text (Leave Blank If You Do Not Want): The margin was drawn around the clinically apparent lesion.  A curvilinear shape was then drawn on the skin incorporating the lesion and margins.  Incisions were then made along these lines to the appropriate tissue plane and the lesion was extirpated. Fusiform Excision Additional Text (Leave Blank If You Do Not Want): The margin was drawn around the clinically apparent lesion.  A fusiform shape was then drawn on the skin incorporating the lesion and margins.  Incisions were then made along these lines to the appropriate tissue plane and the lesion was extirpated. Elliptical Excision Additional Text (Leave Blank If You Do Not Want): The margin was drawn around the clinically apparent lesion.  An elliptical shape was then drawn on the skin incorporating the lesion and margins.  Incisions were then made along these lines to the appropriate tissue plane and the lesion was extirpated. Saucerization Excision Additional Text (Leave Blank If You Do Not Want): The margin was drawn around the clinically apparent lesion.  Incisions were then made along these lines, in a tangential fashion, to the appropriate tissue plane and the lesion was extirpated. Slit Excision Additional Text (Leave Blank If You Do Not Want): A linear line was drawn on the skin overlying the lesion. An incision was made slowly until the lesion was visualized.  Once visualized, the lesion was removed with blunt dissection. Excisional Biopsy Additional Text (Leave Blank If You Do Not Want): The margin was drawn around the clinically apparent lesion. An elliptical shape was then drawn on the skin incorporating the lesion and margins.  Incisions were then made along these lines to the appropriate tissue plane and the lesion was extirpated. Perilesional Excision Additional Text (Leave Blank If You Do Not Want): The margin was drawn around the clinically apparent lesion. Incisions were then made along these lines to the appropriate tissue plane and the lesion was extirpated. Repair Performed By Another Provider Text (Leave Blank If You Do Not Want): After the tissue was excised the defect was repaired by another provider. No Repair - Repaired With Adjacent Surgical Defect Text (Leave Blank If You Do Not Want): After the excision the defect was repaired concurrently with another surgical defect which was in close approximation. Adjacent Tissue Transfer Text: The defect edges were debeveled with a #15 scalpel blade.  Given the location of the defect and the proximity to free margins an adjacent tissue transfer was deemed most appropriate.  Using a sterile surgical marker, an appropriate flap was drawn incorporating the defect and placing the expected incisions within the relaxed skin tension lines where possible.    The area thus outlined was incised deep to adipose tissue with a #15 scalpel blade.  The skin margins were undermined to an appropriate distance in all directions utilizing iris scissors. Advancement Flap (Single) Text: The defect edges were debeveled with a #15 scalpel blade.  Given the location of the defect and the proximity to free margins a single advancement flap was deemed most appropriate.  Using a sterile surgical marker, an appropriate advancement flap was drawn incorporating the defect and placing the expected incisions within the relaxed skin tension lines where possible.    The area thus outlined was incised deep to adipose tissue with a #15 scalpel blade.  The skin margins were undermined to an appropriate distance in all directions utilizing iris scissors. Advancement Flap (Double) Text: The defect edges were debeveled with a #15 scalpel blade.  Given the location of the defect and the proximity to free margins a double advancement flap was deemed most appropriate.  Using a sterile surgical marker, the appropriate advancement flaps were drawn incorporating the defect and placing the expected incisions within the relaxed skin tension lines where possible.    The area thus outlined was incised deep to adipose tissue with a #15 scalpel blade.  The skin margins were undermined to an appropriate distance in all directions utilizing iris scissors. Burow's Advancement Flap Text: The defect edges were debeveled with a #15 scalpel blade.  Given the location of the defect and the proximity to free margins a Burow's advancement flap was deemed most appropriate.  Using a sterile surgical marker, the appropriate advancement flap was drawn incorporating the defect and placing the expected incisions within the relaxed skin tension lines where possible.    The area thus outlined was incised deep to adipose tissue with a #15 scalpel blade.  The skin margins were undermined to an appropriate distance in all directions utilizing iris scissors. Chonodrocutaneous Helical Advancement Flap Text: The defect edges were debeveled with a #15 scalpel blade.  Given the location of the defect and the proximity to free margins a chondrocutaneous helical advancement flap was deemed most appropriate.  Using a sterile surgical marker, the appropriate advancement flap was drawn incorporating the defect and placing the expected incisions within the relaxed skin tension lines where possible.    The area thus outlined was incised deep to adipose tissue with a #15 scalpel blade.  The skin margins were undermined to an appropriate distance in all directions utilizing iris scissors. Crescentic Advancement Flap Text: The defect edges were debeveled with a #15 scalpel blade.  Given the location of the defect and the proximity to free margins a crescentic advancement flap was deemed most appropriate.  Using a sterile surgical marker, the appropriate advancement flap was drawn incorporating the defect and placing the expected incisions within the relaxed skin tension lines where possible.    The area thus outlined was incised deep to adipose tissue with a #15 scalpel blade.  The skin margins were undermined to an appropriate distance in all directions utilizing iris scissors. A-T Advancement Flap Text: The defect edges were debeveled with a #15 scalpel blade.  Given the location of the defect, shape of the defect and the proximity to free margins an A-T advancement flap was deemed most appropriate.  Using a sterile surgical marker, an appropriate advancement flap was drawn incorporating the defect and placing the expected incisions within the relaxed skin tension lines where possible.    The area thus outlined was incised deep to adipose tissue with a #15 scalpel blade.  The skin margins were undermined to an appropriate distance in all directions utilizing iris scissors. O-T Advancement Flap Text: The defect edges were debeveled with a #15 scalpel blade.  Given the location of the defect, shape of the defect and the proximity to free margins an O-T advancement flap was deemed most appropriate.  Using a sterile surgical marker, an appropriate advancement flap was drawn incorporating the defect and placing the expected incisions within the relaxed skin tension lines where possible.    The area thus outlined was incised deep to adipose tissue with a #15 scalpel blade.  The skin margins were undermined to an appropriate distance in all directions utilizing iris scissors. O-L Flap Text: The defect edges were debeveled with a #15 scalpel blade.  Given the location of the defect, shape of the defect and the proximity to free margins an O-L flap was deemed most appropriate.  Using a sterile surgical marker, an appropriate advancement flap was drawn incorporating the defect and placing the expected incisions within the relaxed skin tension lines where possible.    The area thus outlined was incised deep to adipose tissue with a #15 scalpel blade.  The skin margins were undermined to an appropriate distance in all directions utilizing iris scissors. O-Z Flap Text: The defect edges were debeveled with a #15 scalpel blade.  Given the location of the defect, shape of the defect and the proximity to free margins an O-Z flap was deemed most appropriate.  Using a sterile surgical marker, an appropriate transposition flap was drawn incorporating the defect and placing the expected incisions within the relaxed skin tension lines where possible. The area thus outlined was incised deep to adipose tissue with a #15 scalpel blade.  The skin margins were undermined to an appropriate distance in all directions utilizing iris scissors. Double O-Z Flap Text: The defect edges were debeveled with a #15 scalpel blade.  Given the location of the defect, shape of the defect and the proximity to free margins a Double O-Z flap was deemed most appropriate.  Using a sterile surgical marker, an appropriate transposition flap was drawn incorporating the defect and placing the expected incisions within the relaxed skin tension lines where possible. The area thus outlined was incised deep to adipose tissue with a #15 scalpel blade.  The skin margins were undermined to an appropriate distance in all directions utilizing iris scissors. V-Y Flap Text: The defect edges were debeveled with a #15 scalpel blade.  Given the location of the defect, shape of the defect and the proximity to free margins a V-Y flap was deemed most appropriate.  Using a sterile surgical marker, an appropriate advancement flap was drawn incorporating the defect and placing the expected incisions within the relaxed skin tension lines where possible.    The area thus outlined was incised deep to adipose tissue with a #15 scalpel blade.  The skin margins were undermined to an appropriate distance in all directions utilizing iris scissors. Advancement-Rotation Flap Text: The defect edges were debeveled with a #15 scalpel blade.  Given the location of the defect, shape of the defect and the proximity to free margins an advancement-rotation flap was deemed most appropriate.  Using a sterile surgical marker, an appropriate flap was drawn incorporating the defect and placing the expected incisions within the relaxed skin tension lines where possible. The area thus outlined was incised deep to adipose tissue with a #15 scalpel blade.  The skin margins were undermined to an appropriate distance in all directions utilizing iris scissors. Mercedes Flap Text: The defect edges were debeveled with a #15 scalpel blade.  Given the location of the defect, shape of the defect and the proximity to free margins a Mercedes flap was deemed most appropriate.  Using a sterile surgical marker, an appropriate advancement flap was drawn incorporating the defect and placing the expected incisions within the relaxed skin tension lines where possible. The area thus outlined was incised deep to adipose tissue with a #15 scalpel blade.  The skin margins were undermined to an appropriate distance in all directions utilizing iris scissors. Modified Advancement Flap Text: The defect edges were debeveled with a #15 scalpel blade.  Given the location of the defect, shape of the defect and the proximity to free margins a modified advancement flap was deemed most appropriate.  Using a sterile surgical marker, an appropriate advancement flap was drawn incorporating the defect and placing the expected incisions within the relaxed skin tension lines where possible.    The area thus outlined was incised deep to adipose tissue with a #15 scalpel blade.  The skin margins were undermined to an appropriate distance in all directions utilizing iris scissors. Mucosal Advancement Flap Text: Given the location of the defect, shape of the defect and the proximity to free margins a mucosal advancement flap was deemed most appropriate. Incisions were made with a 15 blade scalpel in the appropriate fashion along the cutaneous vermilion border and the mucosal lip. The remaining actinically damaged mucosal tissue was excised.  The mucosal advancement flap was then elevated to the gingival sulcus with care taken to preserve the neurovascular structures and advanced into the primary defect. Care was taken to ensure that precise realignment of the vermilion border was achieved. Peng Advancement Flap Text: The defect edges were debeveled with a #15 scalpel blade.  Given the location of the defect, shape of the defect and the proximity to free margins a Peng advancement flap was deemed most appropriate.  Using a sterile surgical marker, an appropriate advancement flap was drawn incorporating the defect and placing the expected incisions within the relaxed skin tension lines where possible. The area thus outlined was incised deep to adipose tissue with a #15 scalpel blade.  The skin margins were undermined to an appropriate distance in all directions utilizing iris scissors. Hatchet Flap Text: The defect edges were debeveled with a #15 scalpel blade.  Given the location of the defect, shape of the defect and the proximity to free margins a hatchet flap was deemed most appropriate.  Using a sterile surgical marker, an appropriate hatchet flap was drawn incorporating the defect and placing the expected incisions within the relaxed skin tension lines where possible.    The area thus outlined was incised deep to adipose tissue with a #15 scalpel blade.  The skin margins were undermined to an appropriate distance in all directions utilizing iris scissors. Rotation Flap Text: The defect edges were debeveled with a #15 scalpel blade.  Given the location of the defect, shape of the defect and the proximity to free margins a rotation flap was deemed most appropriate.  Using a sterile surgical marker, an appropriate rotation flap was drawn incorporating the defect and placing the expected incisions within the relaxed skin tension lines where possible.    The area thus outlined was incised deep to adipose tissue with a #15 scalpel blade.  The skin margins were undermined to an appropriate distance in all directions utilizing iris scissors. Bilateral Rotation Flap Text: The defect edges were debeveled with a #15 scalpel blade. Given the location of the defect, shape of the defect and the proximity to free margins a bilateral rotation flap was deemed most appropriate. Using a sterile surgical marker, an appropriate rotation flap was drawn incorporating the defect and placing the expected incisions within the relaxed skin tension lines where possible. The area thus outlined was incised deep to adipose tissue with a #15 scalpel blade. The skin margins were undermined to an appropriate distance in all directions utilizing iris scissors. Following this, the designed flap was carried over into the primary defect and sutured into place. Spiral Flap Text: The defect edges were debeveled with a #15 scalpel blade.  Given the location of the defect, shape of the defect and the proximity to free margins a spiral flap was deemed most appropriate.  Using a sterile surgical marker, an appropriate rotation flap was drawn incorporating the defect and placing the expected incisions within the relaxed skin tension lines where possible. The area thus outlined was incised deep to adipose tissue with a #15 scalpel blade.  The skin margins were undermined to an appropriate distance in all directions utilizing iris scissors. Staged Advancement Flap Text: The defect edges were debeveled with a #15 scalpel blade.  Given the location of the defect, shape of the defect and the proximity to free margins a staged advancement flap was deemed most appropriate.  Using a sterile surgical marker, an appropriate advancement flap was drawn incorporating the defect and placing the expected incisions within the relaxed skin tension lines where possible. The area thus outlined was incised deep to adipose tissue with a #15 scalpel blade.  The skin margins were undermined to an appropriate distance in all directions utilizing iris scissors. Star Wedge Flap Text: The defect edges were debeveled with a #15 scalpel blade.  Given the location of the defect, shape of the defect and the proximity to free margins a star wedge flap was deemed most appropriate.  Using a sterile surgical marker, an appropriate rotation flap was drawn incorporating the defect and placing the expected incisions within the relaxed skin tension lines where possible. The area thus outlined was incised deep to adipose tissue with a #15 scalpel blade.  The skin margins were undermined to an appropriate distance in all directions utilizing iris scissors. Transposition Flap Text: The defect edges were debeveled with a #15 scalpel blade.  Given the location of the defect and the proximity to free margins a transposition flap was deemed most appropriate.  Using a sterile surgical marker, an appropriate transposition flap was drawn incorporating the defect.    The area thus outlined was incised deep to adipose tissue with a #15 scalpel blade.  The skin margins were undermined to an appropriate distance in all directions utilizing iris scissors. Muscle Hinge Flap Text: The defect edges were debeveled with a #15 scalpel blade.  Given the size, depth and location of the defect and the proximity to free margins a muscle hinge flap was deemed most appropriate.  Using a sterile surgical marker, an appropriate hinge flap was drawn incorporating the defect. The area thus outlined was incised with a #15 scalpel blade.  The skin margins were undermined to an appropriate distance in all directions utilizing iris scissors. Mustarde Flap Text: The defect edges were debeveled with a #15 scalpel blade.  Given the size, depth and location of the defect and the proximity to free margins a Mustarde flap was deemed most appropriate.  Using a sterile surgical marker, an appropriate flap was drawn incorporating the defect. The area thus outlined was incised with a #15 scalpel blade.  The skin margins were undermined to an appropriate distance in all directions utilizing iris scissors. Nasal Turnover Hinge Flap Text: The defect edges were debeveled with a #15 scalpel blade.  Given the size, depth, location of the defect and the defect being full thickness a nasal turnover hinge flap was deemed most appropriate.  Using a sterile surgical marker, an appropriate hinge flap was drawn incorporating the defect. The area thus outlined was incised with a #15 scalpel blade. The flap was designed to recreate the nasal mucosal lining and the alar rim. The skin margins were undermined to an appropriate distance in all directions utilizing iris scissors. Nasalis-Muscle-Based Myocutaneous Island Pedicle Flap Text: Using a #15 blade, an incision was made around the donor flap to the level of the nasalis muscle. Wide lateral undermining was then performed in both the subcutaneous plane above the nasalis muscle, and in a submuscular plane just above periosteum. This allowed the formation of a free nasalis muscle axial pedicle (based on the angular artery) which was still attached to the actual cutaneous flap, increasing its mobility and vascular viability. Hemostasis was obtained with pinpoint electrocoagulation. The flap was mobilized into position and the pivotal anchor points positioned and stabilized with buried interrupted sutures. Subcutaneous and dermal tissues were closed in a multilayered fashion with sutures. Tissue redundancies were excised, and the epidermal edges were apposed without significant tension and sutured with sutures. Orbicularis Oris Muscle Flap Text: The defect edges were debeveled with a #15 scalpel blade.  Given that the defect affected the competency of the oral sphincter an orbicularis oris muscle flap was deemed most appropriate to restore this competency and normal muscle function.  Using a sterile surgical marker, an appropriate flap was drawn incorporating the defect. The area thus outlined was incised with a #15 scalpel blade. Melolabial Transposition Flap Text: The defect edges were debeveled with a #15 scalpel blade.  Given the location of the defect and the proximity to free margins a melolabial flap was deemed most appropriate.  Using a sterile surgical marker, an appropriate melolabial transposition flap was drawn incorporating the defect.    The area thus outlined was incised deep to adipose tissue with a #15 scalpel blade.  The skin margins were undermined to an appropriate distance in all directions utilizing iris scissors. Rhombic Flap Text: The defect edges were debeveled with a #15 scalpel blade.  Given the location of the defect and the proximity to free margins a rhombic flap was deemed most appropriate.  Using a sterile surgical marker, an appropriate rhombic flap was drawn incorporating the defect.    The area thus outlined was incised deep to adipose tissue with a #15 scalpel blade.  The skin margins were undermined to an appropriate distance in all directions utilizing iris scissors. Rhomboid Transposition Flap Text: The defect edges were debeveled with a #15 scalpel blade.  Given the location of the defect and the proximity to free margins a rhomboid transposition flap was deemed most appropriate.  Using a sterile surgical marker, an appropriate rhomboid flap was drawn incorporating the defect.    The area thus outlined was incised deep to adipose tissue with a #15 scalpel blade.  The skin margins were undermined to an appropriate distance in all directions utilizing iris scissors. Bi-Rhombic Flap Text: The defect edges were debeveled with a #15 scalpel blade.  Given the location of the defect and the proximity to free margins a bi-rhombic flap was deemed most appropriate.  Using a sterile surgical marker, an appropriate rhombic flap was drawn incorporating the defect. The area thus outlined was incised deep to adipose tissue with a #15 scalpel blade.  The skin margins were undermined to an appropriate distance in all directions utilizing iris scissors. Helical Rim Advancement Flap Text: The defect edges were debeveled with a #15 blade scalpel.  Given the location of the defect and the proximity to free margins (helical rim) a double helical rim advancement flap was deemed most appropriate.  Using a sterile surgical marker, the appropriate advancement flaps were drawn incorporating the defect and placing the expected incisions between the helical rim and antihelix where possible.  The area thus outlined was incised through and through with a #15 scalpel blade.  With a skin hook and iris scissors, the flaps were gently and sharply undermined and freed up. Bilateral Helical Rim Advancement Flap Text: The defect edges were debeveled with a #15 blade scalpel.  Given the location of the defect and the proximity to free margins (helical rim) a bilateral helical rim advancement flap was deemed most appropriate.  Using a sterile surgical marker, the appropriate advancement flaps were drawn incorporating the defect and placing the expected incisions between the helical rim and antihelix where possible.  The area thus outlined was incised through and through with a #15 scalpel blade.  With a skin hook and iris scissors, the flaps were gently and sharply undermined and freed up. Ear Star Wedge Flap Text: The defect edges were debeveled with a #15 blade scalpel.  Given the location of the defect and the proximity to free margins (helical rim) an ear star wedge flap was deemed most appropriate.  Using a sterile surgical marker, the appropriate flap was drawn incorporating the defect and placing the expected incisions between the helical rim and antihelix where possible.  The area thus outlined was incised through and through with a #15 scalpel blade. Banner Transposition Flap Text: The defect edges were debeveled with a #15 scalpel blade.  Given the location of the defect and the proximity to free margins a Banner transposition flap was deemed most appropriate.  Using a sterile surgical marker, an appropriate flap drawn around the defect. The area thus outlined was incised deep to adipose tissue with a #15 scalpel blade.  The skin margins were undermined to an appropriate distance in all directions utilizing iris scissors. Bilobed Flap Text: The defect edges were debeveled with a #15 scalpel blade.  Given the location of the defect and the proximity to free margins a bilobe flap was deemed most appropriate.  Using a sterile surgical marker, an appropriate bilobe flap drawn around the defect.    The area thus outlined was incised deep to adipose tissue with a #15 scalpel blade.  The skin margins were undermined to an appropriate distance in all directions utilizing iris scissors. Bilobed Transposition Flap Text: The defect edges were debeveled with a #15 scalpel blade.  Given the location of the defect and the proximity to free margins a bilobed transposition flap was deemed most appropriate.  Using a sterile surgical marker, an appropriate bilobe flap drawn around the defect.    The area thus outlined was incised deep to adipose tissue with a #15 scalpel blade.  The skin margins were undermined to an appropriate distance in all directions utilizing iris scissors. Trilobed Flap Text: The defect edges were debeveled with a #15 scalpel blade.  Given the location of the defect and the proximity to free margins a trilobed flap was deemed most appropriate.  Using a sterile surgical marker, an appropriate trilobed flap drawn around the defect.    The area thus outlined was incised deep to adipose tissue with a #15 scalpel blade.  The skin margins were undermined to an appropriate distance in all directions utilizing iris scissors. Dorsal Nasal Flap Text: The defect edges were debeveled with a #15 scalpel blade.  Given the location of the defect and the proximity to free margins a dorsal nasal flap was deemed most appropriate.  Using a sterile surgical marker, an appropriate dorsal nasal flap was drawn around the defect.    The area thus outlined was incised deep to adipose tissue with a #15 scalpel blade.  The skin margins were undermined to an appropriate distance in all directions utilizing iris scissors. Island Pedicle Flap Text: The defect edges were debeveled with a #15 scalpel blade.  Given the location of the defect, shape of the defect and the proximity to free margins an island pedicle advancement flap was deemed most appropriate.  Using a sterile surgical marker, an appropriate advancement flap was drawn incorporating the defect, outlining the appropriate donor tissue and placing the expected incisions within the relaxed skin tension lines where possible.    The area thus outlined was incised deep to adipose tissue with a #15 scalpel blade.  The skin margins were undermined to an appropriate distance in all directions around the primary defect and laterally outward around the island pedicle utilizing iris scissors.  There was minimal undermining beneath the pedicle flap. Island Pedicle Flap With Canthal Suspension Text: The defect edges were debeveled with a #15 scalpel blade.  Given the location of the defect, shape of the defect and the proximity to free margins an island pedicle advancement flap was deemed most appropriate.  Using a sterile surgical marker, an appropriate advancement flap was drawn incorporating the defect, outlining the appropriate donor tissue and placing the expected incisions within the relaxed skin tension lines where possible. The area thus outlined was incised deep to adipose tissue with a #15 scalpel blade.  The skin margins were undermined to an appropriate distance in all directions around the primary defect and laterally outward around the island pedicle utilizing iris scissors.  There was minimal undermining beneath the pedicle flap. A suspension suture was placed in the canthal tendon to prevent tension and prevent ectropion. Alar Island Pedicle Flap Text: The defect edges were debeveled with a #15 scalpel blade.  Given the location of the defect, shape of the defect and the proximity to the alar rim an island pedicle advancement flap was deemed most appropriate.  Using a sterile surgical marker, an appropriate advancement flap was drawn incorporating the defect, outlining the appropriate donor tissue and placing the expected incisions within the nasal ala running parallel to the alar rim. The area thus outlined was incised with a #15 scalpel blade.  The skin margins were undermined minimally to an appropriate distance in all directions around the primary defect and laterally outward around the island pedicle utilizing iris scissors.  There was minimal undermining beneath the pedicle flap. Double Island Pedicle Flap Text: The defect edges were debeveled with a #15 scalpel blade.  Given the location of the defect, shape of the defect and the proximity to free margins a double island pedicle advancement flap was deemed most appropriate.  Using a sterile surgical marker, an appropriate advancement flap was drawn incorporating the defect, outlining the appropriate donor tissue and placing the expected incisions within the relaxed skin tension lines where possible.    The area thus outlined was incised deep to adipose tissue with a #15 scalpel blade.  The skin margins were undermined to an appropriate distance in all directions around the primary defect and laterally outward around the island pedicle utilizing iris scissors.  There was minimal undermining beneath the pedicle flap. Island Pedicle Flap-Requiring Vessel Identification Text: The defect edges were debeveled with a #15 scalpel blade.  Given the location of the defect, shape of the defect and the proximity to free margins an island pedicle advancement flap was deemed most appropriate.  Using a sterile surgical marker, an appropriate advancement flap was drawn, based on the axial vessel mentioned above, incorporating the defect, outlining the appropriate donor tissue and placing the expected incisions within the relaxed skin tension lines where possible.    The area thus outlined was incised deep to adipose tissue with a #15 scalpel blade.  The skin margins were undermined to an appropriate distance in all directions around the primary defect and laterally outward around the island pedicle utilizing iris scissors.  There was minimal undermining beneath the pedicle flap. Keystone Flap Text: The defect edges were debeveled with a #15 scalpel blade.  Given the location of the defect, shape of the defect a keystone flap was deemed most appropriate.  Using a sterile surgical marker, an appropriate keystone flap was drawn incorporating the defect, outlining the appropriate donor tissue and placing the expected incisions within the relaxed skin tension lines where possible. The area thus outlined was incised deep to adipose tissue with a #15 scalpel blade.  The skin margins were undermined to an appropriate distance in all directions around the primary defect and laterally outward around the flap utilizing iris scissors. O-T Plasty Text: The defect edges were debeveled with a #15 scalpel blade.  Given the location of the defect, shape of the defect and the proximity to free margins an O-T plasty was deemed most appropriate.  Using a sterile surgical marker, an appropriate O-T plasty was drawn incorporating the defect and placing the expected incisions within the relaxed skin tension lines where possible.    The area thus outlined was incised deep to adipose tissue with a #15 scalpel blade.  The skin margins were undermined to an appropriate distance in all directions utilizing iris scissors. O-Z Plasty Text: The defect edges were debeveled with a #15 scalpel blade.  Given the location of the defect, shape of the defect and the proximity to free margins an O-Z plasty (double transposition flap) was deemed most appropriate.  Using a sterile surgical marker, the appropriate transposition flaps were drawn incorporating the defect and placing the expected incisions within the relaxed skin tension lines where possible.    The area thus outlined was incised deep to adipose tissue with a #15 scalpel blade.  The skin margins were undermined to an appropriate distance in all directions utilizing iris scissors.  Hemostasis was achieved with electrocautery.  The flaps were then transposed into place, one clockwise and the other counterclockwise, and anchored with interrupted buried subcutaneous sutures. Double O-Z Plasty Text: The defect edges were debeveled with a #15 scalpel blade.  Given the location of the defect, shape of the defect and the proximity to free margins a Double O-Z plasty (double transposition flap) was deemed most appropriate.  Using a sterile surgical marker, the appropriate transposition flaps were drawn incorporating the defect and placing the expected incisions within the relaxed skin tension lines where possible. The area thus outlined was incised deep to adipose tissue with a #15 scalpel blade.  The skin margins were undermined to an appropriate distance in all directions utilizing iris scissors.  Hemostasis was achieved with electrocautery.  The flaps were then transposed into place, one clockwise and the other counterclockwise, and anchored with interrupted buried subcutaneous sutures. V-Y Plasty Text: The defect edges were debeveled with a #15 scalpel blade.  Given the location of the defect, shape of the defect and the proximity to free margins an V-Y advancement flap was deemed most appropriate.  Using a sterile surgical marker, an appropriate advancement flap was drawn incorporating the defect and placing the expected incisions within the relaxed skin tension lines where possible.    The area thus outlined was incised deep to adipose tissue with a #15 scalpel blade.  The skin margins were undermined to an appropriate distance in all directions utilizing iris scissors. H Plasty Text: Given the location of the defect, shape of the defect and the proximity to free margins a H-plasty was deemed most appropriate for repair.  Using a sterile surgical marker, the appropriate advancement arms of the H-plasty were drawn incorporating the defect and placing the expected incisions within the relaxed skin tension lines where possible. The area thus outlined was incised deep to adipose tissue with a #15 scalpel blade. The skin margins were undermined to an appropriate distance in all directions utilizing iris scissors.  The opposing advancement arms were then advanced into place in opposite direction and anchored with interrupted buried subcutaneous sutures. W Plasty Text: The lesion was extirpated to the level of the fat with a #15 scalpel blade.  Given the location of the defect, shape of the defect and the proximity to free margins a W-plasty was deemed most appropriate for repair.  Using a sterile surgical marker, the appropriate transposition arms of the W-plasty were drawn incorporating the defect and placing the expected incisions within the relaxed skin tension lines where possible.    The area thus outlined was incised deep to adipose tissue with a #15 scalpel blade.  The skin margins were undermined to an appropriate distance in all directions utilizing iris scissors.  The opposing transposition arms were then transposed into place in opposite direction and anchored with interrupted buried subcutaneous sutures. Z Plasty Text: The lesion was extirpated to the level of the fat with a #15 scalpel blade.  Given the location of the defect, shape of the defect and the proximity to free margins a Z-plasty was deemed most appropriate for repair.  Using a sterile surgical marker, the appropriate transposition arms of the Z-plasty were drawn incorporating the defect and placing the expected incisions within the relaxed skin tension lines where possible.    The area thus outlined was incised deep to adipose tissue with a #15 scalpel blade.  The skin margins were undermined to an appropriate distance in all directions utilizing iris scissors.  The opposing transposition arms were then transposed into place in opposite direction and anchored with interrupted buried subcutaneous sutures. Double Z Plasty Text: The lesion was extirpated to the level of the fat with a #15 scalpel blade. Given the location of the defect, shape of the defect and the proximity to free margins a double Z-plasty was deemed most appropriate for repair. Using a sterile surgical marker, the appropriate transposition arms of the double Z-plasty were drawn incorporating the defect and placing the expected incisions within the relaxed skin tension lines where possible. The area thus outlined was incised deep to adipose tissue with a #15 scalpel blade. The skin margins were undermined to an appropriate distance in all directions utilizing iris scissors. The opposing transposition arms were then transposed and carried over into place in opposite direction and anchored with interrupted buried subcutaneous sutures. Zygomaticofacial Flap Text: Given the location of the defect, shape of the defect and the proximity to free margins a zygomaticofacial flap was deemed most appropriate for repair.  Using a sterile surgical marker, the appropriate flap was drawn incorporating the defect and placing the expected incisions within the relaxed skin tension lines where possible. The area thus outlined was incised deep to adipose tissue with a #15 scalpel blade with preservation of a vascular pedicle.  The skin margins were undermined to an appropriate distance in all directions utilizing iris scissors.  The flap was then placed into the defect and anchored with interrupted buried subcutaneous sutures. Cheek Interpolation Flap Text: A decision was made to reconstruct the defect utilizing an interpolation axial flap and a staged reconstruction.  A telfa template was made of the defect.  This telfa template was then used to outline the Cheek Interpolation flap.  The donor area for the pedicle flap was then injected with anesthesia.  The flap was excised through the skin and subcutaneous tissue down to the layer of the underlying musculature.  The interpolation flap was carefully excised within this deep plane to maintain its blood supply.  The edges of the donor site were undermined.   The donor site was closed in a primary fashion.  The pedicle was then rotated into position and sutured.  Once the tube was sutured into place, adequate blood supply was confirmed with blanching and refill.  The pedicle was then wrapped with xeroform gauze and dressed appropriately with a telfa and gauze bandage to ensure continued blood supply and protect the attached pedicle. Cheek-To-Nose Interpolation Flap Text: A decision was made to reconstruct the defect utilizing an interpolation axial flap and a staged reconstruction.  A telfa template was made of the defect.  This telfa template was then used to outline the Cheek-To-Nose Interpolation flap.  The donor area for the pedicle flap was then injected with anesthesia.  The flap was excised through the skin and subcutaneous tissue down to the layer of the underlying musculature.  The interpolation flap was carefully excised within this deep plane to maintain its blood supply.  The edges of the donor site were undermined.   The donor site was closed in a primary fashion.  The pedicle was then rotated into position and sutured.  Once the tube was sutured into place, adequate blood supply was confirmed with blanching and refill.  The pedicle was then wrapped with xeroform gauze and dressed appropriately with a telfa and gauze bandage to ensure continued blood supply and protect the attached pedicle. Interpolation Flap Text: A decision was made to reconstruct the defect utilizing an interpolation axial flap and a staged reconstruction.  A telfa template was made of the defect.  This telfa template was then used to outline the interpolation flap.  The donor area for the pedicle flap was then injected with anesthesia.  The flap was excised through the skin and subcutaneous tissue down to the layer of the underlying musculature.  The interpolation flap was carefully excised within this deep plane to maintain its blood supply.  The edges of the donor site were undermined.   The donor site was closed in a primary fashion.  The pedicle was then rotated into position and sutured.  Once the tube was sutured into place, adequate blood supply was confirmed with blanching and refill.  The pedicle was then wrapped with xeroform gauze and dressed appropriately with a telfa and gauze bandage to ensure continued blood supply and protect the attached pedicle. Melolabial Interpolation Flap Text: A decision was made to reconstruct the defect utilizing an interpolation axial flap and a staged reconstruction.  A telfa template was made of the defect.  This telfa template was then used to outline the melolabial interpolation flap.  The donor area for the pedicle flap was then injected with anesthesia.  The flap was excised through the skin and subcutaneous tissue down to the layer of the underlying musculature.  The pedicle flap was carefully excised within this deep plane to maintain its blood supply.  The edges of the donor site were undermined.   The donor site was closed in a primary fashion.  The pedicle was then rotated into position and sutured.  Once the tube was sutured into place, adequate blood supply was confirmed with blanching and refill.  The pedicle was then wrapped with xeroform gauze and dressed appropriately with a telfa and gauze bandage to ensure continued blood supply and protect the attached pedicle. Mastoid Interpolation Flap Text: A decision was made to reconstruct the defect utilizing an interpolation axial flap and a staged reconstruction.  A telfa template was made of the defect.  This telfa template was then used to outline the mastoid interpolation flap.  The donor area for the pedicle flap was then injected with anesthesia.  The flap was excised through the skin and subcutaneous tissue down to the layer of the underlying musculature.  The pedicle flap was carefully excised within this deep plane to maintain its blood supply.  The edges of the donor site were undermined.   The donor site was closed in a primary fashion.  The pedicle was then rotated into position and sutured.  Once the tube was sutured into place, adequate blood supply was confirmed with blanching and refill.  The pedicle was then wrapped with xeroform gauze and dressed appropriately with a telfa and gauze bandage to ensure continued blood supply and protect the attached pedicle. Posterior Auricular Interpolation Flap Text: A decision was made to reconstruct the defect utilizing an interpolation axial flap and a staged reconstruction.  A telfa template was made of the defect.  This telfa template was then used to outline the posterior auricular interpolation flap.  The donor area for the pedicle flap was then injected with anesthesia.  The flap was excised through the skin and subcutaneous tissue down to the layer of the underlying musculature.  The pedicle flap was carefully excised within this deep plane to maintain its blood supply.  The edges of the donor site were undermined.   The donor site was closed in a primary fashion.  The pedicle was then rotated into position and sutured.  Once the tube was sutured into place, adequate blood supply was confirmed with blanching and refill.  The pedicle was then wrapped with xeroform gauze and dressed appropriately with a telfa and gauze bandage to ensure continued blood supply and protect the attached pedicle. Paramedian Forehead Flap Text: A decision was made to reconstruct the defect utilizing an interpolation axial flap and a staged reconstruction.  A telfa template was made of the defect.  This telfa template was then used to outline the paramedian forehead pedicle flap.  The donor area for the pedicle flap was then injected with anesthesia.  The flap was excised through the skin and subcutaneous tissue down to the layer of the underlying musculature.  The pedicle flap was carefully excised within this deep plane to maintain its blood supply.  The edges of the donor site were undermined.   The donor site was closed in a primary fashion.  The pedicle was then rotated into position and sutured.  Once the tube was sutured into place, adequate blood supply was confirmed with blanching and refill.  The pedicle was then wrapped with xeroform gauze and dressed appropriately with a telfa and gauze bandage to ensure continued blood supply and protect the attached pedicle. Abbe Flap (Upper To Lower Lip) Text: The defect of the lower lip was assessed and measured.  Given the location and size of the defect, an Abbe flap was deemed most appropriate.  Using a sterile surgical marker, an appropriate Abbe flap was measured and drawn on the upper lip. Local anesthesia was then infiltrated.  A scalpel was then used to incise the upper lip through and through the skin, vermilion, muscle and mucosa, leaving the flap pedicled on the opposite side.  The flap was then rotated and transferred to the lower lip defect.  The flap was then sutured into place with a three layer technique, closing the orbicularis oris muscle layer with subcutaneous buried sutures, followed by a mucosal layer and an epidermal layer. Abbe Flap (Lower To Upper Lip) Text: The defect of the upper lip was assessed and measured.  Given the location and size of the defect, an Abbe flap was deemed most appropriate.  Using a sterile surgical marker, an appropriate Abbe flap was measured and drawn on the lower lip. Local anesthesia was then infiltrated. A scalpel was then used to incise the upper lip through and through the skin, vermilion, muscle and mucosa, leaving the flap pedicled on the opposite side.  The flap was then rotated and transferred to the lower lip defect.  The flap was then sutured into place with a three layer technique, closing the orbicularis oris muscle layer with subcutaneous buried sutures, followed by a mucosal layer and an epidermal layer. Estlander Flap (Upper To Lower Lip) Text: The defect of the lower lip was assessed and measured.  Given the location and size of the defect, an Estlander flap was deemed most appropriate.  Using a sterile surgical marker, an appropriate Estlander flap was measured and drawn on the upper lip. Local anesthesia was then infiltrated. A scalpel was then used to incise the lateral aspect of the flap, through skin, muscle and mucosa, leaving the flap pedicled medially.  The flap was then rotated and positioned to fill the lower lip defect.  The flap was then sutured into place with a three layer technique, closing the orbicularis oris muscle layer with subcutaneous buried sutures, followed by a mucosal layer and an epidermal layer. Lip Wedge Excision Repair Text: Given the location of the defect and the proximity to free margins a full thickness wedge repair was deemed most appropriate.  Using a sterile surgical marker, the appropriate repair was drawn incorporating the defect and placing the expected incisions perpendicular to the vermilion border.  The vermilion border was also meticulously outlined to ensure appropriate reapproximation during the repair.  The area thus outlined was incised through and through with a #15 scalpel blade.  The muscularis and dermis were reaproximated with deep sutures following hemostasis. Care was taken to realign the vermilion border before proceeding with the superficial closure.  Once the vermilion was realigned the superfical and mucosal closure was finished. Ftsg Text: The defect edges were debeveled with a #15 scalpel blade.  Given the location of the defect, shape of the defect and the proximity to free margins a full thickness skin graft was deemed most appropriate.  Using a sterile surgical marker, the primary defect shape was transferred to the donor site. The area thus outlined was incised deep to adipose tissue with a #15 scalpel blade.  The harvested graft was then trimmed of adipose tissue until only dermis and epidermis was left.  The skin margins of the secondary defect were undermined to an appropriate distance in all directions utilizing iris scissors.  The secondary defect was closed with interrupted buried subcutaneous sutures.  The skin edges were then re-apposed with running  sutures.  The skin graft was then placed in the primary defect and oriented appropriately. Split-Thickness Skin Graft Text: The defect edges were debeveled with a #15 scalpel blade.  Given the location of the defect, shape of the defect and the proximity to free margins a split thickness skin graft was deemed most appropriate.  Using a sterile surgical marker, the primary defect shape was transferred to the donor site. The split thickness graft was then harvested.  The skin graft was then placed in the primary defect and oriented appropriately. Burow's Graft Text: The defect edges were debeveled with a #15 scalpel blade.  Given the location of the defect, shape of the defect, the proximity to free margins and the presence of a standing cone deformity a Burow's skin graft was deemed most appropriate. The standing cone was removed and this tissue was then trimmed to the shape of the primary defect. The adipose tissue was also removed until only dermis and epidermis were left.  The skin margins of the secondary defect were undermined to an appropriate distance in all directions utilizing iris scissors.  The secondary defect was closed with interrupted buried subcutaneous sutures.  The skin edges were then re-apposed with running  sutures.  The skin graft was then placed in the primary defect and oriented appropriately. Cartilage Graft Text: The defect edges were debeveled with a #15 scalpel blade.  Given the location of the defect, shape of the defect, the fact the defect involved a full thickness cartilage defect a cartilage graft was deemed most appropriate.  An appropriate donor site was identified, cleansed, and anesthetized. The cartilage graft was then harvested and transferred to the recipient site, oriented appropriately and then sutured into place.  The secondary defect was then repaired using a primary closure. Composite Graft Text: The defect edges were debeveled with a #15 scalpel blade.  Given the location of the defect, shape of the defect, the proximity to free margins and the fact the defect was full thickness a composite graft was deemed most appropriate.  The defect was outline and then transferred to the donor site.  A full thickness graft was then excised from the donor site. The graft was then placed in the primary defect, oriented appropriately and then sutured into place.  The secondary defect was then repaired using a primary closure. Epidermal Autograft Text: The defect edges were debeveled with a #15 scalpel blade.  Given the location of the defect, shape of the defect and the proximity to free margins an epidermal autograft was deemed most appropriate.  Using a sterile surgical marker, the primary defect shape was transferred to the donor site. The epidermal graft was then harvested.  The skin graft was then placed in the primary defect and oriented appropriately. Dermal Autograft Text: The defect edges were debeveled with a #15 scalpel blade.  Given the location of the defect, shape of the defect and the proximity to free margins a dermal autograft was deemed most appropriate.  Using a sterile surgical marker, the primary defect shape was transferred to the donor site. The area thus outlined was incised deep to adipose tissue with a #15 scalpel blade.  The harvested graft was then trimmed of adipose and epidermal tissue until only dermis was left.  The skin graft was then placed in the primary defect and oriented appropriately. Skin Substitute Text: The defect edges were debeveled with a #15 scalpel blade.  Given the location of the defect, shape of the defect and the proximity to free margins a skin substitute graft was deemed most appropriate.  The graft material was trimmed to fit the size of the defect. The graft was then placed in the primary defect and oriented appropriately. Tissue Cultured Epidermal Autograft Text: The defect edges were debeveled with a #15 scalpel blade.  Given the location of the defect, shape of the defect and the proximity to free margins a tissue cultured epidermal autograft was deemed most appropriate.  The graft was then trimmed to fit the size of the defect.  The graft was then placed in the primary defect and oriented appropriately. Xenograft Text: The defect edges were debeveled with a #15 scalpel blade.  Given the location of the defect, shape of the defect and the proximity to free margins a xenograft was deemed most appropriate.  The graft was then trimmed to fit the size of the defect.  The graft was then placed in the primary defect and oriented appropriately. Purse String (Intermediate) Text: Given the location of the defect and the characteristics of the surrounding skin a purse string intermediate closure was deemed most appropriate.  Undermining was performed circumferentially around the surgical defect.  A purse string suture was then placed and tightened. Purse String (Simple) Text: Given the location of the defect and the characteristics of the surrounding skin a purse string simple closure was deemed most appropriate.  Undermining was performed circumferentially around the surgical defect.  A purse string suture was then placed and tightened. Partial Purse String (Intermediate) Text: Given the location of the defect and the characteristics of the surrounding skin an intermediate purse string closure was deemed most appropriate.  Undermining was performed circumferentially around the surgical defect.  A purse string suture was then placed and tightened. Wound tension of the circular defect prevented complete closure of the wound. Partial Purse String (Simple) Text: Given the location of the defect and the characteristics of the surrounding skin a simple purse string closure was deemed most appropriate.  Undermining was performed circumferentially around the surgical defect.  A purse string suture was then placed and tightened. Wound tension of the circular defect prevented complete closure of the wound. Complex Repair And Single Advancement Flap Text: The defect edges were debeveled with a #15 scalpel blade.  The primary defect was closed partially with a complex linear closure.  Given the location of the remaining defect, shape of the defect and the proximity to free margins a single advancement flap was deemed most appropriate for complete closure of the defect.  Using a sterile surgical marker, an appropriate advancement flap was drawn incorporating the defect and placing the expected incisions within the relaxed skin tension lines where possible.    The area thus outlined was incised deep to adipose tissue with a #15 scalpel blade.  The skin margins were undermined to an appropriate distance in all directions utilizing iris scissors. Complex Repair And Double Advancement Flap Text: The defect edges were debeveled with a #15 scalpel blade.  The primary defect was closed partially with a complex linear closure.  Given the location of the remaining defect, shape of the defect and the proximity to free margins a double advancement flap was deemed most appropriate for complete closure of the defect.  Using a sterile surgical marker, an appropriate advancement flap was drawn incorporating the defect and placing the expected incisions within the relaxed skin tension lines where possible.    The area thus outlined was incised deep to adipose tissue with a #15 scalpel blade.  The skin margins were undermined to an appropriate distance in all directions utilizing iris scissors. Complex Repair And Modified Advancement Flap Text: The defect edges were debeveled with a #15 scalpel blade.  The primary defect was closed partially with a complex linear closure.  Given the location of the remaining defect, shape of the defect and the proximity to free margins a modified advancement flap was deemed most appropriate for complete closure of the defect.  Using a sterile surgical marker, an appropriate advancement flap was drawn incorporating the defect and placing the expected incisions within the relaxed skin tension lines where possible.    The area thus outlined was incised deep to adipose tissue with a #15 scalpel blade.  The skin margins were undermined to an appropriate distance in all directions utilizing iris scissors. Complex Repair And A-T Advancement Flap Text: The defect edges were debeveled with a #15 scalpel blade.  The primary defect was closed partially with a complex linear closure.  Given the location of the remaining defect, shape of the defect and the proximity to free margins an A-T advancement flap was deemed most appropriate for complete closure of the defect.  Using a sterile surgical marker, an appropriate advancement flap was drawn incorporating the defect and placing the expected incisions within the relaxed skin tension lines where possible.    The area thus outlined was incised deep to adipose tissue with a #15 scalpel blade.  The skin margins were undermined to an appropriate distance in all directions utilizing iris scissors. Complex Repair And O-T Advancement Flap Text: The defect edges were debeveled with a #15 scalpel blade.  The primary defect was closed partially with a complex linear closure.  Given the location of the remaining defect, shape of the defect and the proximity to free margins an O-T advancement flap was deemed most appropriate for complete closure of the defect.  Using a sterile surgical marker, an appropriate advancement flap was drawn incorporating the defect and placing the expected incisions within the relaxed skin tension lines where possible.    The area thus outlined was incised deep to adipose tissue with a #15 scalpel blade.  The skin margins were undermined to an appropriate distance in all directions utilizing iris scissors. Complex Repair And O-L Flap Text: The defect edges were debeveled with a #15 scalpel blade.  The primary defect was closed partially with a complex linear closure.  Given the location of the remaining defect, shape of the defect and the proximity to free margins an O-L flap was deemed most appropriate for complete closure of the defect.  Using a sterile surgical marker, an appropriate flap was drawn incorporating the defect and placing the expected incisions within the relaxed skin tension lines where possible.    The area thus outlined was incised deep to adipose tissue with a #15 scalpel blade.  The skin margins were undermined to an appropriate distance in all directions utilizing iris scissors. Complex Repair And Bilobe Flap Text: The defect edges were debeveled with a #15 scalpel blade.  The primary defect was closed partially with a complex linear closure.  Given the location of the remaining defect, shape of the defect and the proximity to free margins a bilobe flap was deemed most appropriate for complete closure of the defect.  Using a sterile surgical marker, an appropriate advancement flap was drawn incorporating the defect and placing the expected incisions within the relaxed skin tension lines where possible.    The area thus outlined was incised deep to adipose tissue with a #15 scalpel blade.  The skin margins were undermined to an appropriate distance in all directions utilizing iris scissors. Complex Repair And Melolabial Flap Text: The defect edges were debeveled with a #15 scalpel blade.  The primary defect was closed partially with a complex linear closure.  Given the location of the remaining defect, shape of the defect and the proximity to free margins a melolabial flap was deemed most appropriate for complete closure of the defect.  Using a sterile surgical marker, an appropriate advancement flap was drawn incorporating the defect and placing the expected incisions within the relaxed skin tension lines where possible.    The area thus outlined was incised deep to adipose tissue with a #15 scalpel blade.  The skin margins were undermined to an appropriate distance in all directions utilizing iris scissors. Complex Repair And Rotation Flap Text: The defect edges were debeveled with a #15 scalpel blade.  The primary defect was closed partially with a complex linear closure.  Given the location of the remaining defect, shape of the defect and the proximity to free margins a rotation flap was deemed most appropriate for complete closure of the defect.  Using a sterile surgical marker, an appropriate advancement flap was drawn incorporating the defect and placing the expected incisions within the relaxed skin tension lines where possible.    The area thus outlined was incised deep to adipose tissue with a #15 scalpel blade.  The skin margins were undermined to an appropriate distance in all directions utilizing iris scissors. Complex Repair And Rhombic Flap Text: The defect edges were debeveled with a #15 scalpel blade.  The primary defect was closed partially with a complex linear closure.  Given the location of the remaining defect, shape of the defect and the proximity to free margins a rhombic flap was deemed most appropriate for complete closure of the defect.  Using a sterile surgical marker, an appropriate advancement flap was drawn incorporating the defect and placing the expected incisions within the relaxed skin tension lines where possible.    The area thus outlined was incised deep to adipose tissue with a #15 scalpel blade.  The skin margins were undermined to an appropriate distance in all directions utilizing iris scissors. Complex Repair And Transposition Flap Text: The defect edges were debeveled with a #15 scalpel blade.  The primary defect was closed partially with a complex linear closure.  Given the location of the remaining defect, shape of the defect and the proximity to free margins a transposition flap was deemed most appropriate for complete closure of the defect.  Using a sterile surgical marker, an appropriate advancement flap was drawn incorporating the defect and placing the expected incisions within the relaxed skin tension lines where possible.    The area thus outlined was incised deep to adipose tissue with a #15 scalpel blade.  The skin margins were undermined to an appropriate distance in all directions utilizing iris scissors. Complex Repair And V-Y Plasty Text: The defect edges were debeveled with a #15 scalpel blade.  The primary defect was closed partially with a complex linear closure.  Given the location of the remaining defect, shape of the defect and the proximity to free margins a V-Y plasty was deemed most appropriate for complete closure of the defect.  Using a sterile surgical marker, an appropriate advancement flap was drawn incorporating the defect and placing the expected incisions within the relaxed skin tension lines where possible.    The area thus outlined was incised deep to adipose tissue with a #15 scalpel blade.  The skin margins were undermined to an appropriate distance in all directions utilizing iris scissors. Complex Repair And M Plasty Text: The defect edges were debeveled with a #15 scalpel blade.  The primary defect was closed partially with a complex linear closure.  Given the location of the remaining defect, shape of the defect and the proximity to free margins an M plasty was deemed most appropriate for complete closure of the defect.  Using a sterile surgical marker, an appropriate advancement flap was drawn incorporating the defect and placing the expected incisions within the relaxed skin tension lines where possible.    The area thus outlined was incised deep to adipose tissue with a #15 scalpel blade.  The skin margins were undermined to an appropriate distance in all directions utilizing iris scissors. Complex Repair And Double M Plasty Text: The defect edges were debeveled with a #15 scalpel blade.  The primary defect was closed partially with a complex linear closure.  Given the location of the remaining defect, shape of the defect and the proximity to free margins a double M plasty was deemed most appropriate for complete closure of the defect.  Using a sterile surgical marker, an appropriate advancement flap was drawn incorporating the defect and placing the expected incisions within the relaxed skin tension lines where possible.    The area thus outlined was incised deep to adipose tissue with a #15 scalpel blade.  The skin margins were undermined to an appropriate distance in all directions utilizing iris scissors. Complex Repair And W Plasty Text: The defect edges were debeveled with a #15 scalpel blade.  The primary defect was closed partially with a complex linear closure.  Given the location of the remaining defect, shape of the defect and the proximity to free margins a W plasty was deemed most appropriate for complete closure of the defect.  Using a sterile surgical marker, an appropriate advancement flap was drawn incorporating the defect and placing the expected incisions within the relaxed skin tension lines where possible.    The area thus outlined was incised deep to adipose tissue with a #15 scalpel blade.  The skin margins were undermined to an appropriate distance in all directions utilizing iris scissors. Complex Repair And Z Plasty Text: The defect edges were debeveled with a #15 scalpel blade.  The primary defect was closed partially with a complex linear closure.  Given the location of the remaining defect, shape of the defect and the proximity to free margins a Z plasty was deemed most appropriate for complete closure of the defect.  Using a sterile surgical marker, an appropriate advancement flap was drawn incorporating the defect and placing the expected incisions within the relaxed skin tension lines where possible.    The area thus outlined was incised deep to adipose tissue with a #15 scalpel blade.  The skin margins were undermined to an appropriate distance in all directions utilizing iris scissors. Complex Repair And Dorsal Nasal Flap Text: The defect edges were debeveled with a #15 scalpel blade.  The primary defect was closed partially with a complex linear closure.  Given the location of the remaining defect, shape of the defect and the proximity to free margins a dorsal nasal flap was deemed most appropriate for complete closure of the defect.  Using a sterile surgical marker, an appropriate flap was drawn incorporating the defect and placing the expected incisions within the relaxed skin tension lines where possible.    The area thus outlined was incised deep to adipose tissue with a #15 scalpel blade.  The skin margins were undermined to an appropriate distance in all directions utilizing iris scissors. Complex Repair And Ftsg Text: The defect edges were debeveled with a #15 scalpel blade.  The primary defect was closed partially with a complex linear closure.  Given the location of the defect, shape of the defect and the proximity to free margins a full thickness skin graft was deemed most appropriate to repair the remaining defect.  The graft was trimmed to fit the size of the remaining defect.  The graft was then placed in the primary defect, oriented appropriately, and sutured into place. Complex Repair And Burow's Graft Text: The defect edges were debeveled with a #15 scalpel blade.  The primary defect was closed partially with a complex linear closure.  Given the location of the defect, shape of the defect, the proximity to free margins and the presence of a standing cone deformity a Burow's graft was deemed most appropriate to repair the remaining defect.  The graft was trimmed to fit the size of the remaining defect.  The graft was then placed in the primary defect, oriented appropriately, and sutured into place. Complex Repair And Split-Thickness Skin Graft Text: The defect edges were debeveled with a #15 scalpel blade.  The primary defect was closed partially with a complex linear closure.  Given the location of the defect, shape of the defect and the proximity to free margins a split thickness skin graft was deemed most appropriate to repair the remaining defect.  The graft was trimmed to fit the size of the remaining defect.  The graft was then placed in the primary defect, oriented appropriately, and sutured into place. Complex Repair And Epidermal Autograft Text: The defect edges were debeveled with a #15 scalpel blade.  The primary defect was closed partially with a complex linear closure.  Given the location of the defect, shape of the defect and the proximity to free margins an epidermal autograft was deemed most appropriate to repair the remaining defect.  The graft was trimmed to fit the size of the remaining defect.  The graft was then placed in the primary defect, oriented appropriately, and sutured into place. Complex Repair And Dermal Autograft Text: The defect edges were debeveled with a #15 scalpel blade.  The primary defect was closed partially with a complex linear closure.  Given the location of the defect, shape of the defect and the proximity to free margins an dermal autograft was deemed most appropriate to repair the remaining defect.  The graft was trimmed to fit the size of the remaining defect.  The graft was then placed in the primary defect, oriented appropriately, and sutured into place. Complex Repair And Tissue Cultured Epidermal Autograft Text: The defect edges were debeveled with a #15 scalpel blade.  The primary defect was closed partially with a complex linear closure.  Given the location of the defect, shape of the defect and the proximity to free margins an tissue cultured epidermal autograft was deemed most appropriate to repair the remaining defect.  The graft was trimmed to fit the size of the remaining defect.  The graft was then placed in the primary defect, oriented appropriately, and sutured into place. Complex Repair And Xenograft Text: The defect edges were debeveled with a #15 scalpel blade.  The primary defect was closed partially with a complex linear closure.  Given the location of the defect, shape of the defect and the proximity to free margins a xenograft was deemed most appropriate to repair the remaining defect.  The graft was trimmed to fit the size of the remaining defect.  The graft was then placed in the primary defect, oriented appropriately, and sutured into place. Complex Repair And Skin Substitute Graft Text: The defect edges were debeveled with a #15 scalpel blade.  The primary defect was closed partially with a complex linear closure.  Given the location of the remaining defect, shape of the defect and the proximity to free margins a skin substitute graft was deemed most appropriate to repair the remaining defect.  The graft was trimmed to fit the size of the remaining defect.  The graft was then placed in the primary defect, oriented appropriately, and sutured into place. Path Notes (To The Dermatopathologist): Please check margins. Consent was obtained from the patient. The risks and benefits to therapy were discussed in detail. Specifically, the risks of infection, scarring, bleeding, prolonged wound healing, incomplete removal, allergy to anesthesia, nerve injury and recurrence were addressed. Prior to the procedure, the treatment site was clearly identified and confirmed by the patient. All components of Universal Protocol/PAUSE Rule completed. Render Post-Care Instructions In Note?: yes Post-Care Instructions: I reviewed with the patient in detail post-care instructions. Patient is not to engage in any heavy lifting, exercise, or swimming for the next 14 days. Should the patient develop any fevers, chills, bleeding, severe pain patient will contact the office immediately. Home Suture Removal Text: Patient was provided a home suture removal kit and will remove their sutures at home.  If they have any questions or difficulties they will call the office. Where Do You Want The Question To Include Opioid Counseling Located?: Case Summary Tab Information: Selecting Yes will display possible errors in your note based on the variables you have selected. This validation is only offered as a suggestion for you. PLEASE NOTE THAT THE VALIDATION TEXT WILL BE REMOVED WHEN YOU FINALIZE YOUR NOTE. IF YOU WANT TO FAX A PRELIMINARY NOTE YOU WILL NEED TO TOGGLE THIS TO 'NO' IF YOU DO NOT WANT IT IN YOUR FAXED NOTE.

## 2023-05-16 ENCOUNTER — APPOINTMENT (OUTPATIENT)
Dept: PHYSICAL MEDICINE AND REHAB | Facility: CLINIC | Age: 58
End: 2023-05-16
Payer: COMMERCIAL

## 2023-05-16 VITALS
WEIGHT: 160 LBS | SYSTOLIC BLOOD PRESSURE: 114 MMHG | HEIGHT: 62 IN | DIASTOLIC BLOOD PRESSURE: 78 MMHG | HEART RATE: 74 BPM | RESPIRATION RATE: 18 BRPM | BODY MASS INDEX: 29.44 KG/M2

## 2023-05-16 VITALS
HEIGHT: 62 IN | BODY MASS INDEX: 29.44 KG/M2 | DIASTOLIC BLOOD PRESSURE: 74 MMHG | WEIGHT: 160 LBS | RESPIRATION RATE: 18 BRPM | SYSTOLIC BLOOD PRESSURE: 119 MMHG | HEART RATE: 72 BPM

## 2023-05-16 DIAGNOSIS — R27.8 OTHER LACK OF COORDINATION: ICD-10-CM

## 2023-05-16 DIAGNOSIS — R52 PAIN, UNSPECIFIED: ICD-10-CM

## 2023-05-16 PROCEDURE — 99204 OFFICE O/P NEW MOD 45 MIN: CPT

## 2023-05-16 NOTE — DATA REVIEWED
[MRI] : MRI [FreeTextEntry1] : she has MR report  2 weeks ago on her phone as well as images -all checked \par c5-6 mild mod canal stenosis \par spondylosis and facet arthropathy high grade canal narrowing

## 2023-05-16 NOTE — HISTORY OF PRESENT ILLNESS
[FreeTextEntry1] : Ms. JIM MONTGOMERY is a very pleasant 57 year RHD female  retired from [employer]   with recent PMH breast cancer who seen for evaluation of left hand numbness as well as shoulder numbness /pain  night pain and loss of dexterity hard to pick things up   that has been ongoing for a month   without any specific injury or inciting event. The pain is located primarily finger tips  intermittent in nature and described as shooting . The pain is rated as 8/10 during today's visit, and ranges from 4-9/10. The patient's symptoms are aggravated by lying down/ wakes her up   and alleviated by rest meds . The patient denies any other night pain, RH numbness/tingling, R UE weakness, or bowel/bladder dysfunction. The patient has  other complaints at this time of chronic neck pain has DDD and some stenosis facet arthropathy \par also had R cuff surgery  recently \par she is type 2 diabetic  .\par has polyarthropathy knees hip \par has had PT c spine no help \par has been on tamoxifen and had vadim mastectomies \par

## 2023-05-16 NOTE — PHYSICAL EXAM
[FreeTextEntry1] : PHYSICAL EXAM : OBJECTIVE \par \par GENERAL : Awake ,alert and oriented to time place and person \par HEAD : normocephalic and atraumatic \par NECK : supple ,no tracheal deviation ,no thyroid enlargement noted with swallowing\par EYES : sclera and conjunctiva normal no redness,intact extraocular movements \par ENT  : ears and nose normal in appearance -hearing adequate \par PULMONARY: effort normal. No respiratory distress. breathing regular. No wheezes \par LYMPH : No swelling in limbs, capillary return within normal range \par CVS : warm extremities,no palpitations,not short of breath, no visible jugular venous distention\par PSYCH : mood and affect normal ,good eye contact ,normal attention \par ABDOMEN : no visible distension , \par NEUROLOGICAL:cranial nerves intact,muscle tone normal,gait and balance safe except where noted below \par SKIN : warm and dry No rash detected over specific body areas examined \par MUSCULOSKELETAL: normal muscle bulk, no focal bony tenderness /posture normal except where specified below\par + tinels left wrist \par \par grasp 5/5 \par no numbness on light touch \par No long tract signs found on clinical exam and no focal neurological deficits\par c spine ROM intact \par RUE WNR\par gait NL

## 2023-05-16 NOTE — REASON FOR VISIT
[Initial Evaluation] : an initial evaluation [FreeTextEntry1] : ref by Chantel Rodriguez NP Rheumatology

## 2023-05-16 NOTE — REVIEW OF SYSTEMS
[Fever] : no fever [Lower Ext Edema] : no lower extremity edema [Joint Pain] : joint pain [Joint Stiffness] : joint stiffness [Muscle Weakness] : no muscle weakness [Difficulty Walking] : no difficulty walking [Negative] : Heme/Lymph

## 2023-05-16 NOTE — CONSULT LETTER
[FreeTextEntry1] : Dear Dr. CAROLINA MACIAS  , Chantel Grissom NP \par \par I had the pleasure of evaluating your patient, JIM MONTGOMERY .\par \par Thank you very much for allowing me to participate in the care of this patient. If you have any questions, please do not hesitate to contact me. \par \par Sincerely, \par Gurmeet Banerjee MD \par \par ABPMR Board Certified in Physical Medicine and Rehabilitation\par Certified Fellow of AANEM (Neuromuscular and Electrodiagnostic Medicine)\par Subspecialty certified in Sports Medicine (ABPMR)\par \par  of Physical Medicine and Rehabilitation\par [facility] [employer] [facility]\par [facility]\par \par

## 2023-05-16 NOTE — ASSESSMENT
[FreeTextEntry1] : \par PLAN AND RECOMMENDATIONS :\par \par We discussed differential diagnosis and clinical impression\par her symptoms appear like carpal tunnel even tho she is RHD \par has cervical path to explain \par some of her pain seems C5 left upper arm \par \par agree with EMG rule out CTS vs cervical radic \par Recommend\par .symptomatic care and support\par  medications NSAIDS as needed -  OTC fine (-personal preference )-(once or twice a day), -warned of  possible GI side effects -advised to take with meals or add over the counter Nexium, if sensitive\par \par  imaging done\par  \par  hydrotherapy /heat / cold for pain\par  continue   ergonomic precautions including pacing ,posture and frequent breaks while typing.\par cont  spinal precautions including care with bending, lifting, twisting and  carrying.\par \par  relative rest and avoidance of painful activity where possible \par  increasing activity as discussed \par  return for EMG \par Information given to patient about EMG and Nerve Conduction Study Examination including  planning, differential diagnosis to rule in /rule out ,duration of the test ,precautions (if patient on blood thinner.has bleeding disorder or  pace maker device etc -still possible to undergo with care), side effects(benign-limited to short term bruising and discomfort/pain)  \par The protocol of temp checks upon arrival ,disinfection procedure of waiting room and the lab explained- reassured. \par All questions answered. \par Patient instructed to book appointment upon conclusion of appointment\par \par Information sheet ' Answers to your Questions on EMG " forwarded to patient to read prior to testing, with further information about training,background and the procedure itself .\par The patient had the opportunity to ask questions and all were answered to their satisfaction. We will coordinate treatment with the other members of the treatment team.\par The patient verbalized understanding of the management/plan rationale and agreed with my recommendations.\par \par

## 2023-05-18 ENCOUNTER — APPOINTMENT (OUTPATIENT)
Dept: INTERNAL MEDICINE | Facility: CLINIC | Age: 58
End: 2023-05-18
Payer: COMMERCIAL

## 2023-05-18 VITALS
WEIGHT: 161 LBS | HEART RATE: 69 BPM | DIASTOLIC BLOOD PRESSURE: 70 MMHG | OXYGEN SATURATION: 99 % | HEIGHT: 62 IN | TEMPERATURE: 97.5 F | BODY MASS INDEX: 29.63 KG/M2 | SYSTOLIC BLOOD PRESSURE: 105 MMHG

## 2023-05-18 DIAGNOSIS — M48.02 SPINAL STENOSIS, CERVICAL REGION: ICD-10-CM

## 2023-05-18 DIAGNOSIS — M47.812 SPONDYLOSIS W/OUT MYELOPATHY OR RADICULOPATHY, CERVICAL REGION: ICD-10-CM

## 2023-05-18 PROCEDURE — 99214 OFFICE O/P EST MOD 30 MIN: CPT

## 2023-05-18 RX ORDER — DICLOFENAC SODIUM 1% 10 MG/G
1 GEL TOPICAL DAILY
Qty: 1 | Refills: 7 | Status: COMPLETED | COMMUNITY
Start: 2022-08-09 | End: 2023-05-18

## 2023-05-18 RX ORDER — FAMOTIDINE 20 MG/1
20 TABLET, FILM COATED ORAL
Qty: 180 | Refills: 3 | Status: ACTIVE | COMMUNITY
Start: 2023-05-18 | End: 1900-01-01

## 2023-05-18 NOTE — HISTORY OF PRESENT ILLNESS
[FreeTextEntry1] : fu  numbness of arm  left  [de-identified] : Pt has been having pain in her cervical spine  and has  seen a pain management  doctor in [facility]  and had mri of cervical spine  and  evaluation for her jaw.  She also saw Rheumatologist and  was referred to  physiatrist  and emg ordered.     mri   normal  alignment  hemangioma in C6 mild facet arthropathy  normal vertebral height    degenerative  desiccation loss of height  in c567  cord normal  paravertebral soft tissue normal    c23 34 normal  c45 disc bulge  mild canal stenosis mild left foraminal narrowing C56  disc osteophyte complex  mild to mod  canal stenosis with minimal indentation of ventral cord   severe foraminal narrowing   no signal abn  C67  disc osteophyte complex  mild to moderate canal stenosis with minimal indentation  of ventral cord   no cord abn  moderated foraminal narrowing C7 T1  disc bulge  uncovertebral hypertrophy   moderate  foraminal narrowing  no canal stenosis   T12 small central protrusion type herniation    uncovertebral hypertrophy  minimal canal stenosis mild foraminal narrowing    She  doesn’t want surgery and wants pt.    She has weakness in her hands and numbness in her  left arm     Her asthma is well controlled

## 2023-05-18 NOTE — PHYSICAL EXAM
[Normal Sclera/Conjunctiva] : normal sclera/conjunctiva [PERRL] : pupils equal round and reactive to light [Normal Oropharynx] : the oropharynx was normal [No JVD] : no jugular venous distention [Supple] : supple [Normal Rate] : normal rate  [Regular Rhythm] : with a regular rhythm [Normal Posterior Cervical Nodes] : no posterior cervical lymphadenopathy [Normal Anterior Cervical Nodes] : no anterior cervical lymphadenopathy [No CVA Tenderness] : no CVA  tenderness [No Spinal Tenderness] : no spinal tenderness [Normal Station and Gait] : the gait and station were normal [Normal] : motor strength was normal in all muscle groups [Normal Motor Tone] : the muscle tone was normal [Involuntary Movements] : no involuntary movements were seen

## 2023-05-18 NOTE — ASSESSMENT
[FreeTextEntry1] : 1  cervical spine disease she is to have emg   and nerve conduction and  should start pt    \par 2 dm ---The following has been discussed:---\par -Targets for weight and HgA1c have been discussed with patient \par -FS goals have been reviewed with the patient in detail:\par AM <130 post meal<160-180\par -Diet and weight goals have been discussed with the patient in detail.\par -The importance of exercise in the treatment of diabetes has been discussed \par with the patient in detail.\par -Extensive dietary advice provided to patient and the need to avoid concentrated \par sweets/simple carbohydrates and to ensure to consume protein with each meal. \par -Patient instructed to limit carbohydrates to 60 gms per meal and 15 gms per \par snacks. \par -Patient to keep a blood sugar log to check fasting and before meals\par -Patient instructed on importance of daily feet inspection and to reports any \par open lesions to physician promptly\par \par very well controlled hgb A1c 5.7 will take one a day  of metformin and will repat  next visist .\par 3hld  to lower  LDL and non HDL  cholesterol levels     1 limit your intake of foods full of saturated fats  , trans fats, and dietary cholesterol .  Food with a lot of saturated fate include butter, fatty flesh like red meat, full fat and low fat dairy  products  , palm oil and coconut oil .   If you see partially hydrogenated fat in the ingredient  list of food label that food has trans fats.  Top sources of dietary chol include egg yolks , organ meats and shell fish.  one Type of fat omega 3 Fatty acids has been shown to protect against heart disease  . Good sources are cold water fish like salmon, mackerel , halibut ., trout  herring and sardines.     Limit  your intake of meat , poultry and fish to no more than 3.5  ounces per day     Eat a lot more fiber rich foods  like beans , oats, barley fruits and vegetables   .  Food naturally rich in soluble fiber  are good at lowering cholesterol .    Excellent choices  are  oats , oat bran , barley , peas , yams sweet potatoes and legumes  or beans .  Good fruit sources are berries passion fruit, oranges pears,, apricots , apples  and nectarines  .    choose protein rich plant foods   such as legumes or beans nuts and seeds over meat.     Lose as much weight as possible and exercise   Take plant sterol supplements   .A Daily intake  of 1-2 gms  of plant sterols  lowers ldl .    Best choice is supplements  such as Cholestoff  by natures made   because they don’t have calories  sugar trans fats   an salt  of many foods enriched with plant sterols.    Take  Metamucil or psyllium   .   Studies have shown 9-10 gms as daily of psyllium   the equivalent of  about  3 teaspoons  of sugar free Metamucil   reduced LDL levels  .\par ldl  101 \par 4  ppi   Long-term PPI use has been associated with several safety concerns. However, few of these concerns are supported by consistent data demonstrating a causal relationship. (See "Physiology of gastrin", section on 'Hypergastrinemia'.)\par \par Gastrointestinal effects\par \par Clostridioides difficile and other enteric infections — PPI use has been associated with an increased risk of C. difficile infection, even in the absence of antibiotic use  Associations with other enteric infections, including salmonellosis and campylobacteriosis, have also been reported  However, the pathophysiologic mechanism involved in the increased risk of infection is unclear.\par \par A 2017 meta-analysis of 50 observational studies found that PPI use was significantly associated with an increased risk of C. difficile infection (relative risk [RR] 1.3; 95% CI 1.1-14). The risk of C. difficile infection appears to be greater with PPIs as compared to H2 receptor antagonists \par \par PPI use has also been associated with an increased risk of recurrent C. difficile infection In a 2017 meta-analysis of 16 observational studies that included 7703 patients with C. difficile infection of whom 1525 (20 percent) had recurrent C. difficile infection, gastric acid suppression was significantly associated with an increased risk of recurrent C. difficile infection (odds ratio [OR] 1.5; 95% CI 1.2-1.9) . There was significant heterogeneity among the studies included in the meta-analysis. In adjusted analysis using data from nine studies, PPI use was associated with an increased risk of recurrent C. difficile infection after controlling for patient age and other co-morbid conditions (OR 1.4; 95% CI 1.1-1.8). (See "Clostridioides difficile infection in adults: Epidemiology, microbiology, and pathophysiology", section on 'Gastric acid suppression'.)\par \par Microscopic colitis — PPI use has been associated with microscopic colitis, including lymphocytic and collagenous colitis. In a case-control study that included 95 cases of microscopic colitis, exposure to PPIs was significantly higher in patients with microscopic colitis as compared with controls (38 versus 13 percent, OR 4.5, 95% CI 2.0-9.5) [41]. Similar results have been reported in other case-control studies, however, it is unclear if this association varies by PPI and if there is a dose-response relationship in either dose or duration of use  (See "Microscopic (lymphocytic and collagenous) colitis: Clinical manifestations, diagnosis, and management", section on 'Medications'.)\par \par Hypergastrinemia — Induction of hypergastrinemia has been associated with gastric carcinoid tumors in rats. However, these observations are not generalizable to species with gastrin physiology more analogous to humans . While patients treated with omeprazole for up to 11 years have shown some enterochromaffin-like cell hyperplasia, no dysplasia or neoplastic changes have been observed . An increased risk of colon cancer due to hypergastrinemia has also not been established . (See "Physiology of gastrin".)\par \par Atrophic gastritis — Patients on long-term PPI therapy have a propensity to develop chronic atrophic gastritis. However, the risk of atrophic gastritis is small, and in the rare patient who develops atrophic gastritis, the clinical consequences are uncertain \par \par Intestinal colonization of multi-drug resistant organisms — PPIs may increase the risk of intestinal colonization with multi-drug resistant organisms. In a meta-analysis of 12 observational studies that included 22,305 patients, after adjusting for potential confounders, acid suppression increased the odds of intestinal carriage of multi-drug resistant organisms of the Enterobacterales order (producing extended-spectrum beta-lactamases, carbapenemases, or plasmid-mediated AmpC beta-lactamases) and of vancomycin-resistant enterococci (OR 1.74; 95% CI 1.4-2.2) Possible mechanisms include an increase in bacteria that survive transit from the stomach to the intestine due to reduction in gastric acid by PPIs and direct alteration of the composition of intestinal microbiota, leading to a decrease in mean species diversity.  \par \par Inflammatory bowel disease — In a study that pooled data from three observational cohorts and included >600,000 individuals followed for a median of 12 years, the risk of inflammatory bowel disease (IBD) was increased in regular PPI users as compared with nonusers (hazard ratio [HR] 1.42; 95% CI 1.22–1.65) [50]. However, the absolute risk of IBD was low, with a number needed to harm of 3770. In addition, absence of data on PPI dosing precluded assessment of a dose-response relationship between PPI use and IBD.\par \par Malabsorption of minerals and vitamins\par \par Magnesium malabsorption — PPIs can cause hypomagnesemia due to reduced intestinal absorption . A meta-analysis of nine observational studies that included a total of 109,798 patients found that those who took a PPI had a significantly higher risk (RR 1.43; 95% CI 1.08-1.88) of developing hypomagnesemia as compared with those who did not . Clinical manifestations of hypomagnesemia include neuromuscular excitability (eg, tremor, tetany, convulsions), weakness, and apathy. Severe PPI-induced hypomagnesemia has been associated with QT interval prolongation and torsades de pointes [ The risk of hypomagnesemia appears to be mainly in patients who have been on PPIs long-term (generally longer than one year) but cases have been reported within one year of starting PPI therapy [53,55]. This potential risk has led to recommendations to monitor serum magnesium levels in specific patients at high risk for hypomagnesemia. Monitoring for hypomagnesemia in patients on PPIs is discussed in detail separately. (See 'Laboratory testing' above and "Hypomagnesemia: Clinical manifestations of magnesium depletion", section on 'Overview of clinical manifestations'.)\par \par Calcium and fracture risk — Although hypochlorhydria could theoretically reduce calcium absorption, the effect appears to be relevant only for the absorption of water insoluble calcium (eg, calcium carbonate) and can be overcome by ingestion of a slightly acidic meal  The absorption of water soluble calcium salts or calcium in dairy products are not impacted by PPI-induced hypochlorhydria. When calcium supplementation is necessary in patients taking PPIs, we use calcium supplements that do not require acid for absorption, such as calcium citrate. (See 'Laboratory testing' above and "Drugs that affect bone metabolism", section on 'Proton pump inhibitors'.)\par \par PPI-induced hypochlorhydria can augment osteoclastic activity, thereby decreasing bone density . Although an association between PPI use and bone fracture is plausible, causality has not been established]. Nonetheless, the FDA has mandated revised safety information on all PPIs about a possible increased risk of fractures of the hip, wrist, and spine with the use of these medications  The association between PPIs and bone metabolism and risk of fracture are discussed in detail separately. (See "Drugs that affect bone metabolism", section on 'Proton pump inhibitors'.)\par \par Vitamin B12 malabsorption — Long-term therapy with PPIs has been associated with vitamin B12 malabsorption  However, absorption of oral B12 supplements is not affected. (See 'Laboratory testing' above and "Treatment of vitamin B12 and folate deficiencies", section on 'Treatment of vitamin B12 deficiency'.)\par \par Iron malabsorption — Gastric acid plays a role in the absorption of nonheme iron, and the use of PPIs has been associated with decreased iron absorption [63-67]. However, in most cases the decreased absorption does not appear to be of clinical significance. One exception may be in patients who require oral iron supplementation [66,68]. Such patients may need a higher dose or longer duration of supplementation .\par \par 5  fatty liver  Fatty Liver: The patient denies any jaundice or pruritus. The patient denies any alcohol use. The patient denies taking large doses of nonsteroidal anti-inflammatory drugs or acetaminophen. The findings are suggestive of fatty liver. The patient and I had a long discussion regarding the risks of fatty liver progressing to cirrhosis. The patient was told of the possible increased risk of developing liver failure, cirrhosis, ascites, GI bleeding secondary to varices, hepatic encephalopathy, bleeding tendencies and liver cancer. The patient was told of the importance of follow-up. The patient was advised to follow up every 6 months for blood work and imaging studies. The patient agreed and will follow up. The patient was advised to lose weight. I recommend a trial of vitamin E supplementation for the fatty liver. If the liver enzymes remain elevated, the patient may require a trial of Pioglitazone for the fatty liver. I recommend avoid alcohol and hepato-toxic agents. The patient was also advised to avoid NSAIDs, Acetaminophen and any other hepatotoxic drugs. The patient was also advised not to share needles, razors, scissors, nail clippers, etc.. The patient is to continue close follow-up in our office for blood work and exams. If the liver enzymes remain elevated, the patient may require a CT guided liver biopsy to assess the liver parenchyma and for possible treatment. We had a long discussion regarding the risks and benefits of the procedure. The patient was told of the risks of bleeding, perforation, infections, emergency surgery and missing lesions. The patient agreed and will follow-up to reassess the symptoms Patient has been counseled on life style interventions, including but not limited to: weight loss (3-5% loss of body weight might improve fat in the liver, while 7-10% needed for potential improvement of other components, including inflammation and scarring of the liver, called fibrosis); healthy diet, avoiding added sugars, sodas, avoiding saturated fats, limiting sodium, avoiding alcohol; and on the importance of regular exercise (> 150 min/week moderate intensity aerobic exercise with at least 2x/week muscle strengthening or exercise as tolerated). \par - I explained to patient the natural Hx of NAFLD. \par \par she is to have fu  with fibroscan

## 2023-05-18 NOTE — HEALTH RISK ASSESSMENT
[No] : In the past 12 months have you used drugs other than those required for medical reasons? No [No falls in past year] : Patient reported no falls in the past year [0] : 2) Feeling down, depressed, or hopeless: Not at all (0) [PHQ-2 Negative - No further assessment needed] : PHQ-2 Negative - No further assessment needed [Never] : Never [de-identified] : rheum   Dr Rodriguez  PMR  Gurmeet Virgen   [de-identified] : walks   goes to gym   [de-identified] : low carb low salt  [[id number]] : 0

## 2023-05-18 NOTE — COUNSELING
[Fall prevention counseling provided] : Fall prevention counseling provided [Adequate lighting] : Adequate lighting [No throw rugs] : No throw rugs [Use proper foot wear] : Use proper foot wear [Use recommended devices] : Use recommended devices [Sleep ___ hours/day] : Sleep [unfilled] hours/day [Engage in a relaxing activity] : Engage in a relaxing activity [Plan in advance] : Plan in advance [Potential consequences of obesity discussed] : Potential consequences of obesity discussed [Benefits of weight loss discussed] : Benefits of weight loss discussed [Structured Weight Management Program suggested:] : Structured weight management program suggested [Encouraged to maintain food diary] : Encouraged to maintain food diary [Encouraged to increase physical activity] : Encouraged to increase physical activity [Encouraged to use exercise tracking device] : Encouraged to use exercise tracking device [Weigh Self Weekly] : weigh self weekly [Decrease Portions] : decrease portions [____ min/wk Activity] : [unfilled] min/wk activity [Keep Food Diary] : keep food diary [FreeTextEntry1] : low lamin diet diabetic diet  [FreeTextEntry2] : bmi  29 95

## 2023-05-22 ENCOUNTER — APPOINTMENT (OUTPATIENT)
Dept: PHYSICAL MEDICINE AND REHAB | Facility: CLINIC | Age: 58
End: 2023-05-22
Payer: COMMERCIAL

## 2023-05-22 DIAGNOSIS — R20.0 ANESTHESIA OF SKIN: ICD-10-CM

## 2023-05-22 DIAGNOSIS — M47.819 SPONDYLOSIS W/OUT MYELOPATHY OR RADICULOPATHY, SITE UNSPECIFIED: ICD-10-CM

## 2023-05-22 DIAGNOSIS — M54.2 CERVICALGIA: ICD-10-CM

## 2023-05-22 DIAGNOSIS — M54.12 RADICULOPATHY, CERVICAL REGION: ICD-10-CM

## 2023-05-22 PROCEDURE — 95911 NRV CNDJ TEST 9-10 STUDIES: CPT

## 2023-05-22 PROCEDURE — 95886 MUSC TEST DONE W/N TEST COMP: CPT

## 2023-05-22 NOTE — PROCEDURE
[de-identified] : EMG /NERVE CONDUCTION STUDY performed today without complication\par \par Tabulated data, wave forms , conclusions and recommendations are attached and in the procedure report. \par Please refer to the scanned study attached to this encounter\par \par Full history and focused clinical exam performed prior to the examination and documented in report\par

## 2023-05-23 ENCOUNTER — TRANSCRIPTION ENCOUNTER (OUTPATIENT)
Age: 58
End: 2023-05-23

## 2023-05-24 ENCOUNTER — TRANSCRIPTION ENCOUNTER (OUTPATIENT)
Age: 58
End: 2023-05-24

## 2023-05-25 NOTE — DISCUSSION/SUMMARY
[Unlikely Cardiac Ischemia (Low Prob.)] : chest pain unlikely to represent cardiac ischemia (low probability) [Hypertension] : hypertension [Improving] : improving [Responding to Treatment] : responding to treatment [Weight Loss] : weight loss [Sodium Restriction] : sodium restriction [Anxiety] : anxiety disorder NOS [Rhythm Disorder] : rhythm disorder [Sinus Tachycardia] : sinus tachycardia [Stable] : stable [None] : none [Low Sodium Diet] : low sodium diet [Patient] : the patient [Minutes spent___] : for [unfilled] ~Uminutes [___ Month(s)] : [unfilled] month(s) [With Me] : with me [de-identified] : tolerable [de-identified] : resolved, no recurrence, not active. [de-identified] : continue bystolic. [de-identified] : resolved, no recurrence [de-identified] : reduce weight [FreeTextEntry1] : Post-covid infection manifestation with exertional shortness of breath, fast pulse beats and vague chest discomfort was discussed. Before the consideration of surgery, it is indicated to have repeated stress echocardiogram for the clarification.The  stress echocardiogram will be the references for the  further treatment. Discussed. Leg edema is not obvious, resolved.\par \par For the pre-operative assessment for colonoscopic examination:\par 1. RCRI is  zero.\par 2. No active cardiac risk contradicting to the colonoscopic examination. 100% of the time

## 2023-05-26 ENCOUNTER — TRANSCRIPTION ENCOUNTER (OUTPATIENT)
Age: 58
End: 2023-05-26

## 2023-06-23 ENCOUNTER — APPOINTMENT (OUTPATIENT)
Dept: PULMONOLOGY | Facility: CLINIC | Age: 58
End: 2023-06-23
Payer: COMMERCIAL

## 2023-06-23 VITALS
BODY MASS INDEX: 29.44 KG/M2 | TEMPERATURE: 97.7 F | HEIGHT: 62 IN | WEIGHT: 160 LBS | DIASTOLIC BLOOD PRESSURE: 82 MMHG | SYSTOLIC BLOOD PRESSURE: 112 MMHG | HEART RATE: 84 BPM | OXYGEN SATURATION: 98 % | RESPIRATION RATE: 16 BRPM

## 2023-06-23 DIAGNOSIS — J30.2 OTHER SEASONAL ALLERGIC RHINITIS: ICD-10-CM

## 2023-06-23 PROCEDURE — 99214 OFFICE O/P EST MOD 30 MIN: CPT

## 2023-06-23 RX ORDER — BUDESONIDE AND FORMOTEROL FUMARATE DIHYDRATE 160; 4.5 UG/1; UG/1
160-4.5 AEROSOL RESPIRATORY (INHALATION) TWICE DAILY
Qty: 3 | Refills: 2 | Status: ACTIVE | COMMUNITY
Start: 2020-07-14 | End: 1900-01-01

## 2023-06-25 PROBLEM — J30.2 SEASONAL ALLERGIES: Status: ACTIVE | Noted: 2022-04-19

## 2023-06-25 NOTE — DISCUSSION/SUMMARY
[FreeTextEntry1] : Asthma, mild dyspnea on exertion postoperatively\par Lungs are clear\par \par history of COVID infection\par \par COVID vaccine   received\par She has  had influenza vaccine and PPSV\par \par Breast cancer underwent reconstruction\par \par She is having increased symptoms while using Symbicort as needed\par \par \par PlAN\par She will use    Symbicort 160/4.5 2 puffs twice per day standing for at least 1 month\par \par Use  albuterol MDI as needed \par \par She will continue montelukast 10 mg at night\par \par She should return for PFT\par \par \par Total time spent : 30 minutes\par Including:\par Preparation prior to visit - Reviewing prior record, results of tests and Consultation Reports as applicable\par Conducting an appropriate H & P during today's encounter\par Appropriate orders for tests, medications and procedures, as applicable\par Counseling patient \par Note completion \par \par \par Flaquito Sheriff MD [facility]

## 2023-06-25 NOTE — HISTORY OF PRESENT ILLNESS
[Never] : never [Snoring] : snoring [TextBox_4] : 56 year old woman with asthma for 5 years. she has had symptoms prior that were seasonal\par \par She is currently on Breo and montelukast.. She does not use her rescue inhaler. She states that she sometimes has dyspnea and wheeze with exertion. She is most improved using nebulized albuterol.\par \par She developed COVID infection in March. She did not require hospitalization. She was treated with azithromycin. She has some dyspnea that improved.\par \par CXR was performed that did not demonstrate any residual infiltrate. \par \par She was treated with prednisone short course and  switched to Symbicort and continued montelukast.\par \par She states that she improved but has noted some increased dyspnea\par She has increased chest tightness since change of weather.  She had been well and was not using inhaled bronchodilator since last week. \par \par She has been using Symbicort and occasionally uses nebulizer.  \par \par She does not use Symbicort regularly, but finds she is using albuterol more.\par \par She has not had exacerbation. \par \par She states she has recurrence of breast cancer.  She has had bilateral mastectomy and reconstruction\par \par She tolerated it well\par \par She is using Symbicort and montelukast\par \par She has been using Symbicort as needed, though recently her symptoms have been worse, with chest tightness and wheeze\par \par She has not had exacerbation\par \par Breast reconstruction is in progress.\par \par \par SLEEP\par No snoring, witnessed apnea, excessive daytime sleepiness, morning headache \par \par \par PMH\par \par DM\par \par \par \par PSH\par breast cancer, on anastrazole, lumpectomy\par HPV\par hysterectomy\par \par \par SH\par \par never smoked\par \par no ETOH\par \par \par ALLERGY\par \par NKDA\par  [Hypersomnolence] : denies hypersomnolence [Unintentional Sleep while Active] : no unintentional sleep while active [Unintentional Sleep while Inactive] : no unintentional sleep while inactive [Witnessed Apneas] : no witnessed apneas

## 2023-07-18 ENCOUNTER — LABORATORY RESULT (OUTPATIENT)
Age: 58
End: 2023-07-18

## 2023-07-18 ENCOUNTER — APPOINTMENT (OUTPATIENT)
Dept: HEPATOLOGY | Facility: CLINIC | Age: 58
End: 2023-07-18
Payer: COMMERCIAL

## 2023-07-18 VITALS
HEIGHT: 62 IN | OXYGEN SATURATION: 98 % | HEART RATE: 73 BPM | BODY MASS INDEX: 29.81 KG/M2 | SYSTOLIC BLOOD PRESSURE: 104 MMHG | DIASTOLIC BLOOD PRESSURE: 70 MMHG | TEMPERATURE: 97.5 F | RESPIRATION RATE: 16 BRPM | WEIGHT: 162 LBS

## 2023-07-18 DIAGNOSIS — R10.9 UNSPECIFIED ABDOMINAL PAIN: ICD-10-CM

## 2023-07-18 PROCEDURE — 99214 OFFICE O/P EST MOD 30 MIN: CPT

## 2023-07-18 RX ORDER — TIZANIDINE 2 MG/1
2 TABLET ORAL
Qty: 30 | Refills: 0 | Status: DISCONTINUED | COMMUNITY
Start: 2023-05-05 | End: 2023-07-18

## 2023-07-23 ENCOUNTER — RESULT CHARGE (OUTPATIENT)
Age: 58
End: 2023-07-23

## 2023-07-24 ENCOUNTER — NON-APPOINTMENT (OUTPATIENT)
Age: 58
End: 2023-07-24

## 2023-07-24 ENCOUNTER — APPOINTMENT (OUTPATIENT)
Dept: CARDIOLOGY | Facility: CLINIC | Age: 58
End: 2023-07-24
Payer: COMMERCIAL

## 2023-07-24 VITALS
TEMPERATURE: 97.8 F | HEART RATE: 76 BPM | WEIGHT: 164 LBS | RESPIRATION RATE: 16 BRPM | OXYGEN SATURATION: 98 % | SYSTOLIC BLOOD PRESSURE: 110 MMHG | BODY MASS INDEX: 30 KG/M2 | DIASTOLIC BLOOD PRESSURE: 74 MMHG

## 2023-07-24 DIAGNOSIS — I47.29 OTHER VENTRICULAR TACHYCARDIA: ICD-10-CM

## 2023-07-24 PROBLEM — R10.9 FLANK PAIN, ACUTE: Status: ACTIVE | Noted: 2023-07-24

## 2023-07-24 LAB
ALBUMIN SERPL ELPH-MCNC: 4.6 G/DL
ALP BLD-CCNC: 84 U/L
ALT SERPL-CCNC: 37 U/L
ANION GAP SERPL CALC-SCNC: 13 MMOL/L
APPEARANCE: CLEAR
AST SERPL W P-5'-P-CCNC: 34 IU/L
AST SERPL-CCNC: 24 U/L
BILIRUB DIRECT SERPL-MCNC: 0.1 MG/DL
BILIRUB INDIRECT SERPL-MCNC: 0.2 MG/DL
BILIRUB SERPL-MCNC: 0.4 MG/DL
BILIRUBIN URINE: NEGATIVE
BLOOD URINE: NEGATIVE
BUN SERPL-MCNC: 12 MG/DL
CALCIUM SERPL-MCNC: 10 MG/DL
CHLORIDE SERPL-SCNC: 105 MMOL/L
CHOLEST SERPL-MCNC: 170 MG/DL
CO2 SERPL-SCNC: 23 MMOL/L
COLOR: YELLOW
COMMENT:: NORMAL
CREAT SERPL-MCNC: 0.7 MG/DL
EGFR: 100 ML/MIN/1.73M2
FIBROSIS STAGE SERPL QL: NORMAL
FIBROSURE ALPHA 2 MACROGLOBULINS: 301 MG/DL
FIBROSURE ALT (SGPT): 40 IU/L
FIBROSURE APOLIPOPROTEIN A1: 142 MG/DL
FIBROSURE GGT: 21 IU/L
FIBROSURE HAPTOGLOBIN: 119 MG/DL
FIBROSURE SCORING: NORMAL
FIBROSURE TOTAL BILIRUBIN: 0.4 MG/DL
GLUCOSE QUALITATIVE U: NEGATIVE MG/DL
GLUCOSE SERPL-MCNC: 114 MG/DL
GLUCOSE SERPL-MCNC: 117 MG/DL
INR PPP: 1.1 RATIO
INTERPRETATIONS:: NORMAL
KETONES URINE: ABNORMAL MG/DL
LEUKOCYTE ESTERASE URINE: ABNORMAL
LIVER FIBR SCORE SERPL CALC.FIBROSURE: 0.32
Lab: NORMAL
NASH SCORING: NORMAL
NECROINFLAMMATORY ACT GRADE SERPL QL: NORMAL
NECROINFLAMMATORY ACT SCORE SERPL: 0.66
NITRITE URINE: NEGATIVE
PH URINE: 5.5
POTASSIUM SERPL-SCNC: 3.7 MMOL/L
PROT SERPL-MCNC: 7.4 G/DL
PROTEIN URINE: 30 MG/DL
PT BLD: 12.9 SEC
SERVICE CMNT-IMP: NORMAL
SODIUM SERPL-SCNC: 141 MMOL/L
SPECIFIC GRAVITY URINE: 1.03
STEATOSIS GRADE: NORMAL
STEATOSIS SCORE: 0.55
STEATOSIS SCORING: NORMAL
TRIGL SERPL-MCNC: 201 MG/DL
UROBILINOGEN URINE: 0.2 MG/DL

## 2023-07-24 PROCEDURE — 93000 ELECTROCARDIOGRAM COMPLETE: CPT | Mod: 59

## 2023-07-24 PROCEDURE — 99214 OFFICE O/P EST MOD 30 MIN: CPT | Mod: 25

## 2023-07-24 NOTE — REVIEW OF SYSTEMS
[Heartburn] : heartburn [Negative] : Cardiovascular [Fever] : no fever [Chills] : no chills [Feeling Poorly] : not feeling poorly [Feeling Tired] : not feeling tired [Recent Weight Gain (___ Lbs)] : no recent weight gain [Recent Weight Loss (___ Lbs)] : no recent weight loss [Shortness Of Breath] : no shortness of breath [Cough] : no cough [Abdominal Pain] : no abdominal pain [Vomiting] : no vomiting [Constipation] : no constipation [Diarrhea] : no diarrhea [Melena] : no melena [As Noted in HPI] : as noted in HPI [Itching] : no itching [Depression] : no depression [Easy Bleeding] : no tendency for easy bleeding [Easy Bruising] : no tendency for easy bruising [FreeTextEntry8] : Following with Dr. Barnes for urinary frequency, still reports, but now also report one day L flank pain radiating to L groin,

## 2023-07-24 NOTE — PHYSICAL EXAM
[General Appearance - Alert] : alert [General Appearance - In No Acute Distress] : in no acute distress [General Appearance - Well Nourished] : well nourished [General Appearance - Well Developed] : well developed [General Appearance - Well-Appearing] : healthy appearing [Sclera] : the sclera and conjunctiva were normal [Oropharynx] : the oropharynx was normal [Neck Appearance] : the appearance of the neck was normal [] : no respiratory distress [Respiration, Rhythm And Depth] : normal respiratory rhythm and effort [Exaggerated Use Of Accessory Muscles For Inspiration] : no accessory muscle use [Auscultation Breath Sounds / Voice Sounds] : lungs were clear to auscultation bilaterally [Heart Rate And Rhythm] : heart rate was normal and rhythm regular [Heart Sounds] : normal S1 and S2 [Edema] : there was no peripheral edema [No Spinal Tenderness] : no spinal tenderness [Skin Color & Pigmentation] : normal skin color and pigmentation [Skin Turgor] : normal skin turgor [Oriented To Time, Place, And Person] : oriented to person, place, and time [Impaired Insight] : insight and judgment were intact [Affect] : the affect was normal [Mood] : the mood was normal [Memory Recent] : recent memory was not impaired [Memory Remote] : remote memory was not impaired [Rounded] : rounded [Normal] : normal to percussion [Epigastric] : in the epigastric area [RUQ] : in the right upper quadrant [No Mass] : no masses were palpated [No HSM] : no hepatosplenomegaly noted [None] : no CVA tenderness [Scleral Icterus] : No Scleral Icterus [Spider Angioma] : No spider angioma(s) were observed [Abdominal Bruit] : no abdominal bruit [Abdominal  Ascites] : no ascites [Asterixis] : no asterixis observed [Jaundice] : No jaundice [Palmar Erythema] : no Palmar Erythema [Depression] : no depression [Abnormal Walk] : normal gait [FreeTextEntry1] : Grossly intact [Dilated Collat. Veins] : no collateral vein dilation [Striae] : no striae [Firm] : not firm [Rigid] : not rigid [Rebound] : no rebound [Guarding] : no guarding [Denis's] : a negative Denis's sign

## 2023-07-24 NOTE — REASON FOR VISIT
[Follow-Up - Clinic] : a clinic follow-up of [Chest Pain] : chest pain [Hyperlipidemia] : hyperlipidemia [Hypertension] : hypertension [Palpitations] : palpitations [FreeTextEntry1] : pre-operation assessment for breast reconstruction.

## 2023-07-24 NOTE — DISCUSSION/SUMMARY
[Hypertension] : hypertension [Improving] : improving [Exercise Regimen] : an exercise regimen [Dietary Modification] : dietary modification [Weight Loss] : weight loss [ETOH Moderation] : alcohol moderation [Acetaminophen Avoidance] : acetaminophen  avoidance [NSAIDs Avoidance] : non-steroidal anti-inflammatory drugs avoidance [Anxiety] : anxiety disorder NOS [Rhythm Disorder] : rhythm disorder [Sinus Tachycardia] : sinus tachycardia [Low Sodium Diet] : low sodium diet [Family] : the patient's family [Responding to Treatment] : responding to treatment [Minutes Spent: ___] : for [unfilled] ~Uminutes [With Me] : with me [___ Month(s)] : in [unfilled] month(s) [Hyperlipidemia] : hyperlipidemia [Stable] : stable [None] : There are no changes in medication management [Diet Modification] : diet modification [Exercise] : exercise [Patient] : the patient [FreeTextEntry1] : PMD is moderating the  lipid treatment. [de-identified] : past complaints,  avani to sinus tachycardia. [de-identified] : tolerable [de-identified] : continue bystolic. [de-identified] : resolved, no recurrence [de-identified] : reduce weight [de-identified] : possible for  palpitation, not recorded

## 2023-07-24 NOTE — HISTORY OF PRESENT ILLNESS
[FreeTextEntry1] : She, a 57 year old female had hypertension sometimes back. It was controlled by nifedipine along with Bystolic. However, she  had on and off palpitations which bothers her. She had repeated Holter examination without findings of  dysrhythmia\par  She had lumpectomy, right, s/p RT and on tamoxifen . She was told having  recurrence. She  tolerated the mastectomy. She is here for the pre-procedural cardiac assessment  for the reconstruction of breasts.\par In March this year ( 2020) she was contracted with  COVID.  After that she had exertional shortness of breath. She had echocardiogram and was negative.\par She has been free of symptoms of chest pain, shortness of breath, dizziness or palpitation with ordinary activities.

## 2023-07-24 NOTE — PHYSICAL EXAM
[Normal Radial B/L] : normal radial B/L [Normal PT B/L] : normal PT B/L [Normal DP B/L] : normal DP B/L [General Appearance - Well Developed] : well developed [Normal Appearance] : normal appearance [Well Groomed] : well groomed [General Appearance - Well Nourished] : well nourished [No Deformities] : no deformities [General Appearance - In No Acute Distress] : no acute distress [Normal Conjunctiva] : the conjunctiva exhibited no abnormalities [Eyelids - No Xanthelasma] : the eyelids demonstrated no xanthelasmas [Normal Oral Mucosa] : normal oral mucosa [No Oral Pallor] : no oral pallor [No Oral Cyanosis] : no oral cyanosis [Normal Jugular Venous A Waves Present] : normal jugular venous A waves present [Normal Jugular Venous V Waves Present] : normal jugular venous V waves present [No Jugular Venous [place] A Waves] : no jugular venous [place] A waves [Respiration, Rhythm And Depth] : normal respiratory rhythm and effort [Exaggerated Use Of Accessory Muscles For Inspiration] : no accessory muscle use [Auscultation Breath Sounds / Voice Sounds] : lungs were clear to auscultation bilaterally [Chest Palpation] : palpation of the chest revealed no abnormalities [Lungs Percussion] : the lungs were normal to percussion [Heart Rate And Rhythm] : heart rate and rhythm were normal [Heart Sounds] : normal S1 and S2 [Murmurs] : no murmurs present [Arterial Pulses Normal] : the arterial pulses were normal [Edema] : no peripheral edema present [Veins - Varicosity Changes] : no varicosital changes were noted in the lower extremities [Bowel Sounds] : normal bowel sounds [Abdomen Soft] : soft [Abdomen Tenderness] : non-tender [Abdomen Mass (___ Cm)] : no abdominal mass palpated [Abdomen Hernia] : no hernia was discovered [Abnormal Walk] : normal gait [Gait - Sufficient For Exercise Testing] : the gait was sufficient for exercise testing [Nail Clubbing] : no clubbing of the fingernails [Cyanosis, Localized] : no localized cyanosis [Petechial Hemorrhages (___cm)] : no petechial hemorrhages [Skin Turgor] : normal skin turgor [] : no rash [No Venous Stasis] : no venous stasis [No Skin Ulcers] : no skin ulcer [No Xanthoma] : no  xanthoma was observed [Oriented To Time, Place, And Person] : oriented to person, place, and time [Affect] : the affect was normal [Mood] : the mood was normal [No Anxiety] : not feeling anxious [FreeTextEntry1] : no calf tenderness, no edema in my examination, no  asymmetry of calf. " right shoulder  with Rheumatoid arthritis.

## 2023-07-24 NOTE — HISTORY OF PRESENT ILLNESS
[de-identified] : 56 yo female with extensive medical history including diabetes mellitus (diagnosed in 2016, HbA1c: 5.7 in 12/2021), dyslipidemia, hypertension, obesity (BMI 31), s/p cholecystectomy (6/2017), hx of breast cancer s/p R lumpectomy (2015), on Tamoxifen (1/2016-9-2017, then 2018 till present), FARSHAD positivity (1:640, 1:160) with normal Immunoglobulins, nephrolithiasis and asthma is being followed by hepatology for fatty liver disease (US abd 6/2017: moderate to severe fatty infiltration of the liver, CT abd 10/2018: hepatic steatosis) with hepatic fibrosis (transient elastography 9/2019: F2 fibrosis, S3 steatosis, prior in 2017: F2-F3 fibrosis). She was last seen by Dr. Ball on 10/14/2019  and transferred care on 10/2/2020. \par \par She was noted to have abnormal liver tests in 2/2017 (mild elevation of AST/ALT with peak 57/74 in 2018, normal ALP and bilirubin). Tamoxifen was discontinued in 10/2017 (1/2016-9/2017) and resumed again in 2018; she is taking 20 mg/day MWF at present. \par Her prior liver workup showed normal ceruloplasmin, neg tTGIgA, normal iron panel, normal IgG, IgA and IgM, negative SMA ab, positive FARSHAD, positive RF, negative HCV ab, immune for Hep A, not immune or exposed to Hep B. Latest liver enzymes were WNL in 7/2020: AST 14, ALT 26, ALP 69, Se bi 0.4, GGT 17. Her lipid profile improved:  -99, Cholesterol 242-257, -85, HDL 52 and she lost weight, from 186 lb to 171lb approximately in 1 year. \par US abdomen (9/12/2020) showed hepatic steatosis and bilateral non-obstructing renal stones (patient is following with urology). \par She had FibroScan in 10/2020, showing significant improvement, with S1 steatosis and F0-F1 fibrosis (from F2-F3). \par \par She had COVID in 03/2020, did not require hospitalization. She has been following with pulmonology.\par She is also being followed by GI (Dr. Campoverde) for GERD, constipation.\par She also had urological procedure for kidney stones, following with . \par \par She was seen in Liver clinic 1/2021. She lost follow up and RTC 4/2022.\par She had recurrent breast cancer (Dx 11/2021, DCIS) and had b/l mastectomy on 1/4/2022 with JESIKA flap reconstruction. She reports that was told that would be monitored and tamoxifen was stopped because she had definitive surgery with the mastectomy. \par \par She had also had R rotator cuff repair in 5/2021. \par She has been followed for kidney stones. \par \par 8/2022 follow up. \par She continued to have heartburn, following with Dr. Campoverde. \par She reported RUQ pain intermittently. No N/V, no fever, chills, no diarrhea, constipation, no blood in stool or melena. \par US abd was non revealing. \par Fibroscan 4/13/22 showed F1-2 fibrosis , 8.2 kPA nad S2 steatosis (). \par CT a/p 9/2022 showed hepatic steatosis, s/p cholecystectomy, but normal bile ducts. She was referred back for the breast findings to her breast surgeon. \par \par She is here for follow up. Reports feeling well, but developed L flank pain radiating to groin similar to her  pain when had kidney stones. Denies systemic symptoms, no fever, chills, nausea etc. No other new complaints. No dysuria either, but slightly more frequency. Did not notice passing stone, no hematuria.

## 2023-07-25 ENCOUNTER — NON-APPOINTMENT (OUTPATIENT)
Age: 58
End: 2023-07-25

## 2023-07-26 ENCOUNTER — APPOINTMENT (OUTPATIENT)
Dept: UROLOGY | Facility: CLINIC | Age: 58
End: 2023-07-26

## 2023-07-26 ENCOUNTER — APPOINTMENT (OUTPATIENT)
Dept: UROLOGY | Facility: CLINIC | Age: 58
End: 2023-07-26
Payer: COMMERCIAL

## 2023-07-26 DIAGNOSIS — N39.0 URINARY TRACT INFECTION, SITE NOT SPECIFIED: ICD-10-CM

## 2023-07-26 PROCEDURE — 99213 OFFICE O/P EST LOW 20 MIN: CPT

## 2023-07-26 PROCEDURE — 76775 US EXAM ABDO BACK WALL LIM: CPT

## 2023-07-26 NOTE — HISTORY OF PRESENT ILLNESS
[FreeTextEntry1] : Pleasant 57-year-old female seen by Dr. Barnes for history of nephrolithiasis.  She has undergone multiple ureteroscopy for recurrent nephrolithiasis.  She states she has been having some left flank pain which appears to be musculoskeletal.  Her follow-up renal ultrasound was done today which did not demonstrate any hydronephrosis and demonstrates bilateral 2 to 4 mm intrarenal stones.  She recently had a urinalysis done showing a urinary tract infection which she has been treated for with antibiotics.  However she denies dysuria frequency urgency.

## 2023-07-26 NOTE — ASSESSMENT
[FreeTextEntry1] : 57-year-old female with history of recurrent nephrolithiasis.  She has some left back and flank pain which appears to be musculoskeletal.  Her renal ultrasound images were reviewed today and discussed with the patient which demonstrates no hydronephrosis and bilateral intrarenal stones which are 2 to 4 mm in size.  We discussed dietary modifications for stone prevention including increased fluid intake, decreased animal proteins, increase fruits and vegetables, appropriate amounts of calcium and sodium daily.  Of note she had a recent urine culture that was positive for E. coli and just started her antibiotics 2 days ago.\par \par Plan\par -Reviewed results of her recent urinalysis and urine culture which demonstrates E. coli discussed with patient the need to finish her antibiotics and if she continues to have symptoms will repeat a urine culture\par -Reviewed images of patient's renal ultrasound done today and discussed with patient the results which demonstrates no hydronephrosis and small bilateral intrarenal stones\par -Discussed dietary modifications for stone prevention\par -Follow-up in 6 months for repeat renal ultrasound and determine if patient needs CT imaging to better assess stone size

## 2023-07-26 NOTE — PHYSICAL EXAM
[Normal Appearance] : normal appearance [Costovertebral Angle Tenderness] : no ~M costovertebral angle tenderness [Abdomen Tenderness] : non-tender [Urinary Bladder Findings] : the bladder was normal on palpation [] : no rash [Edema] : no peripheral edema [Exaggerated Use Of Accessory Muscles For Inspiration] : no accessory muscle use [Mood] : the mood was normal [Normal Station and Gait] : the gait and station were normal for the patient's age [No Focal Deficits] : no focal deficits [No Palpable Adenopathy] : no palpable adenopathy

## 2023-07-29 ENCOUNTER — APPOINTMENT (OUTPATIENT)
Dept: ULTRASOUND IMAGING | Facility: CLINIC | Age: 58
End: 2023-07-29

## 2023-08-11 ENCOUNTER — APPOINTMENT (OUTPATIENT)
Dept: RHEUMATOLOGY | Facility: CLINIC | Age: 58
End: 2023-08-11

## 2023-08-25 ENCOUNTER — APPOINTMENT (OUTPATIENT)
Dept: HEPATOLOGY | Facility: CLINIC | Age: 58
End: 2023-08-25
Payer: COMMERCIAL

## 2023-08-25 ENCOUNTER — APPOINTMENT (OUTPATIENT)
Dept: PULMONOLOGY | Facility: CLINIC | Age: 58
End: 2023-08-25
Payer: COMMERCIAL

## 2023-08-25 VITALS
HEART RATE: 79 BPM | RESPIRATION RATE: 16 BRPM | TEMPERATURE: 98 F | WEIGHT: 164 LBS | OXYGEN SATURATION: 97 % | HEIGHT: 62 IN | DIASTOLIC BLOOD PRESSURE: 61 MMHG | SYSTOLIC BLOOD PRESSURE: 102 MMHG | BODY MASS INDEX: 30.18 KG/M2

## 2023-08-25 PROCEDURE — 76981 USE PARENCHYMA: CPT

## 2023-08-25 PROCEDURE — 99214 OFFICE O/P EST MOD 30 MIN: CPT

## 2023-08-25 NOTE — DISCUSSION/SUMMARY
[FreeTextEntry1] : Asthma, mild dyspnea on exertion postoperatively Lungs are clear  history of COVID infection  COVID vaccine   received She has  had influenza vaccine and PPSV  Breast cancer underwent reconstruction  She is having increased symptoms while using Symbicort as needed  She has been using Symbicort only once a day.  She remains improved  PLAN She will use    Symbicort 160/4.5 2 puffs once to twice per day and observe symptoms  Use  albuterol MDI as needed  Reevaluate for weaning to Symbciort 80/4.5 if remains stable at next appointment    She will continue montelukast 10 mg at night  She should return for PFT   Total time spent : 30 minutes Including: Preparation prior to visit - Reviewing prior record, results of tests and Consultation Reports as applicable Conducting an appropriate H & P during today's encounter Appropriate orders for tests, medications and procedures, as applicable Counseling patient  Note completion    Flaquito Sheriff [facility]

## 2023-09-07 NOTE — ED ADULT NURSE NOTE - NS ED NOTE ABUSE SUSPICION NEGLECT YN
No Plastic Surgeon Procedure Text (D): After obtaining clear surgical margins the patient was sent to plastics for surgical repair.  The patient understands they will receive post-surgical care and follow-up from the referring physician's office.

## 2023-09-26 ENCOUNTER — APPOINTMENT (OUTPATIENT)
Dept: SURGICAL ONCOLOGY | Facility: CLINIC | Age: 58
End: 2023-09-26
Payer: COMMERCIAL

## 2023-09-26 ENCOUNTER — APPOINTMENT (OUTPATIENT)
Dept: INTERNAL MEDICINE | Facility: CLINIC | Age: 58
End: 2023-09-26
Payer: COMMERCIAL

## 2023-09-26 VITALS
SYSTOLIC BLOOD PRESSURE: 119 MMHG | TEMPERATURE: 97.9 F | WEIGHT: 160 LBS | BODY MASS INDEX: 29.44 KG/M2 | HEIGHT: 62 IN | DIASTOLIC BLOOD PRESSURE: 77 MMHG | OXYGEN SATURATION: 98 % | HEART RATE: 74 BPM

## 2023-09-26 DIAGNOSIS — G43.909 MIGRAINE, UNSPECIFIED, NOT INTRACTABLE, W/OUT STATUS MIGRAINOSUS: ICD-10-CM

## 2023-09-26 DIAGNOSIS — R19.5 OTHER FECAL ABNORMALITIES: ICD-10-CM

## 2023-09-26 DIAGNOSIS — R30.0 DYSURIA: ICD-10-CM

## 2023-09-26 DIAGNOSIS — D05.11 INTRADUCTAL CARCINOMA IN SITU OF RIGHT BREAST: ICD-10-CM

## 2023-09-26 DIAGNOSIS — Z79.899 OTHER LONG TERM (CURRENT) DRUG THERAPY: ICD-10-CM

## 2023-09-26 DIAGNOSIS — Z85.3 ENCOUNTER FOR FOLLOW-UP EXAMINATION AFTER COMPLETED TREATMENT FOR MALIGNANT NEOPLASM: ICD-10-CM

## 2023-09-26 DIAGNOSIS — Z08 ENCOUNTER FOR FOLLOW-UP EXAMINATION AFTER COMPLETED TREATMENT FOR MALIGNANT NEOPLASM: ICD-10-CM

## 2023-09-26 DIAGNOSIS — M13.0 POLYARTHRITIS, UNSPECIFIED: ICD-10-CM

## 2023-09-26 PROCEDURE — G0008: CPT

## 2023-09-26 PROCEDURE — 99213 OFFICE O/P EST LOW 20 MIN: CPT

## 2023-09-26 PROCEDURE — 99214 OFFICE O/P EST MOD 30 MIN: CPT | Mod: 25

## 2023-09-26 PROCEDURE — 90686 IIV4 VACC NO PRSV 0.5 ML IM: CPT

## 2023-09-26 RX ORDER — PANTOPRAZOLE 20 MG/1
20 TABLET, DELAYED RELEASE ORAL
Qty: 90 | Refills: 3 | Status: COMPLETED | COMMUNITY
Start: 2019-04-01 | End: 2023-09-26

## 2023-09-26 RX ORDER — AMOXICILLIN AND CLAVULANATE POTASSIUM 875; 125 MG/1; MG/1
875-125 TABLET, COATED ORAL
Qty: 20 | Refills: 0 | Status: COMPLETED | COMMUNITY
Start: 2023-07-24 | End: 2023-09-26

## 2023-09-28 ENCOUNTER — LABORATORY RESULT (OUTPATIENT)
Age: 58
End: 2023-09-28

## 2023-09-29 LAB
ALBUMIN SERPL ELPH-MCNC: 4.9 G/DL
ALP BLD-CCNC: 95 U/L
ALT SERPL-CCNC: 30 U/L
ANION GAP SERPL CALC-SCNC: 15 MMOL/L
APPEARANCE: CLEAR
AST SERPL-CCNC: 26 U/L
BILIRUB SERPL-MCNC: 0.7 MG/DL
BILIRUBIN URINE: NEGATIVE
BLOOD URINE: NEGATIVE
BUN SERPL-MCNC: 16 MG/DL
CALCIUM SERPL-MCNC: 9.9 MG/DL
CHLORIDE SERPL-SCNC: 103 MMOL/L
CHOLEST SERPL-MCNC: 139 MG/DL
CO2 SERPL-SCNC: 21 MMOL/L
COLOR: YELLOW
CREAT SERPL-MCNC: 0.76 MG/DL
EGFR: 91 ML/MIN/1.73M2
ESTIMATED AVERAGE GLUCOSE: 117 MG/DL
FOLATE SERPL-MCNC: 19.8 NG/ML
FRUCTOSAMINE SERPL-MCNC: 247 UMOL/L
GLUCOSE QUALITATIVE U: NEGATIVE MG/DL
GLUCOSE SERPL-MCNC: 94 MG/DL
HBA1C MFR BLD HPLC: 5.7 %
HDLC SERPL-MCNC: 48 MG/DL
KETONES URINE: NEGATIVE MG/DL
LDLC SERPL CALC-MCNC: 72 MG/DL
LEUKOCYTE ESTERASE URINE: ABNORMAL
MAGNESIUM SERPL-MCNC: 1.8 MG/DL
NITRITE URINE: NEGATIVE
NONHDLC SERPL-MCNC: 91 MG/DL
PH URINE: 5.5
POTASSIUM SERPL-SCNC: 3.8 MMOL/L
PROT SERPL-MCNC: 7.5 G/DL
PROTEIN URINE: NEGATIVE MG/DL
SODIUM SERPL-SCNC: 139 MMOL/L
SPECIFIC GRAVITY URINE: 1.01
TRIGL SERPL-MCNC: 103 MG/DL
TSH SERPL-ACNC: 1.85 UIU/ML
UREA BREATH TEST QL: NEGATIVE
UROBILINOGEN URINE: 0.2 MG/DL
VIT B12 SERPL-MCNC: 474 PG/ML

## 2023-10-27 ENCOUNTER — APPOINTMENT (OUTPATIENT)
Dept: RADIOLOGY | Facility: CLINIC | Age: 58
End: 2023-10-27
Payer: COMMERCIAL

## 2023-10-27 ENCOUNTER — LABORATORY RESULT (OUTPATIENT)
Age: 58
End: 2023-10-27

## 2023-10-27 ENCOUNTER — APPOINTMENT (OUTPATIENT)
Dept: PULMONOLOGY | Facility: CLINIC | Age: 58
End: 2023-10-27
Payer: COMMERCIAL

## 2023-10-27 VITALS
SYSTOLIC BLOOD PRESSURE: 106 MMHG | HEART RATE: 72 BPM | TEMPERATURE: 97.7 F | BODY MASS INDEX: 29.44 KG/M2 | OXYGEN SATURATION: 97 % | WEIGHT: 160 LBS | DIASTOLIC BLOOD PRESSURE: 69 MMHG | HEIGHT: 62 IN | RESPIRATION RATE: 16 BRPM

## 2023-10-27 DIAGNOSIS — J45.909 UNSPECIFIED ASTHMA, UNCOMPLICATED: ICD-10-CM

## 2023-10-27 PROCEDURE — 73130 X-RAY EXAM OF HAND: CPT | Mod: 50

## 2023-10-27 PROCEDURE — 73564 X-RAY EXAM KNEE 4 OR MORE: CPT | Mod: 50

## 2023-10-27 PROCEDURE — 99214 OFFICE O/P EST MOD 30 MIN: CPT

## 2023-11-01 ENCOUNTER — APPOINTMENT (OUTPATIENT)
Dept: RHEUMATOLOGY | Facility: CLINIC | Age: 58
End: 2023-11-01
Payer: COMMERCIAL

## 2023-11-01 VITALS
DIASTOLIC BLOOD PRESSURE: 73 MMHG | WEIGHT: 159 LBS | BODY MASS INDEX: 29.26 KG/M2 | TEMPERATURE: 97.3 F | HEIGHT: 62 IN | HEART RATE: 80 BPM | SYSTOLIC BLOOD PRESSURE: 107 MMHG | OXYGEN SATURATION: 98 %

## 2023-11-01 DIAGNOSIS — R20.2 PARESTHESIA OF SKIN: ICD-10-CM

## 2023-11-01 DIAGNOSIS — M16.10 UNILATERAL PRIMARY OSTEOARTHRITIS, UNSPECIFIED HIP: ICD-10-CM

## 2023-11-01 DIAGNOSIS — M54.12 RADICULOPATHY, CERVICAL REGION: ICD-10-CM

## 2023-11-01 DIAGNOSIS — M17.0 BILATERAL PRIMARY OSTEOARTHRITIS OF KNEE: ICD-10-CM

## 2023-11-01 DIAGNOSIS — R76.8 OTHER SPECIFIED ABNORMAL IMMUNOLOGICAL FINDINGS IN SERUM: ICD-10-CM

## 2023-11-01 PROCEDURE — 99214 OFFICE O/P EST MOD 30 MIN: CPT

## 2023-11-01 RX ORDER — PNEUMOCOCCAL 20-VALENT CONJUGATE VACCINE 2.2; 2.2; 2.2; 2.2; 2.2; 2.2; 2.2; 2.2; 2.2; 2.2; 2.2; 2.2; 2.2; 2.2; 2.2; 2.2; 4.4; 2.2; 2.2; 2.2 UG/.5ML; UG/.5ML; UG/.5ML; UG/.5ML; UG/.5ML; UG/.5ML; UG/.5ML; UG/.5ML; UG/.5ML; UG/.5ML; UG/.5ML; UG/.5ML; UG/.5ML; UG/.5ML; UG/.5ML; UG/.5ML; UG/.5ML; UG/.5ML; UG/.5ML; UG/.5ML
INJECTION, SUSPENSION INTRAMUSCULAR
Qty: 1 | Refills: 0 | Status: DISCONTINUED | COMMUNITY
Start: 2023-01-18 | End: 2023-11-01

## 2023-11-01 RX ORDER — GABAPENTIN 100 MG/1
100 CAPSULE ORAL
Qty: 90 | Refills: 3 | Status: ACTIVE | COMMUNITY
Start: 2023-11-01 | End: 1900-01-01

## 2023-11-02 PROBLEM — M54.12 CERVICAL RADICULAR PAIN: Status: ACTIVE | Noted: 2023-05-22

## 2023-11-02 PROBLEM — M16.10 ARTHRITIS OF HIP: Status: ACTIVE | Noted: 2021-02-05

## 2023-11-02 PROBLEM — M17.0 ARTHRITIS OF BOTH KNEES: Status: ACTIVE | Noted: 2022-08-26

## 2023-11-02 PROBLEM — R20.2 PARESTHESIA OF ARM: Status: ACTIVE | Noted: 2023-05-05

## 2023-11-03 LAB
CCP AB SER IA-ACNC: <8 UNITS
CRP SERPL-MCNC: <3 MG/L
DSDNA AB SER-ACNC: <12 IU/ML
ENA RNP AB SER IA-ACNC: <0.2 AL
ENA SM AB SER IA-ACNC: <0.2 AL
ENA SS-A AB SER IA-ACNC: <0.2 AL
ENA SS-B AB SER IA-ACNC: <0.2 AL
ERYTHROCYTE [SEDIMENTATION RATE] IN BLOOD BY WESTERGREN METHOD: 26 MM/HR
RF+CCP IGG SER-IMP: NEGATIVE
RHEUMATOID FACT SER QL: 15 IU/ML

## 2023-11-08 ENCOUNTER — OUTPATIENT (OUTPATIENT)
Dept: OUTPATIENT SERVICES | Facility: HOSPITAL | Age: 58
LOS: 1 days | End: 2023-11-08
Payer: COMMERCIAL

## 2023-11-08 ENCOUNTER — RESULT REVIEW (OUTPATIENT)
Age: 58
End: 2023-11-08

## 2023-11-08 ENCOUNTER — APPOINTMENT (OUTPATIENT)
Dept: MAMMOGRAPHY | Facility: CLINIC | Age: 58
End: 2023-11-08

## 2023-11-08 ENCOUNTER — APPOINTMENT (OUTPATIENT)
Dept: ULTRASOUND IMAGING | Facility: CLINIC | Age: 58
End: 2023-11-08

## 2023-11-08 DIAGNOSIS — Z90.710 ACQUIRED ABSENCE OF BOTH CERVIX AND UTERUS: Chronic | ICD-10-CM

## 2023-11-08 DIAGNOSIS — Z90.13 ACQUIRED ABSENCE OF BILATERAL BREASTS AND NIPPLES: Chronic | ICD-10-CM

## 2023-11-08 DIAGNOSIS — Z98.890 OTHER SPECIFIED POSTPROCEDURAL STATES: Chronic | ICD-10-CM

## 2023-11-08 PROCEDURE — 76641 ULTRASOUND BREAST COMPLETE: CPT | Mod: 26,50

## 2023-11-21 ENCOUNTER — RX RENEWAL (OUTPATIENT)
Age: 58
End: 2023-11-21

## 2023-11-27 ENCOUNTER — APPOINTMENT (OUTPATIENT)
Dept: INTERNAL MEDICINE | Facility: CLINIC | Age: 58
End: 2023-11-27
Payer: COMMERCIAL

## 2023-11-27 VITALS
SYSTOLIC BLOOD PRESSURE: 127 MMHG | TEMPERATURE: 98.1 F | BODY MASS INDEX: 30 KG/M2 | HEART RATE: 76 BPM | HEIGHT: 62 IN | DIASTOLIC BLOOD PRESSURE: 78 MMHG | OXYGEN SATURATION: 98 % | WEIGHT: 163 LBS

## 2023-11-27 DIAGNOSIS — R14.0 ABDOMINAL DISTENSION (GASEOUS): ICD-10-CM

## 2023-11-27 DIAGNOSIS — K21.9 GASTRO-ESOPHAGEAL REFLUX DISEASE W/OUT ESOPHAGITIS: ICD-10-CM

## 2023-11-27 DIAGNOSIS — E11.9 TYPE 2 DIABETES MELLITUS W/OUT COMPLICATIONS: ICD-10-CM

## 2023-11-27 DIAGNOSIS — R19.7 DIARRHEA, UNSPECIFIED: ICD-10-CM

## 2023-11-27 PROCEDURE — 99215 OFFICE O/P EST HI 40 MIN: CPT

## 2023-12-04 ENCOUNTER — TRANSCRIPTION ENCOUNTER (OUTPATIENT)
Age: 58
End: 2023-12-04

## 2023-12-06 ENCOUNTER — RX RENEWAL (OUTPATIENT)
Age: 58
End: 2023-12-06

## 2024-01-09 ENCOUNTER — APPOINTMENT (OUTPATIENT)
Dept: HEPATOLOGY | Facility: CLINIC | Age: 59
End: 2024-01-09
Payer: COMMERCIAL

## 2024-01-09 VITALS
DIASTOLIC BLOOD PRESSURE: 64 MMHG | HEART RATE: 75 BPM | RESPIRATION RATE: 16 BRPM | TEMPERATURE: 97.8 F | BODY MASS INDEX: 29.81 KG/M2 | HEIGHT: 62 IN | OXYGEN SATURATION: 99 % | WEIGHT: 162 LBS | SYSTOLIC BLOOD PRESSURE: 108 MMHG

## 2024-01-09 DIAGNOSIS — R09.89 OTHER SPECIFIED SYMPTOMS AND SIGNS INVOLVING THE CIRCULATORY AND RESPIRATORY SYSTEMS: ICD-10-CM

## 2024-01-09 DIAGNOSIS — K76.0 FATTY (CHANGE OF) LIVER, NOT ELSEWHERE CLASSIFIED: ICD-10-CM

## 2024-01-09 DIAGNOSIS — E66.9 OBESITY, UNSPECIFIED: ICD-10-CM

## 2024-01-09 DIAGNOSIS — K74.00 HEPATIC FIBROSIS, UNSPECIFIED: ICD-10-CM

## 2024-01-09 DIAGNOSIS — R74.8 ABNORMAL LEVELS OF OTHER SERUM ENZYMES: ICD-10-CM

## 2024-01-09 PROCEDURE — 99214 OFFICE O/P EST MOD 30 MIN: CPT

## 2024-01-10 ENCOUNTER — NON-APPOINTMENT (OUTPATIENT)
Age: 59
End: 2024-01-10

## 2024-01-13 ENCOUNTER — TRANSCRIPTION ENCOUNTER (OUTPATIENT)
Age: 59
End: 2024-01-13

## 2024-01-13 PROBLEM — R09.89 UPPER RESPIRATORY SYMPTOM: Status: ACTIVE | Noted: 2024-01-13

## 2024-01-13 NOTE — ASSESSMENT
[FreeTextEntry1] : 57 yo female with extensive medical history including diabetes mellitus (diagnosed in 2016, HbA1c: 5.5 in 7/2020), dyslipidemia, hypertension, obesity (BMI 31), s/p cholecystectomy (6/2017), hx of breast cancer s/p R lumpectomy (2015),  underwent b/l mastectomy, was on Tamoxifen until last year (2023), FARSHAD positivity (1:640, 1:160) with normal Immunoglobulins, nephrolithiasis and asthma is being followed by hepatology for fatty liver disease (US abd 6/2017: moderate to severe fatty infiltration of the liver, CT abd 10/2018: hepatic steatosis) with hepatic fibrosis (transient elastography in 2017 already showed F2-3 fibrosis). Subsequent Fibroscans: 9/2019: F2 fibrosis, S3 steatosis, 10/2020 S1 steatosis and F0-1 fibrosis; 4/2022  F1-2 fibrosis and S2 steatosis; 9/2023 F0 - No Fibrosis and Stage 3 steatosis. AST/ALT continues to be WNL based on the last CMP from 9/2023.    # MASLD  - Likely combination of metabolic risk factors and prior tamoxifen use. - She is not on tamoxifen anymore since 2023 due to mastectomy, per hem/onc. - I have explained to her the natural history of nonalcoholic fatty liver disease, disease progression to MASH, hepatic fibrosis, and cirrhosis and risk of hepatocellular carcinoma. - Avoid hepatotoxic agents, herbal supplements, alcohol - Advised to c/w statin - DM is controlled, currently on Metformin, mgmt. per PCP (if get uncontrolled and additional treatment needed, can consider liraglutide, semaglutide or tirzepatide if no contraindications) - Advised to continue with healthy low-fat diet, exercise as tolerated, weight loss. - Will repeat LFT today -Asked pt to bring report of last RUQ US to monitor for changes   #Incomplete Hep B immunization - As per pt, she did not complete the series for Hep B vaccination.  - Ordered titers of Hep B today, based on the results will determine need to do complete vaccination scheme for hep B.   #Upper respiratory infection - Pt with +ve covid/influenza contacts at home - Mildly symptomatic - Ordered RVP, requested for pt to go to [facility] office at [address] [place] [address] to get tested  C/w regular follow up with PCP, Hem/Onc, Pulm  RTO 6 months but to call office to discuss result. Discussed when to seek immediate medical attention, including fever, chills, persistent pain, N/V, change in urine or stool color or any other new symptoms.

## 2024-01-13 NOTE — REVIEW OF SYSTEMS
[Sore Throat] : sore throat [Shortness Of Breath] : shortness of breath [Wheezing] : wheezing [Fever] : no fever [Chills] : no chills [Feeling Tired] : not feeling tired [Earache] : no earache [Nasal Discharge] : no nasal discharge [Chest Pain] : no chest pain [Palpitations] : no palpitations [Cough] : no cough [Abdominal Pain] : no abdominal pain [Vomiting] : no vomiting [Constipation] : no constipation [Diarrhea] : no diarrhea [Dysuria] : no dysuria [Incontinence] : no incontinence

## 2024-01-13 NOTE — HISTORY OF PRESENT ILLNESS
[___ Month(s) Ago] : [unfilled] month(s) ago [None] : ~he/she~ had no significant interval events [de-identified] : Ms. Guevara is a 57 yo F with extensive medical history including diabetes mellitus (diagnosed in 2016, HbA1c: 5.5 in 7/2020), dyslipidemia, hypertension, obesity, s/p cholecystectomy (6/2017), Hx of breast cancer s/p R lumpectomy (2015), on Tamoxifen (1/2016-9-2017, then 2018 till 2023 after b/l breast mastectomy), FARSHAD positivity (1:640, 1:160) with normal Immunoglobulins, nephrolithiasis and asthma is being followed by hepatology for fatty liver disease.    Pt endorses feeling well, no recent visits to the ED or hospital admissions, no new medications.   She was seen by Dr. Campoverde (GI) for evaluation of abdominal pain. She was started on famotidine with improvement in her symptoms. She underwent fibro scan in 9/2023.  She also reports that has been having symptoms of kidney stone recurrence, will be following up with Dr. Barnes next month. Has also been experiencing some SOB and wheezing which she attributes to the cold weather, has also been experiencing sore throat, denies ear pain, fever, chills. Family members at home tested positive for covid and flu. She has been using rescue inhaler 2X per day. Will be seeing Dr. Dolan on 3/24.

## 2024-01-13 NOTE — PHYSICAL EXAM
[General Appearance - Alert] : alert [General Appearance - In No Acute Distress] : in no acute distress [General Appearance - Well Nourished] : well nourished [General Appearance - Well Developed] : well developed [General Appearance - Well-Appearing] : healthy appearing [Sclera] : the sclera and conjunctiva were normal [Oropharynx] : the oropharynx was normal [Neck Appearance] : the appearance of the neck was normal [] : no respiratory distress [Exaggerated Use Of Accessory Muscles For Inspiration] : no accessory muscle use [Respiration, Rhythm And Depth] : normal respiratory rhythm and effort [Auscultation Breath Sounds / Voice Sounds] : lungs were clear to auscultation bilaterally [Heart Rate And Rhythm] : heart rate was normal and rhythm regular [Heart Sounds] : normal S1 and S2 [Edema] : there was no peripheral edema [No Spinal Tenderness] : no spinal tenderness [Abnormal Walk] : normal gait [Skin Color & Pigmentation] : normal skin color and pigmentation [Skin Turgor] : normal skin turgor [Oriented To Time, Place, And Person] : oriented to person, place, and time [Impaired Insight] : insight and judgment were intact [Affect] : the affect was normal [Mood] : the mood was normal [Memory Recent] : recent memory was not impaired [Memory Remote] : remote memory was not impaired [Rounded] : rounded [Normal] : normal to percussion [Epigastric] : in the epigastric area [RUQ] : in the right upper quadrant [No Mass] : no masses were palpated [No HSM] : no hepatosplenomegaly noted [None] : no CVA tenderness [Scleral Icterus] : No Scleral Icterus [Spider Angioma] : No spider angioma(s) were observed [Abdominal Bruit] : no abdominal bruit [Abdominal  Ascites] : no ascites [Asterixis] : no asterixis observed [Jaundice] : No jaundice [Palmar Erythema] : no Palmar Erythema [Depression] : no depression [FreeTextEntry1] : Grossly intact [Dilated Collat. Veins] : no collateral vein dilation [Striae] : no striae [Firm] : not firm [Rigid] : not rigid [Rebound] : no rebound [Guarding] : no guarding [Denis's] : a negative Denis's sign

## 2024-01-29 ENCOUNTER — APPOINTMENT (OUTPATIENT)
Age: 59
End: 2024-01-29

## 2024-02-02 ENCOUNTER — APPOINTMENT (OUTPATIENT)
Dept: PULMONOLOGY | Facility: CLINIC | Age: 59
End: 2024-02-02

## 2024-02-02 NOTE — DISCUSSION/SUMMARY
[FreeTextEntry1] : Asthma, mild dyspnea on exertion postoperatively Lungs are clear  history of COVID infection  COVID vaccine   received She has  had influenza vaccine and PPSV  Breast cancer underwent reconstruction  She is having increased symptoms while using Symbicort as needed  She has been using Symbicort only once a day.  She remains improved  PLAN She will use    Symbicort 160/4.5 2 puffs once to twice per day and observe symptoms  Use  albuterol MDI as needed  Reevaluate for weaning to Symbciort 80/4.5 if remains stable at next appointment    She will continue montelukast 10 mg at night  She should return for PFT   Total time spent : 30 minutes Including: Preparation prior to visit - Reviewing prior record, results of tests and Consultation Reports as applicable Conducting an appropriate H & P during today's encounter Appropriate orders for tests, medications and procedures, as applicable Counseling patient  Note completion    Flaquito Sheriff MD [facility]

## 2024-02-05 ENCOUNTER — APPOINTMENT (OUTPATIENT)
Age: 59
End: 2024-02-05
Payer: COMMERCIAL

## 2024-02-05 VITALS
HEART RATE: 78 BPM | HEIGHT: 62 IN | BODY MASS INDEX: 29.81 KG/M2 | OXYGEN SATURATION: 98 % | DIASTOLIC BLOOD PRESSURE: 74 MMHG | WEIGHT: 162 LBS | TEMPERATURE: 98.7 F | SYSTOLIC BLOOD PRESSURE: 107 MMHG

## 2024-02-05 DIAGNOSIS — N20.0 CALCULUS OF KIDNEY: ICD-10-CM

## 2024-02-05 PROCEDURE — 76775 US EXAM ABDO BACK WALL LIM: CPT

## 2024-02-06 ENCOUNTER — APPOINTMENT (OUTPATIENT)
Dept: CARDIOLOGY | Facility: CLINIC | Age: 59
End: 2024-02-06
Payer: COMMERCIAL

## 2024-02-06 ENCOUNTER — NON-APPOINTMENT (OUTPATIENT)
Age: 59
End: 2024-02-06

## 2024-02-06 VITALS
SYSTOLIC BLOOD PRESSURE: 109 MMHG | WEIGHT: 156 LBS | RESPIRATION RATE: 18 BRPM | OXYGEN SATURATION: 98 % | TEMPERATURE: 97.2 F | BODY MASS INDEX: 28.53 KG/M2 | DIASTOLIC BLOOD PRESSURE: 72 MMHG | HEART RATE: 82 BPM

## 2024-02-06 DIAGNOSIS — I10 ESSENTIAL (PRIMARY) HYPERTENSION: ICD-10-CM

## 2024-02-06 DIAGNOSIS — R00.2 PALPITATIONS: ICD-10-CM

## 2024-02-06 PROCEDURE — 99214 OFFICE O/P EST MOD 30 MIN: CPT | Mod: 25

## 2024-02-06 PROCEDURE — 93000 ELECTROCARDIOGRAM COMPLETE: CPT

## 2024-02-06 NOTE — HISTORY OF PRESENT ILLNESS
[FreeTextEntry1] : 58 year old F with HTN, breast cancer s/p RT and breast surgery who presents for f/u.  She has been having intermittent palpitations since last visit. It occurs for few seconds, 2-3 times a week. It doesn't bother her much. She denies CP, SOB, dizziness, LOC, orthopnea, PND, or LE edema.  She is on amlo-valsartan 5-160mg daily. She is on nebivolol 5mg daily  Last seen by Dr. Mace 7/24/23 - 57 year old female had hypertension sometimes back. It was controlled by nifedipine along with Bystolic. However, she had on and off palpitations which bothers her. She had repeated Holter examination without findings of dysrhythmia She had lumpectomy, right, s/p RT and on tamoxifen . She was told having recurrence. She tolerated the mastectomy. She is here for the pre-procedural cardiac assessment for the reconstruction of breasts. In March this year ( 2020) she was contracted with COVID. After that she had exertional shortness of breath. She had echocardiogram and was negative. She has been free of symptoms of chest pain, shortness of breath, dizziness or palpitation with ordinary activities

## 2024-02-06 NOTE — CARDIOLOGY SUMMARY
[de-identified] : normal,  no interval changes.   Echo: 12-, stress echocardiogram with negative for ischemia, no major valve abnormality. LVEF 65%.  ( reviewed) repeating stress echocardiogram on 12-, result  with  EF 65%, negative for ischemia. Repeating the  stress echocardiogram for the post COVID infection symptoms on 10-. EF 65%, normal echo, negative for ischemia.

## 2024-02-06 NOTE — ASSESSMENT
[FreeTextEntry1] : 58 year old F with HTN, breast cancer s/p RT and breast surgery who presents for f/u.  She has been having intermittent palpitations since last visit. It occurs for few seconds, 2-3 times a week. It doesn't bother her much. She denies CP, SOB, dizziness, LOC, orthopnea, PND, or LE edema.  She is on amlo-valsartan 5-160mg daily. She is on nebivolol 5mg daily  1) Palpitations 2) HTN - EKG 2/6/24 showed sinus rhythm with low voltage in precordial leads - She states her palpitations only last few seconds and don't bother her much. She would like to defer event monitor. Continue nebivolol 5mg daily for now - Continue Exforge 5-160mg daily (pt reports allergy to generic med)  3) Follow-up 6 months or sooner if symptomatic

## 2024-02-09 NOTE — H&P PST ADULT - NEGATIVE GENERAL GENITOURINARY SYMPTOMS
[FreeTextEntry3] : JAE Osborne has acted like a scribe on my behalf.  I have reviewed the note and edited where appropriate.  History, PE, assessment, and plan were personally performed by me.
no dysuria/no flank pain R/no incontinence

## 2024-03-20 ENCOUNTER — APPOINTMENT (OUTPATIENT)
Dept: RHEUMATOLOGY | Facility: CLINIC | Age: 59
End: 2024-03-20
Payer: COMMERCIAL

## 2024-03-20 VITALS
OXYGEN SATURATION: 97 % | BODY MASS INDEX: 29.44 KG/M2 | HEART RATE: 86 BPM | HEIGHT: 62 IN | TEMPERATURE: 96.9 F | WEIGHT: 160 LBS | SYSTOLIC BLOOD PRESSURE: 105 MMHG | DIASTOLIC BLOOD PRESSURE: 63 MMHG

## 2024-03-20 DIAGNOSIS — M25.512 PAIN IN LEFT SHOULDER: ICD-10-CM

## 2024-03-20 DIAGNOSIS — M77.8 OTHER ENTHESOPATHIES, NOT ELSEWHERE CLASSIFIED: ICD-10-CM

## 2024-03-20 PROCEDURE — G2211 COMPLEX E/M VISIT ADD ON: CPT

## 2024-03-20 PROCEDURE — 99214 OFFICE O/P EST MOD 30 MIN: CPT

## 2024-03-20 RX ORDER — DICLOFENAC SODIUM 1% 10 MG/G
1 GEL TOPICAL DAILY
Qty: 2 | Refills: 2 | Status: ACTIVE | COMMUNITY
Start: 2023-11-01 | End: 1900-01-01

## 2024-03-20 NOTE — PHYSICAL EXAM
[General Appearance - Alert] : alert [General Appearance - In No Acute Distress] : in no acute distress [Neck Cervical Mass (___cm)] : no neck mass was observed [Neck Appearance] : the appearance of the neck was normal [Jugular Venous Distention Increased] : there was no jugular-venous distention [Thyroid Diffuse Enlargement] : the thyroid was not enlarged [Thyroid Nodule] : there were no palpable thyroid nodules [Heart Rate And Rhythm] : heart rate was normal and rhythm regular [Auscultation Breath Sounds / Voice Sounds] : lungs were clear to auscultation bilaterally [Heart Sounds] : normal S1 and S2 [Heart Sounds Gallop] : no gallops [Murmurs] : no murmurs [Heart Sounds Pericardial Friction Rub] : no pericardial rub [Edema] : there was no peripheral edema [Full Pulse] : the pedal pulses are present [Bowel Sounds] : normal bowel sounds [Abdomen Tenderness] : non-tender [Abdomen Soft] : soft [] : no hepato-splenomegaly [Abdomen Mass (___ Cm)] : no abdominal mass palpated [Cervical Lymph Nodes Enlarged Posterior Bilaterally] : posterior cervical [Supraclavicular Lymph Nodes Enlarged Bilaterally] : supraclavicular [Cervical Lymph Nodes Enlarged Anterior Bilaterally] : anterior cervical [No CVA Tenderness] : no ~M costovertebral angle tenderness [No Spinal Tenderness] : no spinal tenderness [Abnormal Walk] : normal gait [Musculoskeletal - Swelling] : no joint swelling seen [Nail Clubbing] : no clubbing  or cyanosis of the fingernails [Motor Tone] : muscle strength and tone were normal [Oriented To Time, Place, And Person] : oriented to person, place, and time [Impaired Insight] : insight and judgment were intact [Affect] : the affect was normal [FreeTextEntry1] : decreased sensation over bilateral thighs with pin prick and dull

## 2024-03-20 NOTE — ASSESSMENT
[FreeTextEntry1] : 1. Polyarthralgia and positive FARSHAD and borderline RF at 17 -  new onset of hand and knee pain - repeat labs  2. Breast CA - s/p bilateral mastectomy  3. Lipoma - referral to derm for excision - not seen yet  4. Hip and knee pain -x-rays with mild arthritis - defer PT at this time -continue with home exercises  5. Right shoulder- complete RC tear -s/p surgery - minimal pain today  6. Cervical pain - with new onset of bilateral arm paresthesia - EMG is normal - start gabapentin 100 mg and increase as tolerated. 7 left shoulder and left-hand pain - send for US - has level 3 fatty liver - avoid NSAIDS - can use topical diclofenac   I reviewed previous labs results with patients. Laboratory tests ordered today Diagnosis and Prognosis discussed Continue with current medications medications refilled education provided on gabapentin F/u 2 months

## 2024-03-24 ENCOUNTER — TRANSCRIPTION ENCOUNTER (OUTPATIENT)
Age: 59
End: 2024-03-24

## 2024-03-24 DIAGNOSIS — R76.8 OTHER SPECIFIED ABNORMAL IMMUNOLOGICAL FINDINGS IN SERUM: ICD-10-CM

## 2024-03-24 LAB
ALBUMIN SERPL ELPH-MCNC: 4.5 G/DL
ALP BLD-CCNC: 97 U/L
ALT SERPL-CCNC: 24 U/L
ANA PAT FLD IF-IMP: NORMAL
ANA SER IF-ACNC: ABNORMAL
ANION GAP SERPL CALC-SCNC: 14 MMOL/L
AST SERPL-CCNC: 19 U/L
BILIRUB DIRECT SERPL-MCNC: 0.1 MG/DL
BILIRUB INDIRECT SERPL-MCNC: 0.2 MG/DL
BILIRUB SERPL-MCNC: 0.4 MG/DL
BUN SERPL-MCNC: 14 MG/DL
CALCIUM SERPL-MCNC: 9.6 MG/DL
CHLORIDE SERPL-SCNC: 101 MMOL/L
CO2 SERPL-SCNC: 22 MMOL/L
CREAT SERPL-MCNC: 0.76 MG/DL
DEPRECATED KAPPA LC FREE/LAMBDA SER: 1.87 RATIO
EGFR: 90 ML/MIN/1.73M2
GLUCOSE SERPL-MCNC: 77 MG/DL
HCT VFR BLD CALC: 41 %
HGB BLD-MCNC: 13.4 G/DL
IGA SER QL IEP: 183 MG/DL
IGG SER QL IEP: 1308 MG/DL
IGM SER QL IEP: 158 MG/DL
INR PPP: 0.98 RATIO
KAPPA LC CSF-MCNC: 1.33 MG/DL
KAPPA LC SERPL-MCNC: 2.49 MG/DL
MCHC RBC-ENTMCNC: 27.9 PG
MCHC RBC-ENTMCNC: 32.7 GM/DL
MCV RBC AUTO: 85.2 FL
PLATELET # BLD AUTO: 324 K/UL
POTASSIUM SERPL-SCNC: 4.3 MMOL/L
PROT SERPL-MCNC: 7.3 G/DL
PT BLD: 11.1 SEC
RAPID RVP RESULT: NOT DETECTED
RBC # BLD: 4.81 M/UL
RBC # FLD: 13.1 %
SARS-COV-2 RNA PNL RESP NAA+PROBE: NOT DETECTED
SODIUM SERPL-SCNC: 137 MMOL/L
WBC # FLD AUTO: 8.05 K/UL

## 2024-03-25 NOTE — ASU PREOP CHECKLIST - ISOLATION PRECAUTIONS
No care due was identified.  [facility] Embedded Care Due Messages. Reference number: [id number].   3/25/2024 6:20:55 PM CDT  
none

## 2024-03-26 ENCOUNTER — APPOINTMENT (OUTPATIENT)
Dept: INTERNAL MEDICINE | Facility: CLINIC | Age: 59
End: 2024-03-26
Payer: COMMERCIAL

## 2024-03-26 VITALS
RESPIRATION RATE: 18 BRPM | HEART RATE: 66 BPM | OXYGEN SATURATION: 98 % | DIASTOLIC BLOOD PRESSURE: 74 MMHG | SYSTOLIC BLOOD PRESSURE: 113 MMHG | BODY MASS INDEX: 29.63 KG/M2 | TEMPERATURE: 97 F | WEIGHT: 161 LBS | HEIGHT: 62 IN

## 2024-03-26 DIAGNOSIS — Z00.00 ENCOUNTER FOR GENERAL ADULT MEDICAL EXAMINATION W/OUT ABNORMAL FINDINGS: ICD-10-CM

## 2024-03-26 PROCEDURE — 36415 COLL VENOUS BLD VENIPUNCTURE: CPT

## 2024-03-26 PROCEDURE — 99396 PREV VISIT EST AGE 40-64: CPT | Mod: 25

## 2024-03-26 RX ORDER — METFORMIN HYDROCHLORIDE 500 MG/1
500 TABLET, COATED ORAL
Qty: 180 | Refills: 3 | Status: ACTIVE | COMMUNITY
Start: 2017-01-11 | End: 1900-01-01

## 2024-03-26 RX ORDER — ATORVASTATIN CALCIUM 10 MG/1
10 TABLET, FILM COATED ORAL DAILY
Qty: 90 | Refills: 3 | Status: ACTIVE | COMMUNITY
Start: 2020-05-14 | End: 1900-01-01

## 2024-03-26 RX ORDER — MONTELUKAST 10 MG/1
10 TABLET, FILM COATED ORAL DAILY
Qty: 90 | Refills: 3 | Status: ACTIVE | COMMUNITY
Start: 2017-10-27 | End: 1900-01-01

## 2024-03-26 NOTE — ASSESSMENT
[FreeTextEntry1] : 58 year old female found to have stable Hypertension, Type 2 Diabetes Mellitus, Asthma, Hypertriglyceridemia, with the current prescription regimen as recommended, diet and lifestyle modifications, as counseled. Prior results reviewed, interpreted and discussed with the patient during today's examination, as appropriate. Follow up, treatment plan and tests, as ordered.   Patient was recommended to follow up with GYN for routine examination, PAP smear and Mammogram, reports will be provided, when ready.

## 2024-03-26 NOTE — HISTORY OF PRESENT ILLNESS
[de-identified] : 58 year old female patient with history of stable Hypertension, Type 2 Diabetes Mellitus, Asthma, Hypertriglyceridemia, history as stated, presented for an annual preventative examination.

## 2024-03-26 NOTE — HEALTH RISK ASSESSMENT
[Good] : ~his/her~  mood as  good [No] : No [No falls in past year] : Patient reported no falls in the past year [0] : 2) Feeling down, depressed, or hopeless: Not at all (0) [Patient reported mammogram was normal] : Patient reported mammogram was normal [Patient reported bone density results were normal] : Patient reported bone density results were normal [Patient reported colonoscopy was abnormal] : Patient reported colonoscopy was abnormal [None] : None [Feels Safe at Home] : Feels safe at home [Fully functional (using the telephone, shopping, preparing meals, housekeeping, doing laundry, using] : Fully functional and needs no help or supervision to perform IADLs (using the telephone, shopping, preparing meals, housekeeping, doing laundry, using transportation, managing medications and managing finances) [Fully functional (bathing, dressing, toileting, transferring, walking, feeding)] : Fully functional (bathing, dressing, toileting, transferring, walking, feeding) [Smoke Detector] : smoke detector [Seat Belt] :  uses seat belt [Carbon Monoxide Detector] : carbon monoxide detector [Sunscreen] : uses sunscreen [With Patient/Caregiver] : , with patient/caregiver [Never] : Never [FreeTextEntry1] : Check up  [de-identified] : RHEUM/CARD/HEP/GI [[id number]] : 0 [Change in mental status noted] : No change in mental status noted [Reports changes in hearing] : Reports no changes in hearing [Reports changes in vision] : Reports no changes in vision [Reports changes in dental health] : Reports no changes in dental health [MammogramDate] : 05/22 [PapSmearComments] : As per GYN. [BoneDensityDate] : 09/17 [ColonoscopyDate] : 03/21 [HIVDate] : 01/23 [HIVComments] : Negative [HepatitisCDate] : 01/23 [HepatitisCComments] : Negative [AdvancecareDate] : 03/24

## 2024-03-27 LAB
ALBUMIN SERPL ELPH-MCNC: 4.6 G/DL
ALP BLD-CCNC: 90 U/L
ALT SERPL-CCNC: 30 U/L
ANION GAP SERPL CALC-SCNC: 14 MMOL/L
AST SERPL-CCNC: 26 U/L
BASOPHILS # BLD AUTO: 0.04 K/UL
BASOPHILS NFR BLD AUTO: 0.6 %
BILIRUB SERPL-MCNC: 0.5 MG/DL
BUN SERPL-MCNC: 11 MG/DL
CALCIUM SERPL-MCNC: 10 MG/DL
CHLORIDE SERPL-SCNC: 104 MMOL/L
CHOLEST SERPL-MCNC: 157 MG/DL
CO2 SERPL-SCNC: 23 MMOL/L
CREAT SERPL-MCNC: 0.75 MG/DL
EGFR: 92 ML/MIN/1.73M2
EOSINOPHIL # BLD AUTO: 0.1 K/UL
EOSINOPHIL NFR BLD AUTO: 1.5 %
ESTIMATED AVERAGE GLUCOSE: 111 MG/DL
GGT SERPL-CCNC: 20 U/L
GLUCOSE SERPL-MCNC: 91 MG/DL
HBA1C MFR BLD HPLC: 5.5 %
HCT VFR BLD CALC: 41.8 %
HDLC SERPL-MCNC: 53 MG/DL
HGB BLD-MCNC: 13.4 G/DL
IMM GRANULOCYTES NFR BLD AUTO: 0.3 %
LDLC SERPL CALC-MCNC: 84 MG/DL
LYMPHOCYTES # BLD AUTO: 1.91 K/UL
LYMPHOCYTES NFR BLD AUTO: 28 %
MAN DIFF?: NORMAL
MCHC RBC-ENTMCNC: 27.3 PG
MCHC RBC-ENTMCNC: 32.1 GM/DL
MCV RBC AUTO: 85.1 FL
MONOCYTES # BLD AUTO: 0.51 K/UL
MONOCYTES NFR BLD AUTO: 7.5 %
NEUTROPHILS # BLD AUTO: 4.25 K/UL
NEUTROPHILS NFR BLD AUTO: 62.1 %
NONHDLC SERPL-MCNC: 104 MG/DL
PLATELET # BLD AUTO: 316 K/UL
POTASSIUM SERPL-SCNC: 4.3 MMOL/L
PROT SERPL-MCNC: 7.6 G/DL
RBC # BLD: 4.91 M/UL
RBC # FLD: 13.5 %
SODIUM SERPL-SCNC: 141 MMOL/L
TRIGL SERPL-MCNC: 113 MG/DL
TSH SERPL-ACNC: 1.45 UIU/ML
WBC # FLD AUTO: 6.83 K/UL

## 2024-04-03 ENCOUNTER — TRANSCRIPTION ENCOUNTER (OUTPATIENT)
Age: 59
End: 2024-04-03

## 2024-04-11 ENCOUNTER — OUTPATIENT (OUTPATIENT)
Dept: OUTPATIENT SERVICES | Facility: HOSPITAL | Age: 59
LOS: 1 days | End: 2024-04-11

## 2024-04-11 ENCOUNTER — APPOINTMENT (OUTPATIENT)
Dept: ULTRASOUND IMAGING | Facility: CLINIC | Age: 59
End: 2024-04-11
Payer: COMMERCIAL

## 2024-04-11 DIAGNOSIS — Z98.890 OTHER SPECIFIED POSTPROCEDURAL STATES: Chronic | ICD-10-CM

## 2024-04-11 DIAGNOSIS — Z90.710 ACQUIRED ABSENCE OF BOTH CERVIX AND UTERUS: Chronic | ICD-10-CM

## 2024-04-11 DIAGNOSIS — Z90.13 ACQUIRED ABSENCE OF BILATERAL BREASTS AND NIPPLES: Chronic | ICD-10-CM

## 2024-04-11 PROCEDURE — 76881 US COMPL JOINT R-T W/IMG: CPT | Mod: 26,LT

## 2024-04-12 DIAGNOSIS — M65.20 CALCIFIC TENDINITIS, UNSPECIFIED SITE: ICD-10-CM

## 2024-04-26 RX ORDER — AMLODIPINE BESYLATE AND VALSARTAN 5; 160 MG/1; MG/1
5-160 TABLET, FILM COATED ORAL DAILY
Qty: 90 | Refills: 3 | Status: ACTIVE | COMMUNITY
Start: 2023-11-27 | End: 1900-01-01

## 2024-05-21 ENCOUNTER — NON-APPOINTMENT (OUTPATIENT)
Age: 59
End: 2024-05-21

## 2024-07-03 ENCOUNTER — APPOINTMENT (OUTPATIENT)
Dept: PULMONOLOGY | Facility: CLINIC | Age: 59
End: 2024-07-03
Payer: COMMERCIAL

## 2024-07-03 VITALS
TEMPERATURE: 97.3 F | OXYGEN SATURATION: 97 % | SYSTOLIC BLOOD PRESSURE: 104 MMHG | WEIGHT: 159 LBS | BODY MASS INDEX: 29.08 KG/M2 | DIASTOLIC BLOOD PRESSURE: 80 MMHG | HEART RATE: 72 BPM

## 2024-07-03 DIAGNOSIS — R09.89 OTHER SPECIFIED SYMPTOMS AND SIGNS INVOLVING THE CIRCULATORY AND RESPIRATORY SYSTEMS: ICD-10-CM

## 2024-07-03 PROCEDURE — 94727 GAS DIL/WSHOT DETER LNG VOL: CPT

## 2024-07-03 PROCEDURE — 94729 DIFFUSING CAPACITY: CPT

## 2024-07-03 PROCEDURE — 99214 OFFICE O/P EST MOD 30 MIN: CPT | Mod: 25

## 2024-07-03 PROCEDURE — 94060 EVALUATION OF WHEEZING: CPT

## 2024-07-03 RX ORDER — AZITHROMYCIN 250 MG/1
250 TABLET, FILM COATED ORAL
Qty: 1 | Refills: 0 | Status: ACTIVE | COMMUNITY
Start: 2024-07-03 | End: 1900-01-01

## 2024-07-03 RX ORDER — PROMETHAZINE HYDROCHLORIDE AND DEXTROMETHORPHAN HYDROBROMIDE ORAL SOLUTION 15; 6.25 MG/5ML; MG/5ML
6.25-15 SOLUTION ORAL TWICE DAILY
Qty: 1 | Refills: 3 | Status: ACTIVE | COMMUNITY
Start: 2024-07-03 | End: 1900-01-01

## 2024-07-09 ENCOUNTER — APPOINTMENT (OUTPATIENT)
Dept: INTERNAL MEDICINE | Facility: CLINIC | Age: 59
End: 2024-07-09
Payer: COMMERCIAL

## 2024-07-09 ENCOUNTER — APPOINTMENT (OUTPATIENT)
Dept: HEPATOLOGY | Facility: CLINIC | Age: 59
End: 2024-07-09
Payer: COMMERCIAL

## 2024-07-09 VITALS
TEMPERATURE: 98 F | HEIGHT: 62 IN | BODY MASS INDEX: 29.63 KG/M2 | SYSTOLIC BLOOD PRESSURE: 111 MMHG | HEART RATE: 82 BPM | OXYGEN SATURATION: 98 % | WEIGHT: 161 LBS | DIASTOLIC BLOOD PRESSURE: 74 MMHG | RESPIRATION RATE: 18 BRPM

## 2024-07-09 DIAGNOSIS — I10 ESSENTIAL (PRIMARY) HYPERTENSION: ICD-10-CM

## 2024-07-09 DIAGNOSIS — J45.909 UNSPECIFIED ASTHMA, UNCOMPLICATED: ICD-10-CM

## 2024-07-09 DIAGNOSIS — E78.1 PURE HYPERGLYCERIDEMIA: ICD-10-CM

## 2024-07-09 DIAGNOSIS — K76.0 FATTY (CHANGE OF) LIVER, NOT ELSEWHERE CLASSIFIED: ICD-10-CM

## 2024-07-09 DIAGNOSIS — E11.9 TYPE 2 DIABETES MELLITUS W/OUT COMPLICATIONS: ICD-10-CM

## 2024-07-09 DIAGNOSIS — E66.9 OBESITY, UNSPECIFIED: ICD-10-CM

## 2024-07-09 PROCEDURE — 99213 OFFICE O/P EST LOW 20 MIN: CPT

## 2024-07-09 PROCEDURE — 99214 OFFICE O/P EST MOD 30 MIN: CPT

## 2024-07-15 ENCOUNTER — TRANSCRIPTION ENCOUNTER (OUTPATIENT)
Age: 59
End: 2024-07-15

## 2024-07-15 LAB
ALBUMIN MFR SERPL ELPH: 55.7 %
ALBUMIN SERPL ELPH-MCNC: 4.4 G/DL
ALBUMIN SERPL-MCNC: 4.1 G/DL
ALP BLD-CCNC: 80 U/L
ALPHA1 GLOB MFR SERPL ELPH: 3.9 %
ALPHA1 GLOB SERPL ELPH-MCNC: 0.3 G/DL
ALPHA2 GLOB MFR SERPL ELPH: 11.7 %
ALPHA2 GLOB SERPL ELPH-MCNC: 0.9 G/DL
ALT SERPL-CCNC: 28 U/L
ANA SER IF-ACNC: NEGATIVE
ANION GAP SERPL CALC-SCNC: 13 MMOL/L
AST SERPL-CCNC: 21 U/L
B-GLOBULIN SERPL ELPH-MCNC: 0.9 G/DL
BILIRUB DIRECT SERPL-MCNC: 0.1 MG/DL
BILIRUB INDIRECT SERPL-MCNC: 0.2 MG/DL
BILIRUB SERPL-MCNC: 0.4 MG/DL
BUN SERPL-MCNC: 10 MG/DL
CALCIUM SERPL-MCNC: 9.6 MG/DL
CHLORIDE SERPL-SCNC: 106 MMOL/L
CHOLEST SERPL-MCNC: 152 MG/DL
CHOLEST SERPL-MCNC: 160 MG/DL
CO2 SERPL-SCNC: 22 MMOL/L
COMMENT:: NORMAL
CREAT SERPL-MCNC: 0.78 MG/DL
DEPRECATED KAPPA LC FREE/LAMBDA SER: 1.36 RATIO
EGFR: 87 ML/MIN/1.73M2
ESTIMATED AVERAGE GLUCOSE: 120 MG/DL
FIBROSIS STAGE SERPL QL: NORMAL
FIBROSURE ALPHA 2 MACROGLOBULINS: 300 MG/DL
FIBROSURE ALT (SGPT): 29 IU/L
FIBROSURE APOLIPOPROTEIN A1: 141 MG/DL
FIBROSURE GGT: 19 IU/L
FIBROSURE HAPTOGLOBIN: 126 MG/DL
FIBROSURE SCORING: NORMAL
FIBROSURE TOTAL BILIRUBIN: 0.4 MG/DL
GAMMA GLOB MFR SERPL ELPH: 16.1 %
GLUCOSE SERPL-MCNC: 92 MG/DL
GLUCOSE SERPL-MCNC: 95 MG/DL
HBA1C MFR BLD HPLC: 5.8 %
HCT VFR BLD CALC: 41 %
HDLC SERPL-MCNC: 45 MG/DL
HEPATITIS A IGG ANTIBODY: REACTIVE
HGB BLD-MCNC: 13.2 G/DL
IGA SER QL IEP: 166 MG/DL
IGG SER QL IEP: 1084 MG/DL
IGM SER QL IEP: 153 MG/DL
INR PPP: 1.03 RATIO
INTERPRETATION SERPL IEP-IMP: NORMAL
INTERPRETATIONS:: NORMAL
KAPPA LC CSF-MCNC: 1.32 MG/DL
KAPPA LC SERPL-MCNC: 1.8 MG/DL
LDLC SERPL CALC-MCNC: 76 MG/DL
LIVER FIBR SCORE SERPL CALC.FIBROSURE: 0.31
Lab: NORMAL
MCHC RBC-ENTMCNC: 27.3 PG
MCHC RBC-ENTMCNC: 32.2 GM/DL
NASH SCORING: NORMAL
NECROINFLAMMATORY ACT GRADE SERPL QL: NORMAL
NECROINFLAMMATORY ACT SCORE SERPL: 0.58
NONHDLC SERPL-MCNC: 114 MG/DL
PROT SERPL-MCNC: 7.4 G/DL
PROT SERPL-MCNC: 7.4 G/DL
PT BLD: 11.7 SEC
RBC # BLD: 4.84 M/UL
RBC # FLD: 14 %
SERVICE CMNT-IMP: NORMAL
SODIUM SERPL-SCNC: 140 MMOL/L
STEATOSIS GRADE: NORMAL
STEATOSIS SCORE: 0.45
STEATOSIS SCORING: NORMAL
TRIGL SERPL-MCNC: 228 MG/DL
TRIGL SERPL-MCNC: 236 MG/DL
WBC # FLD AUTO: 8.31 K/UL

## 2024-07-22 ENCOUNTER — NON-APPOINTMENT (OUTPATIENT)
Age: 59
End: 2024-07-22

## 2024-07-22 RX ORDER — TAMSULOSIN HYDROCHLORIDE 0.4 MG/1
0.4 CAPSULE ORAL
Qty: 30 | Refills: 1 | Status: ACTIVE | COMMUNITY
Start: 2024-07-22 | End: 1900-01-01

## 2024-07-29 ENCOUNTER — APPOINTMENT (OUTPATIENT)
Age: 59
End: 2024-07-29

## 2024-07-29 ENCOUNTER — APPOINTMENT (OUTPATIENT)
Age: 59
End: 2024-07-29
Payer: COMMERCIAL

## 2024-07-29 VITALS
WEIGHT: 160 LBS | HEART RATE: 91 BPM | BODY MASS INDEX: 29.44 KG/M2 | DIASTOLIC BLOOD PRESSURE: 74 MMHG | HEIGHT: 62 IN | OXYGEN SATURATION: 96 % | SYSTOLIC BLOOD PRESSURE: 122 MMHG

## 2024-07-29 DIAGNOSIS — N39.0 URINARY TRACT INFECTION, SITE NOT SPECIFIED: ICD-10-CM

## 2024-07-29 DIAGNOSIS — N20.0 CALCULUS OF KIDNEY: ICD-10-CM

## 2024-07-29 DIAGNOSIS — A49.9 URINARY TRACT INFECTION, SITE NOT SPECIFIED: ICD-10-CM

## 2024-07-29 LAB — BACTERIA UR CULT: ABNORMAL

## 2024-07-29 PROCEDURE — 99214 OFFICE O/P EST MOD 30 MIN: CPT

## 2024-07-29 PROCEDURE — 76775 US EXAM ABDO BACK WALL LIM: CPT

## 2024-07-29 RX ORDER — CEFDINIR 300 MG/1
300 CAPSULE ORAL
Qty: 14 | Refills: 0 | Status: ACTIVE | COMMUNITY
Start: 2024-07-29 | End: 1900-01-01

## 2024-07-29 NOTE — HISTORY OF PRESENT ILLNESS
[FreeTextEntry1] : 50 yo F s/p left URS/HLL last year. Most recent CT in June showed two small stones in left kidney. Was doing well until 2 weeks ago, went to ER for severe flank pain. Stone from kidney had dropped into ureter (obstructing 3mm stone). Also went to urgent care last week for LUTS and dysuria. Told she had UTI and finished course of cipro. No fever, chills, nausea or vomiting  11/26/2018 Interval history: Pt found to have obstructing left ureteral stone and presented to the ER with intractable pain. Pt underwent left URS with stone basket extraction. NO issues since procedure.  4/1/19 Interval history: Has been having some left sided flank pain for a month. US done when symptoms initialyl started was neg for hydronephrosis. Last few days have been having dysuria, suprapubic pain. No gross hematuria, fever, or chills  10/7/19 Interval history: Was doing well until 9/26/19 - ER for left flank pain CT showed 5mm nonobstructing left sided stone, otherwise no hydronephrosis Was told she likely had UTI and was sent home with a course of levaquin Symptoms improved but still having some LUTS - frequency but only small amounts coming out Still with some mild left sided pain no gross hematuria, fever, or chills Of note, pt states she did repeat 24 hr urine collection in April after last visit but no record of it at this time. Also did 24 hr urine collection last year with nephrologist and was told everything was normal.  9/10/20 Interval history: Had episode of gross hematuria a month ago for two days = red urine with little clots Last symptomatic UTI was 2 months ago Haven't passed stones since last visit Left flank pain = intermittent, sometimes sharp, seems to be worse in the afternoon Drinks 1.5 gallon, chamomile tea sometimes Currently no urinary issues   10/8/20 Interval history: Had some flank pain about 2 weeks ago No fever, chills, nausea or vomiting Abdominal US since last visit showed 1.4cm nonobstructing left renal stone  12/21/20 Interval history: Pt is s/p URS/HLL with subsequent stent removal Some hematuria afterward but now clear  4/14/22 Interval history: Pt hasn't been seen since 2020 Since then, s/p breast cancer treatment Recently had some right abdominal pain - US showed bilateral nonobstructing stone Also complaining of dysuria for 1 week Drinks 1 gallon of water  7/29/24 Interval history: Has been having some left flank pain since last week - constant no gross hematuria but some mild dysuria Tamsulosin seemd to help  Recent urine culture - positive Renal US today in the office - stable nonobstructing bilateral calculi, no hydronephrosis

## 2024-07-29 NOTE — ASSESSMENT
[FreeTextEntry1] : 57 yo F with nephrolithiasis, UTI  - Reviewed renal US imaging from today and confirmed findings as written above - Reviewed recent urine culture and confirmed positive finding - discussed possible etiologies for UTI. Spent extensive period of time discussed behavioral modification including adequate hydration, cutting back on caffeine intake, timed voiding during the day, importance of controlling any diabetes and chronic constipation and how all of these things could potentially increase risk for persistent or recurrent UTI. - Start Cefdinir - Rx transmitted - Should symptoms worsen, will plan for CT stone hunt

## 2024-08-02 ENCOUNTER — TRANSCRIPTION ENCOUNTER (OUTPATIENT)
Age: 59
End: 2024-08-02

## 2024-08-07 ENCOUNTER — APPOINTMENT (OUTPATIENT)
Dept: INTERNAL MEDICINE | Facility: CLINIC | Age: 59
End: 2024-08-07

## 2024-08-29 ENCOUNTER — APPOINTMENT (OUTPATIENT)
Dept: INTERNAL MEDICINE | Facility: CLINIC | Age: 59
End: 2024-08-29
Payer: COMMERCIAL

## 2024-08-29 VITALS
SYSTOLIC BLOOD PRESSURE: 98 MMHG | WEIGHT: 159 LBS | RESPIRATION RATE: 16 BRPM | TEMPERATURE: 98 F | OXYGEN SATURATION: 97 % | DIASTOLIC BLOOD PRESSURE: 65 MMHG | HEART RATE: 87 BPM | HEIGHT: 62 IN | BODY MASS INDEX: 29.26 KG/M2

## 2024-08-29 DIAGNOSIS — I10 ESSENTIAL (PRIMARY) HYPERTENSION: ICD-10-CM

## 2024-08-29 DIAGNOSIS — E78.1 PURE HYPERGLYCERIDEMIA: ICD-10-CM

## 2024-08-29 DIAGNOSIS — E11.9 TYPE 2 DIABETES MELLITUS W/OUT COMPLICATIONS: ICD-10-CM

## 2024-08-29 DIAGNOSIS — J45.909 UNSPECIFIED ASTHMA, UNCOMPLICATED: ICD-10-CM

## 2024-08-29 DIAGNOSIS — R53.83 OTHER FATIGUE: ICD-10-CM

## 2024-08-29 PROCEDURE — 99213 OFFICE O/P EST LOW 20 MIN: CPT

## 2024-08-29 NOTE — REVIEW OF SYSTEMS
[Joint Pain] : joint pain [Muscle Pain] : muscle pain [Fatigue] : fatigue [TextEntry] : CARDIOVASCULAR: Negative RESPIRATORY: Negative GASTROINTESTINAL: Negative NEUROLOGICAL: Negative

## 2024-08-29 NOTE — ASSESSMENT
[FreeTextEntry1] : 58 year old female found to have stable Hypertension, Type 2 Diabetes Mellitus, Asthma, Hypertriglyceridemia, with the current prescription regimen as recommended, diet and lifestyle modifications, as counseled. Prior results reviewed, interpreted and discussed with the patient during today's examination, as appropriate. Follow up, treatment plan and tests, as ordered.   Total time spent:: 25 minutes Including: Preparation prior to visit - Reviewing prior record, results of tests and Consultation Reports as applicable Conducting an appropriate H & P during today's encounter Appropriate orders for tests, medications and procedures, as applicable Counseling patient Note completion

## 2024-08-29 NOTE — HISTORY OF PRESENT ILLNESS
[de-identified] : 58 year old  female patient with history of stable Hypertension, Type 2 Diabetes Mellitus, Asthma, Hypertriglyceridemia, history as stated, presented for follow up examination. Patient is compliant with all medications. ROS as stated.

## 2024-08-29 NOTE — HEALTH RISK ASSESSMENT
[No] : In the past 12 months have you used drugs other than those required for medical reasons? No [No falls in past year] : Patient reported no falls in the past year [0] : 2) Feeling down, depressed, or hopeless: Not at all (0) [Never] : Never [de-identified] : URO [[id number]] : 0

## 2024-08-29 NOTE — HISTORY OF PRESENT ILLNESS
[de-identified] : 58 year old  female patient with history of stable Hypertension, Type 2 Diabetes Mellitus, Asthma, Hypertriglyceridemia, history as stated, presented for follow up examination. Patient is compliant with all medications. ROS as stated.

## 2024-08-29 NOTE — HEALTH RISK ASSESSMENT
[No] : In the past 12 months have you used drugs other than those required for medical reasons? No [No falls in past year] : Patient reported no falls in the past year [0] : 2) Feeling down, depressed, or hopeless: Not at all (0) [Never] : Never [de-identified] : URO [[id number]] : 0

## 2024-09-01 LAB
ALBUMIN SERPL ELPH-MCNC: 4.6 G/DL
ALP BLD-CCNC: 90 U/L
ALT SERPL-CCNC: 41 U/L
ANION GAP SERPL CALC-SCNC: 10 MMOL/L
AST SERPL-CCNC: 25 U/L
BASOPHILS # BLD AUTO: 0.05 K/UL
BASOPHILS NFR BLD AUTO: 0.7 %
BILIRUB SERPL-MCNC: 0.5 MG/DL
BUN SERPL-MCNC: 16 MG/DL
CALCIUM SERPL-MCNC: 9.8 MG/DL
CHLORIDE SERPL-SCNC: 104 MMOL/L
CHOLEST SERPL-MCNC: 154 MG/DL
CO2 SERPL-SCNC: 27 MMOL/L
CREAT SERPL-MCNC: 0.84 MG/DL
EGFR: 80 ML/MIN/1.73M2
EOSINOPHIL # BLD AUTO: 0.1 K/UL
EOSINOPHIL NFR BLD AUTO: 1.3 %
ESTIMATED AVERAGE GLUCOSE: 114 MG/DL
GGT SERPL-CCNC: 22 U/L
GLUCOSE SERPL-MCNC: 95 MG/DL
HBA1C MFR BLD HPLC: 5.6 %
HCT VFR BLD CALC: 41.3 %
HDLC SERPL-MCNC: 47 MG/DL
HGB BLD-MCNC: 13.6 G/DL
IMM GRANULOCYTES NFR BLD AUTO: 0.3 %
LDLC SERPL CALC-MCNC: 82 MG/DL
LYMPHOCYTES # BLD AUTO: 2.53 K/UL
LYMPHOCYTES NFR BLD AUTO: 33.2 %
MAN DIFF?: NORMAL
MCHC RBC-ENTMCNC: 27.9 PG
MCHC RBC-ENTMCNC: 32.9 GM/DL
MCV RBC AUTO: 84.8 FL
MONOCYTES # BLD AUTO: 0.62 K/UL
MONOCYTES NFR BLD AUTO: 8.1 %
NEUTROPHILS # BLD AUTO: 4.29 K/UL
NEUTROPHILS NFR BLD AUTO: 56.4 %
NONHDLC SERPL-MCNC: 107 MG/DL
PLATELET # BLD AUTO: 316 K/UL
POTASSIUM SERPL-SCNC: 4 MMOL/L
PROT SERPL-MCNC: 7 G/DL
RBC # BLD: 4.87 M/UL
RBC # FLD: 13.1 %
SODIUM SERPL-SCNC: 141 MMOL/L
T3 SERPL-MCNC: 119 NG/DL
T4 FREE SERPL-MCNC: 1.2 NG/DL
TRIGL SERPL-MCNC: 144 MG/DL
TSH SERPL-ACNC: 2.82 UIU/ML
WBC # FLD AUTO: 7.61 K/UL

## 2024-09-17 ENCOUNTER — TRANSCRIPTION ENCOUNTER (OUTPATIENT)
Age: 59
End: 2024-09-17

## 2024-09-18 ENCOUNTER — TRANSCRIPTION ENCOUNTER (OUTPATIENT)
Age: 59
End: 2024-09-18

## 2024-09-19 ENCOUNTER — TRANSCRIPTION ENCOUNTER (OUTPATIENT)
Age: 59
End: 2024-09-19

## 2024-09-25 ENCOUNTER — APPOINTMENT (OUTPATIENT)
Dept: ULTRASOUND IMAGING | Facility: CLINIC | Age: 59
End: 2024-09-25
Payer: COMMERCIAL

## 2024-09-25 ENCOUNTER — OUTPATIENT (OUTPATIENT)
Dept: OUTPATIENT SERVICES | Facility: HOSPITAL | Age: 59
LOS: 1 days | End: 2024-09-25
Payer: COMMERCIAL

## 2024-09-25 ENCOUNTER — TRANSCRIPTION ENCOUNTER (OUTPATIENT)
Age: 59
End: 2024-09-25

## 2024-09-25 DIAGNOSIS — Z98.890 OTHER SPECIFIED POSTPROCEDURAL STATES: Chronic | ICD-10-CM

## 2024-09-25 DIAGNOSIS — Z90.13 ACQUIRED ABSENCE OF BILATERAL BREASTS AND NIPPLES: Chronic | ICD-10-CM

## 2024-09-25 DIAGNOSIS — Z90.710 ACQUIRED ABSENCE OF BOTH CERVIX AND UTERUS: Chronic | ICD-10-CM

## 2024-09-25 DIAGNOSIS — M25.512 PAIN IN LEFT SHOULDER: ICD-10-CM

## 2024-09-25 PROCEDURE — 76881 US COMPL JOINT R-T W/IMG: CPT

## 2024-09-25 PROCEDURE — 76881 US COMPL JOINT R-T W/IMG: CPT | Mod: 26,LT

## 2024-09-26 ENCOUNTER — RX RENEWAL (OUTPATIENT)
Age: 59
End: 2024-09-26

## 2024-10-08 ENCOUNTER — APPOINTMENT (OUTPATIENT)
Dept: INTERNAL MEDICINE | Facility: CLINIC | Age: 59
End: 2024-10-08

## 2024-10-16 NOTE — ASU PATIENT PROFILE, ADULT - AS SC BRADEN FRICTION
General Sunscreen Counseling: I recommended a broad spectrum sunscreen with a SPF of 30 or higher.  I recommend protecting lips as well. I also recommend sun protective clothing. Detail Level: Generalized (3) no apparent problem

## 2024-10-21 NOTE — ASSESSMENT
Detail Level: Detailed [FreeTextEntry1] : 53 year-old female with positive FARSHAD and hx of FINCH with polyarthralgia - not responsive to prednisone\par \par 1. Polyarthralgia  and positive FARSHAD and borderline RF at 17  - no improvement - better on meloxicam - continue with current therapy\par 2. Breast CA - now on tamoxifen\par 3. Lipoma - referral to derm for excision - not seen yet\par 4. Hip and knee pain -x-rays with mild arthritis -   can continue with meloxicam - also with bilateral trochanteric bursitis - will resume deep tissue massage\par 5. Right shoulder- complete RC tear - pending surgery may 2021\par \par I reviewed previous labs results with patients.\par Diagnosis and Prognosis discussed\par Continue with current medications\par medications refilled\par F/u 2 months\par  Depth Of Biopsy: dermis Was A Bandage Applied: Yes Size Of Lesion In Cm: 0 Biopsy Type: H and E Biopsy Method: Dermablade Anesthesia Type: 1% lidocaine with 1:100,000 epinephrine Anesthesia Volume In Cc: 0.5 Hemostasis: Aluminum Chloride and Electrocautery Wound Care: Aquaphor Dressing: bandage Destruction After The Procedure: No Type Of Destruction Used: Curettage Curettage Text: The wound bed was treated with curettage after the biopsy was performed. Cryotherapy Text: The wound bed was treated with cryotherapy after the biopsy was performed. Electrodesiccation Text: The wound bed was treated with electrodesiccation after the biopsy was performed. Electrodesiccation And Curettage Text: The wound bed was treated with electrodesiccation and curettage after the biopsy was performed. Silver Nitrate Text: The wound bed was treated with silver nitrate after the biopsy was performed. Lab: 6 Lab Facility: 3 Consent: Verbal consent was obtained and risks were reviewed including but not limited to scarring, infection, bleeding, scabbing, incomplete removal, nerve damage and allergy to anesthesia. Post-Care Instructions: I reviewed with the patient in detail post-care instructions. Patient is to keep the biopsy site dry overnight, and then apply aquaphor twice daily until healed. Notification Instructions: Patient will be notified of biopsy results. However, patient instructed to call the office if not contacted within 2 weeks. Billing Type: Third-Party Bill Information: Selecting Yes will display possible errors in your note based on the variables you have selected. This validation is only offered as a suggestion for you. PLEASE NOTE THAT THE VALIDATION TEXT WILL BE REMOVED WHEN YOU FINALIZE YOUR NOTE. IF YOU WANT TO FAX A PRELIMINARY NOTE YOU WILL NEED TO TOGGLE THIS TO 'NO' IF YOU DO NOT WANT IT IN YOUR FAXED NOTE.

## 2024-10-28 ENCOUNTER — RX RENEWAL (OUTPATIENT)
Age: 59
End: 2024-10-28

## 2024-11-18 ENCOUNTER — APPOINTMENT (OUTPATIENT)
Dept: INTERNAL MEDICINE | Facility: CLINIC | Age: 59
End: 2024-11-18

## 2024-11-18 VITALS
WEIGHT: 159 LBS | SYSTOLIC BLOOD PRESSURE: 116 MMHG | OXYGEN SATURATION: 97 % | RESPIRATION RATE: 16 BRPM | BODY MASS INDEX: 29.26 KG/M2 | HEART RATE: 76 BPM | HEIGHT: 62 IN | TEMPERATURE: 97.9 F | DIASTOLIC BLOOD PRESSURE: 75 MMHG

## 2024-11-18 DIAGNOSIS — I10 ESSENTIAL (PRIMARY) HYPERTENSION: ICD-10-CM

## 2024-11-18 DIAGNOSIS — E78.1 PURE HYPERGLYCERIDEMIA: ICD-10-CM

## 2024-11-18 DIAGNOSIS — E11.9 TYPE 2 DIABETES MELLITUS W/OUT COMPLICATIONS: ICD-10-CM

## 2024-11-18 DIAGNOSIS — Z23 ENCOUNTER FOR IMMUNIZATION: ICD-10-CM

## 2024-11-18 DIAGNOSIS — J45.909 UNSPECIFIED ASTHMA, UNCOMPLICATED: ICD-10-CM

## 2024-11-18 PROCEDURE — 99213 OFFICE O/P EST LOW 20 MIN: CPT | Mod: 25

## 2024-11-18 PROCEDURE — G0008: CPT

## 2024-11-18 PROCEDURE — 90656 IIV3 VACC NO PRSV 0.5 ML IM: CPT

## 2024-11-20 ENCOUNTER — APPOINTMENT (OUTPATIENT)
Dept: RHEUMATOLOGY | Facility: CLINIC | Age: 59
End: 2024-11-20
Payer: COMMERCIAL

## 2024-11-20 VITALS
OXYGEN SATURATION: 98 % | BODY MASS INDEX: 29.08 KG/M2 | WEIGHT: 158 LBS | HEIGHT: 62 IN | TEMPERATURE: 97.6 F | SYSTOLIC BLOOD PRESSURE: 121 MMHG | DIASTOLIC BLOOD PRESSURE: 77 MMHG | HEART RATE: 75 BPM

## 2024-11-20 DIAGNOSIS — M13.0 POLYARTHRITIS, UNSPECIFIED: ICD-10-CM

## 2024-11-20 DIAGNOSIS — M25.571 PAIN IN RIGHT ANKLE AND JOINTS OF RIGHT FOOT: ICD-10-CM

## 2024-11-20 DIAGNOSIS — M25.572 PAIN IN RIGHT ANKLE AND JOINTS OF RIGHT FOOT: ICD-10-CM

## 2024-11-20 DIAGNOSIS — K11.20 SIALOADENITIS, UNSPECIFIED: ICD-10-CM

## 2024-11-20 PROCEDURE — 99214 OFFICE O/P EST MOD 30 MIN: CPT

## 2024-11-20 PROCEDURE — G2211 COMPLEX E/M VISIT ADD ON: CPT

## 2024-11-20 RX ORDER — PREDNISONE 20 MG/1
20 TABLET ORAL
Qty: 10 | Refills: 0 | Status: ACTIVE | COMMUNITY
Start: 2024-11-20 | End: 1900-01-01

## 2024-11-25 ENCOUNTER — TRANSCRIPTION ENCOUNTER (OUTPATIENT)
Age: 59
End: 2024-11-25

## 2024-11-25 LAB
CCP AB SER IA-ACNC: <8 U/ML
CRP SERPL-MCNC: <3 MG/L
ERYTHROCYTE [SEDIMENTATION RATE] IN BLOOD BY WESTERGREN METHOD: 3 MM/HR
RF+CCP IGG SER-IMP: NEGATIVE
RHEUMATOID FACT SER QL: 14 IU/ML

## 2024-12-04 ENCOUNTER — APPOINTMENT (OUTPATIENT)
Dept: RHEUMATOLOGY | Facility: CLINIC | Age: 59
End: 2024-12-04

## 2025-01-25 ENCOUNTER — NON-APPOINTMENT (OUTPATIENT)
Age: 60
End: 2025-01-25

## 2025-01-28 ENCOUNTER — APPOINTMENT (OUTPATIENT)
Dept: INTERNAL MEDICINE | Facility: CLINIC | Age: 60
End: 2025-01-28
Payer: COMMERCIAL

## 2025-01-28 ENCOUNTER — NON-APPOINTMENT (OUTPATIENT)
Age: 60
End: 2025-01-28

## 2025-01-28 ENCOUNTER — LABORATORY RESULT (OUTPATIENT)
Age: 60
End: 2025-01-28

## 2025-01-28 VITALS
BODY MASS INDEX: 29.26 KG/M2 | TEMPERATURE: 97.9 F | HEIGHT: 62 IN | WEIGHT: 159 LBS | HEART RATE: 75 BPM | DIASTOLIC BLOOD PRESSURE: 70 MMHG | OXYGEN SATURATION: 97 % | SYSTOLIC BLOOD PRESSURE: 114 MMHG

## 2025-01-28 DIAGNOSIS — E78.1 PURE HYPERGLYCERIDEMIA: ICD-10-CM

## 2025-01-28 DIAGNOSIS — D12.6 BENIGN NEOPLASM OF COLON, UNSPECIFIED: ICD-10-CM

## 2025-01-28 DIAGNOSIS — R19.5 OTHER FECAL ABNORMALITIES: ICD-10-CM

## 2025-01-28 DIAGNOSIS — R74.8 ABNORMAL LEVELS OF OTHER SERUM ENZYMES: ICD-10-CM

## 2025-01-28 DIAGNOSIS — R19.7 DIARRHEA, UNSPECIFIED: ICD-10-CM

## 2025-01-28 DIAGNOSIS — M25.512 PAIN IN LEFT SHOULDER: ICD-10-CM

## 2025-01-28 DIAGNOSIS — J45.909 UNSPECIFIED ASTHMA, UNCOMPLICATED: ICD-10-CM

## 2025-01-28 DIAGNOSIS — Z91.89 OTHER SPECIFIED PERSONAL RISK FACTORS, NOT ELSEWHERE CLASSIFIED: ICD-10-CM

## 2025-01-28 DIAGNOSIS — R76.8 OTHER SPECIFIED ABNORMAL IMMUNOLOGICAL FINDINGS IN SERUM: ICD-10-CM

## 2025-01-28 DIAGNOSIS — E11.9 TYPE 2 DIABETES MELLITUS W/OUT COMPLICATIONS: ICD-10-CM

## 2025-01-28 DIAGNOSIS — K76.0 FATTY (CHANGE OF) LIVER, NOT ELSEWHERE CLASSIFIED: ICD-10-CM

## 2025-01-28 DIAGNOSIS — I10 ESSENTIAL (PRIMARY) HYPERTENSION: ICD-10-CM

## 2025-01-28 DIAGNOSIS — R09.89 OTHER SPECIFIED SYMPTOMS AND SIGNS INVOLVING THE CIRCULATORY AND RESPIRATORY SYSTEMS: ICD-10-CM

## 2025-01-28 PROCEDURE — 99215 OFFICE O/P EST HI 40 MIN: CPT

## 2025-01-29 DIAGNOSIS — E55.9 VITAMIN D DEFICIENCY, UNSPECIFIED: ICD-10-CM

## 2025-01-29 LAB
25(OH)D3 SERPL-MCNC: 21.6 NG/ML
ALBUMIN SERPL ELPH-MCNC: 4.6 G/DL
ALP BLD-CCNC: 95 U/L
ALT SERPL-CCNC: 20 U/L
ANION GAP SERPL CALC-SCNC: 15 MMOL/L
APPEARANCE: CLEAR
AST SERPL-CCNC: 20 U/L
BASOPHILS # BLD AUTO: 0.04 K/UL
BASOPHILS NFR BLD AUTO: 0.6 %
BILIRUB SERPL-MCNC: 0.5 MG/DL
BILIRUBIN URINE: NEGATIVE
BLOOD URINE: NEGATIVE
BUN SERPL-MCNC: 13 MG/DL
CALCIUM SERPL-MCNC: 10.1 MG/DL
CHLORIDE SERPL-SCNC: 104 MMOL/L
CHOLEST SERPL-MCNC: 163 MG/DL
CO2 SERPL-SCNC: 22 MMOL/L
COLOR: YELLOW
CREAT SERPL-MCNC: 0.79 MG/DL
CREAT SPEC-SCNC: 170 MG/DL
EGFR: 86 ML/MIN/1.73M2
EOSINOPHIL # BLD AUTO: 0.06 K/UL
EOSINOPHIL NFR BLD AUTO: 0.8 %
ESTIMATED AVERAGE GLUCOSE: 117 MG/DL
FRUCTOSAMINE SERPL-MCNC: 226 UMOL/L
GLUCOSE QUALITATIVE U: NEGATIVE MG/DL
GLUCOSE SERPL-MCNC: 88 MG/DL
HBA1C MFR BLD HPLC: 5.7 %
HCT VFR BLD CALC: 42.4 %
HDLC SERPL-MCNC: 52 MG/DL
HGB BLD-MCNC: 13.7 G/DL
IMM GRANULOCYTES NFR BLD AUTO: 0.1 %
KETONES URINE: NEGATIVE MG/DL
LDLC SERPL CALC-MCNC: 84 MG/DL
LEUKOCYTE ESTERASE URINE: ABNORMAL
LYMPHOCYTES # BLD AUTO: 2.11 K/UL
LYMPHOCYTES NFR BLD AUTO: 29.1 %
MAN DIFF?: NORMAL
MCHC RBC-ENTMCNC: 27.2 PG
MCHC RBC-ENTMCNC: 32.3 G/DL
MCV RBC AUTO: 84.3 FL
MICROALBUMIN 24H UR DL<=1MG/L-MCNC: 1.7 MG/DL
MICROALBUMIN/CREAT 24H UR-RTO: 10 MG/G
MONOCYTES # BLD AUTO: 0.58 K/UL
MONOCYTES NFR BLD AUTO: 8 %
NEUTROPHILS # BLD AUTO: 4.45 K/UL
NEUTROPHILS NFR BLD AUTO: 61.4 %
NITRITE URINE: NEGATIVE
NONHDLC SERPL-MCNC: 111 MG/DL
PH URINE: 5
PLATELET # BLD AUTO: 356 K/UL
POTASSIUM SERPL-SCNC: 3.9 MMOL/L
PROT SERPL-MCNC: 7.5 G/DL
PROTEIN URINE: NEGATIVE MG/DL
RBC # BLD: 5.03 M/UL
RBC # FLD: 13.2 %
SODIUM SERPL-SCNC: 140 MMOL/L
SPECIFIC GRAVITY URINE: 1.02
T PALLIDUM AB SER QL IA: NEGATIVE
TRIGL SERPL-MCNC: 157 MG/DL
TSH SERPL-ACNC: 1.72 UIU/ML
UROBILINOGEN URINE: 0.2 MG/DL
WBC # FLD AUTO: 7.25 K/UL

## 2025-01-29 RX ORDER — CHOLECALCIFEROL (VITAMIN D3) 1250 MCG
1.25 MG CAPSULE ORAL
Qty: 12 | Refills: 0 | Status: ACTIVE | COMMUNITY
Start: 2025-01-29 | End: 1900-01-01

## 2025-01-30 ENCOUNTER — APPOINTMENT (OUTPATIENT)
Age: 60
End: 2025-01-30

## 2025-02-07 ENCOUNTER — NON-APPOINTMENT (OUTPATIENT)
Age: 60
End: 2025-02-07

## 2025-02-11 ENCOUNTER — APPOINTMENT (OUTPATIENT)
Dept: INTERNAL MEDICINE | Facility: CLINIC | Age: 60
End: 2025-02-11

## 2025-02-17 ENCOUNTER — APPOINTMENT (OUTPATIENT)
Dept: RADIOLOGY | Facility: CLINIC | Age: 60
End: 2025-02-17
Payer: COMMERCIAL

## 2025-02-17 ENCOUNTER — APPOINTMENT (OUTPATIENT)
Dept: ULTRASOUND IMAGING | Facility: CLINIC | Age: 60
End: 2025-02-17
Payer: COMMERCIAL

## 2025-02-17 PROCEDURE — 77080 DXA BONE DENSITY AXIAL: CPT

## 2025-02-17 PROCEDURE — 76536 US EXAM OF HEAD AND NECK: CPT

## 2025-02-19 ENCOUNTER — APPOINTMENT (OUTPATIENT)
Dept: CARDIOLOGY | Facility: CLINIC | Age: 60
End: 2025-02-19
Payer: COMMERCIAL

## 2025-02-19 ENCOUNTER — NON-APPOINTMENT (OUTPATIENT)
Age: 60
End: 2025-02-19

## 2025-02-19 VITALS
BODY MASS INDEX: 29.08 KG/M2 | OXYGEN SATURATION: 98 % | DIASTOLIC BLOOD PRESSURE: 73 MMHG | SYSTOLIC BLOOD PRESSURE: 110 MMHG | WEIGHT: 159 LBS | HEART RATE: 66 BPM | RESPIRATION RATE: 16 BRPM

## 2025-02-19 DIAGNOSIS — R00.2 PALPITATIONS: ICD-10-CM

## 2025-02-19 DIAGNOSIS — I10 ESSENTIAL (PRIMARY) HYPERTENSION: ICD-10-CM

## 2025-02-19 PROCEDURE — 93000 ELECTROCARDIOGRAM COMPLETE: CPT | Mod: 59

## 2025-02-19 PROCEDURE — 99214 OFFICE O/P EST MOD 30 MIN: CPT

## 2025-02-19 PROCEDURE — G2211 COMPLEX E/M VISIT ADD ON: CPT | Mod: NC

## 2025-02-20 ENCOUNTER — TRANSCRIPTION ENCOUNTER (OUTPATIENT)
Age: 60
End: 2025-02-20

## 2025-02-25 ENCOUNTER — TRANSCRIPTION ENCOUNTER (OUTPATIENT)
Age: 60
End: 2025-02-25

## 2025-02-26 ENCOUNTER — APPOINTMENT (OUTPATIENT)
Dept: CARDIOLOGY | Facility: CLINIC | Age: 60
End: 2025-02-26
Payer: COMMERCIAL

## 2025-02-26 VITALS — SYSTOLIC BLOOD PRESSURE: 109 MMHG | DIASTOLIC BLOOD PRESSURE: 70 MMHG

## 2025-02-26 PROCEDURE — 93306 TTE W/DOPPLER COMPLETE: CPT

## 2025-02-28 ENCOUNTER — TRANSCRIPTION ENCOUNTER (OUTPATIENT)
Age: 60
End: 2025-02-28

## 2025-03-12 ENCOUNTER — OUTPATIENT (OUTPATIENT)
Dept: OUTPATIENT SERVICES | Facility: HOSPITAL | Age: 60
LOS: 1 days | End: 2025-03-12

## 2025-03-12 ENCOUNTER — APPOINTMENT (OUTPATIENT)
Dept: ULTRASOUND IMAGING | Facility: CLINIC | Age: 60
End: 2025-03-12
Payer: COMMERCIAL

## 2025-03-12 ENCOUNTER — RESULT REVIEW (OUTPATIENT)
Age: 60
End: 2025-03-12

## 2025-03-12 DIAGNOSIS — Z90.710 ACQUIRED ABSENCE OF BOTH CERVIX AND UTERUS: Chronic | ICD-10-CM

## 2025-03-12 DIAGNOSIS — Z98.890 OTHER SPECIFIED POSTPROCEDURAL STATES: Chronic | ICD-10-CM

## 2025-03-12 DIAGNOSIS — Z90.13 ACQUIRED ABSENCE OF BILATERAL BREASTS AND NIPPLES: Chronic | ICD-10-CM

## 2025-03-12 PROCEDURE — 76641 ULTRASOUND BREAST COMPLETE: CPT | Mod: 26,50

## 2025-03-14 NOTE — REVIEW OF SYSTEMS
Call [facility] at [phone] or if it is after hours ask to have the doctor on call paged if your child has:    * any fresh bleeding from the nose or mouth  * A temperature greater than 102F  * Vomiting that lasts more than 24 hours  * Severe pain that gets worse and is not helped by medicine  * Coughing that will not go away  * Problems drinking fluids for more than 24 hours or in not able to urinate  * Neck pain, stiffness or has a hard time turning their head  * Drainage from the ear that is:   - Bright red blood   - Thick mucus   - Foul smelling   - If the drainage continues for 5 days after being treated with ear drops    Appointments you need to make:  You should make a follow up appointment for 2 weeks after surgery.    Routine visits are done every 6 months to check the tubes.  It is important to keep these appointments.  The doctor wants to make sure that the tubes are working properly and that no other problems have occurred.    What to expect:  * Your child will have throat, ear and jaw pain  * Bad breath  * increased nasal drainage  * mild fever for a few days after surgery    Medications:  Ciprodex drops:  Use 3-4 drops twice a day to both ears for 5 days    Pain:  * Your child may have acetaminophen (Tylenol) every 4 to 6 hours or Ibuprofen every 6-8 hours as needed  * If you need additional pain medication, please call the nursing line at [phone]    Diet:  * Offer plenty of fluids  * Start with clear liquids (flat white soda, water, broth, apple juice, and popsicles)  * If your child does not have an upset stomach when fully awake from surgery, a soft diet can be started. Avoid spicy, acidic or rough foods (includes toast, crackers, and potato chips)  * If your child is constipated, please use over the counter Miralax    Activity:  * Recovery takes 1-2 days.  Avoid rough play, gym, swimming, and contact sports during this time  * Your child may go back to school or   immediately.    Ear Infections:  * Your child should have less ear infections with the tubes in place  * Any liquid or drainage from the ear is not normal and means that your child has an ear infection:   - This may be yellow, white, clear, or bloody   - If your child develops ear drainage, call your doctor.  The first treatment is ear drops.  If your child does not improve on ear drops after 5 days, call the ENT clinic to been seen.    What to expect:  * Ear tubes usually stay in place for 12-18 months.  The ear drum pushes the tube out on its own.  This does NOT hurt.    Water precautions:  * In general, ear plugs are only needed for \"dirty water\", like lakes and ponds.  Bath water, showers, and clean chlorinated pools are ok.    With any concerns or questions, or ANY bleeding, call and ask for the ENT on call physician       [Joint Pain] : no joint pain [Joint Stiffness] : no joint stiffness [Muscle Pain] : muscle pain [Muscle Weakness] : no muscle weakness [Skin Rash] : no skin rash [Skin Wound] : no skin wound [Negative] : Allergic/Immunologic [FreeTextEntry9] : back pain from kidney stones  [de-identified] : breast tenderness occasional

## 2025-03-31 PROBLEM — R09.89 UPPER RESPIRATORY SYMPTOM: Status: RESOLVED | Noted: 2024-01-13 | Resolved: 2025-03-31

## 2025-04-01 ENCOUNTER — APPOINTMENT (OUTPATIENT)
Dept: INTERNAL MEDICINE | Facility: CLINIC | Age: 60
End: 2025-04-01
Payer: COMMERCIAL

## 2025-04-01 VITALS
HEIGHT: 62 IN | WEIGHT: 158 LBS | SYSTOLIC BLOOD PRESSURE: 113 MMHG | TEMPERATURE: 97 F | OXYGEN SATURATION: 98 % | DIASTOLIC BLOOD PRESSURE: 75 MMHG | BODY MASS INDEX: 29.08 KG/M2 | HEART RATE: 86 BPM

## 2025-04-01 DIAGNOSIS — E11.9 TYPE 2 DIABETES MELLITUS W/OUT COMPLICATIONS: ICD-10-CM

## 2025-04-01 DIAGNOSIS — E78.1 PURE HYPERGLYCERIDEMIA: ICD-10-CM

## 2025-04-01 DIAGNOSIS — Z00.00 ENCOUNTER FOR GENERAL ADULT MEDICAL EXAMINATION W/OUT ABNORMAL FINDINGS: ICD-10-CM

## 2025-04-01 DIAGNOSIS — M25.512 PAIN IN LEFT SHOULDER: ICD-10-CM

## 2025-04-01 DIAGNOSIS — J45.909 UNSPECIFIED ASTHMA, UNCOMPLICATED: ICD-10-CM

## 2025-04-01 DIAGNOSIS — H04.129 DRY EYE SYNDROME OF UNSPECIFIED LACRIMAL GLAND: ICD-10-CM

## 2025-04-01 DIAGNOSIS — K44.9 DIAPHRAGMATIC HERNIA W/OUT OBSTRUCTION OR GANGRENE: ICD-10-CM

## 2025-04-01 DIAGNOSIS — K74.00 HEPATIC FIBROSIS, UNSPECIFIED: ICD-10-CM

## 2025-04-01 DIAGNOSIS — R09.89 OTHER SPECIFIED SYMPTOMS AND SIGNS INVOLVING THE CIRCULATORY AND RESPIRATORY SYSTEMS: ICD-10-CM

## 2025-04-01 DIAGNOSIS — N20.0 CALCULUS OF KIDNEY: ICD-10-CM

## 2025-04-01 DIAGNOSIS — K76.0 FATTY (CHANGE OF) LIVER, NOT ELSEWHERE CLASSIFIED: ICD-10-CM

## 2025-04-01 PROCEDURE — 99396 PREV VISIT EST AGE 40-64: CPT

## 2025-04-01 RX ORDER — DICLOFENAC SODIUM 10 MG/G
1 GEL TOPICAL
Qty: 100 | Refills: 0 | Status: ACTIVE | COMMUNITY
Start: 2025-04-01 | End: 1900-01-01

## 2025-04-01 RX ORDER — PNEUMOCOCCAL 20-VALENT CONJUGATE VACCINE 2.2; 2.2; 2.2; 2.2; 2.2; 2.2; 2.2; 2.2; 2.2; 2.2; 2.2; 2.2; 2.2; 2.2; 2.2; 2.2; 4.4; 2.2; 2.2; 2.2 UG/.5ML; UG/.5ML; UG/.5ML; UG/.5ML; UG/.5ML; UG/.5ML; UG/.5ML; UG/.5ML; UG/.5ML; UG/.5ML; UG/.5ML; UG/.5ML; UG/.5ML; UG/.5ML; UG/.5ML; UG/.5ML; UG/.5ML; UG/.5ML; UG/.5ML; UG/.5ML
INJECTION, SUSPENSION INTRAMUSCULAR
Qty: 1 | Refills: 0 | Status: ACTIVE | COMMUNITY
Start: 2025-04-01 | End: 1900-01-01

## 2025-04-01 RX ORDER — POLYETHYLENE GLYCOL 400 AND PROPYLENE GLYCOL 4; 3 MG/ML; MG/ML
0.4-0.3 GEL OPHTHALMIC
Qty: 1 | Refills: 6 | Status: ACTIVE | COMMUNITY
Start: 2025-04-01 | End: 1900-01-01

## 2025-04-01 RX ORDER — ZOSTER VACCINE RECOMBINANT, ADJUVANTED 50 MCG/0.5
50 KIT INTRAMUSCULAR
Qty: 1 | Refills: 2 | Status: ACTIVE | COMMUNITY
Start: 2025-04-01 | End: 1900-01-01

## 2025-04-03 ENCOUNTER — RX RENEWAL (OUTPATIENT)
Age: 60
End: 2025-04-03

## 2025-04-04 ENCOUNTER — NON-APPOINTMENT (OUTPATIENT)
Age: 60
End: 2025-04-04

## 2025-04-11 ENCOUNTER — APPOINTMENT (OUTPATIENT)
Dept: CARDIOLOGY | Facility: CLINIC | Age: 60
End: 2025-04-11
Payer: COMMERCIAL

## 2025-04-11 ENCOUNTER — NON-APPOINTMENT (OUTPATIENT)
Age: 60
End: 2025-04-11

## 2025-04-11 VITALS
RESPIRATION RATE: 16 BRPM | OXYGEN SATURATION: 96 % | WEIGHT: 160 LBS | TEMPERATURE: 97 F | BODY MASS INDEX: 29.26 KG/M2 | DIASTOLIC BLOOD PRESSURE: 70 MMHG | HEART RATE: 87 BPM | SYSTOLIC BLOOD PRESSURE: 105 MMHG

## 2025-04-11 DIAGNOSIS — R00.2 PALPITATIONS: ICD-10-CM

## 2025-04-11 DIAGNOSIS — Z01.810 ENCOUNTER FOR PREPROCEDURAL CARDIOVASCULAR EXAMINATION: ICD-10-CM

## 2025-04-11 DIAGNOSIS — I10 ESSENTIAL (PRIMARY) HYPERTENSION: ICD-10-CM

## 2025-04-11 PROCEDURE — 93000 ELECTROCARDIOGRAM COMPLETE: CPT | Mod: NC

## 2025-04-11 PROCEDURE — 99214 OFFICE O/P EST MOD 30 MIN: CPT

## 2025-04-11 PROCEDURE — G2211 COMPLEX E/M VISIT ADD ON: CPT

## 2025-04-14 ENCOUNTER — APPOINTMENT (OUTPATIENT)
Dept: ULTRASOUND IMAGING | Facility: CLINIC | Age: 60
End: 2025-04-14
Payer: COMMERCIAL

## 2025-04-14 PROCEDURE — 76700 US EXAM ABDOM COMPLETE: CPT

## 2025-04-21 ENCOUNTER — RX RENEWAL (OUTPATIENT)
Age: 60
End: 2025-04-21

## 2025-04-29 ENCOUNTER — APPOINTMENT (OUTPATIENT)
Dept: HEPATOLOGY | Facility: CLINIC | Age: 60
End: 2025-04-29
Payer: COMMERCIAL

## 2025-04-29 DIAGNOSIS — K76.0 FATTY (CHANGE OF) LIVER, NOT ELSEWHERE CLASSIFIED: ICD-10-CM

## 2025-04-29 PROCEDURE — 76981 USE PARENCHYMA: CPT

## 2025-04-30 PROBLEM — K76.0 METABOLIC DYSFUNCTION-ASSOCIATED STEATOTIC LIVER DISEASE (MASLD): Status: ACTIVE | Noted: 2025-04-30

## 2025-04-30 PROBLEM — K76.0 NAFLD (NONALCOHOLIC FATTY LIVER DISEASE): Noted: 2017-05-31

## 2025-05-21 ENCOUNTER — APPOINTMENT (OUTPATIENT)
Dept: DERMATOLOGY | Facility: CLINIC | Age: 60
End: 2025-05-21
Payer: COMMERCIAL

## 2025-05-21 VITALS — HEIGHT: 62 IN | WEIGHT: 160 LBS | BODY MASS INDEX: 29.44 KG/M2

## 2025-05-21 DIAGNOSIS — L73.8 OTHER SPECIFIED FOLLICULAR DISORDERS: ICD-10-CM

## 2025-05-21 DIAGNOSIS — D17.21 BENIGN LIPOMATOUS NEOPLASM OF SKIN AND SUBCUTANEOUS TISSUE OF RIGHT ARM: ICD-10-CM

## 2025-05-21 DIAGNOSIS — H02.824 CYSTS OF LEFT UPPER EYELID: ICD-10-CM

## 2025-05-21 DIAGNOSIS — L82.1 OTHER SEBORRHEIC KERATOSIS: ICD-10-CM

## 2025-05-21 PROCEDURE — 99204 OFFICE O/P NEW MOD 45 MIN: CPT

## 2025-05-23 DIAGNOSIS — H60.502 UNSPECIFIED ACUTE NONINFECTIVE OTITIS EXTERNA, LEFT EAR: ICD-10-CM

## 2025-05-23 RX ORDER — TOBRAMYCIN AND DEXAMETHASONE 3; 1 MG/ML; MG/ML
0.3-0.1 SUSPENSION/ DROPS OPHTHALMIC
Qty: 1 | Refills: 3 | Status: ACTIVE | COMMUNITY
Start: 2025-05-23 | End: 1900-01-01

## 2025-05-23 RX ORDER — AMOXICILLIN 875 MG/1
875 TABLET, FILM COATED ORAL
Qty: 10 | Refills: 0 | Status: ACTIVE | COMMUNITY
Start: 2025-05-23 | End: 1900-01-01

## 2025-05-27 ENCOUNTER — RX RENEWAL (OUTPATIENT)
Age: 60
End: 2025-05-27

## 2025-05-27 ENCOUNTER — NON-APPOINTMENT (OUTPATIENT)
Age: 60
End: 2025-05-27

## 2025-05-27 NOTE — PATIENT PROFILE ADULT - NSPROEXTENSIONSOFSELF_GEN_A_NUR
Received request via: Patient    Was the patient seen in the last year in this department? Yes    Does the patient have an active prescription (recently filled or refills available) for medication(s) requested? No    Pharmacy Name: [facility]     Does the patient have [facility] Plus and need 100-day supply? (This applies to ALL medications) Patient does not have SCP     none

## 2025-07-18 ENCOUNTER — APPOINTMENT (OUTPATIENT)
Dept: HEPATOLOGY | Facility: CLINIC | Age: 60
End: 2025-07-18
Payer: COMMERCIAL

## 2025-07-18 VITALS
HEIGHT: 62 IN | RESPIRATION RATE: 16 BRPM | DIASTOLIC BLOOD PRESSURE: 74 MMHG | OXYGEN SATURATION: 97 % | BODY MASS INDEX: 29.63 KG/M2 | WEIGHT: 161 LBS | HEART RATE: 90 BPM | TEMPERATURE: 97.88 F | SYSTOLIC BLOOD PRESSURE: 110 MMHG

## 2025-07-18 LAB
25(OH)D3 SERPL-MCNC: 27.7 NG/ML
ALBUMIN SERPL ELPH-MCNC: 4.4 G/DL
ALP BLD-CCNC: 95 U/L
ALT SERPL-CCNC: 38 U/L
ANION GAP SERPL CALC-SCNC: 16 MMOL/L
AST SERPL-CCNC: 28 U/L
BILIRUB DIRECT SERPL-MCNC: 0.16 MG/DL
BILIRUB INDIRECT SERPL-MCNC: 0.3 MG/DL
BILIRUB SERPL-MCNC: 0.4 MG/DL
BUN SERPL-MCNC: 12 MG/DL
CALCIUM SERPL-MCNC: 9.9 MG/DL
CHLORIDE SERPL-SCNC: 103 MMOL/L
CHOLEST SERPL-MCNC: 165 MG/DL
CO2 SERPL-SCNC: 22 MMOL/L
CREAT SERPL-MCNC: 0.7 MG/DL
EGFRCR SERPLBLD CKD-EPI 2021: 99 ML/MIN/1.73M2
GLUCOSE SERPL-MCNC: 102 MG/DL
HCT VFR BLD CALC: 41.1 %
HDLC SERPL-MCNC: 50 MG/DL
HGB BLD-MCNC: 13.2 G/DL
INR PPP: 1.06 RATIO
LDLC SERPL-MCNC: 91 MG/DL
MCHC RBC-ENTMCNC: 27.2 PG
MCHC RBC-ENTMCNC: 32.1 G/DL
MCV RBC AUTO: 84.6 FL
NONHDLC SERPL-MCNC: 115 MG/DL
PLATELET # BLD AUTO: 301 K/UL
POTASSIUM SERPL-SCNC: 3.8 MMOL/L
PROT SERPL-MCNC: 7.4 G/DL
PT BLD: 12.5 SEC
RBC # BLD: 4.86 M/UL
RBC # FLD: 13.6 %
SODIUM SERPL-SCNC: 141 MMOL/L
TRIGL SERPL-MCNC: 139 MG/DL
WBC # FLD AUTO: 9.62 K/UL

## 2025-07-18 PROCEDURE — 99214 OFFICE O/P EST MOD 30 MIN: CPT

## 2025-07-19 LAB
ESTIMATED AVERAGE GLUCOSE: 120 MG/DL
HBA1C MFR BLD HPLC: 5.8 %
RESP PATH DNA+RNA PNL NPH NAA+NON-PROBE: DETECTED
SARS-COV-2 RNA RESP QL NAA+PROBE: DETECTED

## 2025-07-21 ENCOUNTER — TRANSCRIPTION ENCOUNTER (OUTPATIENT)
Age: 60
End: 2025-07-21

## 2025-08-04 ENCOUNTER — APPOINTMENT (OUTPATIENT)
Dept: INTERNAL MEDICINE | Facility: CLINIC | Age: 60
End: 2025-08-04

## 2025-09-09 ENCOUNTER — APPOINTMENT (OUTPATIENT)
Dept: OTOLARYNGOLOGY | Facility: CLINIC | Age: 60
End: 2025-09-09
Payer: COMMERCIAL

## 2025-09-09 VITALS
SYSTOLIC BLOOD PRESSURE: 100 MMHG | HEIGHT: 62 IN | WEIGHT: 161 LBS | HEART RATE: 75 BPM | DIASTOLIC BLOOD PRESSURE: 65 MMHG | OXYGEN SATURATION: 98 % | TEMPERATURE: 98.1 F | BODY MASS INDEX: 29.63 KG/M2

## 2025-09-09 DIAGNOSIS — H91.93 UNSPECIFIED HEARING LOSS, BILATERAL: ICD-10-CM

## 2025-09-09 DIAGNOSIS — H61.23 IMPACTED CERUMEN, BILATERAL: ICD-10-CM

## 2025-09-09 DIAGNOSIS — M26.629 ARTHRALGIA OF TEMPOROMANDIBULAR JOINT,: ICD-10-CM

## 2025-09-09 DIAGNOSIS — H93.13 TINNITUS, BILATERAL: ICD-10-CM

## 2025-09-09 DIAGNOSIS — F45.8 OTHER SOMATOFORM DISORDERS: ICD-10-CM

## 2025-09-09 DIAGNOSIS — H60.502 UNSPECIFIED ACUTE NONINFECTIVE OTITIS EXTERNA, LEFT EAR: ICD-10-CM

## 2025-09-09 PROCEDURE — 99203 OFFICE O/P NEW LOW 30 MIN: CPT | Mod: 25

## 2025-09-09 PROCEDURE — 69210 REMOVE IMPACTED EAR WAX UNI: CPT | Mod: RT

## (undated) DEVICE — TUBING SUCTION CONN 1/4" X 10FT

## (undated) DEVICE — SUT DERMABOND 0.7ML

## (undated) DEVICE — SYR LUER LOK 10CC

## (undated) DEVICE — DRAPE MAYO STAND 30"

## (undated) DEVICE — SUT NYLON 9-0 5" BV130-5

## (undated) DEVICE — SUT SILK 2-0 18" FS

## (undated) DEVICE — DRAIN RESERVOIR FOR JACKSON PRATT 100CC CARDINAL

## (undated) DEVICE — DRAPE SPLIT SHEETS 77X108"

## (undated) DEVICE — NDL EPIDRL TUOH/WEISS 17GX5IN

## (undated) DEVICE — SUT QUILL MONODERM 3-0 30CM PS-2

## (undated) DEVICE — SUT POLYSORB 0 36" GS-21 UNDYED

## (undated) DEVICE — MERCIAN VISABILITY BACKROUND YELLOW

## (undated) DEVICE — SUT PDS II 3-0 27" SH

## (undated) DEVICE — PACK BASIC

## (undated) DEVICE — SUT PROLENE 5-0 36" RB-1

## (undated) DEVICE — GOWN TRIMAX LG

## (undated) DEVICE — DRAPE FLUID WARMER 44X44"

## (undated) DEVICE — FOLEY TRAY 16FR 5CC LTX UMETER CLOSED

## (undated) DEVICE — SUT QUILL PDO 2 30CM 36MM

## (undated) DEVICE — DRSG STERISTRIPS 0.5X4"

## (undated) DEVICE — DRAIN BLAKE #19

## (undated) DEVICE — SOL IRR POUR H2O 250ML

## (undated) DEVICE — SUT VICRYL 2-0 27" SH UNDYED

## (undated) DEVICE — POSITIONER FOAM EGG CRATE ULNAR (2PCS)

## (undated) DEVICE — ELCTR PENCIL NEPTUNE SMOKE EVACUATION

## (undated) DEVICE — SYR LUER LOK 3CC

## (undated) DEVICE — DRAPE HALF SHEET 40X57"

## (undated) DEVICE — PREP CHLORAPREP ORANGE 2PCT 26ML

## (undated) DEVICE — TUBING SUCTION 20FT

## (undated) DEVICE — DRSG TEGADERM 6"X8"

## (undated) DEVICE — GLV 7.5 PROTEXIS

## (undated) DEVICE — DRAPE 3/4 SHEET W REINFORCEMENT 56X77"

## (undated) DEVICE — CLAMP MICROVASCULAR DOUBLE  1-2MM

## (undated) DEVICE — BLANKET WARMER LOWER ADULT

## (undated) DEVICE — DRSG KLING 6"

## (undated) DEVICE — TUBING TRUWAVE PRESSURE MALE/FEMALE 12"

## (undated) DEVICE — PACK BASIC DISP

## (undated) DEVICE — CLAMP MICROVASCULAR SINGLE  1-2MM

## (undated) DEVICE — SENS SMALL PATCH FBR OPTC

## (undated) DEVICE — INSTR LIGHTED RETRACTOR LARGE DISP

## (undated) DEVICE — NEEDLE COUNTER  FOAM AND MAGNET 40-70

## (undated) DEVICE — SPONGE PATTY X-RAY 0.5X3"

## (undated) DEVICE — BLADE SAFETY LOCK #10

## (undated) DEVICE — SUT QUILL MONODERM 2-0 30CM 19MM

## (undated) DEVICE — SYR ASEPTO

## (undated) DEVICE — DRAPE TOWEL BLUE 17" X 24"

## (undated) DEVICE — STAPLER SKIN VISI-STAT 35 WIDE

## (undated) DEVICE — DRSG BIOPATCH DISK W CHG 1" W 7.0MM HOLE

## (undated) DEVICE — SOL IRR POUR NS 0.9% 500ML

## (undated) DEVICE — DRAPE HANDLE COVER

## (undated) DEVICE — BLANKET WARMER FULL UNDERBODY

## (undated) DEVICE — SUT STRATAFIX SPIRAL PDO 1 30CM CT-1

## (undated) DEVICE — GLV 7 PROTEXIS

## (undated) DEVICE — BAG DECANTER IV STERILE

## (undated) DEVICE — PACK MINOR

## (undated) DEVICE — SUT QUILL MONODERM 2-0 30CM 18MM

## (undated) DEVICE — SUT DERMABOND PRINEO 60CM

## (undated) DEVICE — LONE STAR ELASTIC STAYS 12MM BLUNT

## (undated) DEVICE — MARKER SKIN MULTI TIP 6"

## (undated) DEVICE — BLADE SAFETY LOCK #15

## (undated) DEVICE — DRAPE CAMERA VIDEO 7"X96"

## (undated) DEVICE — DOPPLER PROBE  CABLE

## (undated) DEVICE — SPEAR SURG EYE WECK-CELL CELOS

## (undated) DEVICE — SUT MONOCRYL 4-0 27" PS-2 UNDYED

## (undated) DEVICE — WRAP COMPRESSION CALF LG

## (undated) DEVICE — SUT MONOCRYL 3-0 27" SH

## (undated) DEVICE — SPONGE LAP X-RAY DETECTABLE 18X18"

## (undated) DEVICE — BASIN SPECIAL PROCEDURE

## (undated) DEVICE — SUT PDS II 0 36" CT-1

## (undated) DEVICE — FORCEP BIPOLAR NON STICK 4" W 12FT CORD DISP

## (undated) DEVICE — ELCTR BIPOLAR CORD J&J 12FT DISP

## (undated) DEVICE — CONTAINER SPECIMEN 100ML

## (undated) DEVICE — SUCTION YANKAUER NO CONTROL VENT

## (undated) DEVICE — FOLEY HOLDER STATLOCK 2 WAY ADULT

## (undated) DEVICE — MEDICATION LABELS W MARKER

## (undated) DEVICE — DRAPE IOBAN 23X23"